# Patient Record
Sex: MALE | Race: WHITE | Employment: OTHER | URBAN - METROPOLITAN AREA
[De-identification: names, ages, dates, MRNs, and addresses within clinical notes are randomized per-mention and may not be internally consistent; named-entity substitution may affect disease eponyms.]

---

## 2017-08-05 ENCOUNTER — APPOINTMENT (OUTPATIENT)
Dept: CT IMAGING | Age: 72
DRG: 123 | End: 2017-08-05
Attending: HOSPITALIST
Payer: MEDICARE

## 2017-08-05 ENCOUNTER — APPOINTMENT (OUTPATIENT)
Dept: MRI IMAGING | Age: 72
DRG: 123 | End: 2017-08-05
Attending: HOSPITALIST
Payer: MEDICARE

## 2017-08-05 ENCOUNTER — APPOINTMENT (OUTPATIENT)
Dept: GENERAL RADIOLOGY | Age: 72
DRG: 123 | End: 2017-08-05
Attending: HOSPITALIST
Payer: MEDICARE

## 2017-08-05 ENCOUNTER — HOSPITAL ENCOUNTER (INPATIENT)
Age: 72
LOS: 2 days | Discharge: HOME | DRG: 123 | End: 2017-08-07
Attending: EMERGENCY MEDICINE | Admitting: HOSPITALIST
Payer: MEDICARE

## 2017-08-05 ENCOUNTER — APPOINTMENT (OUTPATIENT)
Dept: CT IMAGING | Age: 72
DRG: 123 | End: 2017-08-05
Attending: EMERGENCY MEDICINE
Payer: MEDICARE

## 2017-08-05 DIAGNOSIS — I63.9 CEREBROVASCULAR ACCIDENT (CVA), UNSPECIFIED MECHANISM (HCC): Primary | ICD-10-CM

## 2017-08-05 PROBLEM — H51.0: Status: ACTIVE | Noted: 2017-08-05

## 2017-08-05 PROBLEM — H53.8 BLURRY VISION: Status: ACTIVE | Noted: 2017-08-05

## 2017-08-05 PROBLEM — R51.9 HEADACHE: Status: ACTIVE | Noted: 2017-08-05

## 2017-08-05 LAB
ALBUMIN SERPL BCP-MCNC: 4 G/DL (ref 3.2–4.6)
ALBUMIN/GLOB SERPL: 1.1 {RATIO} (ref 1.2–3.5)
ALP SERPL-CCNC: 84 U/L (ref 50–136)
ALT SERPL-CCNC: 35 U/L (ref 12–65)
ANION GAP BLD CALC-SCNC: 11 MMOL/L (ref 7–16)
AST SERPL W P-5'-P-CCNC: 30 U/L (ref 15–37)
BASOPHILS # BLD AUTO: 0 K/UL (ref 0–0.2)
BASOPHILS # BLD: 1 % (ref 0–2)
BILIRUB SERPL-MCNC: 0.8 MG/DL (ref 0.2–1.1)
BUN SERPL-MCNC: 16 MG/DL (ref 8–23)
CALCIUM SERPL-MCNC: 9.8 MG/DL (ref 8.3–10.4)
CHLORIDE SERPL-SCNC: 102 MMOL/L (ref 98–107)
CO2 SERPL-SCNC: 28 MMOL/L (ref 21–32)
CREAT SERPL-MCNC: 1.07 MG/DL (ref 0.8–1.5)
CRP SERPL-MCNC: <0.3 MG/DL (ref 0–0.9)
DIFFERENTIAL METHOD BLD: ABNORMAL
EOSINOPHIL # BLD: 0.2 K/UL (ref 0–0.8)
EOSINOPHIL NFR BLD: 3 % (ref 0.5–7.8)
ERYTHROCYTE [DISTWIDTH] IN BLOOD BY AUTOMATED COUNT: 12.5 % (ref 11.9–14.6)
ERYTHROCYTE [SEDIMENTATION RATE] IN BLOOD: 10 MM/HR (ref 0–20)
EST. AVERAGE GLUCOSE BLD GHB EST-MCNC: 171 MG/DL
GLOBULIN SER CALC-MCNC: 3.5 G/DL (ref 2.3–3.5)
GLUCOSE BLD STRIP.AUTO-MCNC: 186 MG/DL (ref 65–100)
GLUCOSE SERPL-MCNC: 200 MG/DL (ref 65–100)
HBA1C MFR BLD: 7.6 % (ref 4.8–6)
HCT VFR BLD AUTO: 41.8 % (ref 41.1–50.3)
HGB BLD-MCNC: 15.2 G/DL (ref 13.6–17.2)
IMM GRANULOCYTES # BLD: 0 K/UL (ref 0–0.5)
IMM GRANULOCYTES NFR BLD AUTO: 0.2 % (ref 0–5)
INR PPP: 1 (ref 0.9–1.2)
LYMPHOCYTES # BLD AUTO: 34 % (ref 13–44)
LYMPHOCYTES # BLD: 2.1 K/UL (ref 0.5–4.6)
MCH RBC QN AUTO: 31.5 PG (ref 26.1–32.9)
MCHC RBC AUTO-ENTMCNC: 36.4 G/DL (ref 31.4–35)
MCV RBC AUTO: 86.7 FL (ref 79.6–97.8)
MONOCYTES # BLD: 0.5 K/UL (ref 0.1–1.3)
MONOCYTES NFR BLD AUTO: 8 % (ref 4–12)
NEUTS SEG # BLD: 3.4 K/UL (ref 1.7–8.2)
NEUTS SEG NFR BLD AUTO: 54 % (ref 43–78)
PLATELET # BLD AUTO: 150 K/UL (ref 150–450)
PMV BLD AUTO: 10.7 FL (ref 10.8–14.1)
POTASSIUM SERPL-SCNC: 3.6 MMOL/L (ref 3.5–5.1)
PROT SERPL-MCNC: 7.5 G/DL (ref 6.3–8.2)
PROTHROMBIN TIME: 10.5 SEC (ref 9.6–12)
RBC # BLD AUTO: 4.82 M/UL (ref 4.23–5.67)
RPR SER QL: NONREACTIVE
SODIUM SERPL-SCNC: 141 MMOL/L (ref 136–145)
WBC # BLD AUTO: 6.3 K/UL (ref 4.3–11.1)

## 2017-08-05 PROCEDURE — 85652 RBC SED RATE AUTOMATED: CPT | Performed by: HOSPITALIST

## 2017-08-05 PROCEDURE — 86592 SYPHILIS TEST NON-TREP QUAL: CPT | Performed by: HOSPITALIST

## 2017-08-05 PROCEDURE — 96374 THER/PROPH/DIAG INJ IV PUSH: CPT | Performed by: EMERGENCY MEDICINE

## 2017-08-05 PROCEDURE — 96375 TX/PRO/DX INJ NEW DRUG ADDON: CPT | Performed by: EMERGENCY MEDICINE

## 2017-08-05 PROCEDURE — 71020 XR CHEST PA LAT: CPT

## 2017-08-05 PROCEDURE — 74011636637 HC RX REV CODE- 636/637: Performed by: HOSPITALIST

## 2017-08-05 PROCEDURE — 82962 GLUCOSE BLOOD TEST: CPT

## 2017-08-05 PROCEDURE — 93005 ELECTROCARDIOGRAM TRACING: CPT | Performed by: EMERGENCY MEDICINE

## 2017-08-05 PROCEDURE — 74011250637 HC RX REV CODE- 250/637: Performed by: HOSPITALIST

## 2017-08-05 PROCEDURE — 80053 COMPREHEN METABOLIC PANEL: CPT | Performed by: EMERGENCY MEDICINE

## 2017-08-05 PROCEDURE — 74011250636 HC RX REV CODE- 250/636: Performed by: HOSPITALIST

## 2017-08-05 PROCEDURE — 65660000000 HC RM CCU STEPDOWN

## 2017-08-05 PROCEDURE — 86140 C-REACTIVE PROTEIN: CPT | Performed by: HOSPITALIST

## 2017-08-05 PROCEDURE — 70450 CT HEAD/BRAIN W/O DYE: CPT

## 2017-08-05 PROCEDURE — 85025 COMPLETE CBC W/AUTO DIFF WBC: CPT | Performed by: EMERGENCY MEDICINE

## 2017-08-05 PROCEDURE — 85610 PROTHROMBIN TIME: CPT | Performed by: EMERGENCY MEDICINE

## 2017-08-05 PROCEDURE — 83036 HEMOGLOBIN GLYCOSYLATED A1C: CPT | Performed by: HOSPITALIST

## 2017-08-05 PROCEDURE — 99285 EMERGENCY DEPT VISIT HI MDM: CPT | Performed by: EMERGENCY MEDICINE

## 2017-08-05 PROCEDURE — 74011250636 HC RX REV CODE- 250/636: Performed by: EMERGENCY MEDICINE

## 2017-08-05 RX ORDER — LORAZEPAM 2 MG/ML
1 INJECTION INTRAMUSCULAR
Status: COMPLETED | OUTPATIENT
Start: 2017-08-05 | End: 2017-08-05

## 2017-08-05 RX ORDER — VALSARTAN 160 MG/1
160 TABLET ORAL DAILY
Status: DISCONTINUED | OUTPATIENT
Start: 2017-08-06 | End: 2017-08-07 | Stop reason: HOSPADM

## 2017-08-05 RX ORDER — SODIUM CHLORIDE 0.9 % (FLUSH) 0.9 %
5-10 SYRINGE (ML) INJECTION AS NEEDED
Status: DISCONTINUED | OUTPATIENT
Start: 2017-08-05 | End: 2017-08-07 | Stop reason: HOSPADM

## 2017-08-05 RX ORDER — SIMVASTATIN 40 MG/1
40 TABLET, FILM COATED ORAL
Status: DISCONTINUED | OUTPATIENT
Start: 2017-08-05 | End: 2017-08-06

## 2017-08-05 RX ORDER — LEVOTHYROXINE SODIUM 150 UG/1
150 TABLET ORAL
Status: DISCONTINUED | OUTPATIENT
Start: 2017-08-06 | End: 2017-08-07 | Stop reason: HOSPADM

## 2017-08-05 RX ORDER — SODIUM CHLORIDE 0.9 % (FLUSH) 0.9 %
5-10 SYRINGE (ML) INJECTION EVERY 8 HOURS
Status: DISCONTINUED | OUTPATIENT
Start: 2017-08-05 | End: 2017-08-07 | Stop reason: HOSPADM

## 2017-08-05 RX ORDER — HYDROCODONE BITARTRATE AND ACETAMINOPHEN 5; 325 MG/1; MG/1
1 TABLET ORAL
Status: DISCONTINUED | OUTPATIENT
Start: 2017-08-05 | End: 2017-08-07 | Stop reason: HOSPADM

## 2017-08-05 RX ORDER — TOPIRAMATE 50 MG/1
50 TABLET, FILM COATED ORAL 2 TIMES DAILY WITH MEALS
Status: DISCONTINUED | OUTPATIENT
Start: 2017-08-05 | End: 2017-08-07 | Stop reason: HOSPADM

## 2017-08-05 RX ORDER — ONDANSETRON 2 MG/ML
4 INJECTION INTRAMUSCULAR; INTRAVENOUS
Status: DISCONTINUED | OUTPATIENT
Start: 2017-08-05 | End: 2017-08-07 | Stop reason: HOSPADM

## 2017-08-05 RX ORDER — NALOXONE HYDROCHLORIDE 0.4 MG/ML
0.4 INJECTION, SOLUTION INTRAMUSCULAR; INTRAVENOUS; SUBCUTANEOUS AS NEEDED
Status: DISCONTINUED | OUTPATIENT
Start: 2017-08-05 | End: 2017-08-07 | Stop reason: HOSPADM

## 2017-08-05 RX ORDER — INSULIN LISPRO 100 [IU]/ML
INJECTION, SOLUTION INTRAVENOUS; SUBCUTANEOUS
Status: DISCONTINUED | OUTPATIENT
Start: 2017-08-05 | End: 2017-08-07 | Stop reason: HOSPADM

## 2017-08-05 RX ORDER — PREDNISONE 20 MG/1
40 TABLET ORAL
Status: DISCONTINUED | OUTPATIENT
Start: 2017-08-06 | End: 2017-08-07 | Stop reason: HOSPADM

## 2017-08-05 RX ORDER — GUAIFENESIN 100 MG/5ML
81 LIQUID (ML) ORAL DAILY
Status: DISCONTINUED | OUTPATIENT
Start: 2017-08-06 | End: 2017-08-07 | Stop reason: HOSPADM

## 2017-08-05 RX ORDER — FUROSEMIDE 40 MG/1
40 TABLET ORAL DAILY
Status: DISCONTINUED | OUTPATIENT
Start: 2017-08-06 | End: 2017-08-07 | Stop reason: HOSPADM

## 2017-08-05 RX ORDER — ENOXAPARIN SODIUM 100 MG/ML
40 INJECTION SUBCUTANEOUS EVERY 24 HOURS
Status: DISCONTINUED | OUTPATIENT
Start: 2017-08-05 | End: 2017-08-07 | Stop reason: HOSPADM

## 2017-08-05 RX ORDER — NITROGLYCERIN 20 MG/1
1 PATCH TRANSDERMAL DAILY
Status: DISCONTINUED | OUTPATIENT
Start: 2017-08-06 | End: 2017-08-07

## 2017-08-05 RX ORDER — AMLODIPINE BESYLATE 5 MG/1
2.5 TABLET ORAL DAILY
Status: CANCELLED | OUTPATIENT
Start: 2017-08-06

## 2017-08-05 RX ORDER — METOCLOPRAMIDE HYDROCHLORIDE 5 MG/ML
10 INJECTION INTRAMUSCULAR; INTRAVENOUS
Status: COMPLETED | OUTPATIENT
Start: 2017-08-05 | End: 2017-08-05

## 2017-08-05 RX ORDER — ACETAMINOPHEN 325 MG/1
650 TABLET ORAL
Status: DISCONTINUED | OUTPATIENT
Start: 2017-08-05 | End: 2017-08-07 | Stop reason: HOSPADM

## 2017-08-05 RX ORDER — DEXAMETHASONE SODIUM PHOSPHATE 100 MG/10ML
10 INJECTION INTRAMUSCULAR; INTRAVENOUS
Status: COMPLETED | OUTPATIENT
Start: 2017-08-05 | End: 2017-08-05

## 2017-08-05 RX ORDER — METOPROLOL TARTRATE 25 MG/1
25 TABLET, FILM COATED ORAL 2 TIMES DAILY
Status: DISCONTINUED | OUTPATIENT
Start: 2017-08-05 | End: 2017-08-07 | Stop reason: HOSPADM

## 2017-08-05 RX ORDER — PREDNISONE 20 MG/1
60 TABLET ORAL ONCE
Status: COMPLETED | OUTPATIENT
Start: 2017-08-05 | End: 2017-08-05

## 2017-08-05 RX ADMIN — PREDNISONE 60 MG: 20 TABLET ORAL at 22:32

## 2017-08-05 RX ADMIN — Medication 10 ML: at 22:33

## 2017-08-05 RX ADMIN — METOPROLOL TARTRATE 25 MG: 25 TABLET ORAL at 22:32

## 2017-08-05 RX ADMIN — SIMVASTATIN 40 MG: 40 TABLET, FILM COATED ORAL at 22:32

## 2017-08-05 RX ADMIN — DEXAMETHASONE SODIUM PHOSPHATE 10 MG: 10 INJECTION INTRAMUSCULAR; INTRAVENOUS at 18:31

## 2017-08-05 RX ADMIN — INSULIN LISPRO 2 UNITS: 100 INJECTION, SOLUTION INTRAVENOUS; SUBCUTANEOUS at 22:31

## 2017-08-05 RX ADMIN — LORAZEPAM 1 MG: 2 INJECTION INTRAMUSCULAR; INTRAVENOUS at 18:30

## 2017-08-05 RX ADMIN — METOCLOPRAMIDE 10 MG: 5 INJECTION, SOLUTION INTRAMUSCULAR; INTRAVENOUS at 18:31

## 2017-08-05 RX ADMIN — ENOXAPARIN SODIUM 40 MG: 40 INJECTION SUBCUTANEOUS at 22:32

## 2017-08-05 NOTE — IP AVS SNAPSHOT
Kev Burton 
 
 
 [address] [facility] [address] [facility] 
[phone] Patient: Alexis Vieyra MRN: [id number] FXO:[id number] You are allergic to the following No active allergies Recent Documentation Height Weight BMI  
  
  
 1.854 m 96.2 kg 27.97 kg/m2 Emergency Contacts Name Discharge Info Relation Home Work Mobile Danya Xie  Spouse [3] [phone] About your hospitalization You were admitted on:  August 5, 2017 You last received care in the:  [facility] 6 MED SURG You were discharged on:  August 7, 2017 Unit phone number:  [phone] Why you were hospitalized Your primary diagnosis was:  Not on File Your diagnoses also included:  Blurry Vision, Paralysis Of Conjugate Gaze, Headache Providers Seen During Your Hospitalizations Provider Role Specialty Primary office phone Estelita Cabral MD Attending Provider Emergency Medicine [phone] Christelle Jordan MD Attending Provider Internal Medicine [phone] Your Primary Care Physician (PCP) Primary Care Physician Office Phone Office Fax UNKNOWN, PROVIDER ** None ** ** None ** Follow-up Information Follow up With Details Comments Contact Info [address] [facility]  patient will need to call and schedule. [facility] 
[phone] Ella Hickman MD  we will notify you of your appointment date and time y [address] [facility] for [address] [place] [address] [address] [place] [address] 
[phone] Current Discharge Medication List  
  
START taking these medications Dose & Instructions Dispensing Information Comments Morning Noon Evening Bedtime  
 atorvastatin 40 mg tablet Commonly known as:  LIPITOR Your next dose is: This evening Dose:  40 mg Take 1 Tab by mouth nightly. Quantity:  30 Tab Refills:  0 butalbital-acetaminophen-caffeine -40 mg per tablet Commonly known as:  Georgiann Pallas Your next dose is: Take on as needed schedule Dose:  1 Tab Take 1 Tab by mouth every six (6) hours as needed for Headache. Max Daily Amount: 4 Tabs. Quantity:  30 Tab Refills:  0  
     
   
   
   
  
 predniSONE 20 mg tablet Commonly known as:  Savage Aver Your next dose is:  Tomorrow Morning Take 40 mg x 3 days, then 20 mg x 3 days, then10 mg x 3 days then stop Quantity:  11 Tab Refills:  0  
     
  
   
   
   
  
 topiramate 50 mg tablet Commonly known as:  TOPAMAX Your next dose is: This evening Dose:  50 mg Take 1 Tab by mouth two (2) times daily (with meals). Quantity:  60 Tab Refills:  0 CONTINUE these medications which have NOT CHANGED Dose & Instructions Dispensing Information Comments Morning Noon Evening Bedtime  
 aspirin 81 mg chewable tablet Your next dose is:  Tomorrow Morning Dose:  81 mg Take 81 mg by mouth daily. Refills:  0  
     
  
   
   
   
  
 cholecalciferol 400 unit Tab tablet Commonly known as:  VITAMIN D3 Your next dose is:  Tomorrow Morning Dose:  400 Units Take 400 Units by mouth daily. Refills:  0  
     
  
   
   
   
  
 cyanocobalamin 1,000 mcg tablet Your next dose is:  Tomorrow Morning Dose:  1000 mcg Take 1,000 mcg by mouth daily. Refills:  0  
     
  
   
   
   
  
 glipiZIDE 5 mg tablet Commonly known as:  Ora Kitchen Your next dose is: This evening Dose:  5 mg Take 5 mg by mouth two (2) times a day. Refills:  0  
     
  
   
   
  
   
  
 LASIX 40 mg tablet Generic drug:  furosemide Your next dose is:  Tomorrow Morning Take  by mouth daily. Refills:  0  
     
  
   
   
   
  
 metFORMIN 1,000 mg tablet Commonly known as:  GLUCOPHAGE Your next dose is: This evening Dose:  1000 mg Take 1,000 mg by mouth two (2) times daily (with meals). Refills:  0  
     
  
   
   
  
   
  
 metoprolol tartrate 50 mg tablet Commonly known as:  LOPRESSOR Your next dose is: This evening Dose:  25 mg Take 0.5 Tabs by mouth two (2) times a day. Quantity:  60 Tab Refills:  2 NITRO-DUR 0.1 mg/hr Generic drug:  nitroglycerin Dose:  1 Patch 1 Patch by TransDERmal route daily. Refills:  0 NORVASC 2.5 mg tablet Generic drug:  amLODIPine Your next dose is:  Tomorrow Morning Take  by mouth daily. Refills:  0  
     
  
   
   
   
  
 SYNTHROID 125 mcg tablet Generic drug:  levothyroxine Your next dose is:  Tomorrow Morning Dose:  150 mcg Take 150 mcg by mouth Daily (before breakfast). Refills:  0 STOP taking these medications ZOCOR 10 mg tablet Generic drug:  simvastatin Where to Get Your Medications Information on where to get these meds will be given to you by the nurse or doctor. ! Ask your nurse or doctor about these medications  
  atorvastatin 40 mg tablet  
 butalbital-acetaminophen-caffeine -40 mg per tablet  
 predniSONE 20 mg tablet  
 topiramate 50 mg tablet Discharge Instructions DISCHARGE SUMMARY from Nurse The following personal items are in your possession at time of discharge: 
 
Dental Appliances: Uppers, Lowers, With patient Visual Aid: Glasses Home Medications: None Jewelry: None Clothing: Shirt, Pants, Socks, Footwear, At bedside Other Valuables: Cell Phone PATIENT INSTRUCTIONS: 
 
After general anesthesia or intravenous sedation, for 24 hours or while taking prescription Narcotics: · Limit your activities · Do not drive and operate hazardous machinery · Do not make important personal or business decisions · Do  not drink alcoholic beverages · If you have not urinated within 8 hours after discharge, please contact your surgeon on call. Report the following to your surgeon: 
· Excessive pain, swelling, redness or odor of or around the surgical area · Temperature over 100.5 · Nausea and vomiting lasting longer than 4 hours or if unable to take medications · Any signs of decreased circulation or nerve impairment to extremity: change in color, persistent  numbness, tingling, coldness or increase pain · Any questions What to do at Home: 
Recommended activity: Activity as tolerated, If you experience any of the following symptoms fever greater than 101, new or unrelieved pain,dizziness, chest pain or shortness of breath , please follow up with MD. 
 
 
 
Recognize signs and symptoms of STROKE: 
 
 F-face looks uneven A-arms unable to move or move unevenly S-speech slurred or non-existent T-time-call 911 as soon as signs and symptoms begin-DO NOT go Back to bed or wait to see if you get better-TIME IS BRAIN. Warning Signs of HEART ATTACK Call 911 if you have these symptoms: 
? Chest discomfort. Most heart attacks involve discomfort in the center of the chest that lasts more than a few minutes, or that goes away and comes back. It can feel like uncomfortable pressure, squeezing, fullness, or pain. ? Discomfort in other areas of the upper body. Symptoms can include pain or discomfort in one or both arms, the back, neck, jaw, or stomach. ? Shortness of breath with or without chest discomfort. ? Other signs may include breaking out in a cold sweat, nausea, or lightheadedness. Don't wait more than five minutes to call [address]! Fast action can save your life. Calling 911 is almost always the fastest way to get lifesaving treatment. Emergency Medical Services staff can begin treatment when they arrive  up to an hour sooner than if someone gets to the hospital by car. The discharge information has been reviewed with the patient. The patient verbalized understanding. Discharge medications reviewed with the patient and appropriate educational materials and side effects teaching were provided. Discharge Orders None [place] Announcement We are excited to announce that we are making your provider's discharge notes available to you in [employer]. You will see these notes when they are completed and signed by the physician that discharged you from your recent hospital stay. If you have any questions or concerns about any information you see in [employer], please call the Health Information Department where you were seen or reach out to your Primary Care Provider for more information about your plan of care. Introducing [facility]! Una Flores introduces [employer] patient portal. Now you can access parts of your medical record, email your doctor's office, and request medication refills online. 1. In your internet browser, go to [email or web address] 2. Click on the First Time User? Click Here link in the Sign In box. You will see the New Member Sign Up page. 3. Enter your [employer] Access Code exactly as it appears below. You will not need to use this code after youve completed the sign-up process. If you do not sign up before the expiration date, you must request a new code. · [employer] Access Code: [email or web address] Expires: 11/3/2017  4:35 PM 
 
4. Enter the last four digits of your Social Security Number (xxxx) and Date of Birth (mm/dd/yyyy) as indicated and click Submit. You will be taken to the next sign-up page. 5. Create a [employer] ID. This will be your [employer] login ID and cannot be changed, so think of one that is secure and easy to remember. 6. Create a [employer] password. You can change your password at any time. 7. Enter your Password Reset Question and Answer. This can be used at a later time if you forget your password. 8. Enter your e-mail address. You will receive e-mail notification when new information is available in [address] 9. Click Sign Up. You can now view and download portions of your medical record. 10. Click the Download Summary menu link to download a portable copy of your medical information. If you have questions, please visit the Frequently Asked Questions section of the [employer] website. Remember, [employer] is NOT to be used for urgent needs. For medical emergencies, dial 911. Now available from your iPhone and Android! General Information Please provide this summary of care documentation to your next provider. Patient Signature:  ____________________________________________________________ Date:  ____________________________________________________________  
  
Eav Ciaran Provider Signature:  ____________________________________________________________ Date:  ____________________________________________________________

## 2017-08-05 NOTE — ED PROVIDER NOTES
HPI Comments: 58-year-old male presents with a seven-day history of blurred vision and double vision. This is associated with a right sided headache which radiates into his neck. Headache does seem to be worse with bright lights. Patient states he went to the [place] for evaluation last Saturday; they performed a CAT scan which he has not heard the results of and plain x-rays of his neck which only revealed some degenerative change. Patient denies history of headaches up until this event. Patient has had no fever vomiting or diarrhea  Patient denies trauma    Patient presents today due to worsening right-sided headache and persistent blurriness and double vision with his binocular vision. Patient clearly states that when he only uses one eye, his vision is clear and normal.  This is the same for either of his eyes. Patient and caregiver report that he was seen by ophthalmology  In the middle of the week. They felt that he did not have a direct ophthalmologic problem but were concerned that he may have had a \"stroke\"  The patient is unaware of any other specific instructions or treatment that the ophthalmologist advised. Patient is a 74 y.o. male presenting with visual disturbance. The history is provided by the patient and a friend. Vision Change    This is a new problem. Episode onset: abrupt onset of symptoms 7 days ago. The problem occurs constantly. The problem has been gradually worsening. Affected eye: patient reports blurry and double vision with binocular vision states that his vision is clear when he closes either eye. The injury mechanism was none. The pain is moderate. There is no history of trauma to the eye. There is no known exposure to pink eye. He does not wear contacts. Associated symptoms include blurred vision, double vision and photophobia.  Pertinent negatives include no numbness, no discharge, no foreign body sensation, no eye redness, no nausea, no vomiting, no tingling, no weakness, no itching, no fever, no pain, no blindness, no head injury and no dizziness. He has tried nothing for the symptoms. Past Medical History:   Diagnosis Date    CAD (Coronary Artery Disease)     Endocrine Disease     hypothyriod    Heart Failure ([address]     Hypertension        Past Surgical History:   Procedure Laterality Date    CARDIAC SURG PROCEDURE UNLIST      3 vessel bypass    HX CHOLECYSTECTOMY           No family history on file. Social History     Social History    Marital status:      Spouse name: N/A    Number of children: N/A    Years of education: N/A     Occupational History    Not on file. Social History Main Topics    Smoking status: Never Smoker    Smokeless tobacco: Not on file    Alcohol use No    Drug use: No    Sexual activity: Not on file     Other Topics Concern    Not on file     Social History Narrative    No narrative on file         ALLERGIES: Review of patient's allergies indicates no known allergies. Review of Systems   Constitutional: Negative for activity change, chills, diaphoresis and fever. HENT: Negative for dental problem, hearing loss, nosebleeds, rhinorrhea and sore throat. Eyes: Positive for blurred vision, double vision, photophobia and visual disturbance. Negative for blindness, pain, discharge and redness. Respiratory: Negative for cough, chest tightness and shortness of breath. Cardiovascular: Negative for chest pain, palpitations and leg swelling. Gastrointestinal: Negative for abdominal pain, constipation, diarrhea, nausea and vomiting. Endocrine: Negative for cold intolerance, heat intolerance, polydipsia and polyuria. Genitourinary: Negative for dysuria and flank pain. Musculoskeletal: Negative for arthralgias, back pain, joint swelling, myalgias and neck pain. Skin: Negative for itching, pallor and rash. Allergic/Immunologic: Negative for environmental allergies and food allergies.    Neurological: Positive for headaches. Negative for dizziness, tingling, tremors, seizures, syncope, weakness, light-headedness and numbness. Hematological: Negative for adenopathy. Does not bruise/bleed easily. Psychiatric/Behavioral: Negative for confusion and dysphoric mood. The patient is not nervous/anxious and is not hyperactive. All other systems reviewed and are negative. Vitals:    08/05/17 1651   BP: 149/83   Pulse: 79   Resp: 16   Temp: 97.8 °F (36.6 °C)   SpO2: 95%   Weight: 96.2 kg (212 lb)   Height: 6' 1\" (1.854 m)            Physical Exam   Constitutional: He is oriented to person, place, and time. He appears well-developed and well-nourished. He appears distressed. HENT:   Head: Normocephalic and atraumatic. Mouth/Throat: Oropharynx is clear and moist.   Eyes: Conjunctivae and EOM are normal. Pupils are equal, round, and reactive to light. Right eye exhibits no discharge. Left eye exhibits no discharge. No scleral icterus. Right eye gaze palsy. Patient is unable to deviate his right eye laterally  Pupils are equal round and reactive to light     Neck: Normal range of motion. Neck supple. No JVD present. Cardiovascular: Normal rate, regular rhythm and intact distal pulses. Exam reveals no gallop and no friction rub. Murmur heard. Pulmonary/Chest: Effort normal and breath sounds normal. No respiratory distress. He has no wheezes. Abdominal: Soft. Bowel sounds are normal. He exhibits no distension. There is no hepatosplenomegaly. There is no tenderness. There is no rebound and no guarding. Musculoskeletal: Normal range of motion. He exhibits no edema or tenderness. Lymphadenopathy:     He has no cervical adenopathy. Neurological: He is alert and oriented to person, place, and time. He has normal strength. No cranial nerve deficit or sensory deficit. He exhibits normal muscle tone. GCS eye subscore is 4. GCS verbal subscore is 5. GCS motor subscore is 6. Skin: Skin is warm and dry. No rash noted. He is not diaphoretic. No erythema. Psychiatric: He has a normal mood and affect. His speech is normal and behavior is normal. Judgment and thought content normal. Cognition and memory are normal.   Nursing note and vitals reviewed. MDM  Number of Diagnoses or Management Options  Cerebrovascular accident (CVA), unspecified mechanism [facility]): new and requires workup  Diagnosis management comments: Differential diagnosis includes stroke aneurysm electrolyte abnormality atypical migraine per patient's history the ophthalmologic consultants have ruled out a pure ophthalmologic issue. 6:07 PM  CT is negative for an acute stroke bleed or mass effect. CBC and see med are within normal limits  Currently we will attempt to treat the patient's headache to help exclude a migraine etiology    7:25 PM  Patient headache relieved with medications. Unfortunately, the vision changes are unchanged. I will consult with the hospitalist for admission for full stroke workup. Amount and/or Complexity of Data Reviewed  Clinical lab tests: ordered and reviewed  Tests in the radiology section of CPT®: ordered and reviewed  Tests in the medicine section of CPT®: ordered and reviewed  Obtain history from someone other than the patient: yes  Review and summarize past medical records: yes  Discuss the patient with other providers: yes    Risk of Complications, Morbidity, and/or Mortality  Presenting problems: high  Diagnostic procedures: high  Management options: high  General comments: Elements of this note have been dictated via voice recognition software. Text and phrases may be limited by the accuracy of the software. The chart has been reviewed, but errors may still be present.       Critical Care  Total time providing critical care: 30-74 minutes    Patient Progress  Patient progress: improved    ED Course       Procedures

## 2017-08-05 NOTE — H&P
HOSPITALIST H&P/CONSULT  NAME:  Jeri Jacobs   Age:  74 y.o.  :   1945   MRN:   [id number]  PCP: PROVIDER Everardo Claros  Consulting MD:  Treatment Team: Attending Provider: Izabella Gallegos MD; Primary Nurse: Leena Reid RN  HPI:   Patient is 74years old male with pmhx of HTN, congestive HF unknown type hypothyroidism, dyslipidemia, CAD s/p CABG x4 and PCI who presented to ER with chief complaints of 1 week history of blurry vision and headaches. Pt was in his usual health until about a week ago when he had sudden onset of binocular blurry vision for which he saw ophthalmologist who mentioned that pt probably had a stroke with no other interventions offered. Pt is also having worsening headaches, mostly right sided and mostly located behind the eye. Pt denies scalp tenderness or jaw claudication. He complains of diplopia with no fever, nausea, vomiting, chest pain, SOB, weakness or numbness/tingling in extremities. He denies any recent travel, change in medications, sick contacts. Complete ROS done and is as stated in HPI or otherwise negative  Past Medical History:   Diagnosis Date    CAD (coronary artery disease)     Endocrine disease     hypothyriod    Headache 2017    Heart failure ([address]     Hypertension       Past Surgical History:   Procedure Laterality Date    CARDIAC SURG PROCEDURE UNLIST      3 vessel bypass    HX CHOLECYSTECTOMY        Prior to Admission Medications   Prescriptions Last Dose Informant Patient Reported? Taking?   amlodipine (NORVASC) 2.5 mg tablet   Yes No   Sig: Take  by mouth daily. levothyroxine (SYNTHROID) 125 mcg tablet   Yes No   Sig: Take  by mouth daily (before breakfast). metoprolol (LOPRESSOR) 50 mg tablet   No No   Sig: Take 0.5 Tabs by mouth two (2) times a day. nitroglycerin (NITRO-DUR) 0.1 mg/hr   Yes No   Si Patch by TransDERmal route daily. simvastatin (ZOCOR) 10 mg tablet   Yes No   Sig: Take  by mouth nightly. Facility-Administered Medications: None     No Known Allergies   Social History   Substance Use Topics    Smoking status: Never Smoker    Smokeless tobacco: Not on file    Alcohol use No      No family history on file. Objective:     Visit Vitals    /83 (BP 1 Location: Right arm, BP Patient Position: At rest)    Pulse 79    Temp 97.8 °F (36.6 °C)    Resp 16    Ht 6' 1\" (1.854 m)    Wt 96.2 kg (212 lb)    SpO2 95%    BMI 27.97 kg/m2      Temp (24hrs), Av.8 °F (36.6 °C), Min:97.8 °F (36.6 °C), Max:97.8 °F (36.6 °C)    Oxygen Therapy  O2 Sat (%): 95 % (17)  O2 Device: Room air (17)  Physical Exam:  General:    NAD  HEENT:           Head AT/NC, PERRLA+, no pallor or ict, MMM, neck supple. Lungs:   Clear to auscultation bilaterally. No Wheezing or Rhonchi. No rales. Heart:   Regular rate and rhythm,  no murmur, rub or gallop. Abdomen:   Soft, non-tender. Not distended. Bowel sounds normal.   Extremities: No cyanosis. No edema. No clubbing  Skin:     Texture, turgor normal. No rashes or lesions. Not Jaundiced  Neurologic: GCS 15, right lateral gaze paralysis, with right temporal hemianopsia. No motor or sensory deficits, cerebellar functions intact. Psych:           AO x 3, mood and affect anxious       Data Review:   Recent Results (from the past 24 hour(s))   CBC WITH AUTOMATED DIFF    Collection Time: 17  4:54 PM   Result Value Ref Range    WBC 6.3 4.3 - 11.1 K/uL    RBC 4.82 4.23 - 5.67 M/uL    HGB 15.2 13.6 - 17.2 g/dL    HCT 41.8 41.1 - 50.3 %    MCV 86.7 79.6 - 97.8 FL    MCH 31.5 26.1 - 32.9 PG    MCHC 36.4 (H) 31.4 - 35.0 g/dL    RDW 12.5 11.9 - 14.6 %    PLATELET 352 199 - 066 K/uL    MPV 10.7 (L) 10.8 - 14.1 FL    DF AUTOMATED      NEUTROPHILS 54 43 - 78 %    LYMPHOCYTES 34 13 - 44 %    MONOCYTES 8 4.0 - 12.0 %    EOSINOPHILS 3 0.5 - 7.8 %    BASOPHILS 1 0.0 - 2.0 %    IMMATURE GRANULOCYTES 0.2 0.0 - 5.0 %    ABS.  NEUTROPHILS 3.4 1.7 - 8.2 K/UL ABS. LYMPHOCYTES 2.1 0.5 - 4.6 K/UL    ABS. MONOCYTES 0.5 0.1 - 1.3 K/UL    ABS. EOSINOPHILS 0.2 0.0 - 0.8 K/UL    ABS. BASOPHILS 0.0 0.0 - 0.2 K/UL    ABS. IMM. GRANS. 0.0 0.0 - 0.5 K/UL   METABOLIC PANEL, COMPREHENSIVE    Collection Time: 08/05/17  4:54 PM   Result Value Ref Range    Sodium 141 136 - 145 mmol/L    Potassium 3.6 3.5 - 5.1 mmol/L    Chloride 102 98 - 107 mmol/L    CO2 28 21 - 32 mmol/L    Anion gap 11 7 - 16 mmol/L    Glucose 200 (H) 65 - 100 mg/dL    BUN 16 8 - 23 MG/DL    Creatinine 1.07 0.8 - 1.5 MG/DL    GFR est AA >60 >60 ml/min/1.73m2    GFR est non-AA >60 >60 ml/min/1.73m2    Calcium 9.8 8.3 - 10.4 MG/DL    Bilirubin, total 0.8 0.2 - 1.1 MG/DL    ALT (SGPT) 35 12 - 65 U/L    AST (SGOT) 30 15 - 37 U/L    Alk. phosphatase 84 50 - 136 U/L    Protein, total 7.5 6.3 - 8.2 g/dL    Albumin 4.0 3.2 - 4.6 g/dL    Globulin 3.5 2.3 - 3.5 g/dL    A-G Ratio 1.1 (L) 1.2 - 3.5     PROTHROMBIN TIME + INR    Collection Time: 08/05/17  4:54 PM   Result Value Ref Range    Prothrombin time 10.5 9.6 - 12.0 sec    INR 1.0 0.9 - 1.2       Imaging /Procedures /Studies   CT head w/o contrast:  Impression:   1. No evidence of hemorrhage. 2. 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 Will follow up with ESR, CRP. Will start on Prednisone. Temporal artery biopsy can be needed. ·  to r/o stroke, MRI/MRA can't be done because of pacemaker. Will get CTA head and carotid dopplers. Will also get 2d-echo. · For headache, tylenol and topiramate.   · Tele neurology consult in AM.  · Continue home meds as reconciled in [facility] for HTN, Dyslipidemia, hypothyroidism, CHF. · DVT prophylaxis with lovenox.   · SSI, and HbA1c  · PRN tylenol for fever    Code Status: full    Anticipated discharge: > 2MN    Signed By: Magali Merrill MD     August 5, 2017

## 2017-08-05 NOTE — ED TRIAGE NOTES
Pt. Reports blurred vision/difficulty seeing out of his right eye. Stated that this started a week ago. States he went to the [place] and had a CT but has not been notified of the results. States he has now started to have headaches that come and go.

## 2017-08-06 ENCOUNTER — APPOINTMENT (OUTPATIENT)
Dept: ULTRASOUND IMAGING | Age: 72
DRG: 123 | End: 2017-08-06
Attending: HOSPITALIST
Payer: MEDICARE

## 2017-08-06 ENCOUNTER — APPOINTMENT (OUTPATIENT)
Dept: CT IMAGING | Age: 72
DRG: 123 | End: 2017-08-06
Attending: HOSPITALIST
Payer: MEDICARE

## 2017-08-06 LAB
ALBUMIN SERPL BCP-MCNC: 4 G/DL (ref 3.2–4.6)
ALBUMIN/GLOB SERPL: 1 {RATIO} (ref 1.2–3.5)
ALP SERPL-CCNC: 68 U/L (ref 50–136)
ALT SERPL-CCNC: 35 U/L (ref 12–65)
ANION GAP BLD CALC-SCNC: 13 MMOL/L (ref 7–16)
AST SERPL W P-5'-P-CCNC: 22 U/L (ref 15–37)
ATRIAL RATE: 71 BPM
ATRIAL RATE: 85 BPM
BILIRUB SERPL-MCNC: 0.9 MG/DL (ref 0.2–1.1)
BUN SERPL-MCNC: 18 MG/DL (ref 8–23)
CALCIUM SERPL-MCNC: 9.7 MG/DL (ref 8.3–10.4)
CALCULATED P AXIS, ECG09: 41 DEGREES
CALCULATED P AXIS, ECG09: 42 DEGREES
CALCULATED R AXIS, ECG10: -82 DEGREES
CALCULATED R AXIS, ECG10: -85 DEGREES
CALCULATED T AXIS, ECG11: 90 DEGREES
CALCULATED T AXIS, ECG11: 96 DEGREES
CHLORIDE SERPL-SCNC: 102 MMOL/L (ref 98–107)
CO2 SERPL-SCNC: 23 MMOL/L (ref 21–32)
CREAT SERPL-MCNC: 1.12 MG/DL (ref 0.8–1.5)
DIAGNOSIS, 93000: NORMAL
DIAGNOSIS, 93000: NORMAL
ERYTHROCYTE [DISTWIDTH] IN BLOOD BY AUTOMATED COUNT: 12.5 % (ref 11.9–14.6)
EST. AVERAGE GLUCOSE BLD GHB EST-MCNC: 166 MG/DL
GLOBULIN SER CALC-MCNC: 4 G/DL (ref 2.3–3.5)
GLUCOSE BLD STRIP.AUTO-MCNC: 271 MG/DL (ref 65–100)
GLUCOSE BLD STRIP.AUTO-MCNC: 337 MG/DL (ref 65–100)
GLUCOSE BLD STRIP.AUTO-MCNC: 343 MG/DL (ref 65–100)
GLUCOSE BLD STRIP.AUTO-MCNC: 359 MG/DL (ref 65–100)
GLUCOSE BLD STRIP.AUTO-MCNC: 361 MG/DL (ref 65–100)
GLUCOSE SERPL-MCNC: 293 MG/DL (ref 65–100)
HBA1C MFR BLD: 7.4 % (ref 4.8–6)
HCT VFR BLD AUTO: 44.6 % (ref 41.1–50.3)
HGB BLD-MCNC: 16.4 G/DL (ref 13.6–17.2)
MCH RBC QN AUTO: 31.4 PG (ref 26.1–32.9)
MCHC RBC AUTO-ENTMCNC: 36.8 G/DL (ref 31.4–35)
MCV RBC AUTO: 85.3 FL (ref 79.6–97.8)
P-R INTERVAL, ECG05: 124 MS
P-R INTERVAL, ECG05: 184 MS
PLATELET # BLD AUTO: 167 K/UL (ref 150–450)
PMV BLD AUTO: 10.9 FL (ref 10.8–14.1)
POTASSIUM SERPL-SCNC: 3.8 MMOL/L (ref 3.5–5.1)
PROT SERPL-MCNC: 8 G/DL (ref 6.3–8.2)
Q-T INTERVAL, ECG07: 466 MS
Q-T INTERVAL, ECG07: 468 MS
QRS DURATION, ECG06: 186 MS
QRS DURATION, ECG06: 188 MS
QTC CALCULATION (BEZET), ECG08: 506 MS
QTC CALCULATION (BEZET), ECG08: 556 MS
RBC # BLD AUTO: 5.23 M/UL (ref 4.23–5.67)
SODIUM SERPL-SCNC: 138 MMOL/L (ref 136–145)
VENTRICULAR RATE, ECG03: 71 BPM
VENTRICULAR RATE, ECG03: 85 BPM
WBC # BLD AUTO: 10 K/UL (ref 4.3–11.1)

## 2017-08-06 PROCEDURE — 80053 COMPREHEN METABOLIC PANEL: CPT | Performed by: HOSPITALIST

## 2017-08-06 PROCEDURE — 74011636637 HC RX REV CODE- 636/637: Performed by: HOSPITALIST

## 2017-08-06 PROCEDURE — 97161 PT EVAL LOW COMPLEX 20 MIN: CPT

## 2017-08-06 PROCEDURE — 85027 COMPLETE CBC AUTOMATED: CPT | Performed by: HOSPITALIST

## 2017-08-06 PROCEDURE — 83036 HEMOGLOBIN GLYCOSYLATED A1C: CPT | Performed by: HOSPITALIST

## 2017-08-06 PROCEDURE — 74011250636 HC RX REV CODE- 250/636: Performed by: HOSPITALIST

## 2017-08-06 PROCEDURE — 82962 GLUCOSE BLOOD TEST: CPT

## 2017-08-06 PROCEDURE — 93005 ELECTROCARDIOGRAM TRACING: CPT | Performed by: HOSPITALIST

## 2017-08-06 PROCEDURE — 77030012893

## 2017-08-06 PROCEDURE — 93880 EXTRACRANIAL BILAT STUDY: CPT

## 2017-08-06 PROCEDURE — 74011250637 HC RX REV CODE- 250/637: Performed by: HOSPITALIST

## 2017-08-06 PROCEDURE — 36415 COLL VENOUS BLD VENIPUNCTURE: CPT | Performed by: HOSPITALIST

## 2017-08-06 PROCEDURE — 74011250637 HC RX REV CODE- 250/637: Performed by: INTERNAL MEDICINE

## 2017-08-06 PROCEDURE — 70496 CT ANGIOGRAPHY HEAD: CPT

## 2017-08-06 PROCEDURE — 74011636320 HC RX REV CODE- 636/320: Performed by: HOSPITALIST

## 2017-08-06 PROCEDURE — 65660000000 HC RM CCU STEPDOWN

## 2017-08-06 PROCEDURE — 74011000258 HC RX REV CODE- 258: Performed by: HOSPITALIST

## 2017-08-06 RX ORDER — CYANOCOBALAMIN (VITAMIN B-12) 500 MCG
400 TABLET ORAL DAILY
COMMUNITY
End: 2018-11-13

## 2017-08-06 RX ORDER — SODIUM CHLORIDE 0.9 % (FLUSH) 0.9 %
10 SYRINGE (ML) INJECTION
Status: COMPLETED | OUTPATIENT
Start: 2017-08-06 | End: 2017-08-06

## 2017-08-06 RX ORDER — GLIPIZIDE 5 MG/1
5 TABLET ORAL 2 TIMES DAILY
COMMUNITY
End: 2018-01-09

## 2017-08-06 RX ORDER — FUROSEMIDE 40 MG/1
TABLET ORAL DAILY
COMMUNITY
End: 2018-02-06 | Stop reason: SDUPTHER

## 2017-08-06 RX ORDER — GUAIFENESIN 100 MG/5ML
81 LIQUID (ML) ORAL
COMMUNITY
End: 2020-08-21 | Stop reason: SDUPTHER

## 2017-08-06 RX ORDER — ATORVASTATIN CALCIUM 40 MG/1
40 TABLET, FILM COATED ORAL
Status: DISCONTINUED | OUTPATIENT
Start: 2017-08-06 | End: 2017-08-07 | Stop reason: HOSPADM

## 2017-08-06 RX ORDER — METFORMIN HYDROCHLORIDE 1000 MG/1
1000 TABLET ORAL 2 TIMES DAILY WITH MEALS
COMMUNITY
End: 2018-02-28 | Stop reason: SDUPTHER

## 2017-08-06 RX ORDER — BUTALBITAL, ACETAMINOPHEN AND CAFFEINE 50; 325; 40 MG/1; MG/1; MG/1
1 TABLET ORAL
Status: DISCONTINUED | OUTPATIENT
Start: 2017-08-06 | End: 2017-08-07 | Stop reason: HOSPADM

## 2017-08-06 RX ORDER — GLIPIZIDE 5 MG/1
5 TABLET ORAL 2 TIMES DAILY WITH MEALS
Status: DISCONTINUED | OUTPATIENT
Start: 2017-08-06 | End: 2017-08-07 | Stop reason: HOSPADM

## 2017-08-06 RX ORDER — METFORMIN HYDROCHLORIDE 500 MG/1
1000 TABLET ORAL 2 TIMES DAILY WITH MEALS
Status: DISCONTINUED | OUTPATIENT
Start: 2017-08-06 | End: 2017-08-07 | Stop reason: HOSPADM

## 2017-08-06 RX ORDER — LANOLIN ALCOHOL/MO/W.PET/CERES
1000 CREAM (GRAM) TOPICAL DAILY
COMMUNITY
End: 2018-11-13

## 2017-08-06 RX ADMIN — INSULIN LISPRO 10 UNITS: 100 INJECTION, SOLUTION INTRAVENOUS; SUBCUTANEOUS at 17:16

## 2017-08-06 RX ADMIN — TOPIRAMATE 50 MG: 50 TABLET, FILM COATED ORAL at 08:59

## 2017-08-06 RX ADMIN — METOPROLOL TARTRATE 25 MG: 25 TABLET ORAL at 17:15

## 2017-08-06 RX ADMIN — INSULIN LISPRO 12 UNITS: 100 INJECTION, SOLUTION INTRAVENOUS; SUBCUTANEOUS at 12:07

## 2017-08-06 RX ADMIN — Medication 10 ML: at 23:46

## 2017-08-06 RX ADMIN — TOPIRAMATE 50 MG: 50 TABLET, FILM COATED ORAL at 17:15

## 2017-08-06 RX ADMIN — SODIUM CHLORIDE 100 ML: 900 INJECTION, SOLUTION INTRAVENOUS at 06:48

## 2017-08-06 RX ADMIN — GLIPIZIDE 5 MG: 5 TABLET ORAL at 17:15

## 2017-08-06 RX ADMIN — ENOXAPARIN SODIUM 40 MG: 40 INJECTION SUBCUTANEOUS at 21:35

## 2017-08-06 RX ADMIN — LEVOTHYROXINE SODIUM 150 MCG: 150 TABLET ORAL at 06:02

## 2017-08-06 RX ADMIN — PREDNISONE 40 MG: 20 TABLET ORAL at 08:58

## 2017-08-06 RX ADMIN — VALSARTAN 160 MG: 160 TABLET, FILM COATED ORAL at 08:59

## 2017-08-06 RX ADMIN — IOPAMIDOL 80 ML: 755 INJECTION, SOLUTION INTRAVENOUS at 06:48

## 2017-08-06 RX ADMIN — Medication 10 ML: at 14:23

## 2017-08-06 RX ADMIN — Medication 10 ML: at 06:48

## 2017-08-06 RX ADMIN — INSULIN LISPRO 6 UNITS: 100 INJECTION, SOLUTION INTRAVENOUS; SUBCUTANEOUS at 09:01

## 2017-08-06 RX ADMIN — FUROSEMIDE 40 MG: 40 TABLET ORAL at 08:59

## 2017-08-06 RX ADMIN — METOPROLOL TARTRATE 25 MG: 25 TABLET ORAL at 08:59

## 2017-08-06 RX ADMIN — INSULIN LISPRO 10 UNITS: 100 INJECTION, SOLUTION INTRAVENOUS; SUBCUTANEOUS at 20:47

## 2017-08-06 RX ADMIN — ASPIRIN 81 MG 81 MG: 81 TABLET ORAL at 08:59

## 2017-08-06 RX ADMIN — ATORVASTATIN CALCIUM 40 MG: 40 TABLET, FILM COATED ORAL at 21:34

## 2017-08-06 NOTE — PROGRESS NOTES
Problem: Mobility Impaired (Adult and Pediatric)  Goal: *Acute Goals and Plan of Care (Insert Text)  LTG:  (1.)Mr. Schwartz will move from supine to sit and sit to supine, scoot up and down and roll side to side INDEPENDENT within 7 day(s). (2.)Mr. Schwartz will transfer from bed to chair and chair to bed INDEPENDENTLY within 7 day(s). (3.)Mr. Schwartz will ambulate with INDEPENDENT for 500+ feet with good balance and safety awareness using the least restrictive or no device within 7 day(s). (4.)Mr. Schwartz will ascend and descend 10 steps with MODIFIED INDEPENDENCE using handrail within 7 days. ________________________________________________________________________________________________      PHYSICAL THERAPY: INITIAL ASSESSMENT, AM 8/6/2017  INPATIENT: Hospital Day: 2  [place] / [place] / Product Type: Medicare /      NAME/AGE/GENDER: Corinne Cowing is a 74 y.o. male      PRIMARY DIAGNOSIS: Headache  Blurry vision  Paralysis of conjugate gaze <principal problem not specified> <principal problem not specified>        ICD-10: Treatment Diagnosis:       · Difficulty in walking, Not elsewhere classified (R26.2)  · Other abnormalities of gait and mobility (R26.89)   Precaution/Allergies:  Review of patient's allergies indicates no known allergies. ASSESSMENT:      Mr. Soraida Thomas is a pleasant 74year old male admitted form home for 1 week onset of blurry/altered vision and more recently a right headache. Has seen the [place] [facility] and an eye doctor per his report with no diagnosis or resolution. He denies falls during this time. At baseline pt lives with wife and is completely independent/ambulatory without use of any DME. Agreeable to therapy assessment this morning and denies pain. Performs bed mobility and transfers independently. Ambulates 250 ft in room and hallway without use of any DME.  He does have some path deviations and trunk sway noted at times however no major loss of balance. Reports feeling that when he loses balance he goes posteriorly. Navigates 10 steps with right handrail and CGA. Appears that he is functioning close to baseline however does have some deficits with balance which is likely caused by his blurry/double vision. Encouraged pt to continuing moving around room and sitting up during the day but to move slowly and carefully to avoid loss of balance/falls. Likely that if/when vision improves pt will be back to baseline physically with his balance/safety. May benefit from PT 2x/wk during hospital stay to maximize safety and independence. Does have supportive who at home who is able to assist.       This section established at most recent assessment   PROBLEM LIST (Impairments causing functional limitations):  1. Decreased Ambulation Ability/Technique  2. Decreased Balance  3. Decreased Activity Tolerance    INTERVENTIONS PLANNED: (Benefits and precautions of physical therapy have been discussed with the patient.)  1. Balance Exercise  2. Bed Mobility  3. Gait Training  4. Therapeutic Activites  5. Therapeutic Exercise/Strengthening  6. Transfer Training      TREATMENT PLAN: Frequency/Duration: 2 times a week for duration of hospital stay  Rehabilitation Potential For Stated Goals: GOOD      RECOMMENDED REHABILITATION/EQUIPMENT: (at time of discharge pending progress): Continue Skilled Therapy and likely none pending progress. HISTORY:   History of Present Injury/Illness (Reason for Referral):  Per H&P, \"Patient is 74years old male with pmhx of HTN, congestive HF unknown type hypothyroidism, dyslipidemia, CAD s/p CABG x4 and PCI who presented to ER with chief complaints of 1 week history of blurry vision and headaches. Pt was in his usual health until about a week ago when he had sudden onset of binocular blurry vision for which he saw ophthalmologist who mentioned that pt probably had a stroke with no other interventions offered.  Pt is also having worsening headaches, mostly right sided and mostly located behind the eye. Pt denies scalp tenderness or jaw claudication. He complains of diplopia with no fever, nausea, vomiting, chest pain, SOB, weakness or numbness/tingling in extremities. He denies any recent travel, change in medications, sick contacts\"     Past Medical History/Comorbidities:   Mr. Maria Gaona  has a past medical history of CAD (coronary artery disease); Endocrine disease; Headache (8/5/2017); Heart failure ([facility] and Hypertension. He also has no past medical history of Arrhythmia; Arthritis; Asthma; Autoimmune disease ([address] Cancer ([facility] [address] Chronic kidney disease; Chronic pain; Coagulation defects; COPD; DEMENTIA; Diabetes ([facility] [address] Gastrointestinal disorder; GERD (gastroesophageal reflux disease); Infectious disease; Liver disease; Other ill-defined conditions; Psychiatric disorder; PUD (peptic ulcer disease); Seizures ([facility] Sleep disorder; Stroke [facility]); Thromboembolus ([facility] [address] or Thyroid disease. Mr. Maria Gaona  has a past surgical history that includes cardiac surg procedure unlist and cholecystectomy. Social History/Living Environment:   Home Environment: Private residence  # Steps to Enter: 7  Rails to Enter: Yes  Hand Rails : Right  Wheelchair Ramp: No  One/Two Story Residence: One story  Living Alone: No  Support Systems: Spouse/Significant Other/Partner  Patient Expects to be Discharged to[de-identified] Private residence  Current DME Used/Available at Home: Cane, straight  Prior Level of Function/Work/Activity:  Lives with wife in single story home with 7 steps to enter. Pt is typically active and independent. Drives daily. Denies falls or regular use of any DME.        Number of Personal Factors/Comorbidities that affect the Plan of Care: 1-2: MODERATE COMPLEXITY   EXAMINATION:   Most Recent Physical Functioning:   Gross Assessment:  AROM: Within functional limits  Strength: Generally decreased, functional  Coordination: Within functional limits  Sensation: Intact               Posture:  Posture (WDL): Exceptions to WDL  Posture Assessment: Forward head, Rounded shoulders  Balance:  Sitting: Intact  Standing: Impaired  Standing - Static: Good  Standing - Dynamic : Fair (+) Bed Mobility:  Supine to Sit: Independent  Scooting: Independent  Wheelchair Mobility:     Transfers:  Sit to Stand: Independent  Stand to Sit: Independent  Bed to Chair: Independent  Gait:     Base of Support: Narrowed; Center of gravity altered  Speed/Liz: Accelerated; Fluctuations  Step Length: Left shortened;Right shortened  Gait Abnormalities: Trunk sway increased; Path deviations  Distance (ft): 250 Feet (ft)  Assistive Device:  (none)  Ambulation - Level of Assistance: Contact guard assistance;Stand-by asssistance  Number of Stairs Trained: 10  Stairs - Level of Assistance: Contact guard assistance  Rail Use: Right   Interventions: Verbal cues; Safety awareness training;Visual/Demos       Body Structures Involved:  1. Eyes and Ears Body Functions Affected:  1. Movement Related Activities and Participation Affected:  1. General Tasks and Demands  2. Mobility  3. Domestic Life  4. Community, Social and [place]   Number of elements that affect the Plan of Care: 4+: HIGH COMPLEXITY   CLINICAL PRESENTATION:   Presentation: Stable and uncomplicated: LOW COMPLEXITY   CLINICAL DECISION MAKIN [facility] Inpatient Short Form  How much difficulty does the patient currently have. .. Unable A Lot A Little None   1. Turning over in bed (including adjusting bedclothes, sheets and blankets)? [ ] 1   [ ] 2   [ ] 3   [X] 4   2. Sitting down on and standing up from a chair with arms ( e.g., wheelchair, bedside commode, etc.)   [ ] 1   [ ] 2   [ ] 3   [X] 4   3. Moving from lying on back to sitting on the side of the bed? [ ] 1   [ ] 2   [ ] 3   [X] 4   How much help from another person does the patient currently need. .. Total A Lot A Little None   4. Moving to and from a bed to a chair (including a wheelchair)? [ ] 1   [ ] 2   [ ] 3   [X] 4   5. Need to walk in hospital room? [ ] 1   [ ] 2   [X] 3   [ ] 4   6. Climbing 3-5 steps with a railing? [ ] 1   [ ] 2   [X] 3   [ ] 4   © 2007, Trustees of [address] [place] [address], under license to [employer]. All rights reserved    Score:  Initial: 22 Most Recent: X (Date: -- )     Interpretation of Tool:  Represents activities that are increasingly more difficult (i.e. Bed mobility, Transfers, Gait). Score 24 23 22-20 19-15 14-10 9-7 6       Modifier CH CI CJ CK CL CM CN         · Mobility - Walking and Moving Around:               - CURRENT STATUS:    CJ - 20%-39% impaired, limited or restricted               - GOAL STATUS:           CH - 0% impaired, limited or restricted               - D/C STATUS:                       ---------------To be determined---------------  [place] / [place] / Product Type: Medicare /       Medical Necessity:     · Patient demonstrates good rehab potential due to higher previous functional level. Reason for Services/Other Comments:  · Patient continues to demonstrate capacity to improve strength, mobility, balance, transfers, activity tolerance which will increase independence and increase safety. Use of outcome tool(s) and clinical judgement create a POC that gives a: Clear prediction of patient's progress: LOW COMPLEXITY                 TREATMENT:   (In addition to Assessment/Re-Assessment sessions the following treatments were rendered)   Pre-treatment Symptoms/Complaints:  \"I feel like if I lose my balance it's always to the back\"  Pain: Initial:   Pain Intensity 1: 0  Post Session:  0/10      Assessment/Reassessment only, no treatment provided today     Braces/Orthotics/Lines/Etc:   · O2 Device: Room air  Treatment/Session Assessment:    · Response to Treatment:  Pt performs mobility with CGA-independence.  Some balance deficits related to visual changes  · Interdisciplinary Collaboration:  · Physical Therapist  · Registered Nurse  · After treatment position/precautions:  · Bed/Chair-wheels locked  · Bed in low position  · Call light within reach  · RN notified  · Family at bedside  · sitting at edge of bed  · Compliance with Program/Exercises: Will assess as treatment progresses. · Recommendations/Intent for next treatment session: \"Next visit will focus on advancements to more challenging activities and reduction in assistance provided\".   Total Treatment Duration:  PT Patient Time In/Time Out  Time In: 1134  Time Out: 1356 Perla Ramirez DPT

## 2017-08-06 NOTE — PROGRESS NOTES
PTA med list updated. Restarted pts blood sugar meds per hospitalist. Will allow MD to restart the rest of the home meds as he sees fit.

## 2017-08-06 NOTE — PROGRESS NOTES
Hospitalist Progress Note    2017  Admit Date: 2017  4:52 PM   NAME: Laretta Rinne   :  1945   MRN:  [id number]   Attending: Zelda Cuellar MD  PCP:  PROVIDER UNKNOWN    SUBJECTIVE:   Patient is 74years old male with pmhx of HTN, congestive HF unknown type hypothyroidism, dyslipidemia, CAD s/p CABG x4 and PCI who presented to ER with chief complaints of 1 week history of blurry vision and headaches. : Still complaining of headaches and blurry vision. Bp is up. Carotid ultrasound is pending. CRP and VDRL - neg. Review of Systems negative with exception of pertinent positives noted above  PHYSICAL EXAM     Visit Vitals    /80    Pulse 86    Temp 97.6 °F (36.4 °C)    Resp 18    Ht 6' 1\" (1.854 m)    Wt 96.2 kg (212 lb)    SpO2 92%    BMI 27.97 kg/m2      Temp (24hrs), Av.8 °F (36.6 °C), Min:97.4 °F (36.3 °C), Max:98.4 °F (36.9 °C)    Oxygen Therapy  O2 Sat (%): 92 % (17 0738)  O2 Device: Room air (17 2030)    Intake/Output Summary (Last 24 hours) at 17 1056  Last data filed at 17 0745   Gross per 24 hour   Intake              240 ml   Output                0 ml   Net              240 ml      General: No acute distress    Lungs:  CTA Bilaterally. Heart:  Regular rate and rhythm,  No murmur, rub, or gallop  Abdomen: Soft, Non distended, Non tender, Positive bowel sounds  Extremities: No cyanosis, clubbing or edema  Neurologic:  No focal deficits    CTA Brain: Mild atherosclerosis in the carotid siphon bilaterally. No acute vascular  findings. If symptoms persist, recommend consideration of a MRI of the brain.     ASSESSMENT      Active Hospital Problems    Diagnosis Date Noted    Blurry vision 2017    Paralysis of conjugate gaze 2017    Headache 2017     Plan:  · Blurry vision / Headache:  Consider MRI / MRA if all work up neg  · HTN: Stay on present meds for now     DVT Prophylaxis:     Signed By: Vira Shafer MD August 6, 2017

## 2017-08-06 NOTE — ED NOTES
TRANSFER - OUT REPORT:    Verbal report given to Andre Madsen RN on Fredis Cormier  being transferred to 6th Floor for routine progression of care       Report consisted of patients Situation, Background, Assessment and   Recommendations(SBAR). Information from the following report(s) SBAR was reviewed with the receiving nurse. Lines:       Opportunity for questions and clarification was provided.     MD aware of MRI cancellation     Patient transported with:

## 2017-08-06 NOTE — PROGRESS NOTES
TRANSFER - IN REPORT:    Verbal report received from Alan allen RN on [phone]  being received from ED for routine progression of care      Report consisted of patients Situation, Background, Assessment and   Recommendations(SBAR). Information from the following report(s) SBAR was reviewed with the receiving nurse. Opportunity for questions and clarification was provided. Assessment completed upon patients arrival to unit and care assumed.

## 2017-08-06 NOTE — PROGRESS NOTES
Primary Nurse Poppy Toscano and Archie Moran RN performed a dual skin assessment on this patient. One scar in the middle of chest and one on the upper abdomen from previous surgeries noted. No other breakdown noted. Skin clean, dry, and intact. Pt oriented to room and call light and instructed to call with any further needs. Pt resting quietly with family at bedside.

## 2017-08-06 NOTE — PROGRESS NOTES
Hospitalist Progress Note    2017  Admit Date: 2017  4:52 PM   NAME: Fly Sweet   :  1945   MRN:  [id number]   Attending: Edgar Arora MD  PCP:  PROVIDER UNKNOWN    SUBJECTIVE:   Patient is 74years old male with pmhx of HTN, congestive HF unknown type hypothyroidism, dyslipidemia, CAD s/p CABG x4 and PCI who presented to ER with chief complaints of 1 week history of blurry vision and headaches. : Still complaining of headaches and blurry vision. Bp is up. Carotid ultrasound is pending. CRP and VDRL - neg. Review of Systems negative with exception of pertinent positives noted above  PHYSICAL EXAM     Visit Vitals    /78    Pulse 88    Temp 97.6 °F (36.4 °C)    Resp 18    Ht 6' 1\" (1.854 m)    Wt 96.2 kg (212 lb)    SpO2 92%    BMI 27.97 kg/m2      Temp (24hrs), Av.7 °F (36.5 °C), Min:97.4 °F (36.3 °C), Max:98.4 °F (36.9 °C)    Oxygen Therapy  O2 Sat (%): 92 % (17 1507)  O2 Device: Room air (17 1147)    Intake/Output Summary (Last 24 hours) at 17 1805  Last data filed at 17 0745   Gross per 24 hour   Intake              240 ml   Output                0 ml   Net              240 ml      General: No acute distress    Lungs:  CTA Bilaterally. Heart:  Regular rate and rhythm,  No murmur, rub, or gallop  Abdomen: Soft, Non distended, Non tender, Positive bowel sounds  Extremities: No cyanosis, clubbing or edema  Neurologic:  No focal deficits    CTA Brain: Mild atherosclerosis in the carotid siphon bilaterally. No acute vascular  findings. If symptoms persist, recommend consideration of a MRI of the brain. ASSESSMENT      Active Hospital Problems    Diagnosis Date Noted    Blurry vision 2017    Paralysis of conjugate gaze 2017    Headache 2017     Plan:  · Blurry vision / Headache:  Consider MRI / MRA if all work up neg (pt has a pace maker in place, not sure if MRI compatible.   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

## 2017-08-07 VITALS
TEMPERATURE: 97.3 F | WEIGHT: 212 LBS | HEIGHT: 73 IN | DIASTOLIC BLOOD PRESSURE: 73 MMHG | HEART RATE: 70 BPM | RESPIRATION RATE: 15 BRPM | BODY MASS INDEX: 28.1 KG/M2 | OXYGEN SATURATION: 96 % | SYSTOLIC BLOOD PRESSURE: 154 MMHG

## 2017-08-07 LAB
ALBUMIN SERPL BCP-MCNC: 4.2 G/DL (ref 3.2–4.6)
ALBUMIN/GLOB SERPL: 1.1 {RATIO} (ref 1.2–3.5)
ALP SERPL-CCNC: 61 U/L (ref 50–136)
ALT SERPL-CCNC: 35 U/L (ref 12–65)
ANION GAP BLD CALC-SCNC: 12 MMOL/L (ref 7–16)
AST SERPL W P-5'-P-CCNC: 33 U/L (ref 15–37)
ATRIAL RATE: 78 BPM
BILIRUB SERPL-MCNC: 0.8 MG/DL (ref 0.2–1.1)
BUN SERPL-MCNC: 16 MG/DL (ref 8–23)
CALCIUM SERPL-MCNC: 10.1 MG/DL (ref 8.3–10.4)
CALCULATED P AXIS, ECG09: 64 DEGREES
CALCULATED R AXIS, ECG10: -90 DEGREES
CALCULATED T AXIS, ECG11: 89 DEGREES
CHLORIDE SERPL-SCNC: 101 MMOL/L (ref 98–107)
CO2 SERPL-SCNC: 27 MMOL/L (ref 21–32)
CREAT SERPL-MCNC: 1.08 MG/DL (ref 0.8–1.5)
DIAGNOSIS, 93000: NORMAL
ERYTHROCYTE [DISTWIDTH] IN BLOOD BY AUTOMATED COUNT: 12.7 % (ref 11.9–14.6)
GLOBULIN SER CALC-MCNC: 3.7 G/DL (ref 2.3–3.5)
GLUCOSE BLD STRIP.AUTO-MCNC: 238 MG/DL (ref 65–100)
GLUCOSE BLD STRIP.AUTO-MCNC: 244 MG/DL (ref 65–100)
GLUCOSE SERPL-MCNC: 235 MG/DL (ref 65–100)
HCT VFR BLD AUTO: 45.9 % (ref 41.1–50.3)
HGB BLD-MCNC: 16.1 G/DL (ref 13.6–17.2)
MCH RBC QN AUTO: 31.1 PG (ref 26.1–32.9)
MCHC RBC AUTO-ENTMCNC: 35.1 G/DL (ref 31.4–35)
MCV RBC AUTO: 88.6 FL (ref 79.6–97.8)
P-R INTERVAL, ECG05: 188 MS
PLATELET # BLD AUTO: 181 K/UL (ref 150–450)
PMV BLD AUTO: 10.8 FL (ref 10.8–14.1)
POTASSIUM SERPL-SCNC: 3.6 MMOL/L (ref 3.5–5.1)
PROT SERPL-MCNC: 7.9 G/DL (ref 6.3–8.2)
Q-T INTERVAL, ECG07: 470 MS
QRS DURATION, ECG06: 186 MS
QTC CALCULATION (BEZET), ECG08: 535 MS
RBC # BLD AUTO: 5.18 M/UL (ref 4.23–5.67)
SODIUM SERPL-SCNC: 140 MMOL/L (ref 136–145)
VENTRICULAR RATE, ECG03: 78 BPM
WBC # BLD AUTO: 17.8 K/UL (ref 4.3–11.1)

## 2017-08-07 PROCEDURE — 74011250637 HC RX REV CODE- 250/637: Performed by: INTERNAL MEDICINE

## 2017-08-07 PROCEDURE — 74011250637 HC RX REV CODE- 250/637: Performed by: HOSPITALIST

## 2017-08-07 PROCEDURE — 74011636637 HC RX REV CODE- 636/637: Performed by: HOSPITALIST

## 2017-08-07 PROCEDURE — 82962 GLUCOSE BLOOD TEST: CPT

## 2017-08-07 PROCEDURE — 93005 ELECTROCARDIOGRAM TRACING: CPT | Performed by: HOSPITALIST

## 2017-08-07 PROCEDURE — 93306 TTE W/DOPPLER COMPLETE: CPT

## 2017-08-07 PROCEDURE — 85027 COMPLETE CBC AUTOMATED: CPT | Performed by: HOSPITALIST

## 2017-08-07 PROCEDURE — 97165 OT EVAL LOW COMPLEX 30 MIN: CPT

## 2017-08-07 PROCEDURE — 80053 COMPREHEN METABOLIC PANEL: CPT | Performed by: HOSPITALIST

## 2017-08-07 PROCEDURE — 36415 COLL VENOUS BLD VENIPUNCTURE: CPT | Performed by: HOSPITALIST

## 2017-08-07 RX ORDER — ATORVASTATIN CALCIUM 40 MG/1
40 TABLET, FILM COATED ORAL
Qty: 30 TAB | Refills: 0 | Status: SHIPPED | OUTPATIENT
Start: 2017-08-07 | End: 2017-12-19

## 2017-08-07 RX ORDER — TOPIRAMATE 50 MG/1
50 TABLET, FILM COATED ORAL 2 TIMES DAILY WITH MEALS
Qty: 60 TAB | Refills: 0 | Status: SHIPPED | OUTPATIENT
Start: 2017-08-07 | End: 2017-12-19

## 2017-08-07 RX ORDER — BUTALBITAL, ACETAMINOPHEN AND CAFFEINE 50; 325; 40 MG/1; MG/1; MG/1
1 TABLET ORAL
Qty: 30 TAB | Refills: 0 | Status: SHIPPED | OUTPATIENT
Start: 2017-08-07 | End: 2017-12-19

## 2017-08-07 RX ORDER — PREDNISONE 20 MG/1
TABLET ORAL
Qty: 11 TAB | Refills: 0 | Status: SHIPPED | OUTPATIENT
Start: 2017-08-07 | End: 2017-12-19

## 2017-08-07 RX ADMIN — PREDNISONE 40 MG: 20 TABLET ORAL at 08:28

## 2017-08-07 RX ADMIN — ASPIRIN 81 MG 81 MG: 81 TABLET ORAL at 08:28

## 2017-08-07 RX ADMIN — Medication 10 ML: at 06:10

## 2017-08-07 RX ADMIN — LEVOTHYROXINE SODIUM 150 MCG: 150 TABLET ORAL at 06:09

## 2017-08-07 RX ADMIN — TOPIRAMATE 50 MG: 50 TABLET, FILM COATED ORAL at 08:28

## 2017-08-07 RX ADMIN — GLIPIZIDE 5 MG: 5 TABLET ORAL at 08:28

## 2017-08-07 RX ADMIN — INSULIN LISPRO 4 UNITS: 100 INJECTION, SOLUTION INTRAVENOUS; SUBCUTANEOUS at 08:28

## 2017-08-07 RX ADMIN — METOPROLOL TARTRATE 25 MG: 25 TABLET ORAL at 08:28

## 2017-08-07 RX ADMIN — FUROSEMIDE 40 MG: 40 TABLET ORAL at 08:28

## 2017-08-07 RX ADMIN — VALSARTAN 160 MG: 160 TABLET, FILM COATED ORAL at 08:28

## 2017-08-07 RX ADMIN — Medication 10 ML: at 14:29

## 2017-08-07 RX ADMIN — INSULIN LISPRO 4 UNITS: 100 INJECTION, SOLUTION INTRAVENOUS; SUBCUTANEOUS at 11:53

## 2017-08-07 NOTE — DISCHARGE SUMMARY
Hospitalist Discharge Summary     Patient ID:  Gigi Mackenzie  [id number]  3 y.o.  1945  Admit date: 8/5/2017  4:52 PM  Discharge date and time: 8/7/2017  Attending: Trevor Gutiérrez MD  PCP:  PROVIDER UNKNOWN  Treatment Team: Attending Provider: Trevor Gutiérrez MD; Consulting Provider: Vi Broderick MD    Principal Diagnosis <principal problem not specified>   Active Problems:    Blurry vision (8/5/2017)      Paralysis of conjugate gaze (8/5/2017)      Headache (8/5/2017)             Hospital Course:  Please refer to the admission H&P for details of presentation. In summary, the patient is a 73 yo male who presented to ER with new onset headache and blurry vision. Pt was initially seen by opthalmology for dilated eye exam and was urged to come to ER for stroke work up. Unfortunately pt unable to have MRI due to pacer. His CT and CTA are without signficiant findings. He has been evaluated by tele neuro who feels differentials include CN 6 palsy vs CVA. Pt was started on prednisone and topamax. Today he reports improvement in headaches. He continues to have some vision issues, not worsening but also not improving. He is stable for d/c home and will have follow up with neuro opthamologist for first available appt (may not be until Sept). He will cont short prednisone taper and topamax. Significant Diagnostic Studies:     CT Head IMPRESSION: No acute abnormality    CTA HeadIMPRESSION:     Mild atherosclerosis in the carotid siphon bilaterally. No acute vascular  findings. If symptoms persist, recommend consideration of a MRI of the brain. Carotid Duplex IMPRESSION:     Bilateral carotid artery atherosclerosis. However, no hemodynamically  significant internal carotid artery stenosis.     Labs: Results:       Chemistry Recent Labs      08/07/17   0809  08/06/17   0600  08/05/17   1654   GLU  235*  293*  200*   NA  140  138  141   K  3.6  3.8  3.6   CL  101  102  102   CO2  27  23  28   BUN  16 18  16   CREA  1.08  1.12  1.07   CA  10.1  9.7  9.8   AGAP  12  13  11   AP  61  68  84   TP  7.9  8.0  7.5   ALB  4.2  4.0  4.0   GLOB  3.7*  4.0*  3.5   AGRAT  1.1*  1.0*  1.1*      CBC w/Diff Recent Labs      08/07/17   0809  08/06/17   0600  08/05/17   1654   WBC  17.8*  10.0  6.3   RBC  5.18  5.23  4.82   HGB  16.1  16.4  15.2   HCT  45.9  44.6  41.8   PLT  181  167  150   GRANS   --    --   54   LYMPH   --    --   34   EOS   --    --   3      Cardiac Enzymes No results for input(s): CPK, CKND1, LÓPZE in the last 72 hours. No lab exists for component: CKRMB, TROIP   Coagulation Recent Labs      08/05/17   1654   PTP  10.5   INR  1.0       Lipid Panel No results found for: CHOL, CHOLPOCT, CHOLX, CHLST, CHOLV, 964889, HDL, LDL, LDLC, DLDLP, 338414, VLDLC, VLDL, TGLX, TRIGL, TRIGP, TGLPOCT, CHHD, CHHDX   BNP No results for input(s): BNPP in the last 72 hours. Liver Enzymes Recent Labs      08/07/17   0809   TP  7.9   ALB  4.2   AP  61   SGOT  33      Thyroid Studies No results found for: T4, T3U, TSH, TSHEXT         Discharge Exam:  Visit Vitals    /75 (BP 1 Location: Right arm, BP Patient Position: Sitting)    Pulse 77    Temp 97.3 °F (36.3 °C)    Resp 15    Ht 6' 1\" (1.854 m)    Wt 96.2 kg (212 lb)    SpO2 93%    BMI 27.97 kg/m2     General appearance: alert, cooperative, no distress, appears stated age  Lungs: clear to auscultation bilaterally  Heart: regular rate and rhythm, S1, S2 normal, no murmur, click, rub or gallop  Abdomen: soft, non-tender. Bowel sounds normal. No masses,  no organomegaly  Extremities: no cyanosis or edema  Neurologic: Grossly normal, blurred vision worse with both eyes but still able to read signs across the room    Disposition: Home   Discharge Condition: stable  Patient Instructions:   Current Discharge Medication List      START taking these medications    Details   atorvastatin (LIPITOR) 40 mg tablet Take 1 Tab by mouth nightly.   Qty: 30 Tab, Refills: 0 butalbital-acetaminophen-caffeine (FIORICET, ESGIC) -40 mg per tablet Take 1 Tab by mouth every six (6) hours as needed for Headache. Max Daily Amount: 4 Tabs. Qty: 30 Tab, Refills: 0      predniSONE (DELTASONE) 20 mg tablet Take 40 mg x 3 days, then 20 mg x 3 days, then10 mg x 3 days then stop  Qty: 11 Tab, Refills: 0      topiramate (TOPAMAX) 50 mg tablet Take 1 Tab by mouth two (2) times daily (with meals). Qty: 60 Tab, Refills: 0         CONTINUE these medications which have NOT CHANGED    Details   aspirin 81 mg chewable tablet Take 81 mg by mouth daily. furosemide (LASIX) 40 mg tablet Take  by mouth daily. glipiZIDE (GLUCOTROL) 5 mg tablet Take 5 mg by mouth two (2) times a day. metFORMIN (GLUCOPHAGE) 1,000 mg tablet Take 1,000 mg by mouth two (2) times daily (with meals). cyanocobalamin 1,000 mcg tablet Take 1,000 mcg by mouth daily. cholecalciferol (VITAMIN D3) 400 unit tab tablet Take 400 Units by mouth daily. metoprolol (LOPRESSOR) 50 mg tablet Take 0.5 Tabs by mouth two (2) times a day. Qty: 60 Tab, Refills: 2      levothyroxine (SYNTHROID) 125 mcg tablet Take 150 mcg by mouth Daily (before breakfast). nitroglycerin (NITRO-DUR) 0.1 mg/hr 1 Patch by TransDERmal route daily. amlodipine (NORVASC) 2.5 mg tablet Take  by mouth daily.          STOP taking these medications       simvastatin (ZOCOR) 10 mg tablet Comments:   Reason for Stopping:               Activity: Regular, as tolerated, caution with blurred vision  Diet: DIET DIABETIC CONSISTENT CARB Regular    Follow-up  Neuro opthalmologist for first available appt   PCP in 2 weeks       Follow-up Information     Follow up With Details Comments Contact Info    Provider Unknown   Patient not available to ask              Time spent to discharge patient greater than 30 minutes  Signed:  Ulises Gonzalez NP  8/7/2017  11:53 AM

## 2017-08-07 NOTE — PROGRESS NOTES
Spoke with Hyacinth Stevens NP regarding Nitro patch. Pt states he does not use the patch anymore. Orders to discontinue nitro patch.

## 2017-08-07 NOTE — PROGRESS NOTES
Still awaiting fax about appointment date time. Unit secretary and this RN will follow up tomorrow and call patient and notify him of his appointment date time. Pt given neuro ophthalmologist's office information. ECHO tech in room at this time. Will administer d/c instructions once ECHO is completed.

## 2017-08-07 NOTE — PROGRESS NOTES
Problem: Self Care Deficits Care Plan (Adult)  Goal: *Acute Goals and Plan of Care (Insert Text)      OCCUPATIONAL THERAPY: Initial Assessment and Discharge 8/7/2017  INPATIENT: Hospital Day: 3  Payor: CARE IMPROVEMENT PLUS / [place] / Product Type: Managed Care Medicare /      NAME/AGE/GENDER: Preet Shirley is a 74 y.o. male      PRIMARY DIAGNOSIS:  Headache  Blurry vision  Paralysis of conjugate gaze <principal problem not specified> <principal problem not specified>        ICD-10: Treatment Diagnosis:        · Generalized Muscle Weakness (M62.81)   Precautions/Allergies:         Review of patient's allergies indicates no known allergies. ASSESSMENT:      Mr. Deep Hernandez presents sitting in chair, agreeable to OT evaluation. Pt is oriented x 4, demonstrates donning/doffing socks with modified independence, appears to be at baseline for ADLs and functional mobility. Pt's vision continues with mild deficits, may benefit from visual OP therapy should difficulty continue. Further acute OT intervention is not indicated. OT will discharge. Pt left sitting in chair with RN at side and all needs in reach. This section established at most recent assessment                RECOMMENDED REHABILITATION/EQUIPMENT: (at time of discharge pending progress): consider vision OT should deficits continue. [employer] OCCUPATIONAL PROFILE AND HISTORY:   History of Present Injury/Illness (Reason for Referral):  See H&P  Past Medical History/Comorbidities:   Mr. Deep Hernandez  has a past medical history of CAD (coronary artery disease); Endocrine disease; Headache (8/5/2017); Heart failure ([address] and Hypertension. He also has no past medical history of Arrhythmia; Arthritis; Asthma; Autoimmune disease ([address] Cancer ([address] Chronic kidney disease; Chronic pain; Coagulation defects; COPD; DEMENTIA; Diabetes ([address] Gastrointestinal disorder; GERD (gastroesophageal reflux disease);  Infectious disease; Liver disease; Other ill-defined conditions; Psychiatric disorder; PUD (peptic ulcer disease); Seizures ([facility] Sleep disorder; Stroke [facility]); Thromboembolus ([facility] or Thyroid disease. Mr. Melissa Jennings  has a past surgical history that includes cardiac surg procedure unlist and cholecystectomy. Social History/Living Environment:   Home Environment: Private residence  # Steps to Enter: 7  Rails to Enter: Yes  Hand Rails : Right  Wheelchair Ramp: No  One/Two Story Residence: One story  Living Alone: No  Support Systems: Spouse/Significant Other/Partner  Patient Expects to be Discharged to[de-identified] Private residence  Current DME Used/Available at Home: Grab bars, Shower chair  Tub or Shower Type: Shower  Prior Level of Function/Work/Activity:  ind      Number of Personal Factors/Comorbidities that affect the Plan of Care: Brief history (0):  LOW COMPLEXITY   ASSESSMENT OF OCCUPATIONAL PERFORMANCE[de-identified]   Activities of Daily Living:           Basic ADLs (From Assessment) Complex ADLs (From Assessment)   Basic ADL  Feeding: Independent  Oral Facial Hygiene/Grooming: Independent  Bathing: Supervision  Upper Body Dressing: Independent  Lower Body Dressing: Modified independent  Toileting: Modified independent     Grooming/Bathing/Dressing Activities of Daily Living     Cognitive Retraining  Safety/Judgement: Fall prevention                 Functional Transfers  Toilet Transfer : Independent  Shower Transfer: Supervision     Bed/Mat Mobility  Sit to Stand: Independent  Bed to Chair: Independent          Most Recent Physical Functioning:   Gross Assessment:  AROM: Within functional limits  Strength: Within functional limits               Posture:  Posture (WDL): Exceptions to WDL  Posture Assessment:  Forward head, Rounded shoulders  Balance:  Sitting: Intact  Standing: Impaired  Standing - Static: Good  Standing - Dynamic :  (Fair+) Bed Mobility:     Wheelchair Mobility:     Transfers:  Sit to Stand: Independent  Stand to Sit: Independent  Bed to Chair: Independent                    Patient Vitals for the past 6 hrs:       BP BP Patient Position SpO2 Pulse   08/07/17 1228 154/73 Sitting 96 % 70        Mental Status  Neurologic State: Alert  Orientation Level: Oriented X4  Cognition: Appropriate for age attention/concentration  Perception: Appears intact  Perseveration: No perseveration noted  Safety/Judgement: Fall prevention                               Physical Skills Involved:  1. Activity Tolerance  2. Vision Cognitive Skills Affected (resulting in the inability to perform in a timely and safe manner):  1. WFLs Psychosocial Skills Affected:  1. Habits/Routines  2. Environmental Adaptation  3. Self-Awareness   Number of elements that affect the Plan of Care: 3-5:  MODERATE COMPLEXITY   CLINICAL DECISION MAKING:   [facility] AM-PAC 6 Clicks   Daily Activity Inpatient Short Form  How much help from another person does the patient currently need. .. Total A Lot A Little None   1. Putting on and taking off regular lower body clothing?   [ ] 1   [ ] 2   [ ] 3   [X] 4   2. Bathing (including washing, rinsing, drying)? [ ] 1   [ ] 2   [ ] 3   [X] 4   3. Toileting, which includes using toilet, bedpan or urinal?   [ ] 1   [ ] 2   [ ] 3   [X] 4   4. Putting on and taking off regular upper body clothing?   [ ] 1   [ ] 2   [ ] 3   [X] 4   5. Taking care of personal grooming such as brushing teeth? [ ] 1   [ ] 2   [ ] 3   [X] 4   6. Eating meals? [ ] 1   [ ] 2   [ ] 3   [X] 4   © 2007, Trustees of [facility], under license to [employer]. All rights reserved    Score:  Initial: 24 Most Recent: X (Date: -- )     Interpretation of Tool:  Represents activities that are increasingly more difficult (i.e. Bed mobility, Transfers, Gait).        Score 24 23 22-20 19-15 14-10 9-7 6       Modifier CH CI CJ CK CL CM CN         · Self Care:               - CURRENT STATUS:     - 0% impaired, limited or restricted  - GOAL STATUS:            - 0% impaired, limited or restricted               - D/C STATUS:                       [address] [place] [address] - 0% impaired, limited or restricted  Payor: Eric Lim / [place] / Product Type: [employer] Care Medicare /           Use of outcome tool(s) and clinical judgement create a POC that gives a: LOW COMPLEXITY             TREATMENT:   (In addition to Assessment/Re-Assessment sessions the following treatments were rendered)      Pre-treatment Symptoms/Complaints:    Pain: Initial:   Pain Intensity 1: 0  Post Session:  0      Assessment/Reassessment only, no treatment provided today     Braces/Orthotics/Lines/Etc:   · O2 Device: Room air  Treatment/Session Assessment:    · Response to Treatment:  D/C  · Interdisciplinary Collaboration:  · Occupational Therapist  · Registered Nurse  · After treatment position/precautions:  · Up in chair  · Bed/Chair-wheels locked  · Call light within reach  · Nurse at bedside     Total Treatment Duration:  OT Patient Time In/Time Out  Time In: 1108  Time Out: [address], OTR/L

## 2017-08-07 NOTE — DISCHARGE INSTRUCTIONS
DISCHARGE SUMMARY from Nurse    The following personal items are in your possession at time of discharge:    Dental Appliances: Uppers, Lowers, With patient  Visual Aid: Glasses     Home Medications: None  Jewelry: None  Clothing: Shirt, Pants, Socks, Footwear, At bedside  Other Valuables: Cell Phone             PATIENT INSTRUCTIONS:    After general anesthesia or intravenous sedation, for 24 hours or while taking prescription Narcotics:  · Limit your activities  · Do not drive and operate hazardous machinery  · Do not make important personal or business decisions  · Do  not drink alcoholic beverages  · If you have not urinated within 8 hours after discharge, please contact your surgeon on call. Report the following to your surgeon:  · Excessive pain, swelling, redness or odor of or around the surgical area  · Temperature over 100.5  · Nausea and vomiting lasting longer than 4 hours or if unable to take medications  · Any signs of decreased circulation or nerve impairment to extremity: change in color, persistent  numbness, tingling, coldness or increase pain  · Any questions        What to do at Home:  Recommended activity: Activity as tolerated,     If you experience any of the following symptoms fever greater than 101, new or unrelieved pain,dizziness, chest pain or shortness of breath , please follow up with MD.      *  Please give a list of your current medications to your Primary Care Provider. *  Please update this list whenever your medications are discontinued, doses are      changed, or new medications (including over-the-counter products) are added. *  Please carry medication information at all times in case of emergency situations. These are general instructions for a healthy lifestyle:    No smoking/ No tobacco products/ Avoid exposure to second hand smoke    Surgeon General's Warning:  Quitting smoking now greatly reduces serious risk to your health.     Obesity, smoking, and sedentary lifestyle greatly increases your risk for illness    A healthy diet, regular physical exercise & weight monitoring are important for maintaining a healthy lifestyle    You may be retaining fluid if you have a history of heart failure or if you experience any of the following symptoms:  Weight gain of 3 pounds or more overnight or 5 pounds in a week, increased swelling in our hands or feet or shortness of breath while lying flat in bed. Please call your doctor as soon as you notice any of these symptoms; do not wait until your next office visit. Recognize signs and symptoms of STROKE:    F-face looks uneven    A-arms unable to move or move unevenly    S-speech slurred or non-existent    T-time-call 911 as soon as signs and symptoms begin-DO NOT go       Back to bed or wait to see if you get better-TIME IS BRAIN. Warning Signs of HEART ATTACK     Call 911 if you have these symptoms:   Chest discomfort. Most heart attacks involve discomfort in the center of the chest that lasts more than a few minutes, or that goes away and comes back. It can feel like uncomfortable pressure, squeezing, fullness, or pain.  Discomfort in other areas of the upper body. Symptoms can include pain or discomfort in one or both arms, the back, neck, jaw, or stomach.  Shortness of breath with or without chest discomfort.  Other signs may include breaking out in a cold sweat, nausea, or lightheadedness. Don't wait more than five minutes to call 911 - MINUTES MATTER! Fast action can save your life. Calling 911 is almost always the fastest way to get lifesaving treatment. Emergency Medical Services staff can begin treatment when they arrive -- up to an hour sooner than if someone gets to the hospital by car. The discharge information has been reviewed with the patient. The patient verbalized understanding.     Discharge medications reviewed with the patient and appropriate educational materials and side effects teaching were provided.

## 2017-08-07 NOTE — PROGRESS NOTES
Patient had a blood sugar of 359 in the evening. MD was paged. Orders were given for 10 units of Humalog and diet ot be changed from cardiac to diabetic. At 10 pm, blood sugar was 343. MD was paged. No new orders were given. Patient is not symptomatic and is sleeping at this time. Will continue to monitor.

## 2017-08-07 NOTE — PROGRESS NOTES
Awaiting fax back from Neuro ophthalmologist with follow up appointment. Will resume with discharge once appointment date/time has been received.

## 2017-12-19 PROBLEM — E11.9 TYPE 2 DIABETES MELLITUS WITHOUT COMPLICATION, WITHOUT LONG-TERM CURRENT USE OF INSULIN (HCC): Status: ACTIVE | Noted: 2017-12-19

## 2018-01-09 PROBLEM — I25.5 ISCHEMIC CARDIOMYOPATHY: Status: ACTIVE | Noted: 2018-01-09

## 2018-02-06 PROBLEM — Z95.0 PACEMAKER: Status: ACTIVE | Noted: 2018-02-06

## 2018-03-19 PROBLEM — E03.9 ACQUIRED HYPOTHYROIDISM: Status: ACTIVE | Noted: 2018-03-19

## 2018-06-23 ENCOUNTER — HOSPITAL ENCOUNTER (EMERGENCY)
Age: 73
Discharge: HOME | End: 2018-06-23
Attending: EMERGENCY MEDICINE
Payer: MEDICARE

## 2018-06-23 VITALS
HEART RATE: 80 BPM | OXYGEN SATURATION: 98 % | TEMPERATURE: 98.1 F | DIASTOLIC BLOOD PRESSURE: 88 MMHG | WEIGHT: 221 LBS | RESPIRATION RATE: 16 BRPM | SYSTOLIC BLOOD PRESSURE: 164 MMHG | BODY MASS INDEX: 29.16 KG/M2

## 2018-06-23 DIAGNOSIS — R07.89 ATYPICAL CHEST PAIN: ICD-10-CM

## 2018-06-23 DIAGNOSIS — R10.13 DYSPEPSIA: Primary | ICD-10-CM

## 2018-06-23 LAB
GLUCOSE BLD STRIP.AUTO-MCNC: 233 MG/DL (ref 65–100)
TROPONIN I BLD-MCNC: 0.01 NG/ML (ref 0.02–0.05)

## 2018-06-23 PROCEDURE — 84484 ASSAY OF TROPONIN QUANT: CPT

## 2018-06-23 PROCEDURE — 93005 ELECTROCARDIOGRAM TRACING: CPT | Performed by: EMERGENCY MEDICINE

## 2018-06-23 PROCEDURE — 82962 GLUCOSE BLOOD TEST: CPT

## 2018-06-23 PROCEDURE — 99284 EMERGENCY DEPT VISIT MOD MDM: CPT | Performed by: EMERGENCY MEDICINE

## 2018-06-23 NOTE — ED TRIAGE NOTES
Patient complains of excessive burping for 3 days with burning sensation to throat with burping. Patient denies nausea, vomiting, chest pain, increased shortness of breath. Patient states they added jardiance to his diabetes regimen to try and better control his diabetes on 6/19 and symptoms started on 6/20 after he started medication.

## 2018-06-23 NOTE — ED NOTES
I have reviewed discharge instructions with the patient. The patient verbalized understanding. Patient left ED via Discharge Method: ambulatory to Home with self. Opportunity for questions and clarification provided. Patient given 0 scripts. To continue your aftercare when you leave the hospital, you may receive an automated call from our care team to check in on how you are doing. This is a free service and part of our promise to provide the best care and service to meet your aftercare needs.  If you have questions, or wish to unsubscribe from this service please call [phone]. Thank you for Choosing our [facility] Emergency Department.

## 2018-06-23 NOTE — DISCHARGE INSTRUCTIONS

## 2018-06-23 NOTE — ED PROVIDER NOTES
HPI Comments: Patient started on jardiance several days ago and since has had excessive burping and burning in his upper chest neck and throat area. Patient is a 74 y.o. male presenting with medication problem. The history is provided by the patient. Medication Problem    This is a new problem. The current episode started more than 2 days ago. The problem has not changed since onset. Pertinent negatives include no nausea, no vomiting and no shortness of breath. Past Medical History:   Diagnosis Date    Acquired hypothyroidism 3/19/2018    AV junctional bradycardia     CAD (coronary artery disease)     Congestive heart failure (HCC)     Diabetes ([facility]     Endocrine disease     hypothyriod    Headache 8/5/2017    Heart failure ([facility]     Hypertension     Stroke [facility])        Past Surgical History:   Procedure Laterality Date    CARDIAC SURG PROCEDURE UNLIST      3 vessel bypass    HX CHOLECYSTECTOMY           Family History:   Problem Relation Age of Onset    Heart Disease Mother     Diabetes Sister     Cancer Sister     Diabetes Sister        Social History     Social History    Marital status:      Spouse name: N/A    Number of children: N/A    Years of education: N/A     Occupational History     Retired     Social History Main Topics    Smoking status: Former Smoker     Quit date: 1/9/1972    Smokeless tobacco: Never Used    Alcohol use No    Drug use: No    Sexual activity: Yes     Other Topics Concern    Not on file     Social History Narrative         ALLERGIES: Review of patient's allergies indicates no known allergies. Review of Systems   Constitutional: Negative for fever. Respiratory: Negative for cough and shortness of breath. Cardiovascular: Negative for chest pain. Gastrointestinal: Negative for abdominal pain, nausea and vomiting. Musculoskeletal: Negative for back pain and neck pain.        Vitals:    06/23/18 1315   BP: (!) 175/91   Pulse: 85   Resp: 18   Temp: 98.2 °F (36.8 °C)   SpO2: 96%   Weight: 100.2 kg (221 lb)            Physical Exam   Constitutional: He appears well-developed and well-nourished. HENT:   Mouth/Throat: Oropharynx is clear and moist.   Eyes: Conjunctivae are normal.   Neck: Neck supple. No JVD present. Cardiovascular: Normal rate, regular rhythm, normal heart sounds and intact distal pulses. Pulmonary/Chest: Effort normal and breath sounds normal.   Abdominal: Soft. He exhibits no distension. There is no tenderness. There is no rebound and no guarding. Musculoskeletal: Normal range of motion. He exhibits no edema. Lymphadenopathy:     He has no cervical adenopathy. Nursing note and vitals reviewed. MDM  Number of Diagnoses or Management Options  Diagnosis management comments: Burping and reflux not listed as one of the more common side effects of Giardia and however it is not excluded. Patient has a cardiac history and some of the burning pain is described in the upper chest earrings we will obtain an EKG and a point-of-care troponin. Symptoms have been persistent for several days so I do not believe a second troponin will be needed. We will begin over-the-counter Pepcid and have the patient continue to use Tums or either extra strength Gaviscon for breakthrough symptoms. Over-the-counter simethicone/Gas-X will be recommended as well.        Amount and/or Complexity of Data Reviewed  Clinical lab tests: ordered and reviewed (Results for orders placed or performed during the hospital encounter of 06/23/18  -GLUCOSE, POC       Result                                            Value                         Ref Range                       Glucose (POC)                                     233 (H)                       65 - 100 mg/dL             -POC TROPONIN-I       Result                                            Value                         Ref Range                       Troponin-I (POC) 0.01 (L)                      0.02 - 0.05 ng/ml          )  Independent visualization of images, tracings, or specimens: yes (EKG was reviewed and reveals a ventricular paced rhythm. Wide complex.   Negative Scarbossa criteria for MI.  )          ED Course       Procedures

## 2018-06-24 LAB
ATRIAL RATE: 78 BPM
CALCULATED P AXIS, ECG09: 47 DEGREES
CALCULATED R AXIS, ECG10: -89 DEGREES
CALCULATED T AXIS, ECG11: 91 DEGREES
DIAGNOSIS, 93000: NORMAL
P-R INTERVAL, ECG05: 182 MS
Q-T INTERVAL, ECG07: 470 MS
QRS DURATION, ECG06: 180 MS
QTC CALCULATION (BEZET), ECG08: 535 MS
VENTRICULAR RATE, ECG03: 78 BPM

## 2018-10-12 PROBLEM — E78.5 HYPERLIPIDEMIA: Status: ACTIVE | Noted: 2018-10-12

## 2018-11-04 ENCOUNTER — APPOINTMENT (OUTPATIENT)
Dept: GENERAL RADIOLOGY | Age: 73
DRG: 246 | End: 2018-11-04
Payer: MEDICARE

## 2018-11-04 ENCOUNTER — HOSPITAL ENCOUNTER (INPATIENT)
Age: 73
LOS: 2 days | Discharge: HOME | DRG: 246 | End: 2018-11-06
Attending: EMERGENCY MEDICINE | Admitting: INTERNAL MEDICINE
Payer: MEDICARE

## 2018-11-04 DIAGNOSIS — R06.03 ACUTE RESPIRATORY DISTRESS: Primary | ICD-10-CM

## 2018-11-04 DIAGNOSIS — I50.9 ACUTE ON CHRONIC CONGESTIVE HEART FAILURE, UNSPECIFIED HEART FAILURE TYPE (HCC): ICD-10-CM

## 2018-11-04 DIAGNOSIS — R77.8 ELEVATED TROPONIN: ICD-10-CM

## 2018-11-04 PROBLEM — I50.43 ACUTE ON CHRONIC COMBINED SYSTOLIC AND DIASTOLIC ACC/AHA STAGE C CONGESTIVE HEART FAILURE (HCC): Status: ACTIVE | Noted: 2018-11-04

## 2018-11-04 PROBLEM — I11.0 HYPERTENSIVE CARDIOMYOPATHY, WITH HEART FAILURE (HCC): Status: ACTIVE | Noted: 2018-11-04

## 2018-11-04 PROBLEM — I50.23 ACUTE ON CHRONIC SYSTOLIC CHF (CONGESTIVE HEART FAILURE) (HCC): Status: ACTIVE | Noted: 2018-11-04

## 2018-11-04 PROBLEM — I43 HYPERTENSIVE CARDIOMYOPATHY, WITH HEART FAILURE (HCC): Status: ACTIVE | Noted: 2018-11-04

## 2018-11-04 PROBLEM — I21.4 NSTEMI (NON-ST ELEVATED MYOCARDIAL INFARCTION) (HCC): Status: ACTIVE | Noted: 2018-11-04

## 2018-11-04 LAB
ALBUMIN SERPL-MCNC: 4.2 G/DL (ref 3.2–4.6)
ALBUMIN/GLOB SERPL: 1.2 {RATIO} (ref 1.2–3.5)
ALP SERPL-CCNC: 71 U/L (ref 50–136)
ALT SERPL-CCNC: 31 U/L (ref 12–65)
ANION GAP SERPL CALC-SCNC: 7 MMOL/L (ref 7–16)
APTT PPP: 27.1 SEC (ref 23.2–35.3)
APTT PPP: 98.8 SEC (ref 23.2–35.3)
ARTERIAL PATENCY WRIST A: YES
AST SERPL-CCNC: 26 U/L (ref 15–37)
ATRIAL RATE: 88 BPM
BASE EXCESS BLD CALC-SCNC: 0 MMOL/L
BASOPHILS # BLD: 0 K/UL (ref 0–0.2)
BASOPHILS NFR BLD: 1 % (ref 0–2)
BDY SITE: ABNORMAL
BILIRUB SERPL-MCNC: 1.2 MG/DL (ref 0.2–1.1)
BNP SERPL-MCNC: 287 PG/ML
BODY TEMPERATURE: 98.6
BUN SERPL-MCNC: 9 MG/DL (ref 8–23)
CALCIUM SERPL-MCNC: 8.9 MG/DL (ref 8.3–10.4)
CALCULATED P AXIS, ECG09: 77 DEGREES
CALCULATED R AXIS, ECG10: -76 DEGREES
CALCULATED T AXIS, ECG11: 101 DEGREES
CHLORIDE SERPL-SCNC: 103 MMOL/L (ref 98–107)
CK SERPL-CCNC: 89 U/L (ref 21–215)
CO2 BLD-SCNC: 26 MMOL/L
CO2 SERPL-SCNC: 29 MMOL/L (ref 21–32)
CREAT SERPL-MCNC: 1.05 MG/DL (ref 0.8–1.5)
D DIMER PPP FEU-MCNC: 0.32 UG/ML(FEU)
DIAGNOSIS, 93000: NORMAL
DIFFERENTIAL METHOD BLD: NORMAL
EOSINOPHIL # BLD: 0.3 K/UL (ref 0–0.8)
EOSINOPHIL NFR BLD: 3 % (ref 0.5–7.8)
ERYTHROCYTE [DISTWIDTH] IN BLOOD BY AUTOMATED COUNT: 12.7 %
FLOW RATE ISTAT,IFRATE: 2 L/MIN
GAS FLOW.O2 O2 DELIVERY SYS: ABNORMAL L/MIN
GLOBULIN SER CALC-MCNC: 3.4 G/DL (ref 2.3–3.5)
GLUCOSE BLD STRIP.AUTO-MCNC: 191 MG/DL (ref 65–100)
GLUCOSE BLD STRIP.AUTO-MCNC: 215 MG/DL (ref 65–100)
GLUCOSE SERPL-MCNC: 219 MG/DL (ref 65–100)
HCO3 BLD-SCNC: 24.9 MMOL/L (ref 22–26)
HCT VFR BLD AUTO: 44.6 % (ref 41.1–50.3)
HGB BLD-MCNC: 15.3 G/DL (ref 13.6–17.2)
IMM GRANULOCYTES # BLD: 0 K/UL (ref 0–0.5)
IMM GRANULOCYTES NFR BLD AUTO: 0 % (ref 0–5)
LYMPHOCYTES # BLD: 1.7 K/UL (ref 0.5–4.6)
LYMPHOCYTES NFR BLD: 24 % (ref 13–44)
MCH RBC QN AUTO: 31.5 PG (ref 26.1–32.9)
MCHC RBC AUTO-ENTMCNC: 34.3 G/DL (ref 31.4–35)
MCV RBC AUTO: 91.8 FL (ref 79.6–97.8)
MONOCYTES # BLD: 0.6 K/UL (ref 0.1–1.3)
MONOCYTES NFR BLD: 8 % (ref 4–12)
NEUTS SEG # BLD: 4.7 K/UL (ref 1.7–8.2)
NEUTS SEG NFR BLD: 64 % (ref 43–78)
NRBC # BLD: 0 K/UL (ref 0–0.2)
PCO2 BLD: 40.2 MMHG (ref 35–45)
PH BLD: 7.4 [PH] (ref 7.35–7.45)
PLATELET # BLD AUTO: 156 K/UL (ref 150–450)
PMV BLD AUTO: 11.1 FL (ref 9.4–12.3)
PO2 BLD: 61 MMHG (ref 75–100)
POTASSIUM SERPL-SCNC: 3.6 MMOL/L (ref 3.5–5.1)
PROT SERPL-MCNC: 7.6 G/DL (ref 6.3–8.2)
Q-T INTERVAL, ECG07: 430 MS
QRS DURATION, ECG06: 180 MS
QTC CALCULATION (BEZET), ECG08: 517 MS
RBC # BLD AUTO: 4.86 M/UL (ref 4.23–5.6)
SAO2 % BLD: 91 % (ref 95–98)
SERVICE CMNT-IMP: ABNORMAL
SERVICE CMNT-IMP: ABNORMAL
SODIUM SERPL-SCNC: 139 MMOL/L (ref 136–145)
SPECIMEN TYPE: ABNORMAL
TROPONIN I SERPL-MCNC: 0.26 NG/ML (ref 0.02–0.05)
TROPONIN I SERPL-MCNC: 0.27 NG/ML (ref 0.02–0.05)
TROPONIN I SERPL-MCNC: 0.29 NG/ML (ref 0.02–0.05)
VENTRICULAR RATE, ECG03: 87 BPM
WBC # BLD AUTO: 7.3 K/UL (ref 4.3–11.1)

## 2018-11-04 PROCEDURE — 93005 ELECTROCARDIOGRAM TRACING: CPT

## 2018-11-04 PROCEDURE — 74011250637 HC RX REV CODE- 250/637: Performed by: PHYSICIAN ASSISTANT

## 2018-11-04 PROCEDURE — 85025 COMPLETE CBC W/AUTO DIFF WBC: CPT

## 2018-11-04 PROCEDURE — 74011250636 HC RX REV CODE- 250/636: Performed by: EMERGENCY MEDICINE

## 2018-11-04 PROCEDURE — 82803 BLOOD GASES ANY COMBINATION: CPT

## 2018-11-04 PROCEDURE — 96374 THER/PROPH/DIAG INJ IV PUSH: CPT | Performed by: EMERGENCY MEDICINE

## 2018-11-04 PROCEDURE — 36600 WITHDRAWAL OF ARTERIAL BLOOD: CPT

## 2018-11-04 PROCEDURE — 82550 ASSAY OF CK (CPK): CPT

## 2018-11-04 PROCEDURE — 71045 X-RAY EXAM CHEST 1 VIEW: CPT

## 2018-11-04 PROCEDURE — 85730 THROMBOPLASTIN TIME PARTIAL: CPT

## 2018-11-04 PROCEDURE — 77010033678 HC OXYGEN DAILY

## 2018-11-04 PROCEDURE — 84484 ASSAY OF TROPONIN QUANT: CPT

## 2018-11-04 PROCEDURE — 74011250636 HC RX REV CODE- 250/636: Performed by: INTERNAL MEDICINE

## 2018-11-04 PROCEDURE — 85379 FIBRIN DEGRADATION QUANT: CPT

## 2018-11-04 PROCEDURE — 65660000000 HC RM CCU STEPDOWN

## 2018-11-04 PROCEDURE — 80053 COMPREHEN METABOLIC PANEL: CPT

## 2018-11-04 PROCEDURE — 99285 EMERGENCY DEPT VISIT HI MDM: CPT | Performed by: EMERGENCY MEDICINE

## 2018-11-04 PROCEDURE — 74011250637 HC RX REV CODE- 250/637: Performed by: INTERNAL MEDICINE

## 2018-11-04 PROCEDURE — 36415 COLL VENOUS BLD VENIPUNCTURE: CPT

## 2018-11-04 PROCEDURE — 82962 GLUCOSE BLOOD TEST: CPT

## 2018-11-04 PROCEDURE — 83880 ASSAY OF NATRIURETIC PEPTIDE: CPT

## 2018-11-04 PROCEDURE — 74011250637 HC RX REV CODE- 250/637: Performed by: EMERGENCY MEDICINE

## 2018-11-04 PROCEDURE — 74011636637 HC RX REV CODE- 636/637: Performed by: PHYSICIAN ASSISTANT

## 2018-11-04 RX ORDER — NITROGLYCERIN 0.4 MG/1
0.4 TABLET SUBLINGUAL
Status: DISCONTINUED | OUTPATIENT
Start: 2018-11-04 | End: 2018-11-06 | Stop reason: HOSPADM

## 2018-11-04 RX ORDER — SODIUM CHLORIDE 0.9 % (FLUSH) 0.9 %
5-10 SYRINGE (ML) INJECTION AS NEEDED
Status: DISCONTINUED | OUTPATIENT
Start: 2018-11-04 | End: 2018-11-05 | Stop reason: SDUPTHER

## 2018-11-04 RX ORDER — SODIUM CHLORIDE 0.9 % (FLUSH) 0.9 %
5-10 SYRINGE (ML) INJECTION EVERY 8 HOURS
Status: DISCONTINUED | OUTPATIENT
Start: 2018-11-04 | End: 2018-11-05 | Stop reason: SDUPTHER

## 2018-11-04 RX ORDER — HEPARIN SODIUM 5000 [USP'U]/ML
4000 INJECTION, SOLUTION INTRAVENOUS; SUBCUTANEOUS ONCE
Status: COMPLETED | OUTPATIENT
Start: 2018-11-04 | End: 2018-11-04

## 2018-11-04 RX ORDER — INSULIN LISPRO 100 [IU]/ML
INJECTION, SOLUTION INTRAVENOUS; SUBCUTANEOUS
Status: DISCONTINUED | OUTPATIENT
Start: 2018-11-04 | End: 2018-11-06 | Stop reason: HOSPADM

## 2018-11-04 RX ORDER — GUAIFENESIN 100 MG/5ML
81 LIQUID (ML) ORAL DAILY
Status: DISCONTINUED | OUTPATIENT
Start: 2018-11-04 | End: 2018-11-06 | Stop reason: HOSPADM

## 2018-11-04 RX ORDER — LEVOTHYROXINE SODIUM 75 UG/1
150 TABLET ORAL
Status: DISCONTINUED | OUTPATIENT
Start: 2018-11-05 | End: 2018-11-06 | Stop reason: HOSPADM

## 2018-11-04 RX ORDER — GUAIFENESIN 100 MG/5ML
81 LIQUID (ML) ORAL DAILY
Status: DISCONTINUED | OUTPATIENT
Start: 2018-11-04 | End: 2018-11-04 | Stop reason: SDUPTHER

## 2018-11-04 RX ORDER — IRBESARTAN 150 MG/1
150 TABLET ORAL DAILY
Status: DISCONTINUED | OUTPATIENT
Start: 2018-11-04 | End: 2018-11-06 | Stop reason: HOSPADM

## 2018-11-04 RX ORDER — SIMVASTATIN 40 MG/1
40 TABLET, FILM COATED ORAL
Status: DISCONTINUED | OUTPATIENT
Start: 2018-11-04 | End: 2018-11-06 | Stop reason: HOSPADM

## 2018-11-04 RX ORDER — HEPARIN SODIUM 5000 [USP'U]/100ML
12-25 INJECTION, SOLUTION INTRAVENOUS
Status: DISCONTINUED | OUTPATIENT
Start: 2018-11-04 | End: 2018-11-06 | Stop reason: HOSPADM

## 2018-11-04 RX ORDER — MORPHINE SULFATE 2 MG/ML
2 INJECTION, SOLUTION INTRAMUSCULAR; INTRAVENOUS
Status: DISCONTINUED | OUTPATIENT
Start: 2018-11-04 | End: 2018-11-06 | Stop reason: HOSPADM

## 2018-11-04 RX ORDER — NITROGLYCERIN 20 MG/100ML
0-20 INJECTION INTRAVENOUS
Status: DISCONTINUED | OUTPATIENT
Start: 2018-11-04 | End: 2018-11-06 | Stop reason: HOSPADM

## 2018-11-04 RX ORDER — FUROSEMIDE 10 MG/ML
80 INJECTION INTRAMUSCULAR; INTRAVENOUS
Status: COMPLETED | OUTPATIENT
Start: 2018-11-04 | End: 2018-11-04

## 2018-11-04 RX ORDER — ACETAMINOPHEN 500 MG
500 TABLET ORAL
Status: DISCONTINUED | OUTPATIENT
Start: 2018-11-04 | End: 2018-11-06 | Stop reason: HOSPADM

## 2018-11-04 RX ORDER — CARVEDILOL 12.5 MG/1
12.5 TABLET ORAL 2 TIMES DAILY WITH MEALS
Status: DISCONTINUED | OUTPATIENT
Start: 2018-11-04 | End: 2018-11-06 | Stop reason: HOSPADM

## 2018-11-04 RX ADMIN — SIMVASTATIN 40 MG: 40 TABLET, FILM COATED ORAL at 21:51

## 2018-11-04 RX ADMIN — CARVEDILOL 12.5 MG: 3.12 TABLET, FILM COATED ORAL at 10:48

## 2018-11-04 RX ADMIN — INSULIN LISPRO 2 UNITS: 100 INJECTION, SOLUTION INTRAVENOUS; SUBCUTANEOUS at 21:51

## 2018-11-04 RX ADMIN — HEPARIN SODIUM 4000 UNITS: 5000 INJECTION INTRAVENOUS; SUBCUTANEOUS at 10:48

## 2018-11-04 RX ADMIN — FUROSEMIDE 80 MG: 10 INJECTION, SOLUTION INTRAMUSCULAR; INTRAVENOUS at 06:58

## 2018-11-04 RX ADMIN — HEPARIN SODIUM 12 UNITS/KG/HR: 5000 INJECTION, SOLUTION INTRAVENOUS at 11:12

## 2018-11-04 RX ADMIN — NITROGLYCERIN 1 INCH: 20 OINTMENT TOPICAL at 06:52

## 2018-11-04 RX ADMIN — Medication 10 ML: at 21:52

## 2018-11-04 RX ADMIN — ASPIRIN 81 MG: 81 TABLET, CHEWABLE ORAL at 10:48

## 2018-11-04 RX ADMIN — CARVEDILOL 12.5 MG: 3.12 TABLET, FILM COATED ORAL at 17:29

## 2018-11-04 NOTE — ROUTINE PROCESS
TRANSFER - OUT REPORT: 
 
[facility] Victor  being transferred to 3rd floor for routine progression of care Report consisted of patients Situation, Background, Assessment and  
Recommendations(SBAR). Information from the following report(s) ED Summary was reviewed with the receiving nurse. Lines:  
Peripheral IV 11/04/18 Right Antecubital (Active) Site Assessment Clean, dry, & intact 11/4/2018  6:46 AM  
Phlebitis Assessment 0 11/4/2018  6:46 AM  
Infiltration Assessment 0 11/4/2018  6:46 AM  
Dressing Status Clean, dry, & intact 11/4/2018  6:46 AM  
  
 
Opportunity for questions and clarification was provided. Patient transported with: 
 Monitor Registered Nurse O2 3LNC

## 2018-11-04 NOTE — PROGRESS NOTES
Admission skin assessment completed with second RN and reveals the following: sacrum intact and bilateral lower extremities dry with 1+ pitting edema.

## 2018-11-04 NOTE — ED PROVIDER NOTES
Patient awakened this morning at approximately 5 AM with severe shortness of breath. Denies chest pain, diaphoresis, nausea or vomiting. Denies history of CHF. The history is provided by the patient. Shortness of Breath This is a new problem. The average episode lasts 2 hours. The problem occurs continuously. The current episode started 1 to 2 hours ago. The problem has been gradually improving. Associated symptoms include wheezing. Pertinent negatives include no fever, no headaches, no coryza, no rhinorrhea, no sore throat, no swollen glands, no ear pain, no neck pain, no cough, no sputum production, no hemoptysis, no PND, no orthopnea, no chest pain, no syncope, no vomiting, no abdominal pain, no rash, no leg pain, no leg swelling and no claudication. It is unknown what precipitated the problem. Treatments tried: nebulizer treatment per EMS. The treatment provided mild relief. He has had no prior hospitalizations. He has had no prior ED visits. Associated medical issues include CAD and past MI. Associated medical issues do not include asthma, COPD, pneumonia, chronic lung disease, PE, heart failure, DVT or recent surgery. Past Medical History:  
Diagnosis Date  Acquired hypothyroidism 3/19/2018  AV junctional bradycardia  CAD (coronary artery disease)  Congestive heart failure ([address]  Diabetes [facility]  Endocrine disease   
 hypothyriod  Headache 8/5/2017  Heart failure ([facility]  Hypertension  Stroke ([facility] Past Surgical History:  
Procedure Laterality Date  CARDIAC SURG PROCEDURE UNLIST    
 3 vessel bypass  HX CHOLECYSTECTOMY Family History:  
Problem Relation Age of Onset  Heart Disease Mother  Diabetes Sister  Cancer Sister  Diabetes Sister Social History Socioeconomic History  Marital status:  Spouse name: Not on file  Number of children: Not on file  Years of education: Not on file  Highest education level: Not on file Social Needs  Financial resource strain: Not on file  Food insecurity - worry: Not on file  Food insecurity - inability: Not on file  Transportation needs - medical: Not on file  Transportation needs - non-medical: Not on file Occupational History Employer: RETIRED Tobacco Use  Smoking status: Former Smoker Last attempt to quit: 1972 Years since quittin.8  Smokeless tobacco: Never Used Substance and Sexual Activity  Alcohol use: No  
 Drug use: No  
 Sexual activity: Yes Other Topics Concern  Not on file Social History Narrative  Not on file ALLERGIES: Jardiance [empagliflozin] Review of Systems Constitutional: Negative for activity change, appetite change, chills and fever. HENT: Negative for ear pain, rhinorrhea and sore throat. Respiratory: Positive for shortness of breath and wheezing. Negative for cough, hemoptysis and sputum production. Cardiovascular: Negative for chest pain, orthopnea, claudication, leg swelling, syncope and PND. Gastrointestinal: Negative for abdominal pain and vomiting. Musculoskeletal: Negative for neck pain. Skin: Negative for rash. Neurological: Negative for headaches. All other systems reviewed and are negative. Vitals:  
 18 1231 18 3949 18 5206 18 8355 BP: (!) 194/107  (!) 205/93 190/86 Pulse: 85  87 81 Resp: 23  25 24 Temp:      
SpO2: 93% 92% 92% 99% Weight:      
Height:      
      
 
Physical Exam  
Constitutional: He is oriented to person, place, and time. He appears well-developed and well-nourished. No distress. HENT:  
Head: Normocephalic and atraumatic. Right Ear: External ear normal.  
Left Ear: External ear normal.  
Mouth/Throat: Oropharynx is clear and moist.  
Eyes: Conjunctivae and EOM are normal. Pupils are equal, round, and reactive to light. Neck: Normal range of motion. Neck supple. Cardiovascular: Normal rate, regular rhythm, normal heart sounds and intact distal pulses. No murmur heard. Pulmonary/Chest: Accessory muscle usage present. Tachypnea noted. He is in respiratory distress. He has wheezes (mild all fields). He has no rhonchi. He has rales in the right lower field and the left lower field. Abdominal: Soft. Bowel sounds are normal. There is no tenderness. Musculoskeletal: Normal range of motion. He exhibits no edema. Neurological: He is alert and oriented to person, place, and time. Skin: Skin is warm and dry. Capillary refill takes less than 2 seconds. Psychiatric: He has a normal mood and affect. Nursing note and vitals reviewed. MDM Number of Diagnoses or Management Options Acute on chronic congestive heart failure, unspecified heart failure type [facility]): new and requires workup Acute respiratory distress: new and requires workup Elevated troponin: new and requires workup Amount and/or Complexity of Data Reviewed Clinical lab tests: ordered and reviewed Tests in the radiology section of CPT®: ordered and reviewed Decide to obtain previous medical records or to obtain history from someone other than the patient: yes Review and summarize past medical records: yes (Dedra Torres Physician 
Cardiology Discharge Summary Signed Date of Service:  02/15/10 1055 
 
 
 
 
  
 
                         [facility] [place]  [facility]. [address] 
                              [phone] 
 
                            DISCHARGE SUMMARY 
 
NAME: Alli Morales                            MR:  [id number] LOC: 03  [id number]              SEX:  M               ACCT: [de-identified] : 1945             AGE:  64              PT:  I 
ADMIT: 2010           DSCH:  02/10/2010     MSV:  MED ADMITTING CARDIOLOGIST: Keanu Nova M.D. 
 
 DISCHARGE CARDIOLOGIST: Blanca Mera M.D. FINAL DIAGNOSES EXPLAINING ADMISSION: 1.    Acute diastolic congestive heart failure likely secondary to 
bradycardia. 2.    AV junctional bradycardia, status post dual chamber pacemaker. 3.    Atherosclerotic coronary artery disease, status post coronary 
      artery bypass grafting. 4.    Hypertension. 5.    Dyslipidemia. 6.    Hypothyroidism. OPERATIONS AND PROCEDURES:  2/8/2010 - By Dr. Marianna Boone, he 
underwent a [employer] dual chamber pacemaker implantation secondary to a 
2:1 AV block.  He has an atrial lead [address], model #2008OA, serial #[id number], 46 cm in length, ventricular lead [employer] TNDRIL DX model E3033706, serial [id number], 52 cm in length, 
generator [address] [facility], model M410809, serial [id number]. COURSE IN THE HOSPITAL:  Mr. Asif Denis is a 78-year-old  gentleman 
admitted to the [facility] on February 5, 2010 for 
progressive shortness of breath, orthopnea, and dyspnea on exertion.  He 
was also found to be bradycardiac with heart rates in the 30s upon 
admission. [place] was diuresed with IV Lasix and his Cozaar was resumed. His beta blocker dosage was reduced and subsequently placed on hold with 
a 2:1 AV block.  We assumed care over the course of the weekend secondary 
to the urgent nature of Dr. Woo Smoke underwent a dual chamber 
pacemaker implantation without event.   Post pacer chest x-ray was within 
normal limits with no evidence of pneumothorax.  He does have sutures 
still intact to his right chest wall.  He has some mild erythema to the 
borders of his edges.  Otherwise, no fevers, chills, or drainage 
appreciated.  He was converted to oral Lasix on February 9 with 
resolution of his diastolic heart failure.  On February 10, 2010, he was 
seen and evaluated by Dr. Blanca Mera and felt stable and 
appropriate for discharge home today. DISPOSITION:  Mr. Vanessa Strickland will be discharged home today in stable 
condition. ) Discuss the patient with other providers: yes Independent visualization of images, tracings, or specimens: yes Risk of Complications, Morbidity, and/or Mortality Presenting problems: high Diagnostic procedures: moderate Management options: high Critical Care Total time providing critical care: (=================================================================== This patient is critically ill and there is a high probability of of imminent or life threatening deterioration in the patient's condition without immediate management. The nature of the patient's clinical problem is: aacute and chronic congestive heart failure, acute respiratory distress, elevated troponin, hypertensive urgency I have spent 45 minutes in direct patient care, documentation, review of labs/xrays/old records, discussion with Family, Staff, Colleague, Nursing . The time involved in the performance of separately reportable procedures was not counted toward critical care time. Jose Francisco Key MD; 11/4/2018 @9:06 AM 
=================================================================== 
 
) Patient Progress Patient progress: improved Procedures The patient was observed in the ED. Results Reviewed: 
 
 
Recent Results (from the past 24 hour(s)) POC G3 Collection Time: 11/04/18  6:44 AM  
Result Value Ref Range Device: NASAL CANNULA pH (POC) 7.399 7.35 - 7.45    
 pCO2 (POC) 40.2 35 - 45 MMHG  
 pO2 (POC) 61 (L) 75 - 100 MMHG  
 HCO3 (POC) 24.9 22 - 26 MMOL/L  
 sO2 (POC) 91 (L) 95 - 98 % Base excess (POC) 0 mmol/L Allens test (POC) YES Site RIGHT RADIAL Patient temp. 98.6 Specimen type (POC) ARTERIAL Performed by Kory   
 CO2, POC 26 MMOL/L Flow rate (POC) 2.000 L/min Respiratory comment: PhysicianNotified CBC WITH AUTOMATED DIFF  Collection Time: 11/04/18  6:48 AM  
 Result Value Ref Range WBC 7.3 4.3 - 11.1 K/uL  
 RBC 4.86 4.23 - 5.6 M/uL  
 HGB 15.3 13.6 - 17.2 g/dL HCT 44.6 41.1 - 50.3 % MCV 91.8 79.6 - 97.8 FL  
 MCH 31.5 26.1 - 32.9 PG  
 MCHC 34.3 31.4 - 35.0 g/dL  
 RDW 12.7 % PLATELET 203 518 - 454 K/uL MPV 11.1 9.4 - 12.3 FL ABSOLUTE NRBC 0.00 0.0 - 0.2 K/uL  
 DF AUTOMATED NEUTROPHILS 64 43 - 78 % LYMPHOCYTES 24 13 - 44 % MONOCYTES 8 4.0 - 12.0 % EOSINOPHILS 3 0.5 - 7.8 % BASOPHILS 1 0.0 - 2.0 % IMMATURE GRANULOCYTES 0 0.0 - 5.0 %  
 ABS. NEUTROPHILS 4.7 1.7 - 8.2 K/UL  
 ABS. LYMPHOCYTES 1.7 0.5 - 4.6 K/UL  
 ABS. MONOCYTES 0.6 0.1 - 1.3 K/UL  
 ABS. EOSINOPHILS 0.3 0.0 - 0.8 K/UL  
 ABS. BASOPHILS 0.0 0.0 - 0.2 K/UL  
 ABS. IMM. GRANS. 0.0 0.0 - 0.5 K/UL METABOLIC PANEL, COMPREHENSIVE Collection Time: 11/04/18  6:48 AM  
Result Value Ref Range Sodium 139 136 - 145 mmol/L Potassium 3.6 3.5 - 5.1 mmol/L Chloride 103 98 - 107 mmol/L  
 CO2 29 21 - 32 mmol/L Anion gap 7 7 - 16 mmol/L Glucose 219 (H) 65 - 100 mg/dL BUN 9 8 - 23 MG/DL Creatinine 1.05 0.8 - 1.5 MG/DL  
 GFR est AA >60 >60 ml/min/1.73m2 GFR est non-AA >60 >60 ml/min/1.73m2 Calcium 8.9 8.3 - 10.4 MG/DL Bilirubin, total 1.2 (H) 0.2 - 1.1 MG/DL  
 ALT (SGPT) 31 12 - 65 U/L  
 AST (SGOT) 26 15 - 37 U/L Alk. phosphatase 71 50 - 136 U/L Protein, total 7.6 6.3 - 8.2 g/dL Albumin 4.2 3.2 - 4.6 g/dL Globulin 3.4 2.3 - 3.5 g/dL A-G Ratio 1.2 1.2 - 3.5    
TROPONIN I Collection Time: 11/04/18  6:48 AM  
Result Value Ref Range Troponin-I, Qt. 0.29 (HH) 0.02 - 0.05 NG/ML  
BNP Collection Time: 11/04/18  6:48 AM  
Result Value Ref Range  pg/mL EKG, 12 LEAD, INITIAL Collection Time: 11/04/18  7:43 AM  
Result Value Ref Range Ventricular Rate 87 BPM  
 Atrial Rate 88 BPM  
 QRS Duration 180 ms  
 Q-T Interval 430 ms QTC Calculation (Bezet) 517 ms Calculated P Axis 77 degrees Calculated R Axis -76 degrees Calculated T Axis 101 degrees Diagnosis    
  !! AGE AND GENDER SPECIFIC ECG ANALYSIS !! Sinus rhythm with A-V dissociation and Wide QRS rhythm Left axis deviation Non-specific intra-ventricular conduction block Lateral infarct , age undetermined Inferior infarct , age undetermined Abnormal ECG When compared with ECG of 23-JUN-2018 14:17, 
Previous ECG has undetermined rhythm, needs review Confirmed by Ramila Pena MD (), Evonnie Libman ([id number]) on 11/4/2018 8:38:14 AM 
  
 
 
XR CHEST PORT Final Result IMPRESSION: Postop chest findings and pacemaker stable. Increased mild perihilar  
interstitial markings today. Correlate for viral pneumonitis versus interstitial  
pulmonary edema.

## 2018-11-04 NOTE — ED TRIAGE NOTES
Per EMS: arriving via ambulance from home in [address] Called for SOB. Went to bed feeling okay but woke up SOB. Work of breathing and sounds improved but still wheezing, predominately on right side. 92% room air and 98% on 2l NC. Admin 5 of albuterol.  
20g IV in right AC. bgl 257 with hx diabetes.

## 2018-11-04 NOTE — ED NOTES
Patient refused to start nitro drip at this time. Patient states he was on nitro drip x1 year ago and he states he thinks it caused him to have a stroke. Provided patient education on benefits of nitro drip. Patient verbalizes understanding and states he will agree to nitro drip if blood pressure does not improve with PO medication- see MAR.

## 2018-11-04 NOTE — PROGRESS NOTES
Placed pt on BIPAP per 's order. Pt was SOB and tachypneic on 2L. Pt is doing well on BIPAP now. Will continue to monitor.

## 2018-11-04 NOTE — H&P
11/4/2018 9:27 AM 
 
Admit Date: 11/4/2018 Admit Diagnosis: No admission diagnoses are documented for this encounter. Subjective:  
 Patient awoke early this am with sob vela pul edema. All acute onset. Came to er and found to be in pulmonary edema with labs consistent with nstemi. Prior to this AM had been doing well. Remote cabg hx with known depressed LV function. HFrEF with PPM in place. Received BiPAP and lasix now breathing easier. Objective:  
  
Visit Vitals /83 Pulse 82 Temp 96.7 °F (35.9 °C) Resp 20 Ht 6' 1\" (1.854 m) Wt 95.3 kg (210 lb) SpO2 94% BMI 27.71 kg/m² ROS:  General ROS: negative for - chills Hematological and Lymphatic ROS: negative for - blood clots or jaundice Respiratory ROS: positive for - shortness of breath Cardiovascular ROS: positive for - dyspnea on exertion and shortness of breath Gastrointestinal ROS: no abdominal pain, change in bowel habits, or black or bloody stools Neurological ROS: no TIA or stroke symptoms Physical Exam: 
 
Physical Examination: General appearance - alert, well appearing, and in no distress Mental status - alert, oriented to person, place, and time Eyes - pupils equal and reactive, extraocular eye movements intact Neck/lymph - supple, no significant adenopathy Chest/CV - clear to auscultation, no wheezes, rales or rhonchi, symmetric air entry Heart - normal rate, regular rhythm, normal S1, S2, no murmurs, rubs, clicks or gallops Abdomen/GI - soft, nontender, nondistended, no masses or organomegaly Musculoskeletal - no joint tenderness, deformity or swelling Extremities - peripheral pulses normal, no pedal edema, no clubbing or cyanosis Skin - normal coloration and turgor, no rashes, no suspicious skin lesions noted No current facility-administered medications for this encounter. Current Outpatient Medications Medication Sig  
  irbesartan (AVAPRO) 150 mg tablet 1 po qd for blood pressure instead of the valsartan  levothyroxine (SYNTHROID) 125 mcg tablet Take 1 Tab by mouth Daily (before breakfast).  glimepiride (AMARYL) 4 mg tablet 1 po bid with a meal for diabetes instead of the glipizide)  repaglinide (PRANDIN) 2 mg tablet  Blood-Glucose Meter monitoring kit Test blood sugar daily, either fasting or 2 hours after eating for diabetes and record in log. (#1)  Dx: E11.9  
 lancets 30 gauge misc Test blood sugar daily, either fasting or 2 hours after eating for diabetes and record in log. (#100)  Dx: E11.9  
 glucose blood VI test strips (BLOOD GLUCOSE TEST) strip Test blood sugar daily, either fasting or 2 hours after eating for diabetes and record in log. (#100)  Dx: E11.9  varicella-zoster recombinant, PF, (SHINGRIX, PF,) 50 mcg/0.5 mL susr injection 0.5ml IM now followed by second dose 2 months later.  carvedilol (COREG) 12.5 mg tablet Take 1 Tab by mouth two (2) times daily (with meals).  linagliptin (TRADJENTA) 5 mg tablet Take 1 Tab by mouth daily.  simethicone (GAS-X) 125 mg capsule 1 po qid before meals and bedtime for gas  raNITIdine (ZANTAC) 150 mg tablet Take 1 Tab by mouth two (2) times a day.  metFORMIN (GLUCOPHAGE) 1,000 mg tablet Take 1 Tab by mouth two (2) times daily (with meals).  simvastatin (ZOCOR) 40 mg tablet Take 1 Tab by mouth nightly.  repaglinide (PRANDIN) 1 mg tablet Take 1 Tab by mouth Before breakfast, lunch, and dinner.  furosemide (LASIX) 40 mg tablet Take 1 Tab by mouth daily.  nitroglycerin (NITROSTAT) 0.4 mg SL tablet by SubLINGual route every five (5) minutes as needed for Chest Pain.  aspirin 81 mg chewable tablet Take 81 mg by mouth daily.  cyanocobalamin 1,000 mcg tablet Take 1,000 mcg by mouth daily.  cholecalciferol (VITAMIN D3) 400 unit tab tablet Take 400 Units by mouth daily. Data Review: @LABRCNT(Na,K,BUN,CREA,WBC,HGB,HCT,PLT,INR,TRP,TCHOL*,Triglyceride*,LDL*,LDLCPOC HDL*,HDL])@ TELEMETRY: Paced Assessment/Plan: Active Problems: 
  Chronic coronary artery disease (2/6/2010) prior cabg now appears to have nstemi. Admit med treatment consider cath Acute on chronic combined systolic and diastolic ACC/AHA stage C congestive heart failure ([facility] (11/4/2018)adjust meds Hypertensive cardiomyopathy, with [facility] (11/4/2018)The current medical regimen is effective;  continue present plan and medications. NSTEMI (non-ST elevated myocardial infarction) ([facility] (11/4/2018) PPM SJM dual chamber PPM 
 
CABG lima to lad and SV to rca Brittany Garcia MD

## 2018-11-04 NOTE — PROGRESS NOTES
TRANSFER - IN REPORT: 
 
Verbal report received from SHANE Robertson on [id number] being received from ER for routine progression of care Report consisted of patients Situation, Background, Assessment and Recommendations(SBAR). Information from the following report(s) SBAR, Kardex, ED Summary, Intake/Output, MAR, Accordion, Recent Results, Med Rec Status and Cardiac Rhythm SR was reviewed with the receiving nurse. Opportunity for questions and clarification was provided. Assessment completed upon patients arrival to unit and care assumed.

## 2018-11-05 LAB
ANION GAP SERPL CALC-SCNC: 8 MMOL/L (ref 7–16)
APTT PPP: 98.2 SEC (ref 23.2–35.3)
ATRIAL RATE: 64 BPM
BUN SERPL-MCNC: 9 MG/DL (ref 8–23)
CALCIUM SERPL-MCNC: 8.4 MG/DL (ref 8.3–10.4)
CALCULATED P AXIS, ECG09: 41 DEGREES
CALCULATED R AXIS, ECG10: -84 DEGREES
CALCULATED T AXIS, ECG11: 97 DEGREES
CHLORIDE SERPL-SCNC: 101 MMOL/L (ref 98–107)
CHOLEST SERPL-MCNC: 94 MG/DL
CO2 SERPL-SCNC: 30 MMOL/L (ref 21–32)
CREAT SERPL-MCNC: 0.98 MG/DL (ref 0.8–1.5)
DIAGNOSIS, 93000: NORMAL
GLUCOSE BLD STRIP.AUTO-MCNC: 162 MG/DL (ref 65–100)
GLUCOSE BLD STRIP.AUTO-MCNC: 215 MG/DL (ref 65–100)
GLUCOSE BLD STRIP.AUTO-MCNC: 228 MG/DL (ref 65–100)
GLUCOSE BLD STRIP.AUTO-MCNC: 232 MG/DL (ref 65–100)
GLUCOSE SERPL-MCNC: 185 MG/DL (ref 65–100)
HCT VFR BLD AUTO: 42.5 % (ref 41.1–50.3)
HDLC SERPL-MCNC: 33 MG/DL (ref 40–60)
HDLC SERPL: 2.8 {RATIO}
HGB BLD-MCNC: 14.9 G/DL (ref 13.6–17.2)
LDLC SERPL CALC-MCNC: 25.4 MG/DL
LIPID PROFILE,FLP: ABNORMAL
MAGNESIUM SERPL-MCNC: 2 MG/DL (ref 1.8–2.4)
P-R INTERVAL, ECG05: 162 MS
POTASSIUM SERPL-SCNC: 3.2 MMOL/L (ref 3.5–5.1)
Q-T INTERVAL, ECG07: 518 MS
QRS DURATION, ECG06: 188 MS
QTC CALCULATION (BEZET), ECG08: 534 MS
SODIUM SERPL-SCNC: 139 MMOL/L (ref 136–145)
TRIGL SERPL-MCNC: 178 MG/DL (ref 35–150)
TROPONIN I SERPL-MCNC: 0.22 NG/ML (ref 0.02–0.05)
VENTRICULAR RATE, ECG03: 64 BPM
VLDLC SERPL CALC-MCNC: 35.6 MG/DL (ref 6–23)

## 2018-11-05 PROCEDURE — 74011636320 HC RX REV CODE- 636/320: Performed by: INTERNAL MEDICINE

## 2018-11-05 PROCEDURE — 82962 GLUCOSE BLOOD TEST: CPT

## 2018-11-05 PROCEDURE — 83735 ASSAY OF MAGNESIUM: CPT

## 2018-11-05 PROCEDURE — 36415 COLL VENOUS BLD VENIPUNCTURE: CPT

## 2018-11-05 PROCEDURE — 84484 ASSAY OF TROPONIN QUANT: CPT

## 2018-11-05 PROCEDURE — 74011250637 HC RX REV CODE- 250/637: Performed by: NURSE PRACTITIONER

## 2018-11-05 PROCEDURE — C1887 CATHETER, GUIDING: HCPCS

## 2018-11-05 PROCEDURE — C1725 CATH, TRANSLUMIN NON-LASER: HCPCS

## 2018-11-05 PROCEDURE — 027034Z DILATION OF CORONARY ARTERY, ONE ARTERY WITH DRUG-ELUTING INTRALUMINAL DEVICE, PERCUTANEOUS APPROACH: ICD-10-PCS | Performed by: INTERNAL MEDICINE

## 2018-11-05 PROCEDURE — 74011000258 HC RX REV CODE- 258: Performed by: INTERNAL MEDICINE

## 2018-11-05 PROCEDURE — C1769 GUIDE WIRE: HCPCS

## 2018-11-05 PROCEDURE — C8929 TTE W OR WO FOL WCON,DOPPLER: HCPCS

## 2018-11-05 PROCEDURE — 74011250637 HC RX REV CODE- 250/637: Performed by: INTERNAL MEDICINE

## 2018-11-05 PROCEDURE — 92928 PRQ TCAT PLMT NTRAC ST 1 LES: CPT

## 2018-11-05 PROCEDURE — 85018 HEMOGLOBIN: CPT

## 2018-11-05 PROCEDURE — 74011250636 HC RX REV CODE- 250/636

## 2018-11-05 PROCEDURE — C1760 CLOSURE DEV, VASC: HCPCS

## 2018-11-05 PROCEDURE — 77030012468 HC VLV BLEEDBK CNTRL ABBT -B

## 2018-11-05 PROCEDURE — 99152 MOD SED SAME PHYS/QHP 5/>YRS: CPT

## 2018-11-05 PROCEDURE — 93459 L HRT ART/GRFT ANGIO: CPT

## 2018-11-05 PROCEDURE — 74011636637 HC RX REV CODE- 636/637: Performed by: PHYSICIAN ASSISTANT

## 2018-11-05 PROCEDURE — 77030004534 HC CATH ANGI DX INFN CARD -A

## 2018-11-05 PROCEDURE — 99153 MOD SED SAME PHYS/QHP EA: CPT

## 2018-11-05 PROCEDURE — 80061 LIPID PANEL: CPT

## 2018-11-05 PROCEDURE — 65660000000 HC RM CCU STEPDOWN

## 2018-11-05 PROCEDURE — 74011250636 HC RX REV CODE- 250/636: Performed by: NURSE PRACTITIONER

## 2018-11-05 PROCEDURE — B2151ZZ FLUOROSCOPY OF LEFT HEART USING LOW OSMOLAR CONTRAST: ICD-10-PCS | Performed by: INTERNAL MEDICINE

## 2018-11-05 PROCEDURE — 85730 THROMBOPLASTIN TIME PARTIAL: CPT

## 2018-11-05 PROCEDURE — C1874 STENT, COATED/COV W/DEL SYS: HCPCS

## 2018-11-05 PROCEDURE — 4A023N7 MEASUREMENT OF CARDIAC SAMPLING AND PRESSURE, LEFT HEART, PERCUTANEOUS APPROACH: ICD-10-PCS | Performed by: INTERNAL MEDICINE

## 2018-11-05 PROCEDURE — B2111ZZ FLUOROSCOPY OF MULTIPLE CORONARY ARTERIES USING LOW OSMOLAR CONTRAST: ICD-10-PCS | Performed by: INTERNAL MEDICINE

## 2018-11-05 PROCEDURE — 74011000250 HC RX REV CODE- 250: Performed by: INTERNAL MEDICINE

## 2018-11-05 PROCEDURE — 74011250637 HC RX REV CODE- 250/637: Performed by: PHYSICIAN ASSISTANT

## 2018-11-05 PROCEDURE — C1894 INTRO/SHEATH, NON-LASER: HCPCS

## 2018-11-05 PROCEDURE — 80048 BASIC METABOLIC PNL TOTAL CA: CPT

## 2018-11-05 PROCEDURE — 74011250636 HC RX REV CODE- 250/636: Performed by: INTERNAL MEDICINE

## 2018-11-05 PROCEDURE — 93005 ELECTROCARDIOGRAM TRACING: CPT | Performed by: NURSE PRACTITIONER

## 2018-11-05 RX ORDER — ONDANSETRON 2 MG/ML
INJECTION INTRAMUSCULAR; INTRAVENOUS
Status: COMPLETED
Start: 2018-11-05 | End: 2018-11-05

## 2018-11-05 RX ORDER — MIDAZOLAM HYDROCHLORIDE 1 MG/ML
.5-2 INJECTION, SOLUTION INTRAMUSCULAR; INTRAVENOUS
Status: DISCONTINUED | OUTPATIENT
Start: 2018-11-05 | End: 2018-11-06 | Stop reason: HOSPADM

## 2018-11-05 RX ORDER — CLOPIDOGREL BISULFATE 75 MG/1
75 TABLET ORAL DAILY
Status: DISCONTINUED | OUTPATIENT
Start: 2018-11-05 | End: 2018-11-06 | Stop reason: HOSPADM

## 2018-11-05 RX ORDER — SODIUM CHLORIDE 0.9 % (FLUSH) 0.9 %
5-10 SYRINGE (ML) INJECTION AS NEEDED
Status: DISCONTINUED | OUTPATIENT
Start: 2018-11-05 | End: 2018-11-06 | Stop reason: HOSPADM

## 2018-11-05 RX ORDER — FENTANYL CITRATE 50 UG/ML
25-50 INJECTION, SOLUTION INTRAMUSCULAR; INTRAVENOUS
Status: DISCONTINUED | OUTPATIENT
Start: 2018-11-05 | End: 2018-11-06 | Stop reason: HOSPADM

## 2018-11-05 RX ORDER — HYDRALAZINE HYDROCHLORIDE 20 MG/ML
10 INJECTION INTRAMUSCULAR; INTRAVENOUS
Status: DISCONTINUED | OUTPATIENT
Start: 2018-11-05 | End: 2018-11-06 | Stop reason: HOSPADM

## 2018-11-05 RX ORDER — POTASSIUM CHLORIDE 20 MEQ/1
40 TABLET, EXTENDED RELEASE ORAL 2 TIMES DAILY
Status: COMPLETED | OUTPATIENT
Start: 2018-11-05 | End: 2018-11-05

## 2018-11-05 RX ORDER — LIDOCAINE HYDROCHLORIDE 20 MG/ML
1-20 INJECTION, SOLUTION INFILTRATION; PERINEURAL
Status: DISCONTINUED | OUTPATIENT
Start: 2018-11-05 | End: 2018-11-06 | Stop reason: HOSPADM

## 2018-11-05 RX ORDER — HEPARIN SODIUM 200 [USP'U]/100ML
3 INJECTION, SOLUTION INTRAVENOUS CONTINUOUS
Status: DISCONTINUED | OUTPATIENT
Start: 2018-11-05 | End: 2018-11-06 | Stop reason: HOSPADM

## 2018-11-05 RX ORDER — SODIUM CHLORIDE 0.9 % (FLUSH) 0.9 %
5-10 SYRINGE (ML) INJECTION EVERY 8 HOURS
Status: DISCONTINUED | OUTPATIENT
Start: 2018-11-05 | End: 2018-11-06 | Stop reason: HOSPADM

## 2018-11-05 RX ORDER — SODIUM CHLORIDE 9 MG/ML
75 INJECTION, SOLUTION INTRAVENOUS CONTINUOUS
Status: DISCONTINUED | OUTPATIENT
Start: 2018-11-05 | End: 2018-11-06 | Stop reason: HOSPADM

## 2018-11-05 RX ADMIN — HEPARIN SODIUM 3 ML/HR: 5000 INJECTION, SOLUTION INTRAVENOUS; SUBCUTANEOUS at 10:51

## 2018-11-05 RX ADMIN — Medication 5 ML: at 05:51

## 2018-11-05 RX ADMIN — HYDRALAZINE HYDROCHLORIDE 10 MG: 20 INJECTION INTRAMUSCULAR; INTRAVENOUS at 15:10

## 2018-11-05 RX ADMIN — BIVALIRUDIN 1.75 MG/KG/HR: 250 INJECTION, POWDER, LYOPHILIZED, FOR SOLUTION INTRAVENOUS at 11:13

## 2018-11-05 RX ADMIN — LIDOCAINE HYDROCHLORIDE 140 MG: 20 INJECTION, SOLUTION INFILTRATION; PERINEURAL at 11:01

## 2018-11-05 RX ADMIN — MIDAZOLAM HYDROCHLORIDE 2 MG: 1 INJECTION, SOLUTION INTRAMUSCULAR; INTRAVENOUS at 10:58

## 2018-11-05 RX ADMIN — Medication 5 ML: at 21:50

## 2018-11-05 RX ADMIN — TICAGRELOR 180 MG: 90 TABLET ORAL at 11:26

## 2018-11-05 RX ADMIN — ASPIRIN 81 MG: 81 TABLET, CHEWABLE ORAL at 08:00

## 2018-11-05 RX ADMIN — CARVEDILOL 12.5 MG: 3.12 TABLET, FILM COATED ORAL at 08:00

## 2018-11-05 RX ADMIN — SIMVASTATIN 40 MG: 40 TABLET, FILM COATED ORAL at 21:49

## 2018-11-05 RX ADMIN — CARVEDILOL 12.5 MG: 3.12 TABLET, FILM COATED ORAL at 17:00

## 2018-11-05 RX ADMIN — LEVOTHYROXINE SODIUM 150 MCG: 75 TABLET ORAL at 05:48

## 2018-11-05 RX ADMIN — INSULIN LISPRO 4 UNITS: 100 INJECTION, SOLUTION INTRAVENOUS; SUBCUTANEOUS at 08:00

## 2018-11-05 RX ADMIN — POTASSIUM CHLORIDE 40 MEQ: 20 TABLET, EXTENDED RELEASE ORAL at 08:00

## 2018-11-05 RX ADMIN — ONDANSETRON 4 MG: 2 INJECTION INTRAMUSCULAR; INTRAVENOUS at 16:14

## 2018-11-05 RX ADMIN — POTASSIUM CHLORIDE 40 MEQ: 20 TABLET, EXTENDED RELEASE ORAL at 17:00

## 2018-11-05 RX ADMIN — IRBESARTAN 150 MG: 150 TABLET ORAL at 08:01

## 2018-11-05 RX ADMIN — INSULIN LISPRO 4 UNITS: 100 INJECTION, SOLUTION INTRAVENOUS; SUBCUTANEOUS at 21:48

## 2018-11-05 RX ADMIN — PERFLUTREN 1 ML: 6.52 INJECTION, SUSPENSION INTRAVENOUS at 11:00

## 2018-11-05 RX ADMIN — IOPAMIDOL 150 ML: 755 INJECTION, SOLUTION INTRAVENOUS at 11:30

## 2018-11-05 RX ADMIN — CLOPIDOGREL BISULFATE 75 MG: 75 TABLET ORAL at 21:49

## 2018-11-05 NOTE — H&P
11/5/2018 9:27 AM 
 
Admit Date: 11/4/2018 Admit Diagnosis: Acute on chronic systolic CHF (congestive heart failure) ([facility] Elevated troponin Subjective:  
 Patient awoke early this am with sob vela pul edema. All acute onset. Came to er and found to be in pulmonary edema with labs consistent with nstemi. Prior to this AM had been doing well. Remote cabg hx with known depressed LV function. HFrEF with PPM in place. Received BiPAP and lasix now breathing easier. Trop elevated but stable. Cath today Objective:  
  
Visit Vitals /79 (BP 1 Location: Left arm, BP Patient Position: At rest) Pulse 77 Temp 97.3 °F (36.3 °C) Resp 18 Ht 6' 1\" (1.854 m) Wt 95.8 kg (211 lb 3.2 oz) SpO2 93% BMI 27.86 kg/m² ROS:  General ROS: negative for - chills Hematological and Lymphatic ROS: negative for - blood clots or jaundice Respiratory ROS: positive for - shortness of breath Cardiovascular ROS: positive for - dyspnea on exertion and shortness of breath Gastrointestinal ROS: no abdominal pain, change in bowel habits, or black or bloody stools Neurological ROS: no TIA or stroke symptoms Physical Exam: 
 
Physical Examination: General appearance - alert, well appearing, and in no distress Mental status - alert, oriented to person, place, and time Eyes - pupils equal and reactive, extraocular eye movements intact Neck/lymph - supple, no significant adenopathy Chest/CV - clear to auscultation, no wheezes, rales or rhonchi, symmetric air entry Heart - normal rate, regular rhythm, normal S1, S2, no murmurs, rubs, clicks or gallops Abdomen/GI - soft, nontender, nondistended, no masses or organomegaly Musculoskeletal - no joint tenderness, deformity or swelling Extremities - peripheral pulses normal, no pedal edema, no clubbing or cyanosis Skin - normal coloration and turgor, no rashes, no suspicious skin lesions noted Current Facility-Administered Medications Medication Dose Route Frequency  carvedilol (COREG) tablet 12.5 mg  12.5 mg Oral BID WITH MEALS  irbesartan (AVAPRO) tablet 150 mg (Patient Supplied)  150 mg Oral DAILY  levothyroxine (SYNTHROID) tablet 150 mcg  150 mcg Oral ACB  linagliptin (TRADJENTA) tablet 5 mg (Patient Supplied)  5 mg Oral DAILY  sodium chloride (NS) flush 5-10 mL  5-10 mL IntraVENous Q8H  
 sodium chloride (NS) flush 5-10 mL  5-10 mL IntraVENous PRN  
 aspirin chewable tablet 81 mg  81 mg Oral DAILY  nitroglycerin (NITROSTAT) tablet 0.4 mg  0.4 mg SubLINGual Q5MIN PRN  
 morphine injection 2 mg  2 mg IntraVENous Q4H PRN  
 heparin 25,000 units in dextrose 500 mL infusion  12-25 Units/kg/hr IntraVENous TITRATE  nitroglycerin (Tridil) 200 mcg/ml infusion  0-20 mcg/min IntraVENous TITRATE  insulin lispro (HUMALOG) injection   SubCUTAneous AC&HS  promethazine (PHENERGAN) with saline injection 12.5 mg  12.5 mg IntraVENous Q6H PRN  
 acetaminophen (TYLENOL) tablet 500 mg  500 mg Oral Q4H PRN  
 simvastatin (ZOCOR) tablet 40 mg  40 mg Oral QHS Data Review:  
@LABRCNT(Na,K,BUN,CREA,WBC,HGB,HCT,PLT,INR,TRP,TCHOL*,Triglyceride*,LDL*,LDLCPOC HDL*,HDL])@ TELEMETRY: Paced Assessment/Plan: Active Problems: 
  Chronic coronary artery disease (2/6/2010) prior cabg now appears to have nstemi. Admit med treatment consider cath Acute on chronic combined systolic and diastolic ACC/AHA stage C congestive heart failure ([facility] (11/4/2018)adjust meds Hypertensive cardiomyopathy, with heart failure ([facility] (11/4/2018)The current medical regimen is effective;  continue present plan and medications. NSTEMI (non-ST elevated myocardial infarction) ([facility] (11/4/2018) PPM SJM dual chamber PPM 
 
CABG lima to lad and SV to rca Desirae Santos MD

## 2018-11-05 NOTE — PROGRESS NOTES
RR called overhead around 1545. 
 
75 y/o male admitted 11/5 with NSTEMI with PCI x1. Rec'd Brillinta load. Pt got up to use bathroom and became dizzy and said he didn't feel well. Sounds like he had some near syncope but no actual LOC. Was walked back to bed, SBP 180s, hydralazine got it down to 140s. Sats 92% on 4L NC. HR 70s. 12 lead EKG pending. Order stat H/H (although Hbs have been normal previously). Low threshold for stat head CT wo contrast if dizziness persists. D/w cardiology at bedside.  
 
Renay Goodell, MD

## 2018-11-05 NOTE — PROGRESS NOTES
TRANSFER - OUT REPORT: 
 
Verbal report given to Malik Westfall RN(name) on Aurea Strickland  being transferred to telemetry(unit) for routine progression of care Report consisted of patients Situation, Background, Assessment and  
Recommendations(SBAR). Information from the following report(s) Kardex and Procedure Summary was reviewed with the receiving nurse. Lines:  
Peripheral IV 11/04/18 Right Antecubital (Active) Site Assessment Clean, dry, & intact 11/5/2018 12:15 PM  
Phlebitis Assessment 0 11/5/2018 12:15 PM  
Infiltration Assessment 0 11/5/2018 12:15 PM  
Dressing Status Clean, dry, & intact 11/5/2018 12:15 PM  
Dressing Type Transparent;Tape 11/5/2018 12:15 PM  
Hub Color/Line Status Patent 11/5/2018  4:26 AM  
   
Peripheral IV 11/04/18 Left Arm (Active) Site Assessment Clean, dry, & intact 11/5/2018 12:15 PM  
Phlebitis Assessment 0 11/5/2018 12:15 PM  
Infiltration Assessment 0 11/5/2018 12:15 PM  
Dressing Status Clean, dry, & intact 11/5/2018 12:15 PM  
Dressing Type Transparent;Tape 11/5/2018 12:15 PM  
Hub Color/Line Status Patent 11/5/2018  4:26 AM  
  
 
Opportunity for questions and clarification was provided. Patient transported with: 
 Monitor Registered Nurse

## 2018-11-05 NOTE — PROGRESS NOTES
Rapid response called on pt. I arrived to floor with hospitalist at bedside and pt in trendelenburg position. His . He apparently received single dose of IV hydralazine 10 mg for SBP > 180. Shortly there after pt complained of mid abdominal discomfort and nausea. His nausea persisted for several minutes without nausea. + c/o dizziness. Neurologic exam normal. ECG notes AV sequencial paced rhythm. VS remain stable but nausea persists--will give zofran. Complains of SOB since initiation of brilinta. A/p likely related to brilinta, precipitous drop in bp. Pt was apparently sitting in chair when he received hydralazine. Will discuss with DR. Carrillo--likely stop brilinta. Monitor closely.

## 2018-11-05 NOTE — PROGRESS NOTES
IP consult to Cardiac Rehab. Chart review completed. Echo and LHC pending at this time. I will follow and see pt if appropriate.

## 2018-11-05 NOTE — PROGRESS NOTES
TRANSFER - OUT REPORT: 
 
Verbal report given to Frieda Bailey RN on [address]  being transferred to [facility] for routine progression of care Report consisted of patients Situation, Background, Assessment and Recommendations(SBAR). Information from the following report(s) SBAR, Kardex, Procedure Summary and MAR was reviewed with the receiving nurse. Opportunity for questions and clarification was provided. [facility] with Dr Audelia Rodriguez STEVE Diag 
2 versed 
angiomax completed at 1140 
180 brilinta 6 Kazakh RFA- angioseal

## 2018-11-05 NOTE — PROGRESS NOTES
TRANSFER - IN REPORT: 
 
Verbal report received from SHANE Epps on [address] being received from [facility] for routine progression of care Report consisted of patients Situation, Background, Assessment and Recommendations(SBAR). Information from the following report(s) SBAR, Kardex, Procedure Summary, Intake/Output, Recent Results, Med Rec Status and Cardiac Rhythm paced was reviewed with the receiving nurse. Opportunity for questions and clarification was provided. Assessment completed upon patients arrival to unit and care assumed.

## 2018-11-05 NOTE — PROGRESS NOTES
Bedside and Verbal shift change report given to Juan Ramon Bonds RN (oncoming nurse) by self (offgoing nurse).  Report included the following information SBAR, Kardex, Intake/Output, Recent Results, Med Rec Status and Cardiac Rhythm SR.

## 2018-11-05 NOTE — PROGRESS NOTES
Patient having difficulties breathing after Brilinta. Caffeine was given and patient was assisted to chair. BP continued to be elevated and orders received for Hydralazine. Patient was given Hydralazine and he started having some diaphoresis and just not feeling well. Complaints of nausea and abdominal pain. Patient on 3L nasal cannula and lungs very diminished. Blood sugar checked and resulted at 215. Patient continued to decline. Patient was assisted to bed were he almost went unconscious. [place] was called and patient remained conscious. See rapids charting.

## 2018-11-05 NOTE — PROGRESS NOTES
Rapid Response; pt requested prayer. Initial visit to assess pt's spiritual needs. Ministry of presence & prayer to demonstrate caring & concern, convey emotional & spiritual support.  
 
Chaplain Rickey Arrington MDiv,M,PhD

## 2018-11-05 NOTE — PROCEDURES
Cardiac Catheterization Procedure Note    Patient ID:     Name: Sophy Samuels   Medical Record Number: [id number]   YOB: 1945    Date of Procedure: 11/5/2018     Pre-procedure Diagnosis:  Unstable Angina    Post-procedure Diagnosis: Coronary Artery Disease    Reason for Procedure: Worsening Angina    Blood loss less than 5 ml    Sedation. Pt received 2 mg versed and 0 mcg fentanyl for monitored conscious sedation from 11:00 to 11:35. Nurse woods    Specimen: None    No complications    No assistants    Time out, Mallampati, and ASA performed    Procedure:  After informed consent, patient was prepped and draped in the usual sterile fashion. The right groin was infiltrated with lidocaine. The right femoral artery was accessed via the modified Seldinger technique with a 6 Kazakh sheath. 150cc Visipaque contrast were utilized for the entire procedure. angioseal closure device used    Catheters/wires/stents. Sukhjinder Gee PIGTAIL Q4 GUIDE PROWATER 2.25X15 AMMON STENT    FINDINGS    Left Ventricle: 60  LVEDP: 24    Left Main:patent    Left Anterior descending coronary artery: occluded after diagonal. Lad fills from patent lima    diagonals prox diag with 90% stenosis    Left Circumflex coronary artery: occluded mid vessel. Appears to be chronic. OMs retrograde collateral filling of small om1   Ramus patent with mod30% disease mid ramus .  The ramus is small    Right coronary artery: occluded   RV branch occluded   AKOSUA/PDA fills from patent svbg to the pda      Graft anatomy: LIMA to lad patent with good runoff in small LAD  SV to pda patent with good runoff in pda and akosua    Intervention if done: attempt at  of circ unsuccessful  Stent to mid diag  2.25x15 ammon laura    Conclusions: one vessel stenting    Recommentations: dapt    No complications      Signed By: Tamiko Varela MD

## 2018-11-06 VITALS
TEMPERATURE: 98.4 F | OXYGEN SATURATION: 91 % | DIASTOLIC BLOOD PRESSURE: 77 MMHG | SYSTOLIC BLOOD PRESSURE: 154 MMHG | BODY MASS INDEX: 27.96 KG/M2 | HEIGHT: 73 IN | WEIGHT: 211 LBS | HEART RATE: 89 BPM | RESPIRATION RATE: 18 BRPM

## 2018-11-06 LAB
ANION GAP SERPL CALC-SCNC: 7 MMOL/L (ref 7–16)
BUN SERPL-MCNC: 12 MG/DL (ref 8–23)
CALCIUM SERPL-MCNC: 8.5 MG/DL (ref 8.3–10.4)
CHLORIDE SERPL-SCNC: 103 MMOL/L (ref 98–107)
CO2 SERPL-SCNC: 28 MMOL/L (ref 21–32)
CREAT SERPL-MCNC: 1.2 MG/DL (ref 0.8–1.5)
GLUCOSE BLD STRIP.AUTO-MCNC: 149 MG/DL (ref 65–100)
GLUCOSE SERPL-MCNC: 281 MG/DL (ref 65–100)
POTASSIUM SERPL-SCNC: 3.7 MMOL/L (ref 3.5–5.1)
SODIUM SERPL-SCNC: 138 MMOL/L (ref 136–145)

## 2018-11-06 PROCEDURE — 80048 BASIC METABOLIC PNL TOTAL CA: CPT

## 2018-11-06 PROCEDURE — 74011250637 HC RX REV CODE- 250/637: Performed by: INTERNAL MEDICINE

## 2018-11-06 PROCEDURE — 82962 GLUCOSE BLOOD TEST: CPT

## 2018-11-06 PROCEDURE — 36415 COLL VENOUS BLD VENIPUNCTURE: CPT

## 2018-11-06 RX ORDER — CLOPIDOGREL BISULFATE 75 MG/1
75 TABLET ORAL DAILY
Qty: 30 TAB | Refills: 11 | Status: SHIPPED | OUTPATIENT
Start: 2018-11-07 | End: 2019-10-28

## 2018-11-06 RX ADMIN — ASPIRIN 81 MG: 81 TABLET, CHEWABLE ORAL at 08:14

## 2018-11-06 RX ADMIN — IRBESARTAN 150 MG: 150 TABLET ORAL at 08:14

## 2018-11-06 RX ADMIN — CARVEDILOL 12.5 MG: 3.12 TABLET, FILM COATED ORAL at 08:14

## 2018-11-06 RX ADMIN — LEVOTHYROXINE SODIUM 150 MCG: 75 TABLET ORAL at 05:43

## 2018-11-06 RX ADMIN — Medication 5 ML: at 05:43

## 2018-11-06 NOTE — PROGRESS NOTES
CM met with patient to complete intervention. Patient Zaheer Calzada / cousin at bedside. Patient alert and oriented x3. States that he lives in a one level home with his spouse with five steps to enter into the home. Patient states he independent with his ADL's and have no DME's in the home. Patient drives and care for his spouse. States he plan on returning home with no needs identified. Patient states he will be agreeable to [place] if needed. CM will continue to follow and make necessary changes that may occur. Care Management Interventions PCP Verified by CM: Yes(Medina Stevens) Mode of Transport at Discharge: Other (see comment)(Family) Transition of Care Consult (CM Consult): Discharge Planning Discharge Durable Medical Equipment: No 
Physical Therapy Consult: No 
Occupational Therapy Consult: No 
Speech Therapy Consult: No 
Current Support Network: Own Home, Lives with Spouse Confirm Follow Up Transport: Family Plan discussed with Pt/Family/Caregiver: Yes Freedom of Choice Offered: Yes [employer] Information Provided?: No 
Discharge Location Discharge Placement: Home

## 2018-11-06 NOTE — PROGRESS NOTES
Cardiac Rehab: Spoke with patient regarding referral to cardiac rehab. Patient meets admission criteria based on PCI (11/5/19). Written information about Cardiac Rehab given and reviewed with patient. Discussed lifestyle modifications to promote cardiac wellness. Patient indicated that he does not want to participate in the cardiac rehab program at this time. He states he did not participate in Cardiac Rehab after his heart surgery. The patient is aware that his referral is valid for six months from his procedure date. His Cardiologist is Dr. Nuvia Lamar. Thank you, Raechel Schaumann, BSN, RN Cardiopulmonary Rehabilitation Nurse Liaison [facility]

## 2018-11-06 NOTE — PROGRESS NOTES
Bedside and Verbal shift change report given to self (oncoming nurse) by Sae Garcia RN (offgoing nurse). Report included the following information SBAR, Kardex, Intake/Output, MAR and Recent Results.

## 2018-11-06 NOTE — PROGRESS NOTES
11/6/2018 7:39 AM 
 
Admit Date: 11/4/2018 Admit Diagnosis: Acute on chronic systolic CHF (congestive heart failure) ([address] Elevated troponin Subjective:  
 Patient sp pci. Seems to have had a rxn to brilinta. Doing much better home today Objective:  
  
Visit Vitals /73 Pulse 82 Temp 97.9 °F (36.6 °C) Resp 20 Ht 6' 1\" (1.854 m) Wt 95.7 kg (211 lb) SpO2 90% BMI 27.84 kg/m² ROS:  General ROS: negative for - chills Hematological and Lymphatic ROS: negative for - blood clots or jaundice Respiratory ROS: no cough, shortness of breath, or wheezing Cardiovascular ROS: no chest pain or dyspnea on exertion Gastrointestinal ROS: no abdominal pain, change in bowel habits, or black or bloody stools Neurological ROS: no TIA or stroke symptoms Physical Exam: 
 
Physical Examination: General appearance - alert, well appearing, and in no distress Mental status - alert, oriented to person, place, and time Eyes - pupils equal and reactive, extraocular eye movements intact Neck/lymph - supple, no significant adenopathy Chest/CV - clear to auscultation, no wheezes, rales or rhonchi, symmetric air entry Heart - normal rate, regular rhythm, normal S1, S2, no murmurs, rubs, clicks or gallops Abdomen/GI - soft, nontender, nondistended, no masses or organomegaly Musculoskeletal - no joint tenderness, deformity or swelling Extremities - peripheral pulses normal, no pedal edema, no clubbing or cyanosis Skin - normal coloration and turgor, no rashes, no suspicious skin lesions noted Current Facility-Administered Medications Medication Dose Route Frequency  heparin (PF) 2 units/ml in NS infusion  3 mL/hr IntraVENous CONTINUOUS  
 lidocaine (XYLOCAINE) 20 mg/mL (2 %) injection  mg  1-20 mL IntraDERMal Multiple  midazolam (VERSED) injection 0.5-2 mg  0.5-2 mg IntraVENous Multiple  fentaNYL citrate (PF) injection 25-50 mcg  25-50 mcg IntraVENous Multiple  bivalirudin (ANGIOMAX) 250 mg in 0.9% sodium chloride (MBP/ADV) 50 mL infusion  1.75 mg/kg/hr IntraVENous CONTINUOUS  
 0.9% sodium chloride infusion  75 mL/hr IntraVENous CONTINUOUS  
 sodium chloride (NS) flush 5-10 mL  5-10 mL IntraVENous Q8H  
 sodium chloride (NS) flush 5-10 mL  5-10 mL IntraVENous PRN  
 hydrALAZINE (APRESOLINE) 20 mg/mL injection 10 mg  10 mg IntraVENous Q6H PRN  
 clopidogrel (PLAVIX) tablet 75 mg  75 mg Oral DAILY  carvedilol (COREG) tablet 12.5 mg  12.5 mg Oral BID WITH MEALS  irbesartan (AVAPRO) tablet 150 mg (Patient Supplied)  150 mg Oral DAILY  levothyroxine (SYNTHROID) tablet 150 mcg  150 mcg Oral ACB  aspirin chewable tablet 81 mg  81 mg Oral DAILY  nitroglycerin (NITROSTAT) tablet 0.4 mg  0.4 mg SubLINGual Q5MIN PRN  
 morphine injection 2 mg  2 mg IntraVENous Q4H PRN  
 heparin 25,000 units in dextrose 500 mL infusion  12-25 Units/kg/hr IntraVENous TITRATE  nitroglycerin (Tridil) 200 mcg/ml infusion  0-20 mcg/min IntraVENous TITRATE  insulin lispro (HUMALOG) injection   SubCUTAneous AC&HS  promethazine (PHENERGAN) with saline injection 12.5 mg  12.5 mg IntraVENous Q6H PRN  
 acetaminophen (TYLENOL) tablet 500 mg  500 mg Oral Q4H PRN  
 simvastatin (ZOCOR) tablet 40 mg  40 mg Oral QHS Data Review:  
@LABRCNT(Na,K,BUN,CREA,WBC,HGB,HCT,PLT,INR,TRP,TCHOL*,Triglyceride*,LDL*,LDLCPOC HDL*,HDL])@ TELEMETRY: nsr Assessment/Plan: Active Problems: 
  Chronic coronary artery disease (2/6/2010) The current medical regimen is effective;  continue present plan and medications. Acute on chronic combined systolic and diastolic ACC/AHA stage C congestive heart failure ([facility] (11/4/2018)The current medical regimen is effective;  continue present plan and medications. Hypertensive cardiomyopathy, with heart failure ([facility] (11/4/2018)   NSTEMI (non-ST elevated myocardial infarction) ([facility] (11/4/2018)The current medical regimen is effective;  continue present plan and medications. Elevated troponin (11/4/2018) Acute on chronic systolic CHF (congestive heart failure) ([facility] (11/4/2018) Lisset Reid MD

## 2018-11-06 NOTE — DISCHARGE SUMMARY
[facility] Cardiology Discharge Summary     Patient ID:  Claude Barefoot  [id number]  24 y.o.  1945    Admit date: 11/4/2018    Discharge date:  10/6/2018    Admitting Physician: Dena Stevenson MD     Discharge Physician: Maura Mcknight NP/Dr. Carrillo    Admission Diagnoses: Acute on chronic systolic CHF (congestive heart failure) ([facility]  Elevated troponin    Discharge Diagnoses:    Diagnosis    Acute on chronic combined systolic and diastolic ACC/AHA stage C congestive heart failure ([address]    Hypertensive cardiomyopathy, with heart failure ([address]    NSTEMI (non-ST elevated myocardial infarction) ([facility]    Elevated troponin    Acute on chronic systolic CHF (congestive heart failure) ([facility]    Hyperlipidemia    Pacemaker    Ischemic cardiomyopathy    Type 2 diabetes mellitus without complication, without long-term current use of insulin ([facility])    Stroke ([facility]    Hypertension    Heart failure ([facility]    Diabetes ([facility]    Congestive heart failure ([facility]    CAD (coronary artery disease)     Cardiology Procedures this admission:  Left heart catheterization with PCI  Consults: None    Hospital Course: Patient presented to the emergency department of [facility] with complaints of progressive shortness of breath and FELIX. Troponin level peaked at 0.29. CXR was consistent with pulmonary edema. He received IV lasix with improved dyspnea. Patient was evaluated and subsequently admitted for further cardiac evaluation and treatment. Patient underwent cardiac catheterization by Dr. Iliana Jamison. Patient was found to have 100% occlusion of the cirx with unsuccessful attempt to open . Patient was also found to have 90% stenosis of the mid Diag that was stented with a [place] STEVE 2.25x15 with 0% residual stenosis. Patient tolerated the procedure well and was taken to the telemetry floor for recovery. Echocardiogram was done and showed:      Left ventricle: There apex was severely hypokinetic to akinetic. There was  also severe hypokinesis of the mid to apical inferoseptal/anteroseptal wall. Systolic function was moderately reduced. Ejection fraction was estimated in  the range of 35 % to 40 %. There was mild concentric hypertrophy. Avg. E/e'=  16.2. Features were consistent with grade II diastolic dysfunction. -  Left atrium: The atrium was moderately dilated. -  Aortic valve: There was mild to moderate regurgitation. -  Mitral valve: There was moderate to severe regurgitation. At time of discharge, patient was up feeling well without any complaints shortness of breath. Patient's labs were stable. Patient was seen and examined by Dr. Ye Tran and determined stable and ready for discharge. Patient was instructed on the importance of medication compliance, low sodium diet, 2 liter per day fluid restriction and daily weights. For maximized medical therapy of congestive heart failure, patient will continue use of BB and ARB. For maximized therapy of CAD, patient will continue BB, ARB, and statin. Patient educated on the importance of taking ASA and Plavix everyday without missing a dose. After receiving drug eluting stents patient will remain on  DAPT for at least one year. The patient has been scheduled for 7 day transitional care follow up with [facility] Cardiology -- Dr. Ally Matson in 2-4 weeks. Resume Metformin on Thursday, 11-8-18. DISPOSITION: The patient is being discharged home in stable condition on a low saturated fat, low cholesterol and low salt diet. The patient is instructed to advance activities as tolerated to the limit of fatigue or shortness of breath. The patient is informed to monitor daily weights and maintain a 2 liter per day fluid restriction. The patient is instructed to call the office for any shortness of breath, weight gain, or increased peripheral edema.         Discharge Exam:   Visit Vitals  /73   Pulse 82   Temp 97.9 °F (36.6 °C)   Resp 20   Ht 6' 1\" (1.854 m)   Wt 95.7 kg (211 lb)   SpO2 90%   BMI 27.84 kg/m²        Recent Results (from the past 24 hour(s))   GLUCOSE, POC    Collection Time: 11/05/18 12:31 PM   Result Value Ref Range    Glucose (POC) 162 (H) 65 - 100 mg/dL   GLUCOSE, POC    Collection Time: 11/05/18  3:27 PM   Result Value Ref Range    Glucose (POC) 215 (H) 65 - 100 mg/dL   EKG, 12 LEAD, INITIAL    Collection Time: 11/05/18  3:56 PM   Result Value Ref Range    Ventricular Rate 64 BPM    Atrial Rate 64 BPM    P-R Interval 162 ms    QRS Duration 188 ms    Q-T Interval 518 ms    QTC Calculation (Bezet) 534 ms    Calculated P Axis 41 degrees    Calculated R Axis -84 degrees    Calculated T Axis 97 degrees    Diagnosis       Atrial-sensed ventricular-paced rhythm  Abnormal ECG  When compared with ECG of 04-NOV-2018 07:43,  No significant change was found    Confirmed by [facility]  MD ()ALEXANDRA ([id number]) on 11/5/2018 4:21:21 PM     HGB & HCT    Collection Time: 11/05/18  4:11 PM   Result Value Ref Range    HGB 14.9 13.6 - 17.2 g/dL    HCT 42.5 41.1 - 50.3 %   TROPONIN I    Collection Time: 11/05/18  4:11 PM   Result Value Ref Range    Troponin-I, Qt. 0.22 (HH) 0.02 - 0.05 NG/ML   GLUCOSE, POC    Collection Time: 11/05/18  8:55 PM   Result Value Ref Range    Glucose (POC) 228 (H) 65 - 100 mg/dL   GLUCOSE, POC    Collection Time: 11/06/18  6:05 AM   Result Value Ref Range    Glucose (POC) 149 (H) 65 - 100 mg/dL         Patient Instructions:     Current Discharge Medication List      START taking these medications    Details   clopidogrel (PLAVIX) 75 mg tab Take 1 Tab by mouth daily. Qty: 30 Tab, Refills: 11         CONTINUE these medications which have NOT CHANGED    Details   irbesartan (AVAPRO) 150 mg tablet 1 po qd for blood pressure instead of the valsartan  Qty: 90 Tab, Refills: 1      levothyroxine (SYNTHROID) 125 mcg tablet Take 1 Tab by mouth Daily (before breakfast).   Qty: 90 Tab, Refills: 1    Associated Diagnoses: Acquired hypothyroidism glimepiride (AMARYL) 4 mg tablet 1 po bid with a meal for diabetes instead of the glipizide)  Qty: 180 Tab, Refills: 1    Associated Diagnoses: Type 2 diabetes mellitus without complication, without long-term current use of insulin ([address]      ! ! repaglinide (PRANDIN) 2 mg tablet       carvedilol (COREG) 12.5 mg tablet Take 1 Tab by mouth two (2) times daily (with meals). Qty: 180 Tab, Refills: 3      metFORMIN (GLUCOPHAGE) 1,000 mg tablet Take 1 Tab by mouth two (2) times daily (with meals). Qty: 180 Tab, Refills: 1    Associated Diagnoses: Type 2 diabetes mellitus without complication, without long-term current use of insulin (HCC)      simvastatin (ZOCOR) 40 mg tablet Take 1 Tab by mouth nightly. Qty: 90 Tab, Refills: 3    Associated Diagnoses: Hyperlipidemia, unspecified hyperlipidemia type      !! repaglinide (PRANDIN) 1 mg tablet Take 1 Tab by mouth Before breakfast, lunch, and dinner. Qty: 90 Tab, Refills: 5    Associated Diagnoses: Type 2 diabetes mellitus without complication, without long-term current use of insulin (HCC)      furosemide (LASIX) 40 mg tablet Take 1 Tab by mouth daily. Qty: 90 Tab, Refills: 3      aspirin 81 mg chewable tablet Take 81 mg by mouth nightly. cholecalciferol (VITAMIN D3) 400 unit tab tablet Take 400 Units by mouth daily. Blood-Glucose Meter monitoring kit Test blood sugar daily, either fasting or 2 hours after eating for diabetes and record in log. (#1)  Dx: E11.9  Qty: 1 Kit, Refills: 0      lancets 30 gauge misc Test blood sugar daily, either fasting or 2 hours after eating for diabetes and record in log. (#100)  Dx: E11.9  Qty: 100 Lancet, Refills: 11      glucose blood VI test strips (BLOOD GLUCOSE TEST) strip Test blood sugar daily, either fasting or 2 hours after eating for diabetes and record in log.  (#100)  Dx: E11.9  Qty: 100 Strip, Refills: 11      varicella-zoster recombinant, PF, (SHINGRIX, PF,) 50 mcg/0.5 mL susr injection 0.5ml IM now followed by second dose 2 months later. Qty: 0.5 mL, Refills: 1    Associated Diagnoses: Need for shingles vaccine      linagliptin (TRADJENTA) 5 mg tablet Take 1 Tab by mouth daily. Qty: 30 Tab, Refills: 5    Associated Diagnoses: Type 2 diabetes mellitus without complication, without long-term current use of insulin (HCC)      simethicone (GAS-X) 125 mg capsule 1 po qid before meals and bedtime for gas  Qty: 120 Cap, Refills: 5    Associated Diagnoses: Abdominal bloating      raNITIdine (ZANTAC) 150 mg tablet Take 1 Tab by mouth two (2) times a day. Qty: 60 Tab, Refills: 5    Associated Diagnoses: Dyspepsia; Gastroesophageal reflux disease without esophagitis      nitroglycerin (NITROSTAT) 0.4 mg SL tablet by SubLINGual route every five (5) minutes as needed for Chest Pain. cyanocobalamin 1,000 mcg tablet Take 1,000 mcg by mouth daily. !! - Potential duplicate medications found. Please discuss with provider.               Signed:  Mason Caller, NP  11/6/2018 8:09 AM

## 2018-11-06 NOTE — PROGRESS NOTES
Pt refused evening Brillinta. Deniz Alarcon NP notified. Brillinta d/c and Plavix ordered to start tonight.

## 2018-11-06 NOTE — DISCHARGE INSTRUCTIONS
Heart Failure: Care Instructions  Your Care Instructions    Heart failure occurs when your heart does not pump as much blood as the body needs. Failure does not mean that the heart has stopped pumping but rather that it is not pumping as well as it should. Over time, this causes fluid buildup in your lungs and other parts of your body. Fluid buildup can cause shortness of breath, fatigue, swollen ankles, and other problems. By taking medicines regularly, reducing sodium (salt) in your diet, checking your weight every day, and making lifestyle changes, you can feel better and live longer. Follow-up care is a key part of your treatment and safety. Be sure to make and go to all appointments, and call your doctor if you are having problems. It's also a good idea to know your test results and keep a list of the medicines you take. How can you care for yourself at home? Medicines    · Be safe with medicines. Take your medicines exactly as prescribed. Call your doctor if you think you are having a problem with your medicine.     · Do not take any vitamins, over-the-counter medicine, or herbal products without talking to your doctor first. [place] not take ibuprofen (Advil or Motrin) and naproxen (Aleve) without talking to your doctor first. They could make your heart failure worse.     · You may be taking some of the following medicine. ? Beta-blockers can slow heart rate, decrease blood pressure, and improve your condition. Taking a beta-blocker may lower your chance of needing to be hospitalized. ? Angiotensin-converting enzyme inhibitors (ACEIs) reduce the heart's workload, lower blood pressure, and reduce swelling. Taking an ACEI may lower your chance of needing to be hospitalized again. ? Angiotensin II receptor blockers (ARBs) work like ACEIs. Your doctor may prescribe them instead of ACEIs. ? Diuretics, also called water pills, reduce swelling. ?  Potassium supplements replace this important mineral, which is sometimes lost with diuretics. ? Aspirin and other blood thinners prevent blood clots, which can cause a stroke or heart attack.    You will get more details on the specific medicines your doctor prescribes. Diet    · Your doctor may suggest that you limit sodium to 2,000 milligrams (mg) a day or less. That is less than 1 teaspoon of salt a day, including all the salt you eat in cooking or in packaged foods. People get most of their sodium from processed foods. Fast food and restaurant meals also tend to be very high in sodium.     · Ask your doctor how much liquid you can drink each day. You may have to limit liquids.    Weight    · Weigh yourself without clothing at the same time each day. Record your weight. Call your doctor if you have a sudden weight gain, such as more than 2 to 3 pounds in a day or 5 pounds in a week. (Your doctor may suggest a different range of weight gain.) A sudden weight gain may mean that your heart failure is getting worse.    Activity level    · Start light exercise (if your doctor says it is okay). Even if you can only do a small amount, exercise will help you get stronger, have more energy, and manage your weight and your stress. Walking is an easy way to get exercise. Start out by walking a little more than you did before. Bit by bit, increase the amount you walk.     · When you exercise, watch for signs that your heart is working too hard. You are pushing yourself too hard if you cannot talk while you are exercising. If you become short of breath or dizzy or have chest pain, stop, sit down, and rest.     · If you feel \"wiped out\" the day after you exercise, walk slower or for a shorter distance until you can work up to a better pace.     · Get enough rest at night. Sleeping with 1 or 2 pillows under your upper body and head may help you breathe easier.    Lifestyle changes    · Do not smoke. Smoking can make a heart condition worse.  If you need help quitting, talk to your doctor about stop-smoking programs and medicines. These can increase your chances of quitting for good. Quitting smoking may be the most important step you can take to protect your heart.     · Limit alcohol to 2 drinks a day for men and 1 drink a day for women. Too much alcohol can cause health problems.     · Avoid getting sick from colds and the flu. Get a pneumococcal vaccine shot. If you have had one before, ask your doctor whether you need another dose. Get a flu shot each year. If you must be around people with colds or the flu, wash your hands often. When should you call for help? Call 911 if you have symptoms of sudden heart failure such as:    · You have severe trouble breathing.     · You cough up pink, foamy mucus.     · You have a new irregular or rapid heartbeat.    Call your doctor now or seek immediate medical care if:    · You have new or increased shortness of breath.     · You are dizzy or lightheaded, or you feel like you may faint.     · You have sudden weight gain, such as more than 2 to 3 pounds in a day or 5 pounds in a week. (Your doctor may suggest a different range of weight gain.)     · You have increased swelling in your legs, ankles, or feet.     · You are suddenly so tired or weak that you cannot do your usual activities.    Watch closely for changes in your health, and be sure to contact your doctor if you develop new symptoms. Where can you learn more? Go to http://abdulaziz-evelia.info/. Enter V139 in the search box to learn more about \"Heart Failure: Care Instructions. \"  Current as of: December 6, 2017  Content Version: 11.8  © 9224-8610 [employer]. Care instructions adapted under license by [employer] (which disclaims liability or warranty for this information).  If you have questions about a medical condition or this instruction, always ask your healthcare professional. [address] any warranty or liability for your use of this information. DASH Diet: Care Instructions  Your Care Instructions    The DASH diet is an eating plan that can help lower your blood pressure. DASH stands for Dietary Approaches to Stop Hypertension. Hypertension is high blood pressure. The DASH diet focuses on eating foods that are high in calcium, potassium, and magnesium. These nutrients can lower blood pressure. The foods that are highest in these nutrients are fruits, vegetables, low-fat dairy products, nuts, seeds, and legumes. But taking calcium, potassium, and magnesium supplements instead of eating foods that are high in those nutrients does not have the same effect. The DASH diet also includes whole grains, fish, and poultry. The DASH diet is one of several lifestyle changes your doctor may recommend to lower your high blood pressure. Your doctor may also want you to decrease the amount of sodium in your diet. Lowering sodium while following the DASH diet can lower blood pressure even further than just the DASH diet alone. Follow-up care is a key part of your treatment and safety. Be sure to make and go to all appointments, and call your doctor if you are having problems. It's also a good idea to know your test results and keep a list of the medicines you take. How can you care for yourself at home? Following the DASH diet  · Eat 4 to 5 servings of fruit each day. A serving is 1 medium-sized piece of fruit, ½ cup chopped or canned fruit, 1/4 cup dried fruit, or 4 ounces (½ cup) of fruit juice. Choose fruit more often than fruit juice. · Eat 4 to 5 servings of vegetables each day. A serving is 1 cup of lettuce or raw leafy vegetables, ½ cup of chopped or cooked vegetables, or 4 ounces (½ cup) of vegetable juice. Choose vegetables more often than vegetable juice. · Get 2 to 3 servings of low-fat and fat-free dairy each day. A serving is 8 ounces of milk, 1 cup of yogurt, or 1 ½ ounces of cheese.   · Eat 6 to 8 servings of grains each day. A serving is 1 slice of bread, 1 ounce of dry cereal, or ½ cup of cooked rice, pasta, or cooked cereal. Try to choose whole-grain products as much as possible. · Limit lean meat, poultry, and fish to 2 servings each day. A serving is 3 ounces, about the size of a deck of cards. · Eat 4 to 5 servings of nuts, seeds, and legumes (cooked dried beans, lentils, and split peas) each week. A serving is 1/3 cup of nuts, 2 tablespoons of seeds, or ½ cup of cooked beans or peas. · Limit fats and oils to 2 to 3 servings each day. A serving is 1 teaspoon of vegetable oil or 2 tablespoons of salad dressing. · Limit sweets and added sugars to 5 servings or less a week. A serving is 1 tablespoon jelly or jam, ½ cup sorbet, or 1 cup of lemonade. · Eat less than 2,300 milligrams (mg) of sodium a day. If you limit your sodium to 1,500 mg a day, you can lower your blood pressure even more. Tips for success  · Start small. Do not try to make dramatic changes to your diet all at once. You might feel that you are missing out on your favorite foods and then be more likely to not follow the plan. Make small changes, and stick with them. Once those changes become habit, add a few more changes. · Try some of the following:  ? Make it a goal to eat a fruit or vegetable at every meal and at snacks. This will make it easy to get the recommended amount of fruits and vegetables each day. ? Try yogurt topped with fruit and nuts for a snack or healthy dessert. ? Add lettuce, tomato, cucumber, and onion to sandwiches. ? Combine a ready-made pizza crust with low-fat mozzarella cheese and lots of vegetable toppings. Try using tomatoes, squash, spinach, broccoli, carrots, cauliflower, and onions. ? Have a variety of cut-up vegetables with a low-fat dip as an appetizer instead of chips and dip. ? Sprinkle sunflower seeds or chopped almonds over salads. Or try adding chopped walnuts or almonds to cooked vegetables.   ? Try some vegetarian meals using beans and peas. Add garbanzo or kidney beans to salads. Make burritos and tacos with mashed chew beans or black beans. Where can you learn more? Go to http://abdulaziz-evelia.info/. Enter U836 in the search box to learn more about \"DASH Diet: Care Instructions. \"  Current as of: December 6, 2017  Content Version: 11.8  © 3403-3524 [employer]. Care instructions adapted under license by [employer] (which disclaims liability or warranty for this information). If you have questions about a medical condition or this instruction, always ask your healthcare professional. [place] [address] any warranty or liability for your use of this information. Fluid Restriction: Care Instructions  Your Care Instructions    A buildup of fluid in the body can cause swelling and pain. Your doctor may suggest that you limit liquids, including foods that contain a lot of liquid. Limiting liquids is called fluid restriction. It will help your body get rid of the extra fluid. Your doctor may also recommend that you cut back on sodium in your diet. Keeping track of the amount of fluids you take in may help you feel better. It may also lower your risk of having to go to the hospital. Your doctor will tell you how much fluid you can have in a day. Follow-up care is a key part of your treatment and safety. Be sure to make and go to all appointments, and call your doctor if you are having problems. It's also a good idea to know your test results and keep a list of the medicines you take. How can you care for yourself at home? · Find a way of tracking the fluids you take in that works for you. Here are two methods you can try:  ? Write down how much you drink throughout the day. ? Keep a container filled with the amount of liquid allowed for the day. As you drink liquids during the day, such as a 6-ounce cup of coffee, pour that same amount out of the container. When the container is empty, you've had your liquid for the day. · Count any foods that will melt (such as ice cream, gelatin, or flavored ice treats) or liquid foods (such as soup) as part of your fluids for the day. Also count the liquid in canned fruits and vegetables as part of your daily intake, or drain them well before serving. · Space your liquids throughout the day. Then you won't be tempted to drink more than the amount your doctor recommends. · To relieve thirst without taking in extra water, try chewing gum, sucking on hard candy (sugarless if you have diabetes), or rinsing your mouth with water and spitting it out. Where can you learn more? Go to http://abdulaziz-evelia.info/. Enter R948 in the search box to learn more about \"Fluid Restriction: Care Instructions. \"  Current as of: December 6, 2017  Content Version: 11.8  © 3289-0738 [employer]. Care instructions adapted under license by [employer] (which disclaims liability or warranty for this information). If you have questions about a medical condition or this instruction, always ask your healthcare professional. [place] [address] any warranty or liability for your use of this information.

## 2018-11-06 NOTE — PROGRESS NOTES
Verbal and bedside shift [facility] [address] [facility], RN.

## 2018-11-06 NOTE — PROGRESS NOTES
Problem: Cath Lab Procedures: Post-Cath Day 1 Goal: Medications Outcome: Progressing Towards Goal 
Did not tolerate Brillinta, Plavix given instead.

## 2018-11-06 NOTE — PROGRESS NOTES
Verbal and bedside shift report received from [facility], [address].

## 2018-11-13 ENCOUNTER — HOSPITAL ENCOUNTER (OUTPATIENT)
Dept: LAB | Age: 73
Discharge: HOME | End: 2018-11-13
Payer: MEDICARE

## 2018-11-13 DIAGNOSIS — I50.43 ACUTE ON CHRONIC COMBINED SYSTOLIC AND DIASTOLIC ACC/AHA STAGE C CONGESTIVE HEART FAILURE (HCC): ICD-10-CM

## 2018-11-13 PROBLEM — I34.0 NON-RHEUMATIC MITRAL REGURGITATION: Status: ACTIVE | Noted: 2018-11-13

## 2018-11-13 LAB
ANION GAP SERPL CALC-SCNC: 9 MMOL/L
BUN SERPL-MCNC: 12 MG/DL (ref 8–23)
CALCIUM SERPL-MCNC: 9.3 MG/DL (ref 8.3–10.4)
CHLORIDE SERPL-SCNC: 99 MMOL/L (ref 98–107)
CO2 SERPL-SCNC: 31 MMOL/L (ref 21–32)
CREAT SERPL-MCNC: 1.1 MG/DL (ref 0.8–1.5)
GLUCOSE SERPL-MCNC: 223 MG/DL (ref 65–100)
POTASSIUM SERPL-SCNC: 3.4 MMOL/L (ref 3.5–5.1)
SODIUM SERPL-SCNC: 139 MMOL/L (ref 136–145)

## 2018-11-13 PROCEDURE — 36415 COLL VENOUS BLD VENIPUNCTURE: CPT

## 2018-11-13 PROCEDURE — 80048 BASIC METABOLIC PNL TOTAL CA: CPT

## 2019-01-23 PROBLEM — R55 SYNCOPE: Status: ACTIVE | Noted: 2019-01-23

## 2020-05-12 ENCOUNTER — HOSPITAL ENCOUNTER (INPATIENT)
Age: 75
LOS: 3 days | Discharge: HOME | DRG: 281 | End: 2020-05-15
Attending: EMERGENCY MEDICINE | Admitting: INTERNAL MEDICINE
Payer: MEDICARE

## 2020-05-12 ENCOUNTER — APPOINTMENT (OUTPATIENT)
Dept: GENERAL RADIOLOGY | Age: 75
DRG: 281 | End: 2020-05-12
Attending: EMERGENCY MEDICINE
Payer: MEDICARE

## 2020-05-12 DIAGNOSIS — E78.5 HYPERLIPIDEMIA, UNSPECIFIED HYPERLIPIDEMIA TYPE: ICD-10-CM

## 2020-05-12 DIAGNOSIS — R07.9 ACUTE CHEST PAIN: Primary | ICD-10-CM

## 2020-05-12 DIAGNOSIS — R77.8 ELEVATED TROPONIN I LEVEL: ICD-10-CM

## 2020-05-12 LAB
ALBUMIN SERPL-MCNC: 4.2 G/DL (ref 3.2–4.6)
ALBUMIN/GLOB SERPL: 1.1 {RATIO} (ref 1.2–3.5)
ALP SERPL-CCNC: 82 U/L (ref 50–136)
ALT SERPL-CCNC: 26 U/L (ref 12–65)
ANION GAP SERPL CALC-SCNC: 8 MMOL/L (ref 7–16)
AST SERPL-CCNC: 24 U/L (ref 15–37)
BASOPHILS # BLD: 0 K/UL (ref 0–0.2)
BASOPHILS NFR BLD: 1 % (ref 0–2)
BILIRUB SERPL-MCNC: 0.9 MG/DL (ref 0.2–1.1)
BUN SERPL-MCNC: 15 MG/DL (ref 8–23)
CALCIUM SERPL-MCNC: 9.8 MG/DL (ref 8.3–10.4)
CHLORIDE SERPL-SCNC: 100 MMOL/L (ref 98–107)
CO2 SERPL-SCNC: 30 MMOL/L (ref 21–32)
CREAT SERPL-MCNC: 1.26 MG/DL (ref 0.8–1.5)
DIFFERENTIAL METHOD BLD: NORMAL
EOSINOPHIL # BLD: 0.2 K/UL (ref 0–0.8)
EOSINOPHIL NFR BLD: 3 % (ref 0.5–7.8)
ERYTHROCYTE [DISTWIDTH] IN BLOOD BY AUTOMATED COUNT: 12.7 % (ref 11.9–14.6)
GLOBULIN SER CALC-MCNC: 3.9 G/DL (ref 2.3–3.5)
GLUCOSE SERPL-MCNC: 218 MG/DL (ref 65–100)
HCT VFR BLD AUTO: 45.4 % (ref 41.1–50.3)
HGB BLD-MCNC: 15.1 G/DL (ref 13.6–17.2)
IMM GRANULOCYTES # BLD AUTO: 0 K/UL (ref 0–0.5)
IMM GRANULOCYTES NFR BLD AUTO: 0 % (ref 0–5)
LYMPHOCYTES # BLD: 1.6 K/UL (ref 0.5–4.6)
LYMPHOCYTES NFR BLD: 22 % (ref 13–44)
MCH RBC QN AUTO: 30.4 PG (ref 26.1–32.9)
MCHC RBC AUTO-ENTMCNC: 33.3 G/DL (ref 31.4–35)
MCV RBC AUTO: 91.3 FL (ref 79.6–97.8)
MONOCYTES # BLD: 0.6 K/UL (ref 0.1–1.3)
MONOCYTES NFR BLD: 9 % (ref 4–12)
NEUTS SEG # BLD: 4.8 K/UL (ref 1.7–8.2)
NEUTS SEG NFR BLD: 65 % (ref 43–78)
NRBC # BLD: 0 K/UL (ref 0–0.2)
PLATELET # BLD AUTO: 187 K/UL (ref 150–450)
PMV BLD AUTO: 10.5 FL (ref 9.4–12.3)
POTASSIUM SERPL-SCNC: 3.7 MMOL/L (ref 3.5–5.1)
PROT SERPL-MCNC: 8.1 G/DL (ref 6.3–8.2)
RBC # BLD AUTO: 4.97 M/UL (ref 4.23–5.6)
SODIUM SERPL-SCNC: 138 MMOL/L (ref 136–145)
TROPONIN I SERPL-MCNC: 0.31 NG/ML (ref 0.02–0.05)
WBC # BLD AUTO: 7.3 K/UL (ref 4.3–11.1)

## 2020-05-12 PROCEDURE — 99285 EMERGENCY DEPT VISIT HI MDM: CPT

## 2020-05-12 PROCEDURE — 71046 X-RAY EXAM CHEST 2 VIEWS: CPT

## 2020-05-12 PROCEDURE — 84484 ASSAY OF TROPONIN QUANT: CPT

## 2020-05-12 PROCEDURE — 74011250637 HC RX REV CODE- 250/637: Performed by: INTERNAL MEDICINE

## 2020-05-12 PROCEDURE — 85025 COMPLETE CBC W/AUTO DIFF WBC: CPT

## 2020-05-12 PROCEDURE — 74011250636 HC RX REV CODE- 250/636: Performed by: INTERNAL MEDICINE

## 2020-05-12 PROCEDURE — 80053 COMPREHEN METABOLIC PANEL: CPT

## 2020-05-12 PROCEDURE — 94762 N-INVAS EAR/PLS OXIMTRY CONT: CPT

## 2020-05-12 PROCEDURE — 74011250637 HC RX REV CODE- 250/637: Performed by: EMERGENCY MEDICINE

## 2020-05-12 PROCEDURE — 81003 URINALYSIS AUTO W/O SCOPE: CPT

## 2020-05-12 PROCEDURE — 65660000000 HC RM CCU STEPDOWN

## 2020-05-12 PROCEDURE — 93005 ELECTROCARDIOGRAM TRACING: CPT | Performed by: EMERGENCY MEDICINE

## 2020-05-12 RX ORDER — MORPHINE SULFATE 10 MG/ML
5 INJECTION, SOLUTION INTRAMUSCULAR; INTRAVENOUS
Status: DISCONTINUED | OUTPATIENT
Start: 2020-05-12 | End: 2020-05-15 | Stop reason: HOSPADM

## 2020-05-12 RX ORDER — INSULIN LISPRO 100 [IU]/ML
INJECTION, SOLUTION INTRAVENOUS; SUBCUTANEOUS
Status: DISCONTINUED | OUTPATIENT
Start: 2020-05-13 | End: 2020-05-15 | Stop reason: HOSPADM

## 2020-05-12 RX ORDER — LISINOPRIL 5 MG/1
5 TABLET ORAL DAILY
Status: DISCONTINUED | OUTPATIENT
Start: 2020-05-13 | End: 2020-05-13

## 2020-05-12 RX ORDER — ACETAMINOPHEN 325 MG/1
650 TABLET ORAL
Status: DISCONTINUED | OUTPATIENT
Start: 2020-05-12 | End: 2020-05-15 | Stop reason: HOSPADM

## 2020-05-12 RX ORDER — HEPARIN SODIUM 5000 [USP'U]/ML
4000 INJECTION, SOLUTION INTRAVENOUS; SUBCUTANEOUS ONCE
Status: COMPLETED | OUTPATIENT
Start: 2020-05-12 | End: 2020-05-12

## 2020-05-12 RX ORDER — GUAIFENESIN 100 MG/5ML
81 LIQUID (ML) ORAL
Status: DISCONTINUED | OUTPATIENT
Start: 2020-05-12 | End: 2020-05-15 | Stop reason: HOSPADM

## 2020-05-12 RX ORDER — ATORVASTATIN CALCIUM 80 MG/1
80 TABLET, FILM COATED ORAL
Status: DISCONTINUED | OUTPATIENT
Start: 2020-05-12 | End: 2020-05-15 | Stop reason: HOSPADM

## 2020-05-12 RX ORDER — SODIUM CHLORIDE 9 MG/ML
50 INJECTION, SOLUTION INTRAVENOUS CONTINUOUS
Status: DISCONTINUED | OUTPATIENT
Start: 2020-05-12 | End: 2020-05-14

## 2020-05-12 RX ORDER — NITROGLYCERIN 0.4 MG/1
0.4 TABLET SUBLINGUAL
Status: DISCONTINUED | OUTPATIENT
Start: 2020-05-12 | End: 2020-05-15 | Stop reason: HOSPADM

## 2020-05-12 RX ORDER — NITROGLYCERIN 0.4 MG/1
0.4 TABLET SUBLINGUAL
Status: DISCONTINUED | OUTPATIENT
Start: 2020-05-12 | End: 2020-05-13 | Stop reason: SDUPTHER

## 2020-05-12 RX ORDER — FAMOTIDINE 20 MG/1
20 TABLET, FILM COATED ORAL 2 TIMES DAILY
Status: DISCONTINUED | OUTPATIENT
Start: 2020-05-13 | End: 2020-05-15 | Stop reason: HOSPADM

## 2020-05-12 RX ORDER — CARVEDILOL 12.5 MG/1
12.5 TABLET ORAL 2 TIMES DAILY WITH MEALS
Status: DISCONTINUED | OUTPATIENT
Start: 2020-05-13 | End: 2020-05-15 | Stop reason: HOSPADM

## 2020-05-12 RX ORDER — HEPARIN SODIUM 5000 [USP'U]/100ML
12-25 INJECTION, SOLUTION INTRAVENOUS
Status: DISCONTINUED | OUTPATIENT
Start: 2020-05-12 | End: 2020-05-13

## 2020-05-12 RX ORDER — GUAIFENESIN 100 MG/5ML
324 LIQUID (ML) ORAL
Status: COMPLETED | OUTPATIENT
Start: 2020-05-12 | End: 2020-05-12

## 2020-05-12 RX ADMIN — HEPARIN SODIUM 4000 UNITS: 5000 INJECTION INTRAVENOUS; SUBCUTANEOUS at 22:53

## 2020-05-12 RX ADMIN — ATORVASTATIN CALCIUM 80 MG: 80 TABLET, FILM COATED ORAL at 22:49

## 2020-05-12 RX ADMIN — ASPIRIN 81 MG 81 MG: 81 TABLET ORAL at 22:49

## 2020-05-12 RX ADMIN — SODIUM CHLORIDE 50 ML/HR: 900 INJECTION, SOLUTION INTRAVENOUS at 23:00

## 2020-05-12 RX ADMIN — HEPARIN SODIUM AND DEXTROSE 12 UNITS/KG/HR: 5000; 5 INJECTION INTRAVENOUS at 22:56

## 2020-05-12 RX ADMIN — ASPIRIN 81 MG 324 MG: 81 TABLET ORAL at 19:04

## 2020-05-12 NOTE — ED TRIAGE NOTES
Pt reports onset of right sided dull chest pain with SOB today, states that it radiates to his neck and right shoulder. Denies NV, cough or fever. Pt masked in triage.

## 2020-05-12 NOTE — ED PROVIDER NOTES
68-year-old  male with history of coronary disease status post CABG in 2007 presents to the emergency department complaining of sudden onset of chest pain shortness of breath while working on some yard work this afternoon at about 3 PM.  States the discomfort is much improved at present and his breathing is back to normal, but still complains of a slight pressure in the right side of the chest rating the right neck. No known exacerbating or alleviating factors. Patient states he is chronically short of breath and that is really not changed from his normal at this point. The history is provided by the patient. Chest Pain (Angina)    This is a new problem. The current episode started 3 to 5 hours ago. The problem has been rapidly improving. Duration of episode(s) is 3 hours. The problem occurs rarely. The pain is associated with normal activity. The pain is present in the lateral region and right side. The pain is mild. The quality of the pain is described as tightness and dull. Associated symptoms include shortness of breath. Pertinent negatives include no fever and no palpitations.         Past Medical History:   Diagnosis Date    Acquired hypothyroidism 3/19/2018    AV junctional bradycardia     CAD (coronary artery disease)     Congestive heart failure (HCC)     [facility] ([facility] [address]     Endocrine disease     hypothyriod    Headache 8/5/2017    Heart failure ([facility] [address]     Hypertension     Stroke [facility])        Past Surgical History:   Procedure Laterality Date    CARDIAC SURG PROCEDURE UNLIST      3 vessel bypass    HX CHOLECYSTECTOMY           Family History:   Problem Relation Age of Onset    Heart Disease Mother     Diabetes Sister     Cancer Sister     Diabetes Sister        Social History     Socioeconomic History    Marital status:      Spouse name: Not on file    Number of children: Not on file    Years of education: Not on file    Highest education level: Not on file Occupational History     Employer: RETIRED   Social Needs    Financial resource strain: Not on file    Food insecurity     Worry: Not on file     Inability: Not on file    Transportation needs     Medical: Not on file     Non-medical: Not on file   Tobacco Use    Smoking status: Former Smoker     Last attempt to quit: 1972     Years since quittin.3    Smokeless tobacco: Never Used   Substance and Sexual Activity    Alcohol use: No    Drug use: No    Sexual activity: Yes   Lifestyle    Physical activity     Days per week: Not on file     Minutes per session: Not on file    Stress: Not on file   Relationships    Social connections     Talks on phone: Not on file     Gets together: Not on file     Attends Congregation service: Not on file     Active member of club or organization: Not on file     Attends meetings of clubs or organizations: Not on file     Relationship status: Not on file    Intimate partner violence     Fear of current or ex partner: Not on file     Emotionally abused: Not on file     Physically abused: Not on file     Forced sexual activity: Not on file   Other Topics Concern    Not on file   Social History Narrative    Maegan Hartman is his sister         ALLERGIES: Brilinta [ticagrelor] and Jardiance [empagliflozin]    Review of Systems   Constitutional: Negative for chills and fever. Respiratory: Positive for shortness of breath. Cardiovascular: Positive for chest pain. Negative for palpitations and leg swelling. All other systems reviewed and are negative. Vitals:    20 1730   BP: (!) 176/91   Pulse: (!) 104   Resp: 18   Temp: 98.2 °F (36.8 °C)   SpO2: 95%   Weight: 99.8 kg (220 lb)   Height: 6' 1\" (1.854 m)            Physical Exam  Vitals signs and nursing note reviewed. Constitutional:       General: He is not in acute distress. Appearance: He is well-developed. HENT:      Head: Normocephalic and atraumatic.       Right Ear: External ear normal. Left Ear: External ear normal.      Nose: Nose normal.      Mouth/Throat:      Mouth: Mucous membranes are moist.   Eyes:      Extraocular Movements: Extraocular movements intact. Conjunctiva/sclera: Conjunctivae normal.      Pupils: Pupils are equal, round, and reactive to light. Neck:      Musculoskeletal: Normal range of motion and neck supple. Cardiovascular:      Rate and Rhythm: Normal rate and regular rhythm. Heart sounds: Normal heart sounds. Pulmonary:      Effort: Pulmonary effort is normal.      Breath sounds: Normal breath sounds. Abdominal:      General: Bowel sounds are normal.      Palpations: Abdomen is soft. Tenderness: There is no abdominal tenderness. Hernia: No hernia is present. Musculoskeletal: Normal range of motion. Skin:     General: Skin is warm and dry. Capillary Refill: Capillary refill takes less than 2 seconds. Neurological:      General: No focal deficit present. Mental Status: He is alert and oriented to person, place, and time.    Psychiatric:         Mood and Affect: Mood normal.         Behavior: Behavior normal.          MDM  Number of Diagnoses or Management Options  Acute chest pain: new and requires workup  Elevated troponin I level: new and requires workup     Amount and/or Complexity of Data Reviewed  Clinical lab tests: ordered and reviewed  Tests in the radiology section of CPT®: ordered and reviewed  Review and summarize past medical records: yes  Discuss the patient with other providers: yes  Independent visualization of images, tracings, or specimens: yes    Risk of Complications, Morbidity, and/or Mortality  Presenting problems: high  Diagnostic procedures: moderate  Management options: moderate    Patient Progress  Patient progress: stable         Procedures      Results Reviewed:      Recent Results (from the past 24 hour(s))   CBC WITH AUTOMATED DIFF    Collection Time: 05/12/20  5:37 PM   Result Value Ref Range    WBC 7.3 4.3 - 11.1 K/uL    RBC 4.97 4.23 - 5.6 M/uL    HGB 15.1 13.6 - 17.2 g/dL    HCT 45.4 41.1 - 50.3 %    MCV 91.3 79.6 - 97.8 FL    MCH 30.4 26.1 - 32.9 PG    MCHC 33.3 31.4 - 35.0 g/dL    RDW 12.7 11.9 - 14.6 %    PLATELET 345 680 - 672 K/uL    MPV 10.5 9.4 - 12.3 FL    ABSOLUTE NRBC 0.00 0.0 - 0.2 K/uL    DF AUTOMATED      NEUTROPHILS 65 43 - 78 %    LYMPHOCYTES 22 13 - 44 %    MONOCYTES 9 4.0 - 12.0 %    EOSINOPHILS 3 0.5 - 7.8 %    BASOPHILS 1 0.0 - 2.0 %    IMMATURE GRANULOCYTES 0 0.0 - 5.0 %    ABS. NEUTROPHILS 4.8 1.7 - 8.2 K/UL    ABS. LYMPHOCYTES 1.6 0.5 - 4.6 K/UL    ABS. MONOCYTES 0.6 0.1 - 1.3 K/UL    ABS. EOSINOPHILS 0.2 0.0 - 0.8 K/UL    ABS. BASOPHILS 0.0 0.0 - 0.2 K/UL    ABS. IMM. GRANS. 0.0 0.0 - 0.5 K/UL   METABOLIC PANEL, COMPREHENSIVE    Collection Time: 05/12/20  5:37 PM   Result Value Ref Range    Sodium 138 136 - 145 mmol/L    Potassium 3.7 3.5 - 5.1 mmol/L    Chloride 100 98 - 107 mmol/L    CO2 30 21 - 32 mmol/L    Anion gap 8 7 - 16 mmol/L    Glucose 218 (H) 65 - 100 mg/dL    BUN 15 8 - 23 MG/DL    Creatinine 1.26 0.8 - 1.5 MG/DL    GFR est AA >60 >60 ml/min/1.73m2    GFR est non-AA 59 (L) >60 ml/min/1.73m2    Calcium 9.8 8.3 - 10.4 MG/DL    Bilirubin, total 0.9 0.2 - 1.1 MG/DL    ALT (SGPT) 26 12 - 65 U/L    AST (SGOT) 24 15 - 37 U/L    Alk. phosphatase 82 50 - 136 U/L    Protein, total 8.1 6.3 - 8.2 g/dL    Albumin 4.2 3.2 - 4.6 g/dL    Globulin 3.9 (H) 2.3 - 3.5 g/dL    A-G Ratio 1.1 (L) 1.2 - 3.5     TROPONIN I    Collection Time: 05/12/20  5:37 PM   Result Value Ref Range    Troponin-I, Qt. 0.31 (HH) 0.02 - 0.05 NG/ML       XR CHEST PA LAT   Final Result   IMPRESSION: Negative for acute abnormality.

## 2020-05-13 LAB
ALBUMIN SERPL-MCNC: 3.7 G/DL (ref 3.2–4.6)
ALBUMIN/GLOB SERPL: 1.2 {RATIO} (ref 1.2–3.5)
ALP SERPL-CCNC: 56 U/L (ref 50–136)
ALT SERPL-CCNC: 26 U/L (ref 12–65)
ANION GAP SERPL CALC-SCNC: 6 MMOL/L (ref 7–16)
APTT PPP: 82.1 SEC (ref 24.3–35.4)
APTT PPP: 85.2 SEC (ref 24.3–35.4)
AST SERPL-CCNC: 51 U/L (ref 15–37)
ATRIAL RATE: 100 BPM
BILIRUB DIRECT SERPL-MCNC: 0.3 MG/DL
BILIRUB SERPL-MCNC: 0.8 MG/DL (ref 0.2–1.1)
BUN SERPL-MCNC: 13 MG/DL (ref 8–23)
CALCIUM SERPL-MCNC: 9.2 MG/DL (ref 8.3–10.4)
CALCULATED P AXIS, ECG09: 56 DEGREES
CALCULATED R AXIS, ECG10: -88 DEGREES
CALCULATED T AXIS, ECG11: 78 DEGREES
CHLORIDE SERPL-SCNC: 108 MMOL/L (ref 98–107)
CHOLEST SERPL-MCNC: 113 MG/DL
CO2 SERPL-SCNC: 28 MMOL/L (ref 21–32)
CREAT SERPL-MCNC: 0.96 MG/DL (ref 0.8–1.5)
DIAGNOSIS, 93000: NORMAL
ERYTHROCYTE [DISTWIDTH] IN BLOOD BY AUTOMATED COUNT: 12.6 % (ref 11.9–14.6)
EST. AVERAGE GLUCOSE BLD GHB EST-MCNC: 163 MG/DL
GLOBULIN SER CALC-MCNC: 3.1 G/DL (ref 2.3–3.5)
GLUCOSE BLD STRIP.AUTO-MCNC: 129 MG/DL (ref 65–100)
GLUCOSE BLD STRIP.AUTO-MCNC: 155 MG/DL (ref 65–100)
GLUCOSE BLD STRIP.AUTO-MCNC: 169 MG/DL (ref 65–100)
GLUCOSE BLD STRIP.AUTO-MCNC: 195 MG/DL (ref 65–100)
GLUCOSE SERPL-MCNC: 139 MG/DL (ref 65–100)
HBA1C MFR BLD: 7.3 % (ref 4.8–6)
HCT VFR BLD AUTO: 41.1 % (ref 41.1–50.3)
HDLC SERPL-MCNC: 38 MG/DL (ref 40–60)
HDLC SERPL: 3 {RATIO}
HGB BLD-MCNC: 13.9 G/DL (ref 13.6–17.2)
INR PPP: 1.1
LDLC SERPL CALC-MCNC: 35.8 MG/DL
LIPID PROFILE,FLP: ABNORMAL
MAGNESIUM SERPL-MCNC: 1.9 MG/DL (ref 1.8–2.4)
MCH RBC QN AUTO: 30.7 PG (ref 26.1–32.9)
MCHC RBC AUTO-ENTMCNC: 33.8 G/DL (ref 31.4–35)
MCV RBC AUTO: 90.7 FL (ref 79.6–97.8)
NRBC # BLD: 0 K/UL (ref 0–0.2)
P-R INTERVAL, ECG05: 178 MS
PLATELET # BLD AUTO: 160 K/UL (ref 150–450)
PMV BLD AUTO: 10.8 FL (ref 9.4–12.3)
POTASSIUM SERPL-SCNC: 3.4 MMOL/L (ref 3.5–5.1)
PROT SERPL-MCNC: 6.8 G/DL (ref 6.3–8.2)
PROTHROMBIN TIME: 14 SEC (ref 12–14.7)
Q-T INTERVAL, ECG07: 418 MS
QRS DURATION, ECG06: 176 MS
QTC CALCULATION (BEZET), ECG08: 539 MS
RBC # BLD AUTO: 4.53 M/UL (ref 4.23–5.6)
SODIUM SERPL-SCNC: 142 MMOL/L (ref 136–145)
TRIGL SERPL-MCNC: 196 MG/DL (ref 35–150)
TROPONIN I SERPL-MCNC: 19.1 NG/ML (ref 0.02–0.05)
TSH SERPL DL<=0.005 MIU/L-ACNC: 3.27 UIU/ML (ref 0.36–3.74)
VENTRICULAR RATE, ECG03: 100 BPM
VLDLC SERPL CALC-MCNC: 39.2 MG/DL (ref 6–23)
WBC # BLD AUTO: 5.9 K/UL (ref 4.3–11.1)

## 2020-05-13 PROCEDURE — 74011250637 HC RX REV CODE- 250/637: Performed by: INTERNAL MEDICINE

## 2020-05-13 PROCEDURE — 84443 ASSAY THYROID STIM HORMONE: CPT

## 2020-05-13 PROCEDURE — 85027 COMPLETE CBC AUTOMATED: CPT

## 2020-05-13 PROCEDURE — 74011636637 HC RX REV CODE- 636/637: Performed by: INTERNAL MEDICINE

## 2020-05-13 PROCEDURE — 84484 ASSAY OF TROPONIN QUANT: CPT

## 2020-05-13 PROCEDURE — 80076 HEPATIC FUNCTION PANEL: CPT

## 2020-05-13 PROCEDURE — C1894 INTRO/SHEATH, NON-LASER: HCPCS

## 2020-05-13 PROCEDURE — 65660000000 HC RM CCU STEPDOWN

## 2020-05-13 PROCEDURE — 74011250636 HC RX REV CODE- 250/636: Performed by: INTERNAL MEDICINE

## 2020-05-13 PROCEDURE — B2181ZZ FLUOROSCOPY OF LEFT INTERNAL MAMMARY BYPASS GRAFT USING LOW OSMOLAR CONTRAST: ICD-10-PCS | Performed by: INTERNAL MEDICINE

## 2020-05-13 PROCEDURE — B2131ZZ FLUOROSCOPY OF MULTIPLE CORONARY ARTERY BYPASS GRAFTS USING LOW OSMOLAR CONTRAST: ICD-10-PCS | Performed by: INTERNAL MEDICINE

## 2020-05-13 PROCEDURE — B2111ZZ FLUOROSCOPY OF MULTIPLE CORONARY ARTERIES USING LOW OSMOLAR CONTRAST: ICD-10-PCS | Performed by: INTERNAL MEDICINE

## 2020-05-13 PROCEDURE — 82962 GLUCOSE BLOOD TEST: CPT

## 2020-05-13 PROCEDURE — 83036 HEMOGLOBIN GLYCOSYLATED A1C: CPT

## 2020-05-13 PROCEDURE — 74011636320 HC RX REV CODE- 636/320: Performed by: INTERNAL MEDICINE

## 2020-05-13 PROCEDURE — 77030016699 HC CATH ANGI DX INFN1 CARD -A

## 2020-05-13 PROCEDURE — C1760 CLOSURE DEV, VASC: HCPCS

## 2020-05-13 PROCEDURE — 85730 THROMBOPLASTIN TIME PARTIAL: CPT

## 2020-05-13 PROCEDURE — 83735 ASSAY OF MAGNESIUM: CPT

## 2020-05-13 PROCEDURE — 74011000250 HC RX REV CODE- 250: Performed by: INTERNAL MEDICINE

## 2020-05-13 PROCEDURE — 80048 BASIC METABOLIC PNL TOTAL CA: CPT

## 2020-05-13 PROCEDURE — 36415 COLL VENOUS BLD VENIPUNCTURE: CPT

## 2020-05-13 PROCEDURE — 4A023N7 MEASUREMENT OF CARDIAC SAMPLING AND PRESSURE, LEFT HEART, PERCUTANEOUS APPROACH: ICD-10-PCS | Performed by: INTERNAL MEDICINE

## 2020-05-13 PROCEDURE — 80061 LIPID PANEL: CPT

## 2020-05-13 PROCEDURE — 93459 L HRT ART/GRFT ANGIO: CPT

## 2020-05-13 PROCEDURE — 99152 MOD SED SAME PHYS/QHP 5/>YRS: CPT

## 2020-05-13 PROCEDURE — B2151ZZ FLUOROSCOPY OF LEFT HEART USING LOW OSMOLAR CONTRAST: ICD-10-PCS | Performed by: INTERNAL MEDICINE

## 2020-05-13 PROCEDURE — 85610 PROTHROMBIN TIME: CPT

## 2020-05-13 PROCEDURE — 99153 MOD SED SAME PHYS/QHP EA: CPT

## 2020-05-13 PROCEDURE — C8929 TTE W OR WO FOL WCON,DOPPLER: HCPCS

## 2020-05-13 RX ORDER — HEPARIN SODIUM 200 [USP'U]/100ML
3 INJECTION, SOLUTION INTRAVENOUS CONTINUOUS
Status: DISCONTINUED | OUTPATIENT
Start: 2020-05-13 | End: 2020-05-13

## 2020-05-13 RX ORDER — MIDAZOLAM HYDROCHLORIDE 1 MG/ML
.5-2 INJECTION, SOLUTION INTRAMUSCULAR; INTRAVENOUS
Status: DISCONTINUED | OUTPATIENT
Start: 2020-05-13 | End: 2020-05-15 | Stop reason: HOSPADM

## 2020-05-13 RX ORDER — LIDOCAINE HYDROCHLORIDE 10 MG/ML
1-30 INJECTION, SOLUTION EPIDURAL; INFILTRATION; INTRACAUDAL; PERINEURAL ONCE
Status: COMPLETED | OUTPATIENT
Start: 2020-05-13 | End: 2020-05-13

## 2020-05-13 RX ORDER — SPIRONOLACTONE 25 MG/1
25 TABLET ORAL DAILY
Status: DISCONTINUED | OUTPATIENT
Start: 2020-05-13 | End: 2020-05-15 | Stop reason: HOSPADM

## 2020-05-13 RX ORDER — LISINOPRIL 20 MG/1
20 TABLET ORAL DAILY
Status: DISCONTINUED | OUTPATIENT
Start: 2020-05-13 | End: 2020-05-15

## 2020-05-13 RX ADMIN — IOPAMIDOL 90 ML: 755 INJECTION, SOLUTION INTRAVENOUS at 13:03

## 2020-05-13 RX ADMIN — CARVEDILOL 12.5 MG: 12.5 TABLET, FILM COATED ORAL at 08:24

## 2020-05-13 RX ADMIN — ASPIRIN 81 MG 81 MG: 81 TABLET ORAL at 21:03

## 2020-05-13 RX ADMIN — PERFLUTREN 1 ML: 6.52 INJECTION, SUSPENSION INTRAVENOUS at 09:00

## 2020-05-13 RX ADMIN — SPIRONOLACTONE 25 MG: 25 TABLET ORAL at 08:24

## 2020-05-13 RX ADMIN — NITROGLYCERIN 1 INCH: 20 OINTMENT TOPICAL at 05:30

## 2020-05-13 RX ADMIN — INSULIN LISPRO 2 UNITS: 100 INJECTION, SOLUTION INTRAVENOUS; SUBCUTANEOUS at 21:07

## 2020-05-13 RX ADMIN — HEPARIN SODIUM 3 UNITS/HR: 200 INJECTION, SOLUTION INTRAVENOUS at 12:26

## 2020-05-13 RX ADMIN — MIDAZOLAM 2 MG: 1 INJECTION INTRAMUSCULAR; INTRAVENOUS at 12:43

## 2020-05-13 RX ADMIN — LEVOTHYROXINE SODIUM 137 MCG: 0.11 TABLET ORAL at 06:53

## 2020-05-13 RX ADMIN — FAMOTIDINE 20 MG: 20 TABLET, FILM COATED ORAL at 08:24

## 2020-05-13 RX ADMIN — FAMOTIDINE 20 MG: 20 TABLET, FILM COATED ORAL at 17:42

## 2020-05-13 RX ADMIN — INSULIN LISPRO 2 UNITS: 100 INJECTION, SOLUTION INTRAVENOUS; SUBCUTANEOUS at 11:52

## 2020-05-13 RX ADMIN — INSULIN LISPRO 2 UNITS: 100 INJECTION, SOLUTION INTRAVENOUS; SUBCUTANEOUS at 06:47

## 2020-05-13 RX ADMIN — LIDOCAINE HYDROCHLORIDE 8 ML: 10 INJECTION, SOLUTION EPIDURAL; INFILTRATION; INTRACAUDAL; PERINEURAL at 12:50

## 2020-05-13 RX ADMIN — LISINOPRIL 20 MG: 20 TABLET ORAL at 08:25

## 2020-05-13 RX ADMIN — CARVEDILOL 12.5 MG: 12.5 TABLET, FILM COATED ORAL at 17:43

## 2020-05-13 RX ADMIN — NITROGLYCERIN 1 INCH: 20 OINTMENT TOPICAL at 11:54

## 2020-05-13 RX ADMIN — ATORVASTATIN CALCIUM 80 MG: 80 TABLET, FILM COATED ORAL at 21:03

## 2020-05-13 NOTE — PROGRESS NOTES
TRANSFER - IN REPORT:    Verbal report received from [facility]) on Chidi Duran  being received from cath lab(unit) for routine progression of care      Report consisted of patients Situation, Background, Assessment and   Recommendations(SBAR). Information from the following report(s) Procedure Summary was reviewed with the receiving nurse. Opportunity for questions and clarification was provided. Assessment completed upon patients arrival to unit and care assumed.

## 2020-05-13 NOTE — PROGRESS NOTES
Care Management Interventions  PCP Verified by CM: Yes  Last Visit to PCP: 04/21/20  Mode of Transport at Discharge: Other (see comment)(Abigail Virgen Sister [phone] )  Transition of Care Consult (CM Consult): Discharge Planning  Discharge Durable Medical Equipment: No  Physical Therapy Consult: No  Occupational Therapy Consult: No  Current Support Network: Lives with Caregiver, Own Home(Sister)  Confirm Follow Up Transport: Family  Discharge Location  Discharge Placement: Home    Pt admitted to 3rd floor [facility] for NSTEMI. CM met with pt to discuss CM needs & DCP. Pt is A&Ox4. Pt is indep at home with all ADLS. Pt lives with his sister in a house. Pt has no DME needs; he has a cane. Pt has no difficulty with obtaining medications or transport. DCP home with sister. CM to continue to monitor.

## 2020-05-13 NOTE — PROGRESS NOTES
am  5/13/2020 7:24 AM    Admit Date: 5/12/2020    Admit Diagnosis: NSTEMI (non-ST elevated myocardial infarction) ([facility] [address] [I21.4]      Subjective:    Patient with nstemi. Needs cath. Prior cabg and stenting.  LIMA to LAD and SV to RCA seem to be his grafts    Objective:      Visit Vitals  /72 (BP 1 Location: Right arm, BP Patient Position: At rest)   Pulse 74   Temp 97.4 °F (36.3 °C)   Resp 19   Ht 6' 1\" (1.854 m)   Wt 99.3 kg (219 lb)   SpO2 94%   BMI 28.89 kg/m²       ROS:  General ROS: negative for - chills  Hematological and Lymphatic ROS: negative for - blood clots or jaundice  Respiratory ROS: positive for - shortness of breath  Cardiovascular ROS: positive for - chest pain and dyspnea on exertion  Gastrointestinal ROS: no abdominal pain, change in bowel habits, or black or bloody stools  Neurological ROS: no TIA or stroke symptoms    Physical Exam:    Physical Examination: General appearance - alert, well appearing, and in no distress  Mental status - alert, oriented to person, place, and time  Eyes - pupils equal and reactive, extraocular eye movements intact  Neck/lymph - supple, no significant adenopathy  Chest/CV - clear to auscultation, no wheezes, rales or rhonchi, symmetric air entry  Heart - normal rate, regular rhythm, normal S1, S2, no murmurs, rubs, clicks or gallops  Abdomen/GI - soft, nontender, nondistended, no masses or organomegaly  Musculoskeletal - no joint tenderness, deformity or swelling  Extremities - peripheral pulses normal, no pedal edema, no clubbing or cyanosis  Skin - normal coloration and turgor, no rashes, no suspicious skin lesions noted    Current Facility-Administered Medications   Medication Dose Route Frequency    nitroglycerin (NITROSTAT) tablet 0.4 mg  0.4 mg SubLINGual Q5MIN PRN    aspirin chewable tablet 81 mg  81 mg Oral QHS    carvediloL (COREG) tablet 12.5 mg  12.5 mg Oral BID WITH MEALS    levothyroxine (SYNTHROID) tablet 137 mcg  137 mcg Oral ACB    lisinopriL (PRINIVIL, ZESTRIL) tablet 5 mg  5 mg Oral DAILY    famotidine (PEPCID) tablet 20 mg  20 mg Oral BID    atorvastatin (LIPITOR) tablet 80 mg  80 mg Oral QHS    0.9% sodium chloride infusion  50 mL/hr IntraVENous CONTINUOUS    acetaminophen (TYLENOL) tablet 650 mg  650 mg Oral Q6H PRN    morphine 10 mg/ml injection 5 mg  5 mg IntraVENous Q4H PRN    nitroglycerin (NITROBID) 2 % ointment 1 Inch  1 Inch Topical Q6H    nitroglycerin (NITROSTAT) tablet 0.4 mg  0.4 mg SubLINGual Q5MIN PRN    heparin 25,000 units in dextrose 500 mL infusion  12-25 Units/kg/hr IntraVENous TITRATE    insulin lispro (HUMALOG) injection   SubCUTAneous AC&HS       Data Review:   @LABNT(Na,K,BUN,CREA,WBC,HGB,HCT,PLT,INR,TRP,TCHOL*,Triglyceride*,LDL*,LDLCPOC HDL*,HDL])@    TELEMETRY: paced    Assessment/Plan:     Principal Problem:    NSTEMI (non-ST elevated [facility] infarction) [facility] (11/4/2018)    Active Problems:    Essential hypertension, benign (2/6/2010)      Hypertension ()      Heart failure (HCC) ()      Congestive heart failure (HCC) ()      Overview: Followed by Dr. Cruzito Siegel.       CAD (coronary artery disease) ()      Pacemaker (2/6/2018)      Hypertensive cardiomyopathy, with heart failure ([facility] (11/4/2018)      Elevated troponin (11/4/2018)      Non-rheumatic mitral regurgitation (11/13/2018)    adjust nstemi meds  Plan cath  Adjust hfref meds  Echo          Nakita Harris MD

## 2020-05-13 NOTE — PROCEDURES
[facility]  CARDIAC CATH    Name:  Mabel Calloway  MR#:  [id number]  :  1945  ACCOUNT #:  [de-identified]  DATE OF SERVICE:  2020    PROCEDURES PERFORMED:  Left heart cath, selective coronary angiography, left ventriculogram with LIMA angiography and saphenous vein angiography. PREOPERATIVE DIAGNOSES:  Known coronary artery disease. POSTOPERATIVE DIAGNOSES:  Stable coronary artery disease. SURGEON:  Ru Navas. Francisco Amaya MD    ASSISTANT:  None. ESTIMATED BLOOD LOSS:  4 mL. SPECIMENS REMOVED:  None. COMPLICATIONS:  None. IMPLANTS:  None. ANESTHESIA:  Sedation, 2 mg of Versed given between 12:50 and 01:10 by Lina Liu RN. INDICATION:  Elevated troponin. ACCESS:  Right femoral.    CATHETERS:  Celio left 4, Celio right 4 and straight pigtail. HEMODYNAMICS:  Aortic pressure 108/60. LVEDP of 13. Total contrast 90 mL. Angio-Seal closure was performed. FINDINGS:  Left ventriculogram done in FRANKEL projection shows overall EF around 40% with global hypokinesis and slightly more hypokinetic apex. There is no gradient on pullback. Left main arises normally off the left cusp, courses for a short distance, divides into LAD and circumflex. There is no significant disease in the left main. LAD courses for a short distance and then after two to three very small proximal diagonals, the LAD is occluded. This is chronic. The diagonals are small caliber, less than 2 mm in size and have areas of diffuse moderate-to-severe 70-90% disease, but this is medical management. Circumflex artery in the AV groove is totally occluded in its midportion with faint collateral filling of an OM. Right coronary artery arises off the right cusp in a normal fashion, courses in the AV groove for a short distance and then is occluded in its midportion.     GRAFT ANATOMY:    LIMA to the LAD is patent with good distal anastomosis and acceptable runoff into a mild-to-moderately diseased LAD with 30-40% plaque. Saphenous vein bypass to an OM is known to be occluded. Saphenous vein bypass graft to the PDA is patent with good proximal and distal anastomosis, mild disease in the body of the vein graft and moderate 30-40% disease in the distal right posterior descending and posterior lateral.    Angio-Seal is performed without difficulty. CONCLUSIONS:  Stable CAD with two of three patent grafts. There is a patent LIMA to an LAD. There is a patent vein graft to a PDA. There is an occluded vein graft to an OM. The patient's ejection fraction is moderately reduced at 40%. Continue medical therapy.         MD FLORI [id number]  D:  05/13/2020 13:32  T:  05/13/2020 15:38  JOB #:  [id number]

## 2020-05-13 NOTE — ROUTINE PROCESS
Bedside and Verbal shift change report given to Santiago RN  (oncoming nurse) by Ruddy Lind RN and Mikey Chávez RN  (offgoing nurse). Report included the following information SBAR, Kardex, Intake/Output, MAR and Recent Results.

## 2020-05-13 NOTE — ROUTINE PROCESS
Bedside and Verbal shift change report given to Henry العلي (oncoming nurse) by Ofelia Monson RN. Report included the following information SBAR, Kardex, Intake/Output, and MAR. Heparin verified at 12.

## 2020-05-13 NOTE — ROUTINE PROCESS
Bedside and Verbal shift change report given to [facility], [facility], RN and Nory Trinidad RN  (oncoming nurse) by SHANE Henderson (offgoing nurse). Report included the following information SBAR, Kardex and Recent Results.

## 2020-05-13 NOTE — PROCEDURES
Cardiac Catheterization Procedure Note    Patient ID:     Name: Olga Chapa   Medical Record Number: [id number]   YOB: 1945    Date of Procedure: 5/13/2020     Pre-procedure Diagnosis:  Coronary Artery Disease    Post-procedure Diagnosis: Coronary Artery Disease    Reason for Procedure: Worsening Angina    Blood loss less than 5 ml    Sedation. Pt received 2 mg versed and 0 mcg fentanyl for monitored conscious sedation from 1250to 110. Nurse torrez    Specimen: None    No complications    No assistants    Time out, Mallampati, and ASA performed    Procedure:  After informed consent, patient was prepped and draped in the usual sterile fashion. Right femoral approach was used. 90cc Visipaque contrast were utilized for the entire procedure. angioseal closure device used        FINDINGS    Left Ventricle: 40  LVEDP: 13    Left Main:ok    Left Anterior descending coronary artery: occluded p prox diag       Left Circumflex coronary artery: occluded        Right coronary artery: occluded            Graft anatomy: brady to lad patent  Sv to pda patent  Sv to om occluded    Intervention if done: na    Conclusions: stable anatomy    Recommentations: med tx    No complications    Family and or significant other were sought out and discussion of the procedure and findings took place. Procedure and findings including pertinent sequele were discussed with the patient immediately post procedure. Opportunity to ask questions was offered. If no one was available in the post procedure waiting area, I can be reached thru paging system or at [phone].           Signed By: Violeta Ptit MD

## 2020-05-13 NOTE — H&P
[address], [id number] [facility], [address] [place] [address]  PHONE: [phone]      20      NAME:  Efrain Philip  : 1945  MRN: [id number]     Primary Cardiologist: Marcos Mac MD      HPI:  This is a 74yo WM with CABG in , last PCI at [facility] 2018 (see report in Epic), CSHF (2019 EF 40%) who developed sudden onset of exertional CP into neck and bilateral shoulders. Better after presented to ER. CP free now. Symptoms were consistent with his prior angina. He has chronic FELIX. Living home with sister, not as active now. No new edema. No fever, illness. I have reviewed the prior cardiac testing and results with the patient today. Patient denies recent history of orthopnea, PND, excessive dizziness and/or syncope. He is on ASA. Past Medical History, Past Surgical History, Family history, Social History, and Medications were all reviewed with the patient today and updated as necessary.        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    Headache R51    Stroke ([facility] [address] I63.9    Hypertension I10    Heart failure ([facility] [address] I50.9    Diabetes ([facility] E11.9    Congestive heart failure ([facility]) I50.9    CAD (coronary artery disease) I25.10    Type 2 diabetes mellitus without complication, without long-term current use of insulin ([facility]) E11.9    Ischemic cardiomyopathy I25.5    Pacemaker Z95.0    Acquired hypothyroidism E03.9    Hyperlipidemia E78.5    Acute on chronic combined systolic and diastolic ACC/AHA stage C congestive heart failure (HCC) I50.43    Hypertensive cardiomyopathy, with heart failure ([facility]) I11.0, I43    NSTEMI (non-ST elevated myocardial infarction) ([facility]) I21.4    Elevated troponin R79.89    Acute on chronic systolic CHF (congestive heart failure) ([facility]) I50.23    Non-rheumatic mitral regurgitation I34.0    Syncope R55         Past Medical History:   Diagnosis Date    Acquired hypothyroidism 3/19/2018    AV junctional bradycardia     CAD (coronary artery disease)     Congestive heart failure (HCC)     Diabetes [facility] [address]     Endocrine disease     hypothyriod    Headache 2017    Heart failure ([facility] [address]     Hypertension     Stroke [facility])      Past Surgical History:   Procedure Laterality Date    CARDIAC SURG PROCEDURE UNLIST      3 vessel bypass    HX CHOLECYSTECTOMY       Family History   Problem Relation Age of Onset    Heart Disease Mother     Diabetes Sister     Cancer Sister     Diabetes Sister      Social History     Tobacco Use    Smoking status: Former Smoker     Last attempt to quit: 1972     Years since quittin.3    Smokeless tobacco: Never Used   Substance Use Topics    Alcohol use: No         Allergies   Allergen Reactions    Brilinta [Ticagrelor] Anaphylaxis    Jardiance [Empagliflozin] Other (comments)     Dyspepsia and atypical chest pain       Current Facility-Administered Medications   Medication Dose Route Frequency    nitroglycerin (NITROSTAT) tablet 0.4 mg  0.4 mg SubLINGual Q5MIN PRN     Current Outpatient Medications   Medication Sig    levothyroxine (SYNTHROID) 137 mcg tablet Take 137 mcg by mouth Daily (before breakfast).  lisinopriL (PRINIVIL, ZESTRIL) 5 mg tablet TAKE 1 TABLET BY MOUTH ONCE DAILY    metFORMIN (GLUCOPHAGE) 1,000 mg tablet Take 1 Tab by mouth two (2) times daily (with meals).  simvastatin (ZOCOR) 40 mg tablet Take 1 Tab by mouth nightly.  glimepiride (AMARYL) 4 mg tablet 1 po bid with a meal for diabetes instead of the glipizide)    furosemide (LASIX) 40 mg tablet Take 1 Tab by mouth daily.  pioglitazone (ACTOS) 15 mg tablet 1 po qd for diabetes (instead of the jardiance)    raNITIdine (ZANTAC) 150 mg tablet Take 150 mg by mouth two (2) times a day.     diclofenac potassium (CATAFLAM) 50 mg tablet Take 50 mg by mouth two (2) times a day.  carvedilol (COREG) 12.5 mg tablet TAKE 1 TABLET BY MOUTH TWICE DAILY WITH MEALS    multivitamin (ONE A DAY) tablet Take 1 Tab by mouth daily.  Blood-Glucose Meter monitoring kit Test blood sugar daily, either fasting or 2 hours after eating for diabetes and record in log. (#1)  Dx: E11.9    glucose blood VI test strips (BLOOD GLUCOSE TEST) strip Test blood sugar daily, either fasting or 2 hours after eating for diabetes and record in log. (#100)  Dx: E11.9    nitroglycerin (NITROSTAT) 0.4 mg SL tablet by SubLINGual route every five (5) minutes as needed for Chest Pain.  aspirin 81 mg chewable tablet Take 81 mg by mouth nightly. ROS:    Constitutional:   Negative for fevers and unexplained weight loss. Eyes:   Negative for visual disturbance. ENT:   Negative for significant hearing loss and tinnitus. Respiratory:   Negative for hemoptysis. Cardiovascular:   Negative except as noted in HPI. Gastrointestinal:   Negative for melena and abdominal pain. Genitourinary:   Negative for hematuria, renal stones. Integumentary:   Negative for rash or non-healing wounds  Hematologic/Lymphatic:   Negative for excessive bleeding hx or clotting disorder. Musculoskeletal:  Negative for active, unexplained/severe joint pain. Neurological:   Negative for stroke. Behavioral/Psych:   Negative for suicidal ideations. Endocrine:   Negative for uncontrolled diabetic symptoms including polyuria, polydipsia and poor wound healing.      PHYSICAL EXAM:       Visit Vitals  BP (!) 176/91   Pulse (!) 104   Temp 98.2 °F (36.8 °C)   Resp 18   Ht 6' 1\" (1.854 m)   Wt 99.8 kg (220 lb)   SpO2 95%   BMI 29.03 kg/m²      General/Constitutional:   Alert and oriented x 3, no acute distress  HEENT:   normocephalic, atraumatic, moist mucous membranes  Neck:   No JVD or carotid bruits bilaterally  Cardiovascular:   regular rate and rhythm, no murmur/rub/gallop appreciated  Pulmonary:   clear to auscultation bilaterally, no respiratory distress  Abdomen:   soft, non-tender, non-distended  Ext:   No sig LE edema bilaterally  Skin:  warm and dry, no obvious rashes seen  Neuro:   no obvious sensory or motor deficits  Psychiatric:   normal mood and affect      Medical problems, medical history and test results were reviewed with the patient today. Labs:   Recent Labs     05/12/20  1737      K 3.7   BUN 15   CREA 1.26   *   WBC 7.3   HGB 15.1   HCT 45.4          Echo Results  (Last 48 hours)    None        CXR Results  (Last 48 hours)               05/12/20 1740  XR CHEST PA LAT Final result    Impression:  IMPRESSION: Negative for acute abnormality. Narrative:  CHEST X-RAY, 2 views. HISTORY:  Chest pain shortness breath. TECHNIQUE: PA and lateral views. COMPARISON: November 2018. FINDINGS:    -Lungs: are clear.    -Heart size: is normal.    -Costophrenic angles: are sharp.    -Pulmonary vasculature: is unremarkable. -Included portion of the upper abdomen: is unremarkable. -Bones: Sternal wires.   -Other: Right-sided cardiac pacemaker. ASSESSMENT:    1. NSTEMI    2. cSHF    3. PPM    4. CKD    5. DM      PLAN:     1. NSTEMI: CP free now, symptoms consistent with prior angina on admission. Will admit for ACS, IV heparin. Continue BB, ASA. Check AM labs. Gentle IVF overnight with known EF 40%. Check echo in AM.    Discussed risk of cardiac catheterization and potential PCI with the patient in detail. The patient voiced complete understanding about these risks. The patient agrees to proceed with the aforementioned associated risks. 2. CSHF: remain on coreg and Ace. Does not appear volume overloaded. Check echo in AM, follow. 3. PPM:  Paced on EKG. Last check >99% RV pacing. After admission, consider EP referral for Bi-V upgrade +/- ICD with ICM. 4. CKD: gentle IVF for LHC in the AM, check AM labs. 5. DM: SSI, check HbA1c.              Indio Jane,   5/12/2020   9:00pm

## 2020-05-13 NOTE — PROGRESS NOTES
Telemetry monitor applied, bed low and locked, side rails x2. Patient has been oriented to room and instructed to use call light for assistance. Dual skin assessment completed with secondary RN which reveals the following: Sacrum and heels intact with no breakdown or redness noted. Skin is dry and Flaky. Patient has 1+ pitting in BLE. Patient has scattered small abrasions. IV in [address] [place] [address] noted.

## 2020-05-13 NOTE — ROUTINE PROCESS
Bedside and Verbal shift change report given to self (oncoming nurse) by Abbi Jaquez. Report included the following information SBAR, Kardex, Intake/Output and MAR. Right groin site assessed with off going RN.

## 2020-05-13 NOTE — PROGRESS NOTES
TRANSFER - IN REPORT:    Verbal report received from ER on Rayann Lot  being received from ER(unit) for routine progression of care      Report consisted of patients Situation, Background, Assessment and   Recommendations(SBAR). Information from the following report(s) SBAR, Kardex, ED Summary and MAR was reviewed with the receiving nurse. Opportunity for questions and clarification was provided. Assessment completed upon patients arrival to unit and care assumed.

## 2020-05-13 NOTE — ED NOTES
TRANSFER - OUT REPORT:    Verbal report given to Jeff Tucker RN(name) on Anish Donovan  being transferred to 307(unit) for routine progression of care       Report consisted of patients Situation, Background, Assessment and   Recommendations(SBAR). Information from the following report(s) ED Summary was reviewed with the receiving nurse. Lines:   Peripheral IV 05/12/20 Left Antecubital (Active)        Opportunity for questions and clarification was provided.       Patient transported with:   Registered Nurse

## 2020-05-13 NOTE — PROGRESS NOTES
TRANSFER - OUT REPORT:    Verbal report given to yoseph LYNN(name) on Sadaf Pollack  being transferred to [facility](unit) for routine progression of care       Report consisted of patients Situation, Background, Assessment and   Recommendations(SBAR). Information from the following report(s) Procedure Summary was reviewed with the receiving nurse. Lines:   Peripheral IV 05/12/20 Left Antecubital (Active)   Site Assessment Clean, dry, & intact 5/13/2020 11:54 AM   Phlebitis Assessment 0 5/13/2020 11:54 AM   Infiltration Assessment 0 5/13/2020 11:54 AM   Dressing Status Clean, dry, & intact 5/13/2020 11:54 AM   Dressing Type Transparent;Tape 5/13/2020 11:54 AM   Hub Color/Line Status Flushed 5/13/2020 11:54 AM   Alcohol Cap Used No 5/13/2020 11:54 AM       Peripheral IV 05/12/20 Posterior;Right Forearm (Active)   Site Assessment Clean, dry, & intact 5/13/2020 11:54 AM   Phlebitis Assessment 0 5/13/2020 11:54 AM   Infiltration Assessment 0 5/13/2020 11:54 AM   Dressing Status Clean, dry, & intact 5/13/2020 11:54 AM   Dressing Type Transparent;Tape 5/13/2020 11:54 AM   Hub Color/Line Status Patent 5/13/2020 11:54 AM   Alcohol Cap Used No 5/13/2020 11:54 AM        Opportunity for questions and clarification was provided.       Patient transported with:   [employer]

## 2020-05-14 LAB
ANION GAP SERPL CALC-SCNC: 7 MMOL/L (ref 7–16)
BUN SERPL-MCNC: 11 MG/DL (ref 8–23)
CALCIUM SERPL-MCNC: 9.5 MG/DL (ref 8.3–10.4)
CHLORIDE SERPL-SCNC: 107 MMOL/L (ref 98–107)
CO2 SERPL-SCNC: 26 MMOL/L (ref 21–32)
CREAT SERPL-MCNC: 1.05 MG/DL (ref 0.8–1.5)
ERYTHROCYTE [DISTWIDTH] IN BLOOD BY AUTOMATED COUNT: 12.7 % (ref 11.9–14.6)
GLUCOSE BLD STRIP.AUTO-MCNC: 159 MG/DL (ref 65–100)
GLUCOSE BLD STRIP.AUTO-MCNC: 186 MG/DL (ref 65–100)
GLUCOSE BLD STRIP.AUTO-MCNC: 210 MG/DL (ref 65–100)
GLUCOSE BLD STRIP.AUTO-MCNC: 212 MG/DL (ref 65–100)
GLUCOSE SERPL-MCNC: 170 MG/DL (ref 65–100)
HCT VFR BLD AUTO: 42.3 % (ref 41.1–50.3)
HGB BLD-MCNC: 14.2 G/DL (ref 13.6–17.2)
MCH RBC QN AUTO: 30.7 PG (ref 26.1–32.9)
MCHC RBC AUTO-ENTMCNC: 33.6 G/DL (ref 31.4–35)
MCV RBC AUTO: 91.4 FL (ref 79.6–97.8)
NRBC # BLD: 0 K/UL (ref 0–0.2)
PLATELET # BLD AUTO: 155 K/UL (ref 150–450)
PMV BLD AUTO: 10.3 FL (ref 9.4–12.3)
POTASSIUM SERPL-SCNC: 3.8 MMOL/L (ref 3.5–5.1)
RBC # BLD AUTO: 4.63 M/UL (ref 4.23–5.6)
SODIUM SERPL-SCNC: 140 MMOL/L (ref 136–145)
WBC # BLD AUTO: 6.8 K/UL (ref 4.3–11.1)

## 2020-05-14 PROCEDURE — 36415 COLL VENOUS BLD VENIPUNCTURE: CPT

## 2020-05-14 PROCEDURE — 74011636637 HC RX REV CODE- 636/637: Performed by: INTERNAL MEDICINE

## 2020-05-14 PROCEDURE — 85027 COMPLETE CBC AUTOMATED: CPT

## 2020-05-14 PROCEDURE — 80048 BASIC METABOLIC PNL TOTAL CA: CPT

## 2020-05-14 PROCEDURE — 65660000000 HC RM CCU STEPDOWN

## 2020-05-14 PROCEDURE — 74011250637 HC RX REV CODE- 250/637: Performed by: INTERNAL MEDICINE

## 2020-05-14 PROCEDURE — 82962 GLUCOSE BLOOD TEST: CPT

## 2020-05-14 RX ADMIN — SPIRONOLACTONE 25 MG: 25 TABLET ORAL at 08:10

## 2020-05-14 RX ADMIN — ASPIRIN 81 MG 81 MG: 81 TABLET ORAL at 22:33

## 2020-05-14 RX ADMIN — INSULIN LISPRO 2 UNITS: 100 INJECTION, SOLUTION INTRAVENOUS; SUBCUTANEOUS at 06:39

## 2020-05-14 RX ADMIN — CARVEDILOL 12.5 MG: 12.5 TABLET, FILM COATED ORAL at 17:10

## 2020-05-14 RX ADMIN — LEVOTHYROXINE SODIUM 137 MCG: 0.11 TABLET ORAL at 06:35

## 2020-05-14 RX ADMIN — FAMOTIDINE 20 MG: 20 TABLET, FILM COATED ORAL at 08:10

## 2020-05-14 RX ADMIN — INSULIN LISPRO 4 UNITS: 100 INJECTION, SOLUTION INTRAVENOUS; SUBCUTANEOUS at 11:48

## 2020-05-14 RX ADMIN — LISINOPRIL 20 MG: 20 TABLET ORAL at 08:10

## 2020-05-14 RX ADMIN — INSULIN LISPRO 4 UNITS: 100 INJECTION, SOLUTION INTRAVENOUS; SUBCUTANEOUS at 22:33

## 2020-05-14 RX ADMIN — CARVEDILOL 12.5 MG: 12.5 TABLET, FILM COATED ORAL at 08:10

## 2020-05-14 RX ADMIN — INSULIN LISPRO 2 UNITS: 100 INJECTION, SOLUTION INTRAVENOUS; SUBCUTANEOUS at 17:11

## 2020-05-14 RX ADMIN — FAMOTIDINE 20 MG: 20 TABLET, FILM COATED ORAL at 17:10

## 2020-05-14 RX ADMIN — ATORVASTATIN CALCIUM 80 MG: 80 TABLET, FILM COATED ORAL at 22:33

## 2020-05-14 NOTE — ROUTINE PROCESS
Bedside and Verbal shift change report given to [facility], [facility], RN and Yaneth Curry RN  (oncoming nurse) by SHANE Henderson  (offgoing nurse). Report included the following information SBAR, Kardex, MAR and Recent Results.

## 2020-05-14 NOTE — ROUTINE PROCESS
Bedside and Verbal shift change report given to Mendel Harman (oncoming nurse) by SHANE Alves. Report included the following information SBAR, Kardex, Intake/Output, and MAR. Right groin site assessed with oncoming RN.

## 2020-05-14 NOTE — PROGRESS NOTES
[facility] CARDIOLOGY PROGRESS NOTE           5/14/2020 7:11 AM    Admit Date: 5/12/2020      Subjective:   No cp    ROS:  Cardiovascular:  As noted above    Objective:      Vitals:    05/13/20 2053 05/13/20 2123 05/14/20 0038 05/14/20 0450   BP: (!) 150/96 153/63 145/71 128/49   Pulse: 81  78 74   Resp: 18  20 17   Temp: 97.6 °F (36.4 °C)  97.7 °F (36.5 °C) 98.1 °F (36.7 °C)   SpO2: 94%  96% 95%   Weight:    97.4 kg (214 lb 12.8 oz)   Height:           Physical Exam:  General-No Acute Distress  Neck- supple, no JVD  CV- regular rate and rhythm no MRG  Lung- clear bilaterally  Abd- soft, nontender, nondistended  Ext- no edema bilaterally. Skin- warm and dry    Data Review:   Recent Labs     05/13/20  0442 05/12/20  1737    138   K 3.4* 3.7   MG 1.9  --    BUN 13 15   CREA 0.96 1.26   * 218*   WBC 5.9 7.3   HGB 13.9 15.1   HCT 41.1 45.4    187   INR 1.1  --    TROIQ 19.10* 0.31*   CHOL 113  --    LDLC 35.8  --    HDL 38*  --        Assessment/Plan:     Principal Problem:    NSTEMI (non-ST elevated myocardial infarction) ([facility] (11/4/2018)        Active Problems:    Essential hypertension, benign (2/6/2010)          Hypertension ()          Heart failure (HCC) ()          Congestive heart failure (HCC) ()          CAD (coronary artery disease) ()          Pacemaker (2/6/2018)          Hypertensive cardiomyopathy, with heart failure ([facility] (11/4/2018)          Elevated troponin (11/4/2018)          Non-rheumatic mitral regurgitation (11/13/2018)      ///    CV stable  Cines reviewed  Order for ivf stopped.   Stop [address], MD  5/14/2020 7:11 AM

## 2020-05-14 NOTE — ROUTINE PROCESS
Bedside and Verbal shift change report given to Alea Kearney RN  (oncoming nurse) by [facility], [facility], RN and Randell May RN(offgoing nurse). Report included the following information SBAR, Kardex, MAR and Recent Results.

## 2020-05-15 VITALS
TEMPERATURE: 98.5 F | HEIGHT: 73 IN | WEIGHT: 214.5 LBS | DIASTOLIC BLOOD PRESSURE: 71 MMHG | SYSTOLIC BLOOD PRESSURE: 146 MMHG | HEART RATE: 58 BPM | OXYGEN SATURATION: 97 % | RESPIRATION RATE: 16 BRPM | BODY MASS INDEX: 28.43 KG/M2

## 2020-05-15 LAB — GLUCOSE BLD STRIP.AUTO-MCNC: 187 MG/DL (ref 65–100)

## 2020-05-15 PROCEDURE — 74011250637 HC RX REV CODE- 250/637: Performed by: INTERNAL MEDICINE

## 2020-05-15 PROCEDURE — 82962 GLUCOSE BLOOD TEST: CPT

## 2020-05-15 PROCEDURE — 74011636637 HC RX REV CODE- 636/637: Performed by: INTERNAL MEDICINE

## 2020-05-15 RX ORDER — SPIRONOLACTONE 25 MG/1
25 TABLET ORAL DAILY
Qty: 30 TAB | Refills: 5 | Status: SHIPPED | OUTPATIENT
Start: 2020-05-15 | End: 2020-08-21 | Stop reason: SDUPTHER

## 2020-05-15 RX ORDER — LISINOPRIL 20 MG/1
20 TABLET ORAL 2 TIMES DAILY
Qty: 60 TAB | Refills: 5 | Status: SHIPPED | OUTPATIENT
Start: 2020-05-15 | End: 2020-05-15 | Stop reason: SDUPTHER

## 2020-05-15 RX ORDER — LISINOPRIL 20 MG/1
20 TABLET ORAL 2 TIMES DAILY
Qty: 60 TAB | Refills: 5 | Status: ON HOLD
Start: 2020-05-15 | End: 2020-07-02 | Stop reason: SDUPTHER

## 2020-05-15 RX ORDER — LISINOPRIL 20 MG/1
20 TABLET ORAL 2 TIMES DAILY
Status: DISCONTINUED | OUTPATIENT
Start: 2020-05-15 | End: 2020-05-15 | Stop reason: HOSPADM

## 2020-05-15 RX ADMIN — LISINOPRIL 20 MG: 20 TABLET ORAL at 08:18

## 2020-05-15 RX ADMIN — CARVEDILOL 12.5 MG: 12.5 TABLET, FILM COATED ORAL at 08:18

## 2020-05-15 RX ADMIN — LEVOTHYROXINE SODIUM 137 MCG: 0.11 TABLET ORAL at 08:18

## 2020-05-15 RX ADMIN — SPIRONOLACTONE 25 MG: 25 TABLET ORAL at 08:18

## 2020-05-15 RX ADMIN — INSULIN LISPRO 2 UNITS: 100 INJECTION, SOLUTION INTRAVENOUS; SUBCUTANEOUS at 08:19

## 2020-05-15 RX ADMIN — FAMOTIDINE 20 MG: 20 TABLET, FILM COATED ORAL at 08:18

## 2020-05-15 NOTE — DISCHARGE INSTRUCTIONS
Resume metformin on 05/16/2020  Patient Education                    Cardiac Catheterization/Angiography Discharge Instructions    *Check the puncture site frequently for swelling or bleeding. If you see any bleeding, lie down and apply pressure over the area with a clean town or washcloth. Notify your doctor for any redness, swelling, drainage or oozing from the puncture site. Notify your doctor for any fever or chills. *If the leg or arm with the puncture becomes cold, numb or painful, call Dr Susanne Hartley at  [facility]    *Activity should be limited for the next 48 hours. Climb stairs as little as possible and avoid any stooping, bending or strenuous activity for 48 hours. No heavy lifting (anything over 10 pounds) for three days. *Do not drive for 48 hours. *You may resume your usual diet. Drink more fluids than usual.    *Have a responsible person drive you home and stay with you for at least 24 hours after your heart catheterization/angiography. *You may remove the bandage from your {ARM/GROIN:42818} in 24 hours. You may shower in 24 hours. No tub baths, hot tubs or swimming for one week. Do not place any lotions, creams, powders, ointments over the puncture site for one week. You may place a clean band-aid over the puncture site each day for 5 days. Change this daily. Left Heart Catheterization: About This Test  What is it? Cardiac catheterization is a test to check the left side of your heart. Your doctor might look at the shape of your heart, the motion of your heart, or the blood pressure inside the chambers. Why is this test done? This test gives information about how your heart is working. It can:  · Check blood flow and blood pressure in the chambers of the heart. · Check the pumping action of the heart. · Find out if a heart defect is present and how severe it is. · Find out how well the heart valves work.   What happens during the test?  · You will get medicine to help you relax.  · A thin tube called a catheter is put into a blood vessel in the groin or the arm. The doctor moves the catheter through the blood vessel into your heart. · You will get a shot to numb the skin where the catheter goes in. You may feel pressure when the doctor moves the catheter through your blood vessel into your heart. · Dye may be injected into your heart. Your doctor can watch on special monitors as the dye moves in your heart. The dye helps your doctor see blood flow in your heart. · You may feel hot or flushed for several seconds when the dye is put in.  · If a heart defect is found, cardiac catheterization sometimes is used to correct it during the test.  How long does it take? · The test will take about 30 minutes. If a problem is found and the doctor treats it, it can take a few hours longer. What happens after the test?  · You will stay in a room for at least a few hours to make sure the catheter site starts to heal. You may have a bandage or a compression device on your groin or arm to prevent bleeding. · If the catheter was placed in your groin, you may lie in bed for a few hours. If the catheter was put in your arm, you will need to keep your arm still for at least 1 hour. · You may or may not need to stay in the hospital overnight. You will get more instructions for what to do at home. · Drink plenty of fluids for several hours after the test.  Follow-up care is a key part of your treatment and safety. Be sure to make and go to all appointments, and call your doctor if you are having problems. It's also a good idea to know your test results and keep a list of the medicines you take. Where can you learn more? Go to http://abdulaziz-evelia.info/  Enter W306 in the search box to learn more about \"Left Heart Catheterization: About This Test.\"  Current as of: December 15, 2019Content Version: 12.4  © 2796-6209 [employer], [employer].   Care instructions adapted under license by Jarvis Blackman (which disclaims liability or warranty for this information). If you have questions about a medical condition or this instruction, always ask your healthcare professional. [address] any warranty or liability for your use of this information. DISPOSITION: The patient is being discharged home in stable condition on a low saturated fat, low cholesterol and low salt diet. The patient is instructed to advance activities as tolerated to the limit of fatigue or shortness of breath. The patient is instructed to avoid all heavy lifting, straining, stooping or squatting for 3-5 days. The patient is instructed to watch the cath site for bleeding/oozing; if seen, the patient is instructed to apply firm pressure with a clean cloth and call [facility] at [phone]. The patient is instructed to watch for signs of infection which include: increasing area of redness, fever/hot to touch or purulent drainage at the catheterization site. The patient is instructed not to soak in a bathtub for 7-10 days, but is cleared to shower. The patient is instructed to call the office or return to the ER for immediate evaluation for any shortness of breath or chest pain not relieved by NTG.

## 2020-05-15 NOTE — PROGRESS NOTES
Care Management Interventions  PCP Verified by CM: Yes  Last Visit to PCP: 04/21/20  Mode of Transport at Discharge: Other (see comment)(Abigail Virgen Sister [phone] )  Transition of Care Consult (CM Consult): Discharge Planning  Discharge Durable Medical Equipment: No  Physical Therapy Consult: No  Occupational Therapy Consult: No  Current Support Network: Lives with Caregiver, Own Home(Sister)  Confirm Follow Up Transport: Family  Discharge Location  Discharge Placement: Home    CM spoke with pt yesterday to encourage him to participate in Cardiac Rehab. Pt is agreeable.  No further CM needs

## 2020-05-15 NOTE — ROUTINE PROCESS
Discharge instructions reviewed with pt. Prescriptions given for meds and med info sheets provided for all new medications. Opportunity for questions provided. pt voiced understanding of all discharge instructions.

## 2020-05-15 NOTE — PROGRESS NOTES
Patient alert and oriented x 4. Respirations even and unlabored. Lung sounds diminished in lower. Right groin incision clean, dry, and intact. Remote telemetry on and functioning properly. Pulses present. Skin warm, dry, and intact. Denies any pain. No distress observed. Call light in reach. Bed in low position. Side rails up x 2. Instructed to call with any need. Patient verbalized understanding.

## 2020-05-15 NOTE — PROGRESS NOTES
[facility] CARDIOLOGY PROGRESS NOTE           5/15/2020 6:53 AM    Admit Date: 5/12/2020      Subjective:   No cp or sob        Objective:      Vitals:    05/14/20 1838 05/14/20 2132 05/15/20 0111 05/15/20 0512   BP: 125/65 163/87 146/75 155/72   Pulse: 75 71 69 75   Resp: 20 19 18 18   Temp: 97.9 °F (36.6 °C) 98 °F (36.7 °C) 97.8 °F (36.6 °C) 98.1 °F (36.7 °C)   SpO2: 97% 94% 93% 90%   Weight:    97.3 kg (214 lb 8 oz)   Height:           Physical Exam:  General-No Acute Distress  Neck- supple, no JVD  CV- regular rate and rhythm no MRG  Lung- clear bilaterally  Abd- soft, nontender, nondistended  Ext- no edema bilaterally. Skin- warm and dry    Data Review:   Recent Labs     05/14/20  0751 05/13/20  0442 05/12/20  1737    142 138   K 3.8 3.4* 3.7   MG  --  1.9  --    BUN 11 13 15   CREA 1.05 0.96 1.26   * 139* 218*   WBC 6.8 5.9 7.3   HGB 14.2 13.9 15.1   HCT 42.3 41.1 45.4    160 187   INR  --  1.1  --    TROIQ  --  19.10* 0.31*   CHOL  --  113  --    LDLC  --  35.8  --    HDL  --  38*  --        Assessment/Plan:     Principal Problem:    NSTEMI (non-ST elevated myocardial infarction) ([facility] (11/4/2018)        Active Problems:    Essential hypertension, benign (2/6/2010)          Hypertension ()          Heart failure (HCC) ()        Congestive heart failure (HCC) ()          CAD (coronary artery disease) ()          Pacemaker (2/6/2018)          Hypertensive cardiomyopathy, with heart failure ([facility] (11/4/2018)          Elevated troponin (11/4/2018)          Non-rheumatic mitral regurgitation (11/13/2018)      ////    Looks good. Lisinopril inc to bid. Home today.           Bridger Torres MD  5/15/2020 6:53 AM

## 2020-05-15 NOTE — PROGRESS NOTES
Problem: Falls - Risk of  Goal: *Absence of Falls  Description: Document Adriana Oneal Fall Risk and appropriate interventions in the flowsheet. Outcome: Progressing Towards Goal  Note: Fall Risk Interventions:            Medication Interventions: Patient to call before getting OOB, Teach patient to arise slowly         History of Falls Interventions: Investigate reason for fall         Problem: Patient Education: Go to Patient Education Activity  Goal: Patient/Family Education  Outcome: Progressing Towards Goal     Problem: Diabetes Self-Management  Goal: *Disease process and treatment process  Description: Define diabetes and identify own type of diabetes; list 3 options for treating diabetes. Outcome: Progressing Towards Goal  Goal: *Incorporating nutritional management into lifestyle  Description: Describe effect of type, amount and timing of food on blood glucose; list 3 methods for planning meals. Outcome: Progressing Towards Goal  Goal: *Incorporating physical activity into lifestyle  Description: State effect of exercise on blood glucose levels. Outcome: Progressing Towards Goal  Goal: *Developing strategies to promote health/change behavior  Description: Define the ABC's of diabetes; identify appropriate screenings, schedule and personal plan for screenings. Outcome: Progressing Towards Goal  Goal: *Using medications safely  Description: State effect of diabetes medications on diabetes; name diabetes medication taking, action and side effects. Outcome: Progressing Towards Goal  Goal: *Monitoring blood glucose, interpreting and using results  Description: Identify recommended blood glucose targets  and personal targets. Outcome: Progressing Towards Goal  Goal: *Prevention, detection, treatment of acute complications  Description: List symptoms of hyper- and hypoglycemia; describe how to treat low blood sugar and actions for lowering  high blood glucose level.   Outcome: Progressing Towards Goal  Goal: *Prevention, detection and treatment of chronic complications  Description: Define the natural course of diabetes and describe the relationship of blood glucose levels to long term complications of diabetes.   Outcome: Progressing Towards Goal  Goal: *Developing strategies to address psychosocial issues  Description: Describe feelings about living with diabetes; identify support needed and support network  Outcome: Progressing Towards Goal  Goal: *Insulin pump training  Outcome: Progressing Towards Goal  Goal: *Sick day guidelines  Outcome: Progressing Towards Goal  Goal: *Patient Specific Goal (EDIT GOAL, INSERT TEXT)  Outcome: Progressing Towards Goal     Problem: Patient Education: Go to Patient Education Activity  Goal: Patient/Family Education  Outcome: Progressing Towards Goal

## 2020-05-15 NOTE — DISCHARGE SUMMARY
[id number] Cardiology Discharge Summary     Patient ID:  Solomon Virgen  [id number]  89 y.o.  1945    Admit date: 5/12/2020    Discharge date:  05/15/2020    Admitting Physician: Phoebe Mayen DO     Discharge Physician: Adal Mansfield NP/ [facility]    Admission Diagnoses: NSTEMI (non-ST elevated myocardial infarction) [facility]) [I21.4]    Discharge Diagnoses:    Diagnosis    Essential hypertension, benign        Syncope    Non-rheumatic mitral regurgitation    NSTEMI (non-ST elevated myocardial infarction) ([facility]    Hyperlipidemia    Pacemaker    Type 2 diabetes mellitus without complication, without long-term current use of insulin ([facility]    Stroke ([facility]    Hypertension    CAD (coronary artery disease)       Cardiology Procedures this admission:  Left heart catheterization with PCI  EchoCardiogram  Consults: None    Hospital Course: Patient presented to the ER with c/o chest pain with radiation to neck and bilateral shoulders. Patient underwent cardiac catheterization by Dr. Hanh Miles. Patient was found to have stable CAD with two of three patent grafts. There is a patent LIMA to an LAD. There is a patent vein graft to a PDA. There is an occluded vein graft to an OM. The patient's ejection fraction is moderately reduced at 40%. Continue medical therapy  Patient tolerated the procedure well and was taken to the telemetry floor for recovery. Echo results:  Left ventricle: Systolic function was moderately reduced. Ejection   Fraction was estimated in the range of 35 %. There was mild concentric hypertrophy. The E/e' ratio was 15.6. There was Grade II Diastolic Dysfunction.     -  Left atrium: The atrium was mildly to moderately dilated.     -  Aortic valve: There was mild to moderate regurgitation.     -  Mitral valve: There was mild annular calcification. There was moderate  regurgitation.       The morning of discharge, patient was up feeling well without any complaints of chest pain or shortness of breath. Patient's right femoral cath site was clean, dry and intact without hematoma or bruit. Patient's labs were WNL. Patient was seen and examined by Dr. Misty Arteaga and determined stable and ready for discharge. For maximized medical therapy for CAD, patient will continue ACE, Beta Blocker and Statin  The patient will follow up with [facility] -- Dr. Dennise Barker on 6/4/2020. Patient has been referred to cardiac rehab. DISPOSITION: The patient is being discharged home in stable condition on a low saturated fat, low cholesterol and low salt diet. The patient is instructed to advance activities as tolerated to the limit of fatigue or shortness of breath. The patient is instructed to avoid all heavy lifting, straining, stooping or squatting for 3-5 days. The patient is instructed to watch the cath site for bleeding/oozing; if seen, the patient is instructed to apply firm pressure with a clean cloth and call [facility] at [phone]. The patient is instructed to watch for signs of infection which include: increasing area of redness, fever/hot to touch or purulent drainage at the catheterization site. The patient is instructed not to soak in a bathtub for 7-10 days, but is cleared to shower. The patient is instructed to call the office or return to the ER for immediate evaluation for any shortness of breath or chest pain not relieved by NTG.         Discharge Exam:   Visit Vitals  /72 (BP 1 Location: Right arm, BP Patient Position: At rest)   Pulse 75   Temp 98.1 °F (36.7 °C)   Resp 18   Ht 6' 1\" (1.854 m)   Wt 97.3 kg (214 lb 8 oz)   SpO2 90%   BMI 28.30 kg/m²         Recent Results (from the past 24 hour(s))   GLUCOSE, POC    Collection Time: 05/14/20 11:07 AM   Result Value Ref Range    Glucose (POC) 212 (H) 65 - 100 mg/dL   GLUCOSE, POC    Collection Time: 05/14/20  3:57 PM   Result Value Ref Range    Glucose (POC) 186 (H) 65 - 100 mg/dL   GLUCOSE, POC    Collection Time: 05/14/20 9:34 PM   Result Value Ref Range    Glucose (POC) 210 (H) 65 - 100 mg/dL   GLUCOSE, POC    Collection Time: 05/15/20  6:29 AM   Result Value Ref Range    Glucose (POC) 187 (H) 65 - 100 mg/dL         Patient Instructions:        Current Discharge Medication List      START taking these medications    Details   spironolactone (ALDACTONE) 25 mg tablet Take 1 Tab by mouth daily. Qty: 30 Tab, Refills: 5         CONTINUE these medications which have CHANGED    Details   lisinopriL (PRINIVIL, ZESTRIL) 20 mg tablet Take 1 Tab by mouth two (2) times a day. TAKE 1 TABLET BY MOUTH ONCE DAILY  Qty: 60 Tab, Refills: 5         CONTINUE these medications which have NOT CHANGED    Details   levothyroxine (SYNTHROID) 137 mcg tablet Take 137 mcg by mouth Daily (before breakfast). Qty: 90 Tab, Refills: 1    Comments: Cancel all refills on the lower doses  Associated Diagnoses: Acquired hypothyroidism      metFORMIN (GLUCOPHAGE) 1,000 mg tablet Take 1 Tab by mouth two (2) times daily (with meals). Qty: 180 Tab, Refills: 1    Associated Diagnoses: Type 2 diabetes mellitus without complication, without long-term current use of insulin (HCC)      simvastatin (ZOCOR) 40 mg tablet Take 1 Tab by mouth nightly. Qty: 90 Tab, Refills: 3    Associated Diagnoses: Hyperlipidemia, unspecified hyperlipidemia type      glimepiride (AMARYL) 4 mg tablet 1 po bid with a meal for diabetes instead of the glipizide)  Qty: 180 Tab, Refills: 1    Associated Diagnoses: Type 2 diabetes mellitus without complication, without long-term current use of insulin (HCC)      furosemide (LASIX) 40 mg tablet Take 1 Tab by mouth daily.   Qty: 90 Tab, Refills: 3    Associated Diagnoses: Essential hypertension, benign      pioglitazone (ACTOS) 15 mg tablet 1 po qd for diabetes (instead of the jardiance)  Qty: 90 Tab, Refills: 1    Associated Diagnoses: Type 2 diabetes mellitus without complication, without long-term current use of insulin (HCC)      carvedilol (COREG) 12.5 mg tablet TAKE 1 TABLET BY MOUTH TWICE DAILY WITH MEALS  Qty: 180 Tab, Refills: 3      multivitamin (ONE A DAY) tablet Take 1 Tab by mouth daily. Blood-Glucose Meter monitoring kit Test blood sugar daily, either fasting or 2 hours after eating for diabetes and record in log. (#1)  Dx: E11.9  Qty: 1 Kit, Refills: 0    Associated Diagnoses: Type 2 diabetes mellitus without complication, without long-term current use of insulin ([facility])      glucose blood VI test strips (BLOOD GLUCOSE TEST) strip Test blood sugar daily, either fasting or 2 hours after eating for diabetes and record in log. (#100)  Dx: E11.9  Qty: 100 Strip, Refills: 11    Associated Diagnoses: Type 2 diabetes mellitus without complication, without long-term current use of insulin ([facility])      aspirin 81 mg chewable tablet Take 81 mg by mouth nightly. raNITIdine (ZANTAC) 150 mg tablet Take 150 mg by mouth two (2) times a day. diclofenac potassium (CATAFLAM) 50 mg tablet Take 50 mg by mouth two (2) times a day. nitroglycerin (NITROSTAT) 0.4 mg SL tablet by SubLINGual route every five (5) minutes as needed for Chest Pain.                Signed:  Jerrod Bridges NP  5/15/2020  8:04 AM

## 2020-05-15 NOTE — PROGRESS NOTES
Spiritual Care Visit, initial visit. Conversed and prayed with patient as he was waiting to be discharged and picked up by family. Prayed for patient's healing and health. Visit by Hilda Chua, Staff .  Az., Pratibha.B., B.A.

## 2020-05-18 ENCOUNTER — PATIENT OUTREACH (OUTPATIENT)
Dept: CASE MANAGEMENT | Age: 75
End: 2020-05-18

## 2020-05-18 NOTE — PROGRESS NOTES
Initial [facility] outreach attempt to patient's home/mobile number was unsuccessful. Left message to return call. Will attempt second outreach within 24 hours.

## 2020-05-19 ENCOUNTER — PATIENT OUTREACH (OUTPATIENT)
Dept: CASE MANAGEMENT | Age: 75
End: 2020-05-19

## 2020-05-19 NOTE — PROGRESS NOTES
Second [facility] outreach attempt made to patient's home/mobile number was unsuccessful. Left message to return call. Will attempt third outreach within 5 business days.

## 2020-05-20 ENCOUNTER — PATIENT OUTREACH (OUTPATIENT)
Dept: CASE MANAGEMENT | Age: 75
End: 2020-05-20

## 2020-05-20 NOTE — PROGRESS NOTES
Transition of Care Hospital Discharge Follow-Up      Date/Time:  2020 10:39 AM    Patient was admitted to [facility] on 2020 and discharged on 5/15/2020 for NSTEMI The physician discharge summary was not available at the time of outreach. Patient was contacted within 7 business days of discharge. Inpatient RUR score: 30%  Was this a readmission? no   Patient stated reason for the readmission: None    LPN Care Coordinator contacted the patient by telephone to perform post hospital discharge assessment. Verified name and  with patient as identifiers. Provided introduction to self, and explanation of the Care Coordinator role. Patient received hospital discharge instructions. LPN reviewed discharge instructions and red flags with patient who verbalized understanding. Patient given an opportunity to ask questions and does not have any further questions or concerns at this time. The patient agrees to contact the PCP office for questions related to their healthcare. LPN provided contact information for future reference. Patients top risk factors for readmission:  NSTEMI    Home Health orders at discharge: [address]: N/A  Date of initial or scheduled visit: N/A  (Assist with coordination of services if necessary.)    Durable Medical Equipment ordered at discharge: None, patient has a cane  [address]: N/A  Durable Medical Equipment received: N/A  (Assist patient in obtaining DME orders &/or equipment if necessary.)    Medication(s):   Medication review was performed with patient, who verbalizes understanding of administration of home medications. There were no barriers to obtaining medications identified at this time. Current Outpatient Medications   Medication Sig    spironolactone (ALDACTONE) 25 mg tablet Take 1 Tab by mouth daily.  lisinopriL (PRINIVIL, ZESTRIL) 20 mg tablet Take 1 Tab by mouth two (2) times a day.     levothyroxine (SYNTHROID) 137 mcg tablet Take 137 mcg by mouth Daily (before breakfast).  metFORMIN (GLUCOPHAGE) 1,000 mg tablet Take 1 Tab by mouth two (2) times daily (with meals).  simvastatin (ZOCOR) 40 mg tablet Take 1 Tab by mouth nightly.  glimepiride (AMARYL) 4 mg tablet 1 po bid with a meal for diabetes instead of the glipizide)    furosemide (LASIX) 40 mg tablet Take 1 Tab by mouth daily.  pioglitazone (ACTOS) 15 mg tablet 1 po qd for diabetes (instead of the jardiance)    raNITIdine (ZANTAC) 150 mg tablet Take 150 mg by mouth two (2) times a day.  diclofenac potassium (CATAFLAM) 50 mg tablet Take 50 mg by mouth two (2) times a day.  carvedilol (COREG) 12.5 mg tablet TAKE 1 TABLET BY MOUTH TWICE DAILY WITH MEALS    multivitamin (ONE A DAY) tablet Take 1 Tab by mouth daily.  Blood-Glucose Meter monitoring kit Test blood sugar daily, either fasting or 2 hours after eating for diabetes and record in log. (#1)  Dx: E11.9    glucose blood VI test strips (BLOOD GLUCOSE TEST) strip Test blood sugar daily, either fasting or 2 hours after eating for diabetes and record in log. (#100)  Dx: E11.9    nitroglycerin (NITROSTAT) 0.4 mg SL tablet by SubLINGual route every five (5) minutes as needed for Chest Pain.  aspirin 81 mg chewable tablet Take 81 mg by mouth nightly. No current facility-administered medications for this visit. There are no discontinued medications. ADL assessment:   (If patient is unable to perform ADLs  what is the limiting factor(s)? Do they have a support system that can assist? If no support system is present, discuss possible assistance that they may be able to obtain.  Escalate for SW if ongoing issues are verbalized by pt or anticipated)    [facility] follow up appointment(s):   Future Appointments   Date Time Provider Jose Carlos   6/4/2020 10:00 AM Adam Shin MD [facility]   6/22/2020  8:45 AM MD KELLEN Pastor [address]   6/29/2020  8:30 AM Elicia Daniel MD [facility]PA FPA   7/20/2020  8:00 AM Ailyn Barraza MD [facility] FPA FPA   9/9/2020  8:40 AM GISSELL[facility]TANJA [facility] [address] [facility] UCDE UCD      Non-[facility] follow up appointment(s):   7 Day follow up with PCP or Specialist: Dr. Vic Abraham 6/29/2020, Dr. To Valente 6/4/2020  Transportation arranged: None    Any other questions or concerns expressed by patient? Patient states he is calling his PCP due to foot pain. Schedule next appointment with [facility] or referral to CTN/ ACM:  Next follow up call: Within 14 days  . From CDC: Are you at higher risk for severe illness?  Wash your hands often.  Avoid close contact (6 feet, which is about two arm lengths) with people who are sick.  Put distance between yourself and other people if COVID-19 is spreading in your community.  Clean and disinfect frequently touched surfaces.  Avoid all cruise travel and non-essential air travel.  Call your healthcare professional if you have concerns about COVID-19 and your underlying condition or if you are sick.     For more information on steps you can take to protect yourself, see CDC's How to Protect Yourself            Goals    None

## 2020-06-03 ENCOUNTER — PATIENT OUTREACH (OUTPATIENT)
Dept: CASE MANAGEMENT | Age: 75
End: 2020-06-03

## 2020-06-03 NOTE — PROGRESS NOTES
Attempted to call patient to f/u on [facility] call. Unable to reach the patient on the contact information provided. This Care Coordinator will graduate this patient from this program, patient will be added back to [facility] DODD if phone call is returned.

## 2020-06-30 ENCOUNTER — APPOINTMENT (OUTPATIENT)
Dept: GENERAL RADIOLOGY | Age: 75
DRG: 247 | End: 2020-06-30
Attending: EMERGENCY MEDICINE
Payer: MEDICARE

## 2020-06-30 ENCOUNTER — HOSPITAL ENCOUNTER (INPATIENT)
Age: 75
LOS: 1 days | Discharge: HOME | DRG: 247 | End: 2020-07-02
Admitting: INTERNAL MEDICINE
Payer: MEDICARE

## 2020-06-30 DIAGNOSIS — R07.9 CHEST PAIN, UNSPECIFIED TYPE: Primary | ICD-10-CM

## 2020-06-30 DIAGNOSIS — R77.8 ELEVATED TROPONIN: ICD-10-CM

## 2020-06-30 LAB
ALBUMIN SERPL-MCNC: 3.8 G/DL (ref 3.2–4.6)
ALBUMIN/GLOB SERPL: 1.1 {RATIO} (ref 1.2–3.5)
ALP SERPL-CCNC: 72 U/L (ref 50–136)
ALT SERPL-CCNC: 26 U/L (ref 12–65)
ANION GAP SERPL CALC-SCNC: 7 MMOL/L (ref 7–16)
AST SERPL-CCNC: 20 U/L (ref 15–37)
BASOPHILS # BLD: 0 K/UL (ref 0–0.2)
BASOPHILS NFR BLD: 1 % (ref 0–2)
BILIRUB SERPL-MCNC: 0.9 MG/DL (ref 0.2–1.1)
BNP SERPL-MCNC: 764 PG/ML
BUN SERPL-MCNC: 16 MG/DL (ref 8–23)
CALCIUM SERPL-MCNC: 9.6 MG/DL (ref 8.3–10.4)
CHLORIDE SERPL-SCNC: 105 MMOL/L (ref 98–107)
CO2 SERPL-SCNC: 26 MMOL/L (ref 21–32)
CREAT SERPL-MCNC: 1.16 MG/DL (ref 0.8–1.5)
DIFFERENTIAL METHOD BLD: NORMAL
EOSINOPHIL # BLD: 0.3 K/UL (ref 0–0.8)
EOSINOPHIL NFR BLD: 4 % (ref 0.5–7.8)
ERYTHROCYTE [DISTWIDTH] IN BLOOD BY AUTOMATED COUNT: 13.2 % (ref 11.9–14.6)
GLOBULIN SER CALC-MCNC: 3.6 G/DL (ref 2.3–3.5)
GLUCOSE SERPL-MCNC: 165 MG/DL (ref 65–100)
HCT VFR BLD AUTO: 41.4 % (ref 41.1–50.3)
HGB BLD-MCNC: 14.2 G/DL (ref 13.6–17.2)
IMM GRANULOCYTES # BLD AUTO: 0 K/UL (ref 0–0.5)
IMM GRANULOCYTES NFR BLD AUTO: 0 % (ref 0–5)
LYMPHOCYTES # BLD: 1.4 K/UL (ref 0.5–4.6)
LYMPHOCYTES NFR BLD: 22 % (ref 13–44)
MAGNESIUM SERPL-MCNC: 1.9 MG/DL (ref 1.8–2.4)
MCH RBC QN AUTO: 31.1 PG (ref 26.1–32.9)
MCHC RBC AUTO-ENTMCNC: 34.3 G/DL (ref 31.4–35)
MCV RBC AUTO: 90.8 FL (ref 79.6–97.8)
MONOCYTES # BLD: 0.6 K/UL (ref 0.1–1.3)
MONOCYTES NFR BLD: 9 % (ref 4–12)
NEUTS SEG # BLD: 4.2 K/UL (ref 1.7–8.2)
NEUTS SEG NFR BLD: 65 % (ref 43–78)
NRBC # BLD: 0 K/UL (ref 0–0.2)
PLATELET # BLD AUTO: 162 K/UL (ref 150–450)
PMV BLD AUTO: 10.8 FL (ref 9.4–12.3)
POTASSIUM SERPL-SCNC: 4 MMOL/L (ref 3.5–5.1)
PROT SERPL-MCNC: 7.4 G/DL (ref 6.3–8.2)
RBC # BLD AUTO: 4.56 M/UL (ref 4.23–5.6)
SODIUM SERPL-SCNC: 138 MMOL/L (ref 136–145)
TROPONIN-HIGH SENSITIVITY: 444.2 PG/ML (ref 0–14)
WBC # BLD AUTO: 6.5 K/UL (ref 4.3–11.1)

## 2020-06-30 PROCEDURE — 80053 COMPREHEN METABOLIC PANEL: CPT

## 2020-06-30 PROCEDURE — 83880 ASSAY OF NATRIURETIC PEPTIDE: CPT

## 2020-06-30 PROCEDURE — 85025 COMPLETE CBC W/AUTO DIFF WBC: CPT

## 2020-06-30 PROCEDURE — 99285 EMERGENCY DEPT VISIT HI MDM: CPT

## 2020-06-30 PROCEDURE — 84484 ASSAY OF TROPONIN QUANT: CPT

## 2020-06-30 PROCEDURE — 93005 ELECTROCARDIOGRAM TRACING: CPT

## 2020-06-30 PROCEDURE — 71046 X-RAY EXAM CHEST 2 VIEWS: CPT

## 2020-06-30 PROCEDURE — 93005 ELECTROCARDIOGRAM TRACING: CPT | Performed by: EMERGENCY MEDICINE

## 2020-06-30 PROCEDURE — 83735 ASSAY OF MAGNESIUM: CPT

## 2020-07-01 PROBLEM — I50.22 CHRONIC SYSTOLIC HEART FAILURE (HCC): Status: ACTIVE | Noted: 2018-11-04

## 2020-07-01 LAB
ACT BLD: 362 SECS (ref 70–128)
APTT PPP: 106.1 SEC (ref 24.3–35.4)
ATRIAL RATE: 66 BPM
ATRIAL RATE: 93 BPM
CALCULATED P AXIS, ECG09: 43 DEGREES
CALCULATED P AXIS, ECG09: 72 DEGREES
CALCULATED R AXIS, ECG10: -77 DEGREES
CALCULATED R AXIS, ECG10: -89 DEGREES
CALCULATED T AXIS, ECG11: 104 DEGREES
CALCULATED T AXIS, ECG11: 147 DEGREES
DIAGNOSIS, 93000: NORMAL
DIAGNOSIS, 93000: NORMAL
GLUCOSE BLD STRIP.AUTO-MCNC: 114 MG/DL (ref 65–100)
GLUCOSE BLD STRIP.AUTO-MCNC: 117 MG/DL (ref 65–100)
GLUCOSE BLD STRIP.AUTO-MCNC: 121 MG/DL (ref 65–100)
GLUCOSE BLD STRIP.AUTO-MCNC: 170 MG/DL (ref 65–100)
P-R INTERVAL, ECG05: 190 MS
P-R INTERVAL, ECG05: 192 MS
Q-T INTERVAL, ECG07: 430 MS
Q-T INTERVAL, ECG07: 530 MS
QRS DURATION, ECG06: 176 MS
QRS DURATION, ECG06: 178 MS
QTC CALCULATION (BEZET), ECG08: 534 MS
QTC CALCULATION (BEZET), ECG08: 555 MS
TROPONIN-HIGH SENSITIVITY: 1033.3 PG/ML (ref 0–14)
TROPONIN-HIGH SENSITIVITY: 3582.2 PG/ML (ref 0–14)
VENTRICULAR RATE, ECG03: 66 BPM
VENTRICULAR RATE, ECG03: 93 BPM

## 2020-07-01 PROCEDURE — 74011636320 HC RX REV CODE- 636/320: Performed by: INTERNAL MEDICINE

## 2020-07-01 PROCEDURE — 84484 ASSAY OF TROPONIN QUANT: CPT

## 2020-07-01 PROCEDURE — 74011250637 HC RX REV CODE- 250/637: Performed by: NURSE PRACTITIONER

## 2020-07-01 PROCEDURE — 74011000250 HC RX REV CODE- 250: Performed by: INTERNAL MEDICINE

## 2020-07-01 PROCEDURE — 96374 THER/PROPH/DIAG INJ IV PUSH: CPT

## 2020-07-01 PROCEDURE — B2111ZZ FLUOROSCOPY OF MULTIPLE CORONARY ARTERIES USING LOW OSMOLAR CONTRAST: ICD-10-PCS | Performed by: INTERNAL MEDICINE

## 2020-07-01 PROCEDURE — 65660000000 HC RM CCU STEPDOWN

## 2020-07-01 PROCEDURE — 85347 COAGULATION TIME ACTIVATED: CPT

## 2020-07-01 PROCEDURE — 4A023N7 MEASUREMENT OF CARDIAC SAMPLING AND PRESSURE, LEFT HEART, PERCUTANEOUS APPROACH: ICD-10-PCS | Performed by: INTERNAL MEDICINE

## 2020-07-01 PROCEDURE — 85730 THROMBOPLASTIN TIME PARTIAL: CPT

## 2020-07-01 PROCEDURE — 93458 L HRT ARTERY/VENTRICLE ANGIO: CPT

## 2020-07-01 PROCEDURE — 92920 PRQ TRLUML C ANGIOP 1ART&/BR: CPT

## 2020-07-01 PROCEDURE — 99152 MOD SED SAME PHYS/QHP 5/>YRS: CPT

## 2020-07-01 PROCEDURE — 74011250637 HC RX REV CODE- 250/637: Performed by: INTERNAL MEDICINE

## 2020-07-01 PROCEDURE — 74011250637 HC RX REV CODE- 250/637

## 2020-07-01 PROCEDURE — B2131ZZ FLUOROSCOPY OF MULTIPLE CORONARY ARTERY BYPASS GRAFTS USING LOW OSMOLAR CONTRAST: ICD-10-PCS | Performed by: INTERNAL MEDICINE

## 2020-07-01 PROCEDURE — B2181ZZ FLUOROSCOPY OF LEFT INTERNAL MAMMARY BYPASS GRAFT USING LOW OSMOLAR CONTRAST: ICD-10-PCS | Performed by: INTERNAL MEDICINE

## 2020-07-01 PROCEDURE — 96375 TX/PRO/DX INJ NEW DRUG ADDON: CPT

## 2020-07-01 PROCEDURE — 93005 ELECTROCARDIOGRAM TRACING: CPT | Performed by: INTERNAL MEDICINE

## 2020-07-01 PROCEDURE — 74011250636 HC RX REV CODE- 250/636: Performed by: INTERNAL MEDICINE

## 2020-07-01 PROCEDURE — 027035Z DILATION OF CORONARY ARTERY, ONE ARTERY WITH TWO DRUG-ELUTING INTRALUMINAL DEVICES, PERCUTANEOUS APPROACH: ICD-10-PCS | Performed by: INTERNAL MEDICINE

## 2020-07-01 PROCEDURE — 74011250636 HC RX REV CODE- 250/636

## 2020-07-01 PROCEDURE — 36415 COLL VENOUS BLD VENIPUNCTURE: CPT

## 2020-07-01 PROCEDURE — 92928 PRQ TCAT PLMT NTRAC ST 1 LES: CPT

## 2020-07-01 PROCEDURE — 74011636637 HC RX REV CODE- 636/637: Performed by: NURSE PRACTITIONER

## 2020-07-01 PROCEDURE — 82962 GLUCOSE BLOOD TEST: CPT

## 2020-07-01 PROCEDURE — 99153 MOD SED SAME PHYS/QHP EA: CPT

## 2020-07-01 PROCEDURE — 74011250636 HC RX REV CODE- 250/636: Performed by: NURSE PRACTITIONER

## 2020-07-01 PROCEDURE — 92921 HC PTCA SNGL MAJOR COR VESL/BRNCH EA ADD LD: CPT

## 2020-07-01 RX ORDER — SODIUM CHLORIDE 9 MG/ML
50 INJECTION, SOLUTION INTRAVENOUS CONTINUOUS
Status: DISCONTINUED | OUTPATIENT
Start: 2020-07-01 | End: 2020-07-01

## 2020-07-01 RX ORDER — SODIUM CHLORIDE 0.9 % (FLUSH) 0.9 %
5-40 SYRINGE (ML) INJECTION EVERY 8 HOURS
Status: DISCONTINUED | OUTPATIENT
Start: 2020-07-01 | End: 2020-07-02 | Stop reason: HOSPADM

## 2020-07-01 RX ORDER — LIDOCAINE HYDROCHLORIDE 10 MG/ML
10-30 INJECTION, SOLUTION EPIDURAL; INFILTRATION; INTRACAUDAL; PERINEURAL ONCE
Status: COMPLETED | OUTPATIENT
Start: 2020-07-01 | End: 2020-07-01

## 2020-07-01 RX ORDER — MORPHINE SULFATE 4 MG/ML
4 INJECTION INTRAVENOUS
Status: COMPLETED | OUTPATIENT
Start: 2020-07-01 | End: 2020-07-01

## 2020-07-01 RX ORDER — HYDROCODONE BITARTRATE AND ACETAMINOPHEN 5; 325 MG/1; MG/1
1 TABLET ORAL
Status: DISCONTINUED | OUTPATIENT
Start: 2020-07-01 | End: 2020-07-02 | Stop reason: HOSPADM

## 2020-07-01 RX ORDER — LORAZEPAM 1 MG/1
1 TABLET ORAL
Status: DISCONTINUED | OUTPATIENT
Start: 2020-07-01 | End: 2020-07-02 | Stop reason: HOSPADM

## 2020-07-01 RX ORDER — NITROGLYCERIN 0.4 MG/1
0.4 TABLET SUBLINGUAL
Status: DISCONTINUED | OUTPATIENT
Start: 2020-07-01 | End: 2020-07-02 | Stop reason: HOSPADM

## 2020-07-01 RX ORDER — MIDAZOLAM HYDROCHLORIDE 1 MG/ML
.5-2 INJECTION, SOLUTION INTRAMUSCULAR; INTRAVENOUS
Status: DISCONTINUED | OUTPATIENT
Start: 2020-07-01 | End: 2020-07-02 | Stop reason: HOSPADM

## 2020-07-01 RX ORDER — HEPARIN SODIUM 5000 [USP'U]/ML
4000 INJECTION, SOLUTION INTRAVENOUS; SUBCUTANEOUS
Status: COMPLETED | OUTPATIENT
Start: 2020-07-01 | End: 2020-07-01

## 2020-07-01 RX ORDER — MORPHINE SULFATE 2 MG/ML
2 INJECTION, SOLUTION INTRAMUSCULAR; INTRAVENOUS
Status: DISCONTINUED | OUTPATIENT
Start: 2020-07-01 | End: 2020-07-02 | Stop reason: HOSPADM

## 2020-07-01 RX ORDER — CLOPIDOGREL BISULFATE 75 MG/1
75 TABLET ORAL DAILY
Status: DISCONTINUED | OUTPATIENT
Start: 2020-07-01 | End: 2020-07-02 | Stop reason: HOSPADM

## 2020-07-01 RX ORDER — ACETAMINOPHEN 325 MG/1
650 TABLET ORAL
Status: DISCONTINUED | OUTPATIENT
Start: 2020-07-01 | End: 2020-07-02 | Stop reason: HOSPADM

## 2020-07-01 RX ORDER — FENTANYL CITRATE 50 UG/ML
25-50 INJECTION, SOLUTION INTRAMUSCULAR; INTRAVENOUS
Status: DISCONTINUED | OUTPATIENT
Start: 2020-07-01 | End: 2020-07-02 | Stop reason: HOSPADM

## 2020-07-01 RX ORDER — MORPHINE SULFATE 2 MG/ML
1 INJECTION, SOLUTION INTRAMUSCULAR; INTRAVENOUS
Status: DISCONTINUED | OUTPATIENT
Start: 2020-07-01 | End: 2020-07-02 | Stop reason: HOSPADM

## 2020-07-01 RX ORDER — LISINOPRIL 5 MG/1
10 TABLET ORAL 2 TIMES DAILY
Status: DISCONTINUED | OUTPATIENT
Start: 2020-07-01 | End: 2020-07-02 | Stop reason: HOSPADM

## 2020-07-01 RX ORDER — SODIUM CHLORIDE 0.9 % (FLUSH) 0.9 %
5-40 SYRINGE (ML) INJECTION AS NEEDED
Status: DISCONTINUED | OUTPATIENT
Start: 2020-07-01 | End: 2020-07-02 | Stop reason: HOSPADM

## 2020-07-01 RX ORDER — NITROGLYCERIN 0.4 MG/1
0.4 TABLET SUBLINGUAL
Status: DISCONTINUED | OUTPATIENT
Start: 2020-07-01 | End: 2020-07-01 | Stop reason: SDUPTHER

## 2020-07-01 RX ORDER — ATORVASTATIN CALCIUM 20 MG/1
20 TABLET, FILM COATED ORAL
Status: DISCONTINUED | OUTPATIENT
Start: 2020-07-01 | End: 2020-07-02 | Stop reason: HOSPADM

## 2020-07-01 RX ORDER — GUAIFENESIN 100 MG/5ML
81 LIQUID (ML) ORAL DAILY
Status: DISCONTINUED | OUTPATIENT
Start: 2020-07-02 | End: 2020-07-02 | Stop reason: HOSPADM

## 2020-07-01 RX ORDER — SODIUM CHLORIDE 9 MG/ML
75 INJECTION, SOLUTION INTRAVENOUS CONTINUOUS
Status: DISCONTINUED | OUTPATIENT
Start: 2020-07-01 | End: 2020-07-02 | Stop reason: HOSPADM

## 2020-07-01 RX ORDER — HEPARIN SODIUM 5000 [USP'U]/100ML
12-25 INJECTION, SOLUTION INTRAVENOUS
Status: DISCONTINUED | OUTPATIENT
Start: 2020-07-01 | End: 2020-07-01

## 2020-07-01 RX ORDER — CARVEDILOL 6.25 MG/1
6.25 TABLET ORAL 2 TIMES DAILY WITH MEALS
Status: DISCONTINUED | OUTPATIENT
Start: 2020-07-01 | End: 2020-07-02 | Stop reason: HOSPADM

## 2020-07-01 RX ORDER — INSULIN LISPRO 100 [IU]/ML
INJECTION, SOLUTION INTRAVENOUS; SUBCUTANEOUS
Status: DISCONTINUED | OUTPATIENT
Start: 2020-07-01 | End: 2020-07-02 | Stop reason: HOSPADM

## 2020-07-01 RX ORDER — HEPARIN SODIUM 200 [USP'U]/100ML
3 INJECTION, SOLUTION INTRAVENOUS CONTINUOUS
Status: DISCONTINUED | OUTPATIENT
Start: 2020-07-01 | End: 2020-07-01

## 2020-07-01 RX ORDER — HEPARIN SODIUM 10000 [USP'U]/ML
1000-10000 INJECTION, SOLUTION INTRAVENOUS; SUBCUTANEOUS
Status: DISCONTINUED | OUTPATIENT
Start: 2020-07-01 | End: 2020-07-02 | Stop reason: HOSPADM

## 2020-07-01 RX ORDER — GUAIFENESIN 100 MG/5ML
81 LIQUID (ML) ORAL
Status: DISCONTINUED | OUTPATIENT
Start: 2020-07-01 | End: 2020-07-02 | Stop reason: HOSPADM

## 2020-07-01 RX ORDER — CLOPIDOGREL BISULFATE 75 MG/1
300 TABLET ORAL ONCE
Status: COMPLETED | OUTPATIENT
Start: 2020-07-01 | End: 2020-07-01

## 2020-07-01 RX ORDER — ONDANSETRON 2 MG/ML
4 INJECTION INTRAMUSCULAR; INTRAVENOUS
Status: COMPLETED | OUTPATIENT
Start: 2020-07-01 | End: 2020-07-01

## 2020-07-01 RX ADMIN — HEPARIN SODIUM 3 UNITS/HR: 200 INJECTION, SOLUTION INTRAVENOUS at 13:44

## 2020-07-01 RX ADMIN — ASPIRIN 81 MG: 81 TABLET, CHEWABLE ORAL at 05:35

## 2020-07-01 RX ADMIN — LEVOTHYROXINE SODIUM 137 MCG: 0.11 TABLET ORAL at 08:01

## 2020-07-01 RX ADMIN — Medication 10 ML: at 05:40

## 2020-07-01 RX ADMIN — MORPHINE SULFATE 4 MG: 4 INJECTION INTRAVENOUS at 01:34

## 2020-07-01 RX ADMIN — ATORVASTATIN CALCIUM 20 MG: 20 TABLET, FILM COATED ORAL at 22:15

## 2020-07-01 RX ADMIN — CARVEDILOL 6.25 MG: 6.25 TABLET, FILM COATED ORAL at 18:05

## 2020-07-01 RX ADMIN — LISINOPRIL 10 MG: 5 TABLET ORAL at 18:05

## 2020-07-01 RX ADMIN — NITROGLYCERIN 1 INCH: 20 OINTMENT TOPICAL at 05:35

## 2020-07-01 RX ADMIN — ONDANSETRON 4 MG: 2 INJECTION INTRAMUSCULAR; INTRAVENOUS at 01:34

## 2020-07-01 RX ADMIN — INSULIN LISPRO 2 UNITS: 100 INJECTION, SOLUTION INTRAVENOUS; SUBCUTANEOUS at 22:15

## 2020-07-01 RX ADMIN — LISINOPRIL 10 MG: 5 TABLET ORAL at 08:01

## 2020-07-01 RX ADMIN — LIDOCAINE HYDROCHLORIDE 10 ML: 10 INJECTION, SOLUTION EPIDURAL; INFILTRATION; INTRACAUDAL; PERINEURAL at 13:45

## 2020-07-01 RX ADMIN — SODIUM CHLORIDE 50 ML/HR: 9 INJECTION, SOLUTION INTRAVENOUS at 06:16

## 2020-07-01 RX ADMIN — CLOPIDOGREL BISULFATE 300 MG: 75 TABLET ORAL at 15:33

## 2020-07-01 RX ADMIN — NITROGLYCERIN 1 INCH: 20 OINTMENT TOPICAL at 01:34

## 2020-07-01 RX ADMIN — ATORVASTATIN CALCIUM 20 MG: 20 TABLET, FILM COATED ORAL at 05:35

## 2020-07-01 RX ADMIN — HEPARIN SODIUM 12 UNITS/KG/HR: 5000 INJECTION, SOLUTION INTRAVENOUS at 02:33

## 2020-07-01 RX ADMIN — HEPARIN SODIUM 4000 UNITS: 5000 INJECTION INTRAVENOUS; SUBCUTANEOUS at 02:32

## 2020-07-01 RX ADMIN — Medication 5 ML: at 16:05

## 2020-07-01 RX ADMIN — CLOPIDOGREL BISULFATE 75 MG: 75 TABLET ORAL at 08:02

## 2020-07-01 RX ADMIN — NITROGLYCERIN 1 INCH: 20 OINTMENT TOPICAL at 12:03

## 2020-07-01 RX ADMIN — FENTANYL CITRATE 100 MCG: 50 INJECTION, SOLUTION INTRAMUSCULAR; INTRAVENOUS at 13:58

## 2020-07-01 RX ADMIN — CARVEDILOL 6.25 MG: 6.25 TABLET, FILM COATED ORAL at 08:01

## 2020-07-01 RX ADMIN — IOPAMIDOL 215 ML: 755 INJECTION, SOLUTION INTRAVENOUS at 15:29

## 2020-07-01 RX ADMIN — MIDAZOLAM 2 MG: 1 INJECTION INTRAMUSCULAR; INTRAVENOUS at 13:58

## 2020-07-01 RX ADMIN — HEPARIN SODIUM 5000 UNITS: 10000 INJECTION, SOLUTION INTRAVENOUS; SUBCUTANEOUS at 14:46

## 2020-07-01 RX ADMIN — HEPARIN SODIUM 2 ML: 10000 INJECTION, SOLUTION INTRAVENOUS; SUBCUTANEOUS at 14:00

## 2020-07-01 NOTE — ROUTINE PROCESS
Bedside and Verbal shift change report given to  (oncoming nurse) by Adeola George RN (offgoing nurse). Report included the following information SBAR, Kardex, Intake/Output, MAR and Recent Results. Left radial site visualized with TR band in place

## 2020-07-01 NOTE — ED PROVIDER NOTES
75-year-old male complaining of substernal chest pain onset 3 days ago. 2 PM yesterday. Pain radiating to the jaws and upper arms. Patient was given nitroglycerin by EMS states it did not help however his jaw and arm pain has resolved. There is been no fever chills no nausea vomiting diarrhea. Patient describes the pain is dull or tight. Recent left heart cath results are below:  CONCLUSIONS:  Stable CAD with two of three patent grafts. There is a patent LIMA to an LAD. There is a patent vein graft to a PDA. There is an occluded vein graft to an OM. The patient's ejection fraction is moderately reduced at 40%. Continue medical therapy. Chest Pain (Angina)    This is a recurrent problem. The current episode started 6 to 12 hours ago. The problem has not changed since onset. The problem occurs constantly. The pain is associated with normal activity. The pain is present in the substernal region. The pain is at a severity of 7/10. The pain is moderate. The quality of the pain is described as pressure-like. The pain radiates to the left jaw, right jaw, left arm and right arm. The symptoms are aggravated by movement.         Past Medical History:   Diagnosis Date    Acquired hypothyroidism 3/19/2018    AV junctional bradycardia     CAD (coronary artery disease)     Congestive heart failure (HCC)     Diabetes ([address]     Endocrine disease     hypothyriod    Headache 8/5/2017    Heart failure [facility]     Hypertension     Stroke [facility])        Past Surgical History:   Procedure Laterality Date    CARDIAC SURG PROCEDURE UNLIST      3 vessel bypass    HX CHOLECYSTECTOMY           Family History:   Problem Relation Age of Onset    Heart Disease Mother     Diabetes Sister     Cancer Sister     Diabetes Sister        Social History     Socioeconomic History    Marital status:      Spouse name: Not on file    Number of children: Not on file    Years of education: Not on file   [facility] education level: Not on file   Occupational History     Employer: RETIRED   Social Needs    Financial resource strain: Not on file    Food insecurity     Worry: Not on file     Inability: Not on file    Transportation needs     Medical: Not on file     Non-medical: Not on file   Tobacco Use    Smoking status: Former Smoker     Last attempt to quit: 1972     Years since quittin.5    Smokeless tobacco: Never Used   Substance and Sexual Activity    Alcohol use: No    Drug use: No    Sexual activity: Yes   Lifestyle    Physical activity     Days per week: Not on file     Minutes per session: Not on file    Stress: Not on file   Relationships    Social connections     Talks on phone: Not on file     Gets together: Not on file     Attends Tenriism service: Not on file     Active member of club or organization: Not on file     Attends meetings of clubs or organizations: Not on file     Relationship status: Not on file    Intimate partner violence     Fear of current or ex partner: Not on file     Emotionally abused: Not on file     Physically abused: Not on file     Forced sexual activity: Not on file   Other Topics Concern    Not on file   Social History Narrative    Jen Gonzales is his sister         ALLERGIES: Brilinta [ticagrelor] and Jardiance [empagliflozin]    Review of Systems   Constitutional: Negative. Negative for activity change. HENT: Negative. Eyes: Negative. Respiratory: Negative. Cardiovascular: Positive for chest pain. Gastrointestinal: Negative. Genitourinary: Negative. Musculoskeletal: Negative. Skin: Negative. Neurological: Negative. Psychiatric/Behavioral: Negative. All other systems reviewed and are negative. Vitals:    20 2116   BP: 160/88   Pulse: 95   Resp: 16   Temp: 97.9 °F (36.6 °C)   SpO2: 96%   Weight: 100.2 kg (221 lb)   Height: 6' 1\" (1.854 m)            Physical Exam  Vitals signs and nursing note reviewed.    Constitutional: General: He is not in acute distress. Appearance: He is well-developed. He is not diaphoretic. HENT:      Head: Normocephalic and atraumatic. Right Ear: External ear normal.      Left Ear: External ear normal.      Nose: Nose normal.      Mouth/Throat:      Pharynx: No oropharyngeal exudate. Eyes:      General: No scleral icterus. Right eye: No discharge. Left eye: No discharge. Conjunctiva/sclera: Conjunctivae normal.      Pupils: Pupils are equal, round, and reactive to light. Neck:      Musculoskeletal: Normal range of motion and neck supple. Vascular: No JVD. Trachea: No tracheal deviation. Cardiovascular:      Rate and Rhythm: Normal rate and regular rhythm. Pulmonary:      Effort: Pulmonary effort is normal. No respiratory distress. Breath sounds: Normal breath sounds. No stridor. No wheezing. Chest:      Chest wall: No tenderness. Abdominal:      General: Bowel sounds are normal. There is no distension. Palpations: Abdomen is soft. There is no mass. Tenderness: There is no abdominal tenderness. Musculoskeletal: Normal range of motion. General: No tenderness. Skin:     General: Skin is warm and dry. Coloration: Skin is not pale. Findings: No erythema or rash. Neurological:      Mental Status: He is alert and oriented to person, place, and time. Cranial Nerves: No cranial nerve deficit. Psychiatric:         Behavior: Behavior normal.         Thought Content: Thought content normal.          MDM  Number of Diagnoses or Management Options  Diagnosis management comments: Patient continues to be pain-free in the emergency department elevated troponin noted he has had elevated troponins in the past.  He had a cardiac cath done last month for the same problem. No intervention was necessary at that time. Consulted cardiology they would like heparin and admission will consider additional studies as needed. Amount and/or Complexity of Data Reviewed  Clinical lab tests: ordered and reviewed  Tests in the radiology section of CPT®: ordered and reviewed  Tests in the medicine section of CPT®: ordered and reviewed    Risk of Complications, Morbidity, and/or Mortality  Presenting problems: high  Diagnostic procedures: high  Management options: high    Patient Progress  Patient progress: stable         Procedures

## 2020-07-01 NOTE — H&P
[facility] Cardiology History & Physical      Date of  Admission: 6/30/2020 10:56 PM     Primary Care Physician:  Dr. Glennie Frankel  Primary Cardiologist:  Dr. Joaquina Ornelas  Admitting Physician:  Dr. Tianna Wilkerson    CC:  Chest pain     HPI:  Dayami Manzano is a 66 y.o. male with PMH of CAD s/p CABG ([facility] 2018 -PCI to dia, [facility] 5/2020 LIMA-LAD patent, VG-PDA patent, VG-OM - occluded), SJM DC PPM, HFrEF ([facility] 5/2020 EF 35%), HTN, HLD, hypothyroidism, and DM, who presented to the ED with c/o of CP. He notes exertional chest pain over last 3 days, but today he developed CP @ rest with radiation to jaw and back. The pain is described as a tightness, 7/10. Denies increased SOB, nausea, syncope, or dizziness. EMS was summoned and he was given  mg and nitro paste to chest in route. Labs show , K 4, BUN 16, creatinine 1.16, hs trop 444/1033. EKG showed paced rhythm. CXR normal.  On exam patient is pain free.        Past Medical History:   Diagnosis Date    Acquired hypothyroidism 3/19/2018    AV junctional bradycardia     CAD (coronary artery disease)     Congestive heart failure (HCC)     [facility] [address] [address] [facility]     Endocrine disease     hypothyriod    Headache 8/5/2017    Heart failure ([facility] [address] [facility]     Hypertension     Stroke [facility])       Past Surgical History:   Procedure Laterality Date    CARDIAC SURG PROCEDURE UNLIST      3 vessel bypass    HX CHOLECYSTECTOMY         Allergies   Allergen Reactions    Brilinta [Ticagrelor] Anaphylaxis    Jardiance [Empagliflozin] Other (comments)     Dyspepsia and atypical chest pain      Social History     Socioeconomic History    Marital status:      Spouse name: Not on file    Number of children: Not on file    Years of education: Not on file    Highest education level: Not on file   Occupational History     Employer: RETIRED   Social Needs    Financial resource strain: Not on file    Food insecurity     Worry: Not on file     Inability: Not on file   [employer] needs     Medical: Not on file     Non-medical: Not on file   Tobacco Use    Smoking status: Former Smoker     Last attempt to quit: 1972     Years since quittin.5    Smokeless tobacco: Never Used   Substance and Sexual Activity    Alcohol use: No    Drug use: No    Sexual activity: Yes   Lifestyle    Physical activity     Days per week: Not on file     Minutes per session: Not on file    Stress: Not on file   Relationships    Social connections     Talks on phone: Not on file     Gets together: Not on file     Attends Anabaptism service: Not on file     Active member of club or organization: Not on file     Attends meetings of clubs or organizations: Not on file     Relationship status: Not on file    Intimate partner violence     Fear of current or ex partner: Not on file     Emotionally abused: Not on file     Physically abused: Not on file     Forced sexual activity: Not on file   Other Topics Concern    Not on file   Social History Narrative    Luna Mendiola is his sister     Family History   Problem Relation Age of Onset    Heart Disease Mother     Diabetes Sister     Cancer Sister     Diabetes Sister         Current Facility-Administered Medications   Medication Dose Route Frequency    heparin (porcine) injection 4,000 Units  4,000 Units IntraVENous NOW    heparin 25,000 units in dextrose 500 mL infusion  12-25 Units/kg/hr IntraVENous TITRATE     Current Outpatient Medications   Medication Sig    metFORMIN (GLUCOPHAGE) 1,000 mg tablet Take 1 Tab by mouth two (2) times daily (with meals).  glimepiride (AMARYL) 4 mg tablet 1 po bid with a meal for diabetes instead of the glipizide)    carvediloL (COREG) 12.5 mg tablet TAKE 1 TABLET BY MOUTH TWICE DAILY WITH MEALS    OTHER Disabled placard - This is to certify that this patient has an inability to ordinarily walk 100 feet nonstop without aggravating an existing medical condition, including the increase of pain.     acarbose (PRECOSE) 25 mg tablet Take 1 Tab by mouth two (2) times daily (with meals). Instead of actos    clopidogreL (Plavix) 75 mg tab Take 1 Tab by mouth daily.  spironolactone (ALDACTONE) 25 mg tablet Take 1 Tab by mouth daily.  lisinopriL (PRINIVIL, ZESTRIL) 20 mg tablet Take 1 Tab by mouth two (2) times a day.  levothyroxine (SYNTHROID) 137 mcg tablet Take 137 mcg by mouth Daily (before breakfast).  simvastatin (ZOCOR) 40 mg tablet Take 1 Tab by mouth nightly.  pioglitazone (ACTOS) 15 mg tablet 1 po qd for diabetes (instead of the jardiance)    multivitamin (ONE A DAY) tablet Take 1 Tab by mouth daily.  Blood-Glucose Meter monitoring kit Test blood sugar daily, either fasting or 2 hours after eating for diabetes and record in log. (#1)  Dx: E11.9    glucose blood VI test strips (BLOOD GLUCOSE TEST) strip Test blood sugar daily, either fasting or 2 hours after eating for diabetes and record in log. (#100)  Dx: E11.9    nitroglycerin (NITROSTAT) 0.4 mg SL tablet by SubLINGual route every five (5) minutes as needed for Chest Pain.  aspirin 81 mg chewable tablet Take 81 mg by mouth nightly. Review of Systems    Review of Systems   Constitution: Negative. HENT: Negative. Eyes: Negative. Cardiovascular: Positive for chest pain. Respiratory: Positive for shortness of breath. Endocrine: Negative. Hematologic/Lymphatic: Negative. Skin: Negative. Musculoskeletal: Negative. Gastrointestinal: Negative. Genitourinary: Negative. Neurological: Negative. Psychiatric/Behavioral: Negative. Allergic/Immunologic: Negative. Subjective:     Visit Vitals  /55   Pulse 74   Temp 97.9 °F (36.6 °C)   Resp 20   Ht 6' 1\" (1.854 m)   Wt 100.2 kg (221 lb)   SpO2 91%   BMI 29.16 kg/m²     Physical Exam  Eyes:      Pupils: Pupils are equal, round, and reactive to light. Cardiovascular:      Rate and Rhythm: Normal rate and regular rhythm.    Pulmonary:      Effort: Pulmonary effort is normal.      Breath sounds: Normal breath sounds. Abdominal:      General: Bowel sounds are normal.   Musculoskeletal: Normal range of motion. Skin:     General: Skin is warm and dry. Neurological:      General: No focal deficit present. Mental Status: He is alert and oriented to person, place, and time. Psychiatric:         Mood and Affect: Mood normal.         Behavior: Behavior normal.         Thought Content: Thought content normal.         Judgment: Judgment normal.         Cardiographics  Telemetry: Paced  ECG: Paced  Echocardiogram: 5/2020  Left ventricle: Systolic function was moderately reduced. Ejection   Fraction was estimated in the range of 35 %. There was mild concentric hypertrophy. The E/e' ratio was 15.6. There was Grade II Diastolic Dysfunction.     -  Left atrium: The atrium was mildly to moderately dilated.     -  Aortic valve: There was mild to moderate regurgitation.     -  Mitral valve: There was mild annular calcification.  There was moderate  regurgitation.            Labs:   Recent Results (from the past 24 hour(s))   EKG, 12 LEAD, INITIAL    Collection Time: 06/30/20  9:18 PM   Result Value Ref Range    Ventricular Rate 93 BPM    Atrial Rate 93 BPM    P-R Interval 190 ms    QRS Duration 176 ms    Q-T Interval 430 ms    QTC Calculation (Bezet) 534 ms    Calculated P Axis 72 degrees    Calculated R Axis -77 degrees    Calculated T Axis 104 degrees    Diagnosis       Normal sinus rhythm  Left axis deviation  Non-specific intra-ventricular conduction block  Possible Lateral infarct , age undetermined  Inferior infarct , age undetermined  Abnormal ECG  When compared with ECG of 12-MAY-2020 17:31,  Sinus rhythm has replaced Electronic ventricular pacemaker     CBC WITH AUTOMATED DIFF    Collection Time: 06/30/20  9:20 PM   Result Value Ref Range    WBC 6.5 4.3 - 11.1 K/uL    RBC 4.56 4.23 - 5.6 M/uL    HGB 14.2 13.6 - 17.2 g/dL    HCT 41.4 41.1 - 50.3 %    MCV 90.8 79.6 - 97.8 FL    MCH 31.1 26.1 - 32.9 PG    MCHC 34.3 31.4 - 35.0 g/dL    RDW 13.2 11.9 - 14.6 %    PLATELET 213 244 - 492 K/uL    MPV 10.8 9.4 - 12.3 FL    ABSOLUTE NRBC 0.00 0.0 - 0.2 K/uL    DF AUTOMATED      NEUTROPHILS 65 43 - 78 %    LYMPHOCYTES 22 13 - 44 %    MONOCYTES 9 4.0 - 12.0 %    EOSINOPHILS 4 0.5 - 7.8 %    BASOPHILS 1 0.0 - 2.0 %    IMMATURE GRANULOCYTES 0 0.0 - 5.0 %    ABS. NEUTROPHILS 4.2 1.7 - 8.2 K/UL    ABS. LYMPHOCYTES 1.4 0.5 - 4.6 K/UL    ABS. MONOCYTES 0.6 0.1 - 1.3 K/UL    ABS. EOSINOPHILS 0.3 0.0 - 0.8 K/UL    ABS. BASOPHILS 0.0 0.0 - 0.2 K/UL    ABS. IMM. GRANS. 0.0 0.0 - 0.5 K/UL   METABOLIC PANEL, COMPREHENSIVE    Collection Time: 06/30/20  9:20 PM   Result Value Ref Range    Sodium 138 136 - 145 mmol/L    Potassium 4.0 3.5 - 5.1 mmol/L    Chloride 105 98 - 107 mmol/L    CO2 26 21 - 32 mmol/L    Anion gap 7 7 - 16 mmol/L    Glucose 165 (H) 65 - 100 mg/dL    BUN 16 8 - 23 MG/DL    Creatinine 1.16 0.8 - 1.5 MG/DL    GFR est AA >60 >60 ml/min/1.73m2    GFR est non-AA >60 >60 ml/min/1.73m2    Calcium 9.6 8.3 - 10.4 MG/DL    Bilirubin, total 0.9 0.2 - 1.1 MG/DL    ALT (SGPT) 26 12 - 65 U/L    AST (SGOT) 20 15 - 37 U/L    Alk.  phosphatase 72 50 - 136 U/L    Protein, total 7.4 6.3 - 8.2 g/dL    Albumin 3.8 3.2 - 4.6 g/dL    Globulin 3.6 (H) 2.3 - 3.5 g/dL    A-G Ratio 1.1 (L) 1.2 - 3.5     TROPONIN-HIGH SENSITIVITY    Collection Time: 06/30/20  9:20 PM   Result Value Ref Range    Troponin-High Sensitivity 444.2 (HH) 0 - 14 pg/mL   MAGNESIUM    Collection Time: 06/30/20  9:20 PM   Result Value Ref Range    Magnesium 1.9 1.8 - 2.4 mg/dL   NT-PRO BNP    Collection Time: 06/30/20  9:20 PM   Result Value Ref Range    NT pro- (H) <450 PG/ML   TROPONIN-HIGH SENSITIVITY    Collection Time: 07/01/20 12:30 AM   Result Value Ref Range    Troponin-High Sensitivity 1,033.3 (HH) 0 - 14 pg/mL       Patient has been seen and examined by Dr. Ole Dai and he agrees with the following assessment and plan: Assessment/Plan:       Principal Problem:    NSTEMI (non-ST elevated myocardial infarction) -- admit and plan for [facility]. Heparin bolus and drip. Continue ASA, plavix, BB, ACE and statin. Gently hydrate prior to [address]. Repeat labs. Active Problems:    Essential hypertension, benign-- continue home meds      Diabetes -- hold oral meds, SSI ordered       CAD (coronary artery disease) ()      Pacemaker -- SJM DC PPM      Hyperlipidemia -- on statin      Chronic systolic heart failure -- appear euvolemic, monitor volume status    Closely. May need to consider BIV upgrade of PPM in future, given 99% V pacing.              Paul Carmichael NP  7/1/2020 2:20 AM

## 2020-07-01 NOTE — ROUTINE PROCESS
Bedside and Verbal shift change report given to [employer] (oncoming nurse) by self (offgoing nurse). Report included the following information SBAR, Kardex, ED Summary, Procedure Summary, Intake/Output, MAR and Recent Results.

## 2020-07-01 NOTE — PROGRESS NOTES
TRANSFER - OUT REPORT:    Verbal report given to tele RN(name) on Mark Hines  being transferred to tele(unit) for routine progression of care       Report consisted of patients Situation, Background, Assessment and   Recommendations(SBAR). Information from the following report(s) SBAR, Kardex, Procedure Summary and MAR was reviewed with the receiving nurse. Lines:   Peripheral IV 07/01/20 Left Antecubital (Active)   Site Assessment Clean, dry, & intact 7/1/2020 12:21 PM   Phlebitis Assessment 0 7/1/2020 12:21 PM   Infiltration Assessment 0 7/1/2020 12:21 PM   Dressing Status Clean, dry, & intact 7/1/2020 12:21 PM   Dressing Type Tape;Transparent 7/1/2020  3:51 AM   Hub Color/Line Status Flushed;Patent 7/1/2020  3:51 AM       Peripheral IV 07/01/20 Left; Anterior Wrist (Active)   Site Assessment Clean, dry, & intact 7/1/2020 12:21 PM   Phlebitis Assessment 0 7/1/2020 12:21 PM   Infiltration Assessment 0 7/1/2020 12:21 PM   Dressing Status Clean, dry, & intact 7/1/2020 12:21 PM        Opportunity for questions and clarification was provided.       Patient transported with:   Registered Nurse          [address] [facility] with Dr. Peter Khanna  Left Radial access  Stent of Diagonal  Versed 2mg  Fentanyl 100mcg  Heparin 5000 units IV @ 1445  ACT @ 1455 was 362  TR Band in place @ 15:36 with 12ml in the band

## 2020-07-01 NOTE — PROGRESS NOTES
TRANSFER - IN REPORT:    Verbal report received from Bird RN(name) on Kari Reyes  being received from cath lab(unit) for routine progression of care      Report consisted of patients Situation, Background, Assessment and   Recommendations(SBAR). Information from the following report(s) SBAR, Kardex and MAR was reviewed with the receiving nurse. Opportunity for questions and clarification was provided. Verbal report given to Primary RN Rajni Pitt RN.

## 2020-07-01 NOTE — ROUTINE PROCESS
TRANSFER - IN REPORT: 
 
Verbal report received from [employer] (name) on Chris [place]  being received from ED (unit) for routine progression of care Report consisted of patients Situation, Background, Assessment and  
Recommendations(SBAR). Information from the following report(s) SBAR, Kardex, ED Summary, [facility] and Recent Results was reviewed with the receiving nurse. Opportunity for questions and clarification was provided. Assessment completed upon patients arrival to unit and care assumed.

## 2020-07-01 NOTE — ED NOTES
TRANSFER - OUT REPORT:    Verbal report given to [address] on Hattie Esteves  being transferred to 18 for routine progression of care       Report consisted of patients Situation, Background, Assessment and   Recommendations(SBAR). Information from the following report(s) SBAR, ED Summary, [facility] and Cardiac Rhythm paced was reviewed with the receiving nurse. Lines:   Peripheral IV 07/01/20 Left Antecubital (Active)   Site Assessment Clean, dry, & intact 7/1/2020  2:02 AM   Phlebitis Assessment 0 7/1/2020  2:02 AM   Infiltration Assessment 0 7/1/2020  2:02 AM   Dressing Status Clean, dry, & intact 7/1/2020  2:02 AM        Opportunity for questions and clarification was provided.       Patient transported with:   Monitor  Registered Nurse

## 2020-07-01 NOTE — PROGRESS NOTES
07/01/20 0351   Dual Skin Pressure Injury Assessment   Dual Skin Pressure Injury Assessment WDL   Second Care Provider (Based on [address] [place] [address]) Isaac Judd, RN   Skin Integumentary   Skin Integumentary (WDL) X    Pressure  Injury Documentation No Pressure Injury Noted-Pressure Ulcer Prevention Initiated   Skin Color Appropriate for ethnicity   Skin Condition/Temp Warm   Skin Integrity Incision (comment)  (R hand skin excision )   Turgor Epidermis thin w/ loss of subcut tissue   Hair Growth Sparce   Nails WDL   Varicosities Present     Sacrum free from wounds or redness. Heels dry and intact. Assessment performed with 2nd RN.

## 2020-07-01 NOTE — PROCEDURES
Brief Cardiac Procedure Note    Patient: Erick Vázquez MRN: [id number]  SSN: [id number]    YOB: 1945  Age: 66 y.o. Sex: male      Date of Procedure: 7/1/2020     Pre-procedure Diagnosis: NSTEMI    Post-procedure Diagnosis: Coronary Artery Disease    Reason for Procedure: ACS < or = 24 Hours    Procedure: Left Heart Catheterization with Percutaneous Coronary Intervention    Brief Description of Procedure:  LHC with grafts via L radial artery, selective coronary angiography. PCI to D1      Performed By: Margaux Mac MD     Assistants: None    Anesthesia: Moderate Sedation    Estimated Blood Loss: Less than 10 mL      Specimens: None    Implants: None    Findings: LM normal, LAD occluded patent LIMA to LAD, D1 serial 95% lesions. Circ 99% subtotal occluded prox, % prox, SVG to rPDA patent, SVG to OM known to be occluded. Complications: None    Recommendations: Reloaded with plavix. PCI to D1 2.25 x 15 STEVE and 2.0 x 18 STEVE.     Signed By: Margaux Mac MD     July 1, 2020

## 2020-07-01 NOTE — ED TRIAGE NOTES
Arrives with face mask in place. Arrives via [employer] from home with reports substernal non-radiating chest pain, bilateral jaw pain and numbness to upper extremities. Jaw pain and numbness resolved on arrival. Onset of symptoms approx 1400 today while sitting down. States constant since onset and describes \"dull and tightness\". Reports shortness of breath, non-productive cough. Denies n/v, lightheadedness/dizziness. Asa 324mg, nitropaste 1 inch given by EMS. Reports minimal relief from nitro. Admitted 5/12 for elevated trop, cath done at that time.

## 2020-07-02 VITALS
DIASTOLIC BLOOD PRESSURE: 74 MMHG | RESPIRATION RATE: 18 BRPM | HEART RATE: 80 BPM | SYSTOLIC BLOOD PRESSURE: 152 MMHG | TEMPERATURE: 98.9 F | OXYGEN SATURATION: 92 % | BODY MASS INDEX: 28.02 KG/M2 | HEIGHT: 73 IN | WEIGHT: 211.4 LBS

## 2020-07-02 LAB
ANION GAP SERPL CALC-SCNC: 8 MMOL/L (ref 7–16)
BASOPHILS # BLD: 0 K/UL (ref 0–0.2)
BASOPHILS NFR BLD: 1 % (ref 0–2)
BUN SERPL-MCNC: 11 MG/DL (ref 8–23)
CALCIUM SERPL-MCNC: 9.1 MG/DL (ref 8.3–10.4)
CHLORIDE SERPL-SCNC: 106 MMOL/L (ref 98–107)
CHOLEST SERPL-MCNC: 89 MG/DL
CO2 SERPL-SCNC: 26 MMOL/L (ref 21–32)
CREAT SERPL-MCNC: 0.95 MG/DL (ref 0.8–1.5)
DIFFERENTIAL METHOD BLD: NORMAL
EOSINOPHIL # BLD: 0.2 K/UL (ref 0–0.8)
EOSINOPHIL NFR BLD: 4 % (ref 0.5–7.8)
ERYTHROCYTE [DISTWIDTH] IN BLOOD BY AUTOMATED COUNT: 13 % (ref 11.9–14.6)
GLUCOSE BLD STRIP.AUTO-MCNC: 132 MG/DL (ref 65–100)
GLUCOSE BLD STRIP.AUTO-MCNC: 220 MG/DL (ref 65–100)
GLUCOSE SERPL-MCNC: 109 MG/DL (ref 65–100)
HCT VFR BLD AUTO: 40.7 % (ref 41.1–50.3)
HDLC SERPL-MCNC: 31 MG/DL (ref 40–60)
HDLC SERPL: 2.9 {RATIO}
HGB BLD-MCNC: 13.5 G/DL (ref 13.6–17.2)
IMM GRANULOCYTES # BLD AUTO: 0 K/UL (ref 0–0.5)
IMM GRANULOCYTES NFR BLD AUTO: 0 % (ref 0–5)
LDLC SERPL CALC-MCNC: 22 MG/DL
LIPID PROFILE,FLP: ABNORMAL
LYMPHOCYTES # BLD: 0.9 K/UL (ref 0.5–4.6)
LYMPHOCYTES NFR BLD: 15 % (ref 13–44)
MCH RBC QN AUTO: 30.3 PG (ref 26.1–32.9)
MCHC RBC AUTO-ENTMCNC: 33.2 G/DL (ref 31.4–35)
MCV RBC AUTO: 91.3 FL (ref 79.6–97.8)
MONOCYTES # BLD: 0.6 K/UL (ref 0.1–1.3)
MONOCYTES NFR BLD: 10 % (ref 4–12)
NEUTS SEG # BLD: 4.3 K/UL (ref 1.7–8.2)
NEUTS SEG NFR BLD: 71 % (ref 43–78)
NRBC # BLD: 0 K/UL (ref 0–0.2)
PLATELET # BLD AUTO: 132 K/UL (ref 150–450)
PMV BLD AUTO: 11 FL (ref 9.4–12.3)
POTASSIUM SERPL-SCNC: 3.8 MMOL/L (ref 3.5–5.1)
RBC # BLD AUTO: 4.46 M/UL (ref 4.23–5.6)
SODIUM SERPL-SCNC: 140 MMOL/L (ref 136–145)
TRIGL SERPL-MCNC: 180 MG/DL (ref 35–150)
VLDLC SERPL CALC-MCNC: 36 MG/DL (ref 6–23)
WBC # BLD AUTO: 6 K/UL (ref 4.3–11.1)

## 2020-07-02 PROCEDURE — 74011250637 HC RX REV CODE- 250/637: Performed by: NURSE PRACTITIONER

## 2020-07-02 PROCEDURE — 80061 LIPID PANEL: CPT

## 2020-07-02 PROCEDURE — 85027 COMPLETE CBC AUTOMATED: CPT

## 2020-07-02 PROCEDURE — 80048 BASIC METABOLIC PNL TOTAL CA: CPT

## 2020-07-02 PROCEDURE — 74011250637 HC RX REV CODE- 250/637: Performed by: INTERNAL MEDICINE

## 2020-07-02 PROCEDURE — 82962 GLUCOSE BLOOD TEST: CPT

## 2020-07-02 PROCEDURE — C8929 TTE W OR WO FOL WCON,DOPPLER: HCPCS

## 2020-07-02 PROCEDURE — 36415 COLL VENOUS BLD VENIPUNCTURE: CPT

## 2020-07-02 PROCEDURE — 74011250636 HC RX REV CODE- 250/636: Performed by: INTERNAL MEDICINE

## 2020-07-02 PROCEDURE — 74011000250 HC RX REV CODE- 250: Performed by: INTERNAL MEDICINE

## 2020-07-02 PROCEDURE — 74011636637 HC RX REV CODE- 636/637: Performed by: NURSE PRACTITIONER

## 2020-07-02 RX ORDER — ATORVASTATIN CALCIUM 20 MG/1
20 TABLET, FILM COATED ORAL
Qty: 90 TAB | Refills: 3 | Status: SHIPPED | OUTPATIENT
Start: 2020-07-02 | End: 2020-08-21 | Stop reason: SDUPTHER

## 2020-07-02 RX ORDER — LISINOPRIL 10 MG/1
10 TABLET ORAL 2 TIMES DAILY
Qty: 180 TAB | Refills: 3 | Status: SHIPPED | OUTPATIENT
Start: 2020-07-02 | End: 2020-08-21 | Stop reason: SDUPTHER

## 2020-07-02 RX ADMIN — CARVEDILOL 6.25 MG: 6.25 TABLET, FILM COATED ORAL at 09:09

## 2020-07-02 RX ADMIN — Medication 10 ML: at 05:46

## 2020-07-02 RX ADMIN — LISINOPRIL 10 MG: 5 TABLET ORAL at 09:09

## 2020-07-02 RX ADMIN — PERFLUTREN 1 ML: 6.52 INJECTION, SUSPENSION INTRAVENOUS at 09:45

## 2020-07-02 RX ADMIN — LEVOTHYROXINE SODIUM 137 MCG: 0.11 TABLET ORAL at 09:09

## 2020-07-02 RX ADMIN — CLOPIDOGREL BISULFATE 75 MG: 75 TABLET ORAL at 09:09

## 2020-07-02 RX ADMIN — INSULIN LISPRO 4 UNITS: 100 INJECTION, SOLUTION INTRAVENOUS; SUBCUTANEOUS at 11:45

## 2020-07-02 RX ADMIN — ASPIRIN 81 MG: 81 TABLET, CHEWABLE ORAL at 09:09

## 2020-07-02 NOTE — DISCHARGE SUMMARY
[address] [facility] 121 Cardiology Discharge Summary     Patient ID:  Hannah Parsons  [id number]  39 y.o.  1945    Admit date: 6/30/2020    Discharge date:  07/02/2020    Admitting Physician: Imelda Vincent MD     Discharge Physician: Dr. Rehana Lundberg    Admission Diagnoses: NSTEMI (non-ST elevated myocardial infarction) [facility]) [I21.4]    Discharge Diagnoses:    Diagnosis    Non-rheumatic mitral regurgitation    Hypertensive cardiomyopathy, with heart failure ([address]    NSTEMI (non-ST elevated myocardial infarction) ([address]    Chronic systolic heart failure (HCC)    Hyperlipidemia    Acquired hypothyroidism    Pacemaker    Ischemic cardiomyopathy    Type 2 diabetes mellitus without complication, without long-term current use of insulin [facility]    Stroke ([address]    Hypertension    Diabetes ([facility]    CAD (coronary artery disease)     Transitional care follow up with [address] [place] [address] 121 Cardiology -- Dr Juice Quijano July 16 at [id number] [facility] office (no appts available in [facility])     Cardiology Procedures this admission:  Left heart catheterization with PCI  EchoCardiogram  Consults: None    Hospital Course: Patient presented to the ER with c/o CP. He has a h/o CAD s/p CABG w sHF and EF 35% ([facility] PM). He noted exertional chest pain over last 3 days, but  he developed CP @ rest with radiation to jaw and back  He was evaluated in the Er and admitted for further evaluation. HS troponin was 444 and increased to 3582. He urgently underwent cardiac catheterization by Dr. Rehana Lundberg. Patient was found to have 95% stenosis of the D1 that was stented with an Shaheen STEVE x 2, 2.25x15 and 2.0x18 with 0% residual stenosis. . Patient tolerated the procedure well and was taken to the telemetry floor for recovery. Echo showed    -  Left ventricle: Systolic function was mildly to moderately reduced. Ejection  fraction was estimated to be 40 %. There was severe apical hypokinesis. There  was abnormal septal motion.  There was mild concentric hypertrophy.     -  Left atrium: The atrium was mildly dilated.     -  Mitral valve: There was mild to moderate regurgitation which is eccentric  and anteriorly directed. ACE-I dose decreased bc BP slightly lower. The morning of discharge, patient was up feeling well without any complaints of chest pain or shortness of breath. Patient's left radial cath site was clean, dry and intact without hematoma or bruit. Patient's labs were WNL. Patient was seen and examined by Dr. Ole Dai and determined stable and ready for discharge. Patient was instructed on the importance of medication compliance including taking ASA and Plavix everyday without missing a dose. After receiving drug eluting stents, the patient will remain on dual anti-platelet therapy for 1 year. For maximized medical therapy for CAD, patient will continue ACE, Beta Blocker and Statin  For treatment of CHF will cont ACE and BB. The patient will have close transitional care follow up with [facility] Cardiology -- Dr Doyle Ramirez July 16 at [id number] [facility] office (no appts available in [facility]). Patient has been referred to cardiac rehab. With adjustment of ACE will check BMP prior to f/u appt. Pt to resume glucophage on Saturday July 4. All discharge plans discussed w pt and all questions answered. DISPOSITION: The patient is being discharged home in stable condition on a low saturated fat, low cholesterol and low salt diet. The patient is instructed to advance activities as tolerated to the limit of fatigue or shortness of breath. The patient is instructed to avoid all heavy lifting, straining, stooping or squatting for 3-5 days. The patient is instructed to watch the cath site for bleeding/oozing; if seen, the patient is instructed to apply firm pressure with a clean cloth and call [facility] Cardiology at [phone].  The patient is instructed to watch for signs of infection which include: increasing area of redness, fever/hot to touch or purulent drainage at the catheterization site. The patient is instructed not to soak in a bathtub for 7-10 days, but is cleared to shower. The patient is instructed to call the office or return to the ER for immediate evaluation for any shortness of breath or chest pain not relieved by NTG. Discharge Exam:   Visit Vitals  /74   Pulse 80   Temp 98.9 °F (37.2 °C)   Resp 18   Ht 6' 1\" (1.854 m)   Wt 95.9 kg (211 lb 6.4 oz)   SpO2 92%   BMI 27.89 kg/m²       Patient Vitals for the past 12 hrs:   Temp Pulse Resp BP SpO2   07/02/20 0544 98.1 °F (36.7 °C) 80 16 131/62 91 %   07/02/20 0106 98 °F (36.7 °C) 81 18 118/61 91 %   07/01/20 2127 98.1 °F (36.7 °C) 74 20 150/77 93 %       Patient has been seen by Dr. Wallace Homes: see his progress note for exam details. Recent Results (from the past 24 hour(s))   GLUCOSE, POC    Collection Time: 07/01/20 10:59 AM   Result Value Ref Range    Glucose (POC) 117 (H) 65 - 100 mg/dL   POC ACTIVATED CLOTTING TIME    Collection Time: 07/01/20  2:56 PM   Result Value Ref Range    Activated Clotting Time (POC) 362 (H) 70 - 128 SECS   GLUCOSE, POC    Collection Time: 07/01/20  3:56 PM   Result Value Ref Range    Glucose (POC) 121 (H) 65 - 100 mg/dL   EKG, 12 LEAD, INITIAL    Collection Time: 07/01/20  4:03 PM   Result Value Ref Range    Ventricular Rate 66 BPM    Atrial Rate 66 BPM    P-R Interval 192 ms    QRS Duration 178 ms    Q-T Interval 530 ms    QTC Calculation (Bezet) 555 ms    Calculated P Axis 43 degrees    Calculated R Axis -89 degrees    Calculated T Axis 147 degrees    Diagnosis       Atrial-sensed ventricular-paced rhythm  Abnormal ECG  When compared with ECG of 30-JUN-2020 21:18,  Vent.  rate has decreased BY  27 BPM  Confirmed by ST MARILEE NIELSEN MD (), DEB TANNER ([id number]) on 7/1/2020 10:55:36 PM     GLUCOSE, POC    Collection Time: 07/01/20  9:34 PM   Result Value Ref Range    Glucose (POC) 170 (H) 65 - 068 mg/dL   METABOLIC PANEL, BASIC    Collection Time: 07/02/20 4:41 AM   Result Value Ref Range    Sodium 140 136 - 145 mmol/L    Potassium 3.8 3.5 - 5.1 mmol/L    Chloride 106 98 - 107 mmol/L    CO2 26 21 - 32 mmol/L    Anion gap 8 7 - 16 mmol/L    Glucose 109 (H) 65 - 100 mg/dL    BUN 11 8 - 23 MG/DL    Creatinine 0.95 0.8 - 1.5 MG/DL    GFR est AA >60 >60 ml/min/1.73m2    GFR est non-AA >60 >60 ml/min/1.73m2    Calcium 9.1 8.3 - 10.4 MG/DL   LIPID PANEL    Collection Time: 07/02/20  4:41 AM   Result Value Ref Range    LIPID PROFILE          Cholesterol, total 89 <200 MG/DL    Triglyceride 180 (H) 35 - 150 MG/DL    HDL Cholesterol 31 (L) 40 - 60 MG/DL    LDL, calculated 22 <100 MG/DL    VLDL, calculated 36 (H) 6.0 - 23.0 MG/DL    CHOL/HDL Ratio 2.9     CBC W/O DIFF    Collection Time: 07/02/20  4:41 AM   Result Value Ref Range    WBC 6.0 4.3 - 11.1 K/uL    RBC 4.46 4.23 - 5.6 M/uL    HGB 13.5 (L) 13.6 - 17.2 g/dL    HCT 40.7 (L) 41.1 - 50.3 %    MCV 91.3 79.6 - 97.8 FL    MCH 30.3 26.1 - 32.9 PG    MCHC 33.2 31.4 - 35.0 g/dL    RDW 13.0 11.9 - 14.6 %    PLATELET 077 (L) 866 - 450 K/uL    MPV 11.0 9.4 - 12.3 FL    ABSOLUTE NRBC 0.00 0.0 - 0.2 K/uL   DIFFERENTIAL, AUTO    Collection Time: 07/02/20  4:41 AM   Result Value Ref Range    NEUTROPHILS 71 43 - 78 %    LYMPHOCYTES 15 13 - 44 %    MONOCYTES 10 4.0 - 12.0 %    EOSINOPHILS 4 0.5 - 7.8 %    BASOPHILS 1 0.0 - 2.0 %    ABS. NEUTROPHILS 4.3 1.7 - 8.2 K/UL    ABS. LYMPHOCYTES 0.9 0.5 - 4.6 K/UL    ABS. MONOCYTES 0.6 0.1 - 1.3 K/UL    ABS. EOSINOPHILS 0.2 0.0 - 0.8 K/UL    ABS. BASOPHILS 0.0 0.0 - 0.2 K/UL    DF AUTOMATED      IMMATURE GRANULOCYTES 0 0.0 - 5.0 %    ABS. IMM. GRANS. 0.0 0.0 - 0.5 K/UL   GLUCOSE, POC    Collection Time: 07/02/20  6:15 AM   Result Value Ref Range    Glucose (POC) 132 (H) 65 - 100 mg/dL         Patient Instructions:   Current Discharge Medication List      START taking these medications    Details   atorvastatin (LIPITOR) 20 mg tablet Take 1 Tab by mouth nightly.   Qty: 90 Tab, Refills: 3 CONTINUE these medications which have CHANGED    Details   lisinopriL (PRINIVIL, ZESTRIL) 10 mg tablet Take 1 Tab by mouth two (2) times a day. Qty: 180 Tab, Refills: 3         CONTINUE these medications which have NOT CHANGED    Details   metFORMIN (GLUCOPHAGE) 1,000 mg tablet Take 1 Tab by mouth two (2) times daily (with meals). Qty: 180 Tab, Refills: 1    Associated Diagnoses: Type 2 diabetes mellitus without complication, without long-term current use of insulin ([facility])      glimepiride (AMARYL) 4 mg tablet 1 po bid with a meal for diabetes instead of the glipizide)  Qty: 180 Tab, Refills: 1    Associated Diagnoses: Type 2 diabetes mellitus without complication, without long-term current use of insulin ([facility])      carvediloL (COREG) 12.5 mg tablet TAKE 1 TABLET BY MOUTH TWICE DAILY WITH MEALS  Qty: 180 Tab, Refills: 3    Associated Diagnoses: Congestive heart failure, unspecified HF chronicity, unspecified heart failure type ([facility])      acarbose (PRECOSE) 25 mg tablet Take 1 Tab by mouth two (2) times daily (with meals). Instead of actos  Qty: 180 Tab, Refills: 1    Comments: For diabetes  Associated Diagnoses: Type 2 diabetes mellitus without complication, without long-term current use of insulin ([facility])      clopidogreL (Plavix) 75 mg tab Take 1 Tab by mouth daily. Qty: 90 Tab, Refills: 3      spironolactone (ALDACTONE) 25 mg tablet Take 1 Tab by mouth daily. Qty: 30 Tab, Refills: 5      levothyroxine (SYNTHROID) 137 mcg tablet Take 137 mcg by mouth Daily (before breakfast). Qty: 90 Tab, Refills: 1    Comments: Cancel all refills on the lower doses  Associated Diagnoses: Acquired hypothyroidism      multivitamin (ONE A DAY) tablet Take 1 Tab by mouth daily. aspirin 81 mg chewable tablet Take 81 mg by mouth nightly.       OTHER Disabled placard - This is to certify that this patient has an inability to ordinarily walk 100 feet nonstop without aggravating an existing medical condition, including the increase of pain. Qty: 1 Each, Refills: 11    Associated Diagnoses: Congestive heart failure, unspecified HF chronicity, unspecified heart failure type ([facility]      Blood-Glucose Meter monitoring kit Test blood sugar daily, either fasting or 2 hours after eating for diabetes and record in log. (#1)  Dx: E11.9  Qty: 1 Kit, Refills: 0    Associated Diagnoses: Type 2 diabetes mellitus without complication, without long-term current use of insulin ([facility])      glucose blood VI test strips (BLOOD GLUCOSE TEST) strip Test blood sugar daily, either fasting or 2 hours after eating for diabetes and record in log. (#100)  Dx: E11.9  Qty: 100 Strip, Refills: 11    Associated Diagnoses: Type 2 diabetes mellitus without complication, without long-term current use of insulin ([facility])      nitroglycerin (NITROSTAT) 0.4 mg SL tablet by SubLINGual route every five (5) minutes as needed for Chest Pain.          STOP taking these medications       simvastatin (ZOCOR) 40 mg tablet Comments:   Reason for Stopping:         pioglitazone (ACTOS) 15 mg tablet Comments:   Reason for Stopping:

## 2020-07-02 NOTE — ROUTINE PROCESS
Cardiac Rehab: Spoke with patient regarding referral to cardiac rehab. Patient meets admission criteria based on NSTEMI with PCI (07/01/20). Written information about Cardiac Rehab given and reviewed with patient. Discussed lifestyle modifications to promote cardiac wellness. Pt states he chose not to participate in the Cardiac Rehab program after his CABG surgery. Patient indicated that he does not want to participate in the cardiac rehab program. His Cardiologist is Dr. Cruzito Lynn. Thank you, Ashley Maurer, BHUPENDRAN, RN Cardiopulmonary Rehabilitation Nurse Liaison [facility]

## 2020-07-02 NOTE — PROGRESS NOTES
Care Management Interventions  Transition of Care Consult (CM Consult): Discharge Planning  Discharge Durable Medical Equipment: No  Physical Therapy Consult: No  Occupational Therapy Consult: No  Speech Therapy Consult: No  Discharge Location  Discharge Placement: Home    Pt declined cardiac rehab.

## 2020-07-02 NOTE — ROUTINE PROCESS
Bedside and Verbal shift change report to be given to Maribel Navarro RN (oncoming nurse) by  David levi. Report included the following information SBAR, Kardex, Intake/Output, MAR and Recent Results. RN to assume care of patient.

## 2020-07-02 NOTE — ROUTINE PROCESS
Radial compression band removed at 2300 after slowly reducing air from 14 cc to zero as per hospital protocol. No bleeding or hematoma noted. 2 x 2 gauze with tegaderm placed over puncture site. The affected extremity is warm and dry to the touch. Frequent vital signs printed and placed on bedside chart. Patient instructed to call if any bleeding noted on gauze. Patient verbalized understanding the nursing instructions.

## 2020-07-02 NOTE — PROCEDURES
[address] [facility]  CARDIAC CATH    Name:  Alok Red  MR#:  [id number]  :  1945  ACCOUNT #:  [de-identified]  DATE OF SERVICE:  2020    PROCEDURES PERFORMED:  Left heart catheterization via left radial artery with selective coronary angiography, selective bypass graft angiography, percutaneous intervention to diagonal 1. PREOPERATIVE DIAGNOSIS:  Non-ST elevation myocardial infarction. POSTOPERATIVE DIAGNOSIS:  Coronary artery disease with stable angina. SURGEON:  Lazarus Bolder, MD    ASSISTANT:  None. ESTIMATED BLOOD LOSS:  10 mL. SPECIMENS REMOVED:  None. COMPLICATIONS:  None. IMPLANTS:  Includes two drug-eluting stents as listed below. ANESTHESIA:  The patient received 2 mg of Versed and 100 mcg of fentanyl, was monitored for conscious sedation beginning at 01:59 p.m. and ending at 03:33 p.m. by nurse, Oscar Hassan. PROCEDURE:  After informed consent, the patient was prepped and draped in usual sterile fashion. The left wrist was infiltrated with lidocaine. The left radial artery was accessed via the modified Seldinger technique with a 6-Bermudian sheath. A total of 215 mL of Isovue contrast were used for the entire procedure. Terumo band was used for hemostasis. CATHETERS USED:  Included a JR-4, a JL-3.5, an AL-1 and a XB 3.0 guide. FINDINGS:  Left ventricle:  Left ventriculogram was deferred in order to reduce contrast exposure. LVEDP measured 14 mmHg. Left main was normal.    Left anterior descending artery was occluded proximally after the takeoff of diagonal 1. Diagonal 1 had serial lesions at 95% between two previously placed stents and just beyond a previously placed stent. The circumflex was 99% subtotally occluded. The right coronary artery was 100% proximally occluded. The saphenous vein graft to right PDA was patent with excellent runoff.   The saphenous vein graft to obtuse marginal-1 was known to be occluded and the LIMA to LAD was patent with excellent runoff. INTERVENTION:  It was decided to intervene upon diagonal 1. The patient was given adequate heparin dose to achieve an ACT greater than 300. An XB3 guide was taken down the root of the aorta and used to guide and engage the ostium of the left main. A BMW wire passed down the vessel and then a 2.0 x 20 compliant balloon was used to predilate and restore ANABEL III blood flow to the vessel. Next, a 2.0 x 18 Bern drug-eluting stent was taken down distal to the second of the two previously placed stents. However, we were unable to get it to pass. So, 2.25 NC balloon was used to predilate the proximal portion of the vessel and then the stent passed and was deployed successfully. Next, a 2.25 x 15 Bern drug-eluting stent was placed in an overlapping fashion between the two previously placed stents. Next, a 2.25 x 20 NC balloon was taken back in and used to post-dilate the four stent complex. At the conclusion, there was an excellent angiographic result. The patient was loaded with an additional 300 mg of plavix while on the table. He tolerated the procedure without issue and will be monitored overnight with likely discharge tomorrow.         MD DOMINIC [id number]  D:  07/01/2020 15:48  T:  07/02/2020 4:49  JOB #:  [id number]

## 2020-07-02 NOTE — DISCHARGE INSTRUCTIONS
DISCHARGE SUMMARY from Nurse    PATIENT INSTRUCTIONS:    After general anesthesia or intravenous sedation, for 24 hours or while taking prescription Narcotics:  · Limit your activities  · Do not drive and operate hazardous machinery  · Do not make important personal or business decisions  · Do  not drink alcoholic beverages  · If you have not urinated within 8 hours after discharge, please contact your surgeon on call. Report the following to your surgeon:  · Excessive pain, swelling, redness or odor of or around the surgical area  · Temperature over 100.5  · Nausea and vomiting lasting longer than 4 hours or if unable to take medications  · Any signs of decreased circulation or nerve impairment to extremity: change in color, persistent  numbness, tingling, coldness or increase pain  · Any questions    What to do at Home:  Recommended activity: Activity as tolerated,     *  Please give a list of your current medications to your Primary Care Provider. *  Please update this list whenever your medications are discontinued, doses are      changed, or new medications (including over-the-counter products) are added. *  Please carry medication information at all times in case of emergency situations. These are general instructions for a healthy lifestyle:    No smoking/ No tobacco products/ Avoid exposure to second hand smoke  Surgeon General's Warning:  Quitting smoking now greatly reduces serious risk to your health. Obesity, smoking, and sedentary lifestyle greatly increases your risk for illness    A healthy diet, regular physical exercise & weight monitoring are important for maintaining a healthy lifestyle    You may be retaining fluid if you have a history of heart failure or if you experience any of the following symptoms:  Weight gain of 3 pounds or more overnight or 5 pounds in a week, increased swelling in our hands or feet or shortness of breath while lying flat in bed.   Please call your doctor as soon as you notice any of these symptoms; do not wait until your next office visit. The discharge information has been reviewed with the patient. The patient verbalized understanding. Discharge medications reviewed with the patient and appropriate educational materials and side effects teaching were provided. ___________________________________________________________________________________________________________________________________                     Cardiac Catheterization/Angiography Discharge Instructions    *Check the puncture site frequently for swelling or bleeding. If you see any bleeding, lie down and apply pressure over the area with a clean town or washcloth. Notify your doctor for any redness, swelling, drainage or oozing from the puncture site. Notify your doctor for any fever or chills. *If the leg or arm with the puncture becomes cold, numb or painful, call [facility] Card[facility]    *Activity should be limited for the next 48 hours. Climb stairs as little as possible and avoid any stooping, bending or strenuous activity for 48 hours. No heavy lifting (anything over 10 pounds) for three days. *Do not drive for 48 hours. *You may resume your usual diet. Drink more fluids than usual.    *Have a responsible person drive you home and stay with you for at least 24 hours after your heart catheterization/angiography. *You may remove the bandage from your Left Wrist in 24 hours. You may shower in 24 hours. No tub baths, hot tubs or swimming for one week. Do not place any lotions, creams, powders, ointments over the puncture site for one week. You may place a clean band-aid over the puncture site each day for 5 days. Change this daily.              Resume glucophage on July 4 Saturday     Heart Attack: Care Instructions  Your Care Instructions     A heart attack (myocardial infarction, or MI) occurs when one or more of the coronary arteries, which supply the heart with oxygen-rich blood, is blocked. A blockage usually occurs when plaque inside the artery breaks open and a blood clot forms in the artery. After a heart attack, you may be worried about your future. Over the next several weeks, your heart will start to heal. Though it can be hard to break old habits, you can prevent another heart attack by making some lifestyle changes and by taking medicines. You may use this information for ideas about what to do at home to speed your recovery. Follow-up care is a key part of your treatment and safety. Be sure to make and go to all appointments, and call your doctor if you are having problems. It's also a good idea to know your test results and keep a list of the medicines you take. How can you care for yourself at home? Activity  · Until your doctor says it is okay, do not do strenuous exercise. And do not lift, pull, or push anything heavy. Ask your doctor what types of activities are safe for you. · If your doctor has not set you up with a cardiac rehabilitation (rehab) program, talk to him or her about whether that is right for you. Cardiac rehab includes supervised exercise. It also includes help with diet and lifestyle changes and emotional support. It may reduce your risk of future heart problems. · Increase your activities slowly. Take short rest breaks when you get tired. · If your doctor recommends it, get more exercise. Walking is a good choice. Bit by bit, increase the amount you walk every day. Try for at least 30 minutes on most days of the week. You also may want to swim, bike, or do other activities. Talk with your doctor before you start an exercise program to make sure it is safe for you. · Ask your doctor when you can drive, go back to work, and do other daily activities again. · You can have sex as soon as you feel ready for it. Often this means when you can easily walk around or climb stairs. Talk with your doctor if you have any concerns.  If you are taking nitroglycerin, do not take erection-enhancing medicine such as sildenafil (Viagra), tadalafil (Cialis), or vardenafil (Levitra) . Lifestyle changes  · Do not smoke. Smoking increases your risk of another heart attack. If you need help quitting, talk to your doctor about stop-smoking programs and medicines. These can increase your chances of quitting for good. · Eat a heart-healthy diet that is low in saturated fat and salt, and is full of fruits, vegetables and whole-grains. You may get more details about how to eat healthy. But these tips can help you get started. · Stay at a healthy weight, or lose weight if you need to. Medicines  · Be safe with medicines. Take your medicines exactly as prescribed. Call your doctor if you think you are having a problem with your medicine. You will get more details on the specific medicines your doctor prescribes. Do not stop taking your medicine unless your doctor tells you to. Not taking your medicine might raise your risk of having another heart attack. · You may need several medicines to help lower your risk of another heart attack. These include:  ? Blood pressure medicines such as angiotensin-converting enzyme (ACE) inhibitors, ARBs (angiotensin II receptor blockers), and beta-blockers. ? Cholesterol medicine called statins. ? Aspirin and other blood thinners. These prevent blood clots that can cause a heart attack. · If your doctor has given you nitroglycerin, keep it with you at all times. If you have angina symptoms, such as chest pain or pressure, sit down and rest. Take the first dose of nitroglycerin as directed. If symptoms get worse or are not getting better within 5 minutes, call 911 right away. Stay on the phone. The emergency  will tell you what to do.   · Do not take any over-the-counter medicines, vitamins, or herbal products without talking to your doctor first.  Staying healthy  · Manage other health conditions such as high blood pressure and diabetes. · Avoid colds and flu. Get a pneumococcal vaccine shot. If you have had one before, ask your doctor whether you need another dose. Get the flu vaccine every year. If you must be around people with colds or flu, wash your hands often. · Be sure to tell your doctor about any angina symptoms you have had, even if they went away. Pay attention to your angina symptoms. Know what is typical for you and learn how to control it. Know when to call for help. · Talk to your family, friends, or a counselor about your feelings. It is normal to feel frightened, angry, hopeless, helpless, and even guilty. Talking openly about bad feelings can help you cope. If you have symptoms of depression, talk to your doctor. When should you call for help? [id number] anytime you think you may need emergency care. For example, call if:  · You have symptoms of a heart attack. These may include:  ? Chest pain or pressure, or a strange feeling in the chest.  ? Sweating. ? Shortness of breath. ? Nausea or vomiting. ? Pain, pressure, or a strange feeling in the back, neck, jaw, or upper belly or in one or both shoulders or arms. ? Lightheadedness or sudden weakness. ? A fast or irregular heartbeat. After you call 911, the  may tell you to chew 1 adult-strength or 2 to 4 low-dose aspirin. Wait for an ambulance. Do not try to drive yourself. · You have angina symptoms (such as chest pain or pressure) that do not go away with rest or are not getting better within 5 minutes after you take a dose of nitroglycerin. · You passed out (lost consciousness). · You feel like you are having another heart attack. Call your doctor now or seek immediate medical care if:  · You are having angina symptoms, such as chest pain or pressure, more often than usual, or the symptoms are different or worse than usual.  · You have new or increased shortness of breath. · You are dizzy or lightheaded, or you feel like you may faint.   Watch closely for changes in your health, and be sure to contact your doctor if you have any problems. Where can you learn more? Go to http://abdulaziz-evelia.info/  Enter H564 in the search box to learn more about \"Heart Attack: Care Instructions. \"  Current as of: December 16, 2019               Content Version: 12.5  © 3234-1173 "[employer]. Care instructions adapted under license by [employer] (which disclaims liability or warranty for this information). If you have questions about a medical condition or this instruction, always ask your healthcare professional. [address] any warranty or liability for your use of this information. Learning About Percutaneous Coronary Intervention  What is percutaneous coronary intervention? Percutaneous coronary intervention (PCI) is the name for procedures to open a blocked coronary artery. The two most common are coronary angioplasty and coronary stent placement. A PCI is a way to open a blocked coronary artery before, during, or after a heart attack. It gets blood flowing to your heart. And it can help prevent heart problems by widening an artery that has been narrowed by fatty buildup (plaque). This also may be called balloon angioplasty. Before a PCI, a doctor does a test to find blocked arteries. The test is called cardiac catheterization. The doctor puts a tiny tube called a catheter into an artery in your groin or arm. The doctor moves the catheter through the artery to your heart. Then he or she puts a dye into the catheter. This makes your heart's arteries show up on a screen so the doctor can see any blockages. The test also can measure the pressure inside your heart's chambers. If you have a blocked artery, the doctor may do an angioplasty or a coronary stent procedure. In an angioplasty, the doctor uses a catheter with a tiny balloon at the tip.  He or she puts it into the blocked area and inflates it. The balloon presses the plaque against the walls of the artery. This makes more room for blood to flow. In most cases, the doctor then puts a stent in the artery. A stent is a small, expandable tube that presses against the walls of the artery. The stent is left in the artery to keep the artery open. This helps blood flow. It also may keep small pieces of plaque from breaking off and causing a heart attack. How is PCI done? PCI is done in a cardiac catheterization laboratory (\"cath lab\"). It is done by a heart specialist called a cardiologist. The whole procedure may take 1½ to 3 hours. You lie on a table under a large X-ray machine. You will get medicine through an IV in one of your veins. It helps you relax and not feel pain. You will be awake during the procedure. But you may not be able to remember much about it. The doctor will inject some medicine into your arm or groin to numb the skin. You will feel a small needle stick. It's like having a blood test. You may feel some pressure when the doctor puts in the catheter. But you will not feel pain. The doctor will look at X-ray pictures on a monitor (like a TV set) to move the catheter to your heart. You may feel warm or flushed for a short time when the doctor injects dye into your artery. The doctor then will inflate a tiny balloon at the end of the catheter. The balloon is inflated for a brief time. Then it is deflated and removed. The doctor also may use the catheter to put a stent in the artery. What can you expect after PCI? The catheter will be removed. A nurse or doctor may press on a bandage on the opening. Then a bandage or a compression device may be placed on your groin or wrist at the catheter insertion site. This prevents bleeding. After the test, you will be taken to a room where the catheter site and your heart rate, blood pressure, and temperature will be checked several times.  If the catheter was put in your groin, you will need to lie still and keep your leg straight for several hours. If the catheter was put in your arm, you may be able to sit up and get out of bed right away. But you will need to keep your arm still for at least one hour. You may stay 1 night in the hospital. When you go home, you will get instructions from your doctor to help you recover well and prevent problems. Make sure to drink plenty of fluids (unless your doctor tells you not to) for several hours after the test. This will help flush the dye out of your body. Your doctor will prescribe blood-thinning medicines. You will likely take aspirin plus another blood thinner. It is very important that you take these medicines exactly as directed. They help keep the coronary artery open and reduce your risk of a heart attack. If you have this procedure, you will still need to make lifestyle changes like eating healthy, being active, and not smoking. This will give you the best chance for a longer, healthier life. Follow-up care is a key part of your treatment and safety. Be sure to make and go to all appointments, and call your doctor if you are having problems. It's also a good idea to know your test results and keep a list of the medicines you take. Where can you learn more? Go to http://abdulaziz-evelia.info/  Enter M058 in the search box to learn more about \"Learning About Percutaneous Coronary Intervention. \"  Current as of: December 16, 2019               Content Version: 12.5  © 0244-0769 [employer]. Care instructions adapted under license by [employer] (which disclaims liability or warranty for this information). If you have questions about a medical condition or this instruction, always ask your healthcare professional. [address] any warranty or liability for your use of this information.        Atorvastatin (Lipitor) - (By mouth)   Why this medicine is used:   Treats high cholesterol and triglyceride levels. Reduces the risk of angina, stroke, heart attack, or certain heart and blood vessel problems. Contact a nurse or doctor right away if you have:  · Severe headache, confusion, trouble speaking  · Dark urine or pale stools  · Yellow skin or eyes  · Nausea, vomiting, loss of appetite, stomach pain  · Muscle pain, tenderness, or weakness; unusual tiredness     Common side effects:  · Diarrhea  · Joint pain  © 2017 [facility] Information is for End User's use only and may not be sold, redistributed or otherwise used for commercial purposes.

## 2020-07-02 NOTE — ROUTINE PROCESS
Discharge instructions were reviewed with patient. An opportunity was given for questions. All medications were reviewed, and information was given on the new medications. Patient verbalized understanding, and has no questions at this time. Iv and monitor removed

## 2020-11-05 ENCOUNTER — ANESTHESIA EVENT (OUTPATIENT)
Dept: SURGERY | Age: 75
End: 2020-11-05
Payer: MEDICARE

## 2020-11-05 PROBLEM — I44.2 COMPLETE HEART BLOCK (HCC): Status: ACTIVE | Noted: 2020-11-05

## 2020-11-05 RX ORDER — SODIUM CHLORIDE, SODIUM LACTATE, POTASSIUM CHLORIDE, CALCIUM CHLORIDE 600; 310; 30; 20 MG/100ML; MG/100ML; MG/100ML; MG/100ML
100 INJECTION, SOLUTION INTRAVENOUS CONTINUOUS
Status: CANCELLED | OUTPATIENT
Start: 2020-11-05

## 2020-11-06 ENCOUNTER — ANESTHESIA (OUTPATIENT)
Dept: SURGERY | Age: 75
End: 2020-11-06
Payer: MEDICARE

## 2020-11-06 ENCOUNTER — HOSPITAL ENCOUNTER (OUTPATIENT)
Age: 75
Setting detail: OUTPATIENT SURGERY
Discharge: HOME | End: 2020-11-06
Attending: INTERNAL MEDICINE | Admitting: INTERNAL MEDICINE
Payer: MEDICARE

## 2020-11-06 VITALS
OXYGEN SATURATION: 98 % | DIASTOLIC BLOOD PRESSURE: 51 MMHG | HEIGHT: 73 IN | BODY MASS INDEX: 28.1 KG/M2 | RESPIRATION RATE: 12 BRPM | WEIGHT: 212 LBS | SYSTOLIC BLOOD PRESSURE: 129 MMHG | HEART RATE: 66 BPM | TEMPERATURE: 98 F

## 2020-11-06 DIAGNOSIS — I10 ESSENTIAL HYPERTENSION, BENIGN: Chronic | ICD-10-CM

## 2020-11-06 DIAGNOSIS — I25.10 CORONARY ARTERY DISEASE INVOLVING NATIVE CORONARY ARTERY OF NATIVE HEART WITHOUT ANGINA PECTORIS: ICD-10-CM

## 2020-11-06 DIAGNOSIS — I25.10 CHRONIC CORONARY ARTERY DISEASE: Chronic | ICD-10-CM

## 2020-11-06 DIAGNOSIS — Z45.010 PACEMAKER AT END OF BATTERY LIFE: ICD-10-CM

## 2020-11-06 DIAGNOSIS — I50.22 CHRONIC SYSTOLIC HEART FAILURE (HCC): ICD-10-CM

## 2020-11-06 DIAGNOSIS — I44.2 COMPLETE HEART BLOCK (HCC): ICD-10-CM

## 2020-11-06 LAB
ANION GAP SERPL CALC-SCNC: 6 MMOL/L (ref 7–16)
ATRIAL RATE: 68 BPM
BUN SERPL-MCNC: 12 MG/DL (ref 8–23)
CALCIUM SERPL-MCNC: 9.6 MG/DL (ref 8.3–10.4)
CALCULATED P AXIS, ECG09: 67 DEGREES
CALCULATED R AXIS, ECG10: -79 DEGREES
CALCULATED T AXIS, ECG11: 97 DEGREES
CHLORIDE SERPL-SCNC: 108 MMOL/L (ref 98–107)
CO2 SERPL-SCNC: 27 MMOL/L (ref 21–32)
CREAT SERPL-MCNC: 1.06 MG/DL (ref 0.8–1.5)
DIAGNOSIS, 93000: NORMAL
ERYTHROCYTE [DISTWIDTH] IN BLOOD BY AUTOMATED COUNT: 12.8 % (ref 11.9–14.6)
GLUCOSE SERPL-MCNC: 159 MG/DL (ref 65–100)
HCT VFR BLD AUTO: 42.2 % (ref 41.1–50.3)
HGB BLD-MCNC: 14.2 G/DL (ref 13.6–17.2)
INR PPP: 1
MAGNESIUM SERPL-MCNC: 2 MG/DL (ref 1.8–2.4)
MCH RBC QN AUTO: 30.3 PG (ref 26.1–32.9)
MCHC RBC AUTO-ENTMCNC: 33.6 G/DL (ref 31.4–35)
MCV RBC AUTO: 90 FL (ref 79.6–97.8)
NRBC # BLD: 0 K/UL (ref 0–0.2)
P-R INTERVAL, ECG05: 184 MS
PLATELET # BLD AUTO: 157 K/UL (ref 150–450)
PMV BLD AUTO: 10.8 FL (ref 9.4–12.3)
POTASSIUM SERPL-SCNC: 4.1 MMOL/L (ref 3.5–5.1)
PROTHROMBIN TIME: 13.9 SEC (ref 12.5–14.7)
Q-T INTERVAL, ECG07: 472 MS
QRS DURATION, ECG06: 186 MS
QTC CALCULATION (BEZET), ECG08: 501 MS
RBC # BLD AUTO: 4.69 M/UL (ref 4.23–5.6)
SODIUM SERPL-SCNC: 141 MMOL/L (ref 138–145)
VENTRICULAR RATE, ECG03: 68 BPM
WBC # BLD AUTO: 6.2 K/UL (ref 4.3–11.1)

## 2020-11-06 PROCEDURE — 74011250636 HC RX REV CODE- 250/636: Performed by: NURSE ANESTHETIST, CERTIFIED REGISTERED

## 2020-11-06 PROCEDURE — 85610 PROTHROMBIN TIME: CPT

## 2020-11-06 PROCEDURE — 77030031304 HC WAVWGD EIGR DISP INVO -D

## 2020-11-06 PROCEDURE — 74011000272 HC RX REV CODE- 272: Performed by: INTERNAL MEDICINE

## 2020-11-06 PROCEDURE — 77030022704 HC SUT VLOC COVD -B

## 2020-11-06 PROCEDURE — 77030010507 HC ADH SKN DERMBND J&J -B

## 2020-11-06 PROCEDURE — 33228 REMV&REPLC PM GEN DUAL LEAD: CPT | Performed by: INTERNAL MEDICINE

## 2020-11-06 PROCEDURE — 83735 ASSAY OF MAGNESIUM: CPT

## 2020-11-06 PROCEDURE — 76060000032 HC ANESTHESIA 0.5 TO 1 HR: Performed by: INTERNAL MEDICINE

## 2020-11-06 PROCEDURE — 77030041279 HC DRSG PRMSL AG MDII -B

## 2020-11-06 PROCEDURE — 85027 COMPLETE CBC AUTOMATED: CPT

## 2020-11-06 PROCEDURE — 74011250636 HC RX REV CODE- 250/636: Performed by: INTERNAL MEDICINE

## 2020-11-06 PROCEDURE — C1785 PMKR, DUAL, RATE-RESP: HCPCS

## 2020-11-06 PROCEDURE — 33228 REMV&REPLC PM GEN DUAL LEAD: CPT

## 2020-11-06 PROCEDURE — 74011000250 HC RX REV CODE- 250: Performed by: NURSE ANESTHETIST, CERTIFIED REGISTERED

## 2020-11-06 PROCEDURE — 77030033067 HC SUT PDO STRATFX SPIR J&J -B

## 2020-11-06 PROCEDURE — 80048 BASIC METABOLIC PNL TOTAL CA: CPT

## 2020-11-06 PROCEDURE — 74011000250 HC RX REV CODE- 250: Performed by: INTERNAL MEDICINE

## 2020-11-06 RX ORDER — CEFAZOLIN SODIUM/WATER 2 G/20 ML
2 SYRINGE (ML) INTRAVENOUS ONCE
Status: COMPLETED | OUTPATIENT
Start: 2020-11-06 | End: 2020-11-06

## 2020-11-06 RX ORDER — PROPOFOL 10 MG/ML
INJECTION, EMULSION INTRAVENOUS AS NEEDED
Status: DISCONTINUED | OUTPATIENT
Start: 2020-11-06 | End: 2020-11-06 | Stop reason: HOSPADM

## 2020-11-06 RX ORDER — SODIUM CHLORIDE, SODIUM LACTATE, POTASSIUM CHLORIDE, CALCIUM CHLORIDE 600; 310; 30; 20 MG/100ML; MG/100ML; MG/100ML; MG/100ML
INJECTION, SOLUTION INTRAVENOUS
Status: DISCONTINUED | OUTPATIENT
Start: 2020-11-06 | End: 2020-11-06 | Stop reason: HOSPADM

## 2020-11-06 RX ORDER — DOXYCYCLINE 100 MG/1
100 TABLET ORAL 2 TIMES DAILY
Qty: 10 TAB | Refills: 0 | Status: SHIPPED | OUTPATIENT
Start: 2020-11-06 | End: 2020-11-11

## 2020-11-06 RX ORDER — HYDROMORPHONE HYDROCHLORIDE 2 MG/ML
0.5 INJECTION, SOLUTION INTRAMUSCULAR; INTRAVENOUS; SUBCUTANEOUS
Status: DISCONTINUED | OUTPATIENT
Start: 2020-11-06 | End: 2020-11-06 | Stop reason: HOSPADM

## 2020-11-06 RX ORDER — ALBUTEROL SULFATE 0.83 MG/ML
2.5 SOLUTION RESPIRATORY (INHALATION) AS NEEDED
Status: DISCONTINUED | OUTPATIENT
Start: 2020-11-06 | End: 2020-11-06 | Stop reason: HOSPADM

## 2020-11-06 RX ORDER — LIDOCAINE HYDROCHLORIDE 20 MG/ML
INJECTION, SOLUTION EPIDURAL; INFILTRATION; INTRACAUDAL; PERINEURAL AS NEEDED
Status: DISCONTINUED | OUTPATIENT
Start: 2020-11-06 | End: 2020-11-06 | Stop reason: HOSPADM

## 2020-11-06 RX ORDER — PROPOFOL 10 MG/ML
INJECTION, EMULSION INTRAVENOUS
Status: DISCONTINUED | OUTPATIENT
Start: 2020-11-06 | End: 2020-11-06 | Stop reason: HOSPADM

## 2020-11-06 RX ORDER — OXYCODONE HYDROCHLORIDE 5 MG/1
5 TABLET ORAL
Status: DISCONTINUED | OUTPATIENT
Start: 2020-11-06 | End: 2020-11-06 | Stop reason: HOSPADM

## 2020-11-06 RX ORDER — LIDOCAINE HYDROCHLORIDE AND EPINEPHRINE 10; 10 MG/ML; UG/ML
40 INJECTION, SOLUTION INFILTRATION; PERINEURAL ONCE
Status: COMPLETED | OUTPATIENT
Start: 2020-11-06 | End: 2020-11-06

## 2020-11-06 RX ORDER — SODIUM CHLORIDE 0.9 % (FLUSH) 0.9 %
5 SYRINGE (ML) INJECTION AS NEEDED
Status: DISCONTINUED | OUTPATIENT
Start: 2020-11-06 | End: 2020-11-06 | Stop reason: HOSPADM

## 2020-11-06 RX ORDER — SODIUM CHLORIDE, SODIUM LACTATE, POTASSIUM CHLORIDE, CALCIUM CHLORIDE 600; 310; 30; 20 MG/100ML; MG/100ML; MG/100ML; MG/100ML
50 INJECTION, SOLUTION INTRAVENOUS CONTINUOUS
Status: DISCONTINUED | OUTPATIENT
Start: 2020-11-06 | End: 2020-11-06 | Stop reason: HOSPADM

## 2020-11-06 RX ORDER — HEPARIN SODIUM 200 [USP'U]/100ML
2000 INJECTION, SOLUTION INTRAVENOUS AS NEEDED
Status: DISCONTINUED | OUTPATIENT
Start: 2020-11-06 | End: 2020-11-06 | Stop reason: HOSPADM

## 2020-11-06 RX ADMIN — LIDOCAINE HYDROCHLORIDE,EPINEPHRINE BITARTRATE 200 MG: 10; .01 INJECTION, SOLUTION INFILTRATION; PERINEURAL at 13:16

## 2020-11-06 RX ADMIN — Medication 2 G: at 12:45

## 2020-11-06 RX ADMIN — PROPOFOL 140 MCG/KG/MIN: 10 INJECTION, EMULSION INTRAVENOUS at 12:44

## 2020-11-06 RX ADMIN — NEOMYCIN AND POLYMYXIN B SULFATES: 40; 200000 SOLUTION IRRIGATION at 13:16

## 2020-11-06 RX ADMIN — SODIUM CHLORIDE, SODIUM LACTATE, POTASSIUM CHLORIDE, AND CALCIUM CHLORIDE: 600; 310; 30; 20 INJECTION, SOLUTION INTRAVENOUS at 12:39

## 2020-11-06 RX ADMIN — PROPOFOL 40 MG: 10 INJECTION, EMULSION INTRAVENOUS at 12:44

## 2020-11-06 RX ADMIN — LIDOCAINE HYDROCHLORIDE 100 MG: 20 INJECTION, SOLUTION EPIDURAL; INFILTRATION; INTRACAUDAL; PERINEURAL at 12:44

## 2020-11-06 NOTE — ANESTHESIA PREPROCEDURE EVALUATION
Relevant Problems   No relevant active problems       Anesthetic History   No history of anesthetic complications            Review of Systems / Medical History  Patient summary reviewed, nursing notes reviewed and pertinent labs reviewed    Pulmonary  Within defined limits                 Neuro/Psych         TIA     Cardiovascular    Hypertension    Angina    Dysrhythmias   Pacemaker, CAD, cardiac stents (June 2020) and CABG      Comments: TTE 8/'20- EF 40-45%, mod MR   GI/Hepatic/Renal  Within defined limits              Endo/Other    Diabetes: type 2  Hypothyroidism: well controlled       Other Findings              Physical Exam    Airway  Mallampati: II      Mouth opening: Normal     Cardiovascular  Regular rate and rhythm,  S1 and S2 normal,  no murmur, click, rub, or gallop             Dental    Dentition: Full upper dentures and Full lower dentures     Pulmonary  Breath sounds clear to auscultation               Abdominal         Other Findings            Anesthetic Plan    ASA: 3  Anesthesia type: total IV anesthesia          Induction: Intravenous  Anesthetic plan and risks discussed with: Patient

## 2020-11-06 NOTE — DISCHARGE INSTRUCTIONS
Post Op Device Instructions        Please keep Aquacel dressing on wound until your 1-2 week follow up in device clinic. The dressing promotes healing and is meant to stay on the wound. Keep incision site completely dry for one week. During this week you may take a sponge bath, but no shower. After one week you may shower, cleaning the wound with soap and sexton. Do not use any lotions, creams, or ointments on the incision.         For four weeks after your implant    Do not lift anything heavier than a gallon of milk.    Do not raise your elbow above the level of your shoulder on the pacemaker implant side.    Avoid excessive pushing, pulling, or twisting.    Call you doctor for any of the following:    Any signs of infection, including redness, swelling or pain at the incision site, or a temperature of 101° F or greater.    If you have twitching chest muscles, hiccups that won't stop, or a swollen arm on the side of the incision.    Any feelings of light-headedness, chest pain, or shortness of breath.    Please notify your doctors and dentists that you have a pacemaker.        You should not drive until 1 week after your implant or after your first follow-up appointment, whichever comes first. At that time, you can discuss when you may return to your regular driving routine.        About 1 month after implant, you will receive a card by mail from the company who manufactured your pacemaker. Your device was manufactured by [employer] should carry this card with you at all times.        Avoid manipulating the device or leads with your fingers. Do not massage or excessively rub the implant site.        Please call the device clinic or Behzad Alatorre (EP Nurse) [phone] if you have questions or concerns about your device.  Please call the hospital if it is after 5 pm or the weekend please page the on call cardiologist at [facility] at [facility] will need to have your device checked 1- 2 weeks after the procedure and should have an appointment with the device clinic.

## 2020-11-06 NOTE — PROGRESS NOTES
Patient received to [address] [place] [address] room # 9  Ambulatory from [facility]. Patient scheduled for Gen change today with Dr Daphney Zayas. Procedure reviewed & questions answered, voiced good understanding consent obtained & placed on chart. All medications and medical history reviewed. Will prep patient per orders. Patient & family updated on plan of care.       The patient has a fraility score of 3-MANAGING WELL, based on ability to perform ADLs by self

## 2020-11-06 NOTE — PROCEDURES
: Jakob Coleman. Masood Mendiola MD    REFERRING: Kirit Hardin. Hayley Ji MD    Pre-Procedure Diagnosis  1. PPM generator BRAXTON. 2. Documented non-reversible symptomatic bradycardia due to second degree and/or third degree atrioventricular block. Procedure Performed  1. Dual Chamber PPM Gen Change    Anesthesia: MAC     Complications: None     Estimated Blood Loss: Less than 10 mL     Fluoro Exposure: 0 minutes    Specimens: * No specimens in log *     Patient Information and Indications: The procedure, indications, risks, benefits, and alternatives were discussed with the patient and family members, who desired to proceed after questions were answered and informed consent was documented. Procedure: After informed consent was obtained, the patient was brought to the Electrophysiology Laboratory in a fasting state and was prepped and draped in sterile fashion. Prophylactic antibiotic was administered prior to skin incision. Local anesthetic (sensorcaine) was delivered to the right pectoral region and an incision was made directly over the lower prior surgical scar. The subcutaneous pocket was opened using blunt dissection and electrocautery, and adequate hemostasis was established. The device was freed from overlying fibrotic tissue and the leads freed to give enough slack for device exchange. The leads were individually removed from the old generator and tested using the PSA revealing excellent pacing parameters. The lead pins were then cleaned with antibiotic soaked gauze, dried gently, and attached to a new pacemaker generator. The chronic generator was removed from the field. Pins were directly observed to pass the tip electrode, and the ring hex wrench screws were secured, and leads tug tested. The device and leads were gently positioned within the pocket. The pocket was irrigated copiously with a saline antimicrobial solution prior to device positioning.  The device and leads were tested a second time prior to pocket closure. The wound was closed with two layers of absorbable suture (3-0 Stratafix) followed by skin closure with 4-0 V-Loc in a running fashion. The patient tolerated the procedure well and left the lab in good condition. There were no complications. All sharps and sponges were counted x 2. Device and Lead Information  Pulse Generator Model #  Serial # Location Implant   [id number] [facility] [id number] Right Pectoral Implant   [id number] [facility] [id number] Right Pectoral Explant   Lead Model Number  Serial Number Lead position Implant   RA [id number] [facility] [id number] RA Appendage Chronic   RV [id number] [facility] [id number] RV  Chronic     Lead Sensitivity and Threshold  Lead R or P sensitivity (mv) Threshold (V) Threshold PW (msec) Impedance (ohms) Final output Voltage (V) Final PW (msec)   RA 1.1 1.0 0.4 390 2.0 0.4   RV paced 1.0 0.4 490 2.5 0.4     Bradycardia Settings  Eduard Mode LRL URL Pace AVD (ms) Sense AVD (ms) Rate Response Mode Switching Mode SW Rate   DDDR 60 130 200 150 On On 180       Impression: Successful dual chamber PPM generator replacement. Plan: Same day discharge. -Abx at discharge. -Device follow up in 1-2 weeks.  -Routine cardiac care per Dr. Heather Marrero.  Masood Mendiola MD, [address]  Clinical Cardiac Electrophysiology  [address] Cardiology    11/6/2020  1:34 PM

## 2020-11-06 NOTE — ANESTHESIA POSTPROCEDURE EVALUATION
Procedure(s):  DUAL CHAMBER PACEMAKER CHANGE OUT/ [facility].    total IV anesthesia    Anesthesia Post Evaluation      Multimodal analgesia: multimodal analgesia not used between 6 hours prior to anesthesia start to PACU discharge  Patient location during evaluation: bedside  Patient participation: complete - patient participated  Level of consciousness: awake and alert  Pain management: adequate  Airway patency: patent  Anesthetic complications: no  Cardiovascular status: acceptable  Respiratory status: acceptable, spontaneous ventilation and nonlabored ventilation  Hydration status: acceptable  Post anesthesia nausea and vomiting:  none      INITIAL Post-op Vital signs:   Vitals Value Taken Time   /68 11/6/2020  1:43 PM   Temp     Pulse 67 11/6/2020  1:47 PM   Resp     SpO2 96 % 11/6/2020  1:39 PM   Vitals shown include unvalidated device data.

## 2021-08-03 PROBLEM — I25.10 CAD (CORONARY ARTERY DISEASE): Status: RESOLVED | Noted: 2021-08-03 | Resolved: 2021-08-03

## 2022-03-18 PROBLEM — I44.2 COMPLETE HEART BLOCK (HCC): Status: ACTIVE | Noted: 2020-11-05

## 2022-03-18 PROBLEM — I50.22 CHRONIC SYSTOLIC HEART FAILURE (HCC): Status: ACTIVE | Noted: 2018-11-04

## 2022-03-18 PROBLEM — R55 SYNCOPE: Status: ACTIVE | Noted: 2019-01-23

## 2022-03-19 PROBLEM — H53.8 BLURRY VISION: Status: ACTIVE | Noted: 2017-08-05

## 2022-03-19 PROBLEM — H51.0: Status: ACTIVE | Noted: 2017-08-05

## 2022-03-19 PROBLEM — I34.0 NON-RHEUMATIC MITRAL REGURGITATION: Status: ACTIVE | Noted: 2018-11-13

## 2022-03-19 PROBLEM — Z95.0 PACEMAKER: Status: ACTIVE | Noted: 2018-02-06

## 2022-03-19 PROBLEM — I43 HYPERTENSIVE CARDIOMYOPATHY, WITH HEART FAILURE (HCC): Status: ACTIVE | Noted: 2018-11-04

## 2022-03-19 PROBLEM — I11.0 HYPERTENSIVE CARDIOMYOPATHY, WITH HEART FAILURE (HCC): Status: ACTIVE | Noted: 2018-11-04

## 2022-03-19 PROBLEM — I21.4 NSTEMI (NON-ST ELEVATED MYOCARDIAL INFARCTION) (HCC): Status: ACTIVE | Noted: 2018-11-04

## 2022-03-19 PROBLEM — E11.9 TYPE 2 DIABETES MELLITUS WITHOUT COMPLICATION, WITHOUT LONG-TERM CURRENT USE OF INSULIN (HCC): Status: ACTIVE | Noted: 2017-12-19

## 2022-03-19 PROBLEM — E03.9 ACQUIRED HYPOTHYROIDISM: Status: ACTIVE | Noted: 2018-03-19

## 2022-03-20 PROBLEM — I25.5 ISCHEMIC CARDIOMYOPATHY: Status: ACTIVE | Noted: 2018-01-09

## 2022-03-20 PROBLEM — E78.5 HYPERLIPIDEMIA: Status: ACTIVE | Noted: 2018-10-12

## 2022-05-26 ENCOUNTER — TELEPHONE (OUTPATIENT)
Dept: FAMILY MEDICINE CLINIC | Facility: CLINIC | Age: 77
End: 2022-05-26

## 2022-05-26 DIAGNOSIS — D72.829 LEUKOCYTOSIS, UNSPECIFIED TYPE: ICD-10-CM

## 2022-05-26 DIAGNOSIS — I10 PRIMARY HYPERTENSION: ICD-10-CM

## 2022-05-26 DIAGNOSIS — E11.9 TYPE 2 DIABETES MELLITUS WITHOUT COMPLICATION, WITHOUT LONG-TERM CURRENT USE OF INSULIN (HCC): Primary | ICD-10-CM

## 2022-05-28 ENCOUNTER — HOSPITAL ENCOUNTER (INPATIENT)
Age: 77
LOS: 2 days | Discharge: HOME | DRG: 153 | End: 2022-05-30
Attending: INTERNAL MEDICINE | Admitting: INTERNAL MEDICINE
Payer: MEDICARE

## 2022-05-28 DIAGNOSIS — I25.5 ISCHEMIC CARDIOMYOPATHY: Primary | ICD-10-CM

## 2022-05-28 PROBLEM — I44.2 COMPLETE HEART BLOCK (HCC): Status: ACTIVE | Noted: 2020-11-05

## 2022-05-28 PROBLEM — Z95.0 PACEMAKER: Status: ACTIVE | Noted: 2018-02-06

## 2022-05-28 PROBLEM — R53.1 WEAKNESS GENERALIZED: Status: ACTIVE | Noted: 2022-05-28

## 2022-05-28 PROBLEM — E11.9 TYPE 2 DIABETES MELLITUS WITHOUT COMPLICATION, WITHOUT LONG-TERM CURRENT USE OF INSULIN (HCC): Chronic | Status: ACTIVE | Noted: 2017-12-19

## 2022-05-28 PROBLEM — E11.9 TYPE 2 DIABETES MELLITUS WITHOUT COMPLICATION, WITHOUT LONG-TERM CURRENT USE OF INSULIN (HCC): Status: ACTIVE | Noted: 2017-12-19

## 2022-05-28 PROBLEM — I50.22 CHRONIC SYSTOLIC HEART FAILURE (HCC): Chronic | Status: ACTIVE | Noted: 2018-11-04

## 2022-05-28 PROBLEM — E03.9 ACQUIRED HYPOTHYROIDISM: Chronic | Status: ACTIVE | Noted: 2018-03-19

## 2022-05-28 PROBLEM — E03.9 ACQUIRED HYPOTHYROIDISM: Status: ACTIVE | Noted: 2018-03-19

## 2022-05-28 PROBLEM — E78.5 HYPERLIPIDEMIA: Status: ACTIVE | Noted: 2018-10-12

## 2022-05-28 PROBLEM — E78.5 HYPERLIPIDEMIA: Chronic | Status: ACTIVE | Noted: 2018-10-12

## 2022-05-28 PROBLEM — I44.2 COMPLETE HEART BLOCK (HCC): Chronic | Status: ACTIVE | Noted: 2020-11-05

## 2022-05-28 PROBLEM — R05.9 COUGH: Status: ACTIVE | Noted: 2022-05-28

## 2022-05-28 PROBLEM — Z95.0 PACEMAKER: Chronic | Status: ACTIVE | Noted: 2018-02-06

## 2022-05-28 PROBLEM — I50.22 CHRONIC SYSTOLIC HEART FAILURE (HCC): Status: ACTIVE | Noted: 2018-11-04

## 2022-05-28 PROBLEM — D72.829 LEUKOCYTOSIS: Status: ACTIVE | Noted: 2022-05-28

## 2022-05-28 LAB
EKG ATRIAL RATE: 90 BPM
EKG DIAGNOSIS: NORMAL
EKG P AXIS: 66 DEGREES
EKG P-R INTERVAL: 164 MS
EKG Q-T INTERVAL: 434 MS
EKG QRS DURATION: 176 MS
EKG QTC CALCULATION (BAZETT): 530 MS
EKG R AXIS: -74 DEGREES
EKG T AXIS: 109 DEGREES
EKG VENTRICULAR RATE: 90 BPM
GLUCOSE BLD STRIP.AUTO-MCNC: 120 MG/DL (ref 65–100)
LACTATE SERPL-SCNC: 1.5 MMOL/L (ref 0.4–2)
MAGNESIUM SERPL-MCNC: 2 MG/DL (ref 1.8–2.4)
PROCALCITONIN SERPL-MCNC: 0.15 NG/ML (ref 0–0.49)
SERVICE CMNT-IMP: ABNORMAL
TROPONIN I SERPL HS-MCNC: 1410.4 PG/ML (ref 0–14)
UFH PPP CHRO-ACNC: 0.36 IU/ML (ref 0.3–0.7)

## 2022-05-28 PROCEDURE — 84484 ASSAY OF TROPONIN QUANT: CPT

## 2022-05-28 PROCEDURE — 83735 ASSAY OF MAGNESIUM: CPT

## 2022-05-28 PROCEDURE — 83605 ASSAY OF LACTIC ACID: CPT

## 2022-05-28 PROCEDURE — 84145 PROCALCITONIN (PCT): CPT

## 2022-05-28 PROCEDURE — 2580000003 HC RX 258: Performed by: NURSE PRACTITIONER

## 2022-05-28 PROCEDURE — 6370000000 HC RX 637 (ALT 250 FOR IP): Performed by: NURSE PRACTITIONER

## 2022-05-28 PROCEDURE — 36415 COLL VENOUS BLD VENIPUNCTURE: CPT

## 2022-05-28 PROCEDURE — 99223 1ST HOSP IP/OBS HIGH 75: CPT | Performed by: INTERNAL MEDICINE

## 2022-05-28 PROCEDURE — 82962 GLUCOSE BLOOD TEST: CPT

## 2022-05-28 PROCEDURE — 85520 HEPARIN ASSAY: CPT

## 2022-05-28 PROCEDURE — 1100000003 HC PRIVATE W/ TELEMETRY

## 2022-05-28 RX ORDER — ACETAMINOPHEN 325 MG/1
650 TABLET ORAL EVERY 6 HOURS PRN
Status: DISCONTINUED | OUTPATIENT
Start: 2022-05-28 | End: 2022-05-30 | Stop reason: HOSPADM

## 2022-05-28 RX ORDER — ONDANSETRON 8 MG/1
4 TABLET, ORALLY DISINTEGRATING ORAL EVERY 8 HOURS PRN
Status: DISCONTINUED | OUTPATIENT
Start: 2022-05-28 | End: 2022-05-30 | Stop reason: HOSPADM

## 2022-05-28 RX ORDER — ACARBOSE 25 MG/1
25 TABLET ORAL
Status: DISCONTINUED | OUTPATIENT
Start: 2022-05-28 | End: 2022-05-30 | Stop reason: HOSPADM

## 2022-05-28 RX ORDER — HEPARIN SODIUM 10000 [USP'U]/100ML
5-30 INJECTION, SOLUTION INTRAVENOUS CONTINUOUS
Status: DISCONTINUED | OUTPATIENT
Start: 2022-05-28 | End: 2022-05-30 | Stop reason: HOSPADM

## 2022-05-28 RX ORDER — ASPIRIN 81 MG/1
81 TABLET, CHEWABLE ORAL DAILY
Status: DISCONTINUED | OUTPATIENT
Start: 2022-05-29 | End: 2022-05-30 | Stop reason: HOSPADM

## 2022-05-28 RX ORDER — POTASSIUM CHLORIDE 20 MEQ/1
40 TABLET, EXTENDED RELEASE ORAL PRN
Status: DISCONTINUED | OUTPATIENT
Start: 2022-05-28 | End: 2022-05-30 | Stop reason: HOSPADM

## 2022-05-28 RX ORDER — GLUCAGON 1 MG/ML
1 KIT INJECTION PRN
Status: DISCONTINUED | OUTPATIENT
Start: 2022-05-28 | End: 2022-05-30 | Stop reason: HOSPADM

## 2022-05-28 RX ORDER — ATORVASTATIN CALCIUM 20 MG/1
20 TABLET, FILM COATED ORAL NIGHTLY
Status: DISCONTINUED | OUTPATIENT
Start: 2022-05-28 | End: 2022-05-30 | Stop reason: HOSPADM

## 2022-05-28 RX ORDER — MAGNESIUM SULFATE IN WATER 40 MG/ML
2000 INJECTION, SOLUTION INTRAVENOUS PRN
Status: DISCONTINUED | OUTPATIENT
Start: 2022-05-28 | End: 2022-05-30 | Stop reason: HOSPADM

## 2022-05-28 RX ORDER — MAGNESIUM HYDROXIDE/ALUMINUM HYDROXICE/SIMETHICONE 120; 1200; 1200 MG/30ML; MG/30ML; MG/30ML
30 SUSPENSION ORAL EVERY 6 HOURS PRN
Status: DISCONTINUED | OUTPATIENT
Start: 2022-05-28 | End: 2022-05-30 | Stop reason: HOSPADM

## 2022-05-28 RX ORDER — POLYETHYLENE GLYCOL 3350 17 G/17G
17 POWDER, FOR SOLUTION ORAL DAILY PRN
Status: DISCONTINUED | OUTPATIENT
Start: 2022-05-28 | End: 2022-05-30 | Stop reason: HOSPADM

## 2022-05-28 RX ORDER — ONDANSETRON 2 MG/ML
4 INJECTION INTRAMUSCULAR; INTRAVENOUS EVERY 6 HOURS PRN
Status: DISCONTINUED | OUTPATIENT
Start: 2022-05-28 | End: 2022-05-30 | Stop reason: HOSPADM

## 2022-05-28 RX ORDER — HEPARIN SODIUM 1000 [USP'U]/ML
4000 INJECTION, SOLUTION INTRAVENOUS; SUBCUTANEOUS PRN
Status: DISCONTINUED | OUTPATIENT
Start: 2022-05-28 | End: 2022-05-30 | Stop reason: HOSPADM

## 2022-05-28 RX ORDER — SODIUM CHLORIDE 0.9 % (FLUSH) 0.9 %
5-40 SYRINGE (ML) INJECTION EVERY 12 HOURS SCHEDULED
Status: DISCONTINUED | OUTPATIENT
Start: 2022-05-28 | End: 2022-05-30 | Stop reason: HOSPADM

## 2022-05-28 RX ORDER — HEPARIN SODIUM 1000 [USP'U]/ML
2000 INJECTION, SOLUTION INTRAVENOUS; SUBCUTANEOUS PRN
Status: DISCONTINUED | OUTPATIENT
Start: 2022-05-28 | End: 2022-05-30 | Stop reason: HOSPADM

## 2022-05-28 RX ORDER — INSULIN LISPRO 100 [IU]/ML
0-8 INJECTION, SOLUTION INTRAVENOUS; SUBCUTANEOUS
Status: DISCONTINUED | OUTPATIENT
Start: 2022-05-28 | End: 2022-05-30 | Stop reason: HOSPADM

## 2022-05-28 RX ORDER — GLIPIZIDE 5 MG/1
10 TABLET ORAL
Status: DISCONTINUED | OUTPATIENT
Start: 2022-05-29 | End: 2022-05-30 | Stop reason: HOSPADM

## 2022-05-28 RX ORDER — LISINOPRIL 20 MG/1
20 TABLET ORAL 2 TIMES DAILY
Status: DISCONTINUED | OUTPATIENT
Start: 2022-05-28 | End: 2022-05-30 | Stop reason: HOSPADM

## 2022-05-28 RX ORDER — CARVEDILOL 12.5 MG/1
12.5 TABLET ORAL 2 TIMES DAILY WITH MEALS
Status: DISCONTINUED | OUTPATIENT
Start: 2022-05-28 | End: 2022-05-30 | Stop reason: HOSPADM

## 2022-05-28 RX ORDER — FAMOTIDINE 20 MG/1
20 TABLET, FILM COATED ORAL 2 TIMES DAILY
Status: DISCONTINUED | OUTPATIENT
Start: 2022-05-28 | End: 2022-05-30 | Stop reason: HOSPADM

## 2022-05-28 RX ORDER — DEXTROSE MONOHYDRATE 50 MG/ML
100 INJECTION, SOLUTION INTRAVENOUS PRN
Status: DISCONTINUED | OUTPATIENT
Start: 2022-05-28 | End: 2022-05-30 | Stop reason: HOSPADM

## 2022-05-28 RX ORDER — ACETAMINOPHEN 650 MG/1
650 SUPPOSITORY RECTAL EVERY 6 HOURS PRN
Status: DISCONTINUED | OUTPATIENT
Start: 2022-05-28 | End: 2022-05-30 | Stop reason: HOSPADM

## 2022-05-28 RX ORDER — NITROGLYCERIN 0.4 MG/1
0.4 TABLET SUBLINGUAL EVERY 5 MIN PRN
Status: DISCONTINUED | OUTPATIENT
Start: 2022-05-28 | End: 2022-05-30 | Stop reason: HOSPADM

## 2022-05-28 RX ORDER — INSULIN LISPRO 100 [IU]/ML
0-8 INJECTION, SOLUTION INTRAVENOUS; SUBCUTANEOUS EVERY 4 HOURS
Status: DISCONTINUED | OUTPATIENT
Start: 2022-05-29 | End: 2022-05-28

## 2022-05-28 RX ORDER — SPIRONOLACTONE 25 MG/1
25 TABLET ORAL DAILY
Status: DISCONTINUED | OUTPATIENT
Start: 2022-05-28 | End: 2022-05-30 | Stop reason: HOSPADM

## 2022-05-28 RX ORDER — POTASSIUM CHLORIDE 7.45 MG/ML
10 INJECTION INTRAVENOUS PRN
Status: DISCONTINUED | OUTPATIENT
Start: 2022-05-28 | End: 2022-05-30 | Stop reason: HOSPADM

## 2022-05-28 RX ORDER — SODIUM CHLORIDE 0.9 % (FLUSH) 0.9 %
5-40 SYRINGE (ML) INJECTION PRN
Status: DISCONTINUED | OUTPATIENT
Start: 2022-05-28 | End: 2022-05-30 | Stop reason: HOSPADM

## 2022-05-28 RX ORDER — SODIUM CHLORIDE 9 MG/ML
INJECTION, SOLUTION INTRAVENOUS PRN
Status: DISCONTINUED | OUTPATIENT
Start: 2022-05-28 | End: 2022-05-30 | Stop reason: HOSPADM

## 2022-05-28 RX ORDER — GUAIFENESIN 100 MG/5ML
200 SOLUTION ORAL EVERY 4 HOURS PRN
Status: DISCONTINUED | OUTPATIENT
Start: 2022-05-28 | End: 2022-05-30 | Stop reason: HOSPADM

## 2022-05-28 RX ADMIN — ATORVASTATIN CALCIUM 20 MG: 20 TABLET, FILM COATED ORAL at 21:36

## 2022-05-28 RX ADMIN — FAMOTIDINE 20 MG: 20 TABLET, FILM COATED ORAL at 21:37

## 2022-05-28 RX ADMIN — SODIUM CHLORIDE, PRESERVATIVE FREE 10 ML: 5 INJECTION INTRAVENOUS at 21:37

## 2022-05-28 RX ADMIN — CARVEDILOL 12.5 MG: 12.5 TABLET, FILM COATED ORAL at 21:36

## 2022-05-28 RX ADMIN — LISINOPRIL 20 MG: 20 TABLET ORAL at 21:36

## 2022-05-28 NOTE — PROGRESS NOTES
Attending Addendum to Mario Packer NP    Patient independently seen and examined by me. Agree with above note by physician extender with the following additions and exceptions: 66yo WM with a history of CAD s/p CABG c/b ICM with EF 40%, CHB s/p St Te dual chamber PPM (>99% RV paced), HTN, DM, CVA, admitted with cough, sore throat, not feeling well, and elevated troponin at OSH. Key findings are:  No CP or MERAZ  CV- RRR without murmur  Lungs- Clear bilaterally  Ext- no edema    Plan: etiology of elevated troponin unclear       --no chest pain, worsening SOB       --cycle troponin, check TTE       --heparin gtt already started at OSH       --low threshold to stop heparin pending labs and clinical course       --clinical history not c/w NSTEMI/ACS       --symptoms suggestive of viral URI       --further plan as noted in Mario Mack NP and pending clinical course    Sagrario PICHARDO  [address] [facility] Cardiology

## 2022-05-29 ENCOUNTER — APPOINTMENT (OUTPATIENT)
Dept: NON INVASIVE DIAGNOSTICS | Age: 77
DRG: 153 | End: 2022-05-29
Attending: INTERNAL MEDICINE
Payer: MEDICARE

## 2022-05-29 LAB
ANION GAP SERPL CALC-SCNC: 9 MMOL/L (ref 7–16)
APPEARANCE UR: CLEAR
BILIRUB UR QL: NEGATIVE
BUN SERPL-MCNC: 14 MG/DL (ref 8–23)
CALCIUM SERPL-MCNC: 9.7 MG/DL (ref 8.3–10.4)
CHLORIDE SERPL-SCNC: 100 MMOL/L (ref 98–107)
CHOLEST SERPL-MCNC: 79 MG/DL
CO2 SERPL-SCNC: 24 MMOL/L (ref 21–32)
COLOR UR: YELLOW
CREAT SERPL-MCNC: 1 MG/DL (ref 0.8–1.5)
ECHO AO ASC DIAM: 2.6 CM
ECHO AO ASCENDING AORTA INDEX: 1.2 CM/M2
ECHO AO ROOT DIAM: 3 CM
ECHO AO ROOT INDEX: 1.39 CM/M2
ECHO AV AREA PEAK VELOCITY: 1.8 CM2
ECHO AV AREA VTI: 1.9 CM2
ECHO AV AREA/BSA PEAK VELOCITY: 0.8 CM2/M2
ECHO AV AREA/BSA VTI: 0.9 CM2/M2
ECHO AV MEAN GRADIENT: 5 MMHG
ECHO AV MEAN VELOCITY: 1.1 M/S
ECHO AV PEAK GRADIENT: 10 MMHG
ECHO AV PEAK VELOCITY: 1.6 M/S
ECHO AV VELOCITY RATIO: 0.56
ECHO AV VTI: 27 CM
ECHO EST RA PRESSURE: 3 MMHG
ECHO IVC PROX: 1.4 CM
ECHO LA AREA 2C: 18.1 CM2
ECHO LA AREA 4C: 20.7 CM2
ECHO LA DIAMETER INDEX: 2.08 CM/M2
ECHO LA DIAMETER: 4.5 CM
ECHO LA MAJOR AXIS: 5.4 CM
ECHO LA MINOR AXIS: 4.9 CM
ECHO LA TO AORTIC ROOT RATIO: 1.5
ECHO LA VOL BP: 60 ML (ref 18–58)
ECHO LA VOL/BSA BIPLANE: 28 ML/M2 (ref 16–34)
ECHO LV E' LATERAL VELOCITY: 9 CM/S
ECHO LV E' SEPTAL VELOCITY: 7 CM/S
ECHO LV EDV A4C: 150 ML
ECHO LV EDV INDEX A4C: 69 ML/M2
ECHO LV EJECTION FRACTION A4C: 45 %
ECHO LV ESV A4C: 82 ML
ECHO LV ESV INDEX A4C: 38 ML/M2
ECHO LV FRACTIONAL SHORTENING: 18 % (ref 28–44)
ECHO LV INTERNAL DIMENSION DIASTOLE INDEX: 1.85 CM/M2
ECHO LV INTERNAL DIMENSION DIASTOLIC: 4 CM (ref 4.2–5.9)
ECHO LV INTERNAL DIMENSION SYSTOLIC INDEX: 1.53 CM/M2
ECHO LV INTERNAL DIMENSION SYSTOLIC: 3.3 CM
ECHO LV IVSD: 1.3 CM (ref 0.6–1)
ECHO LV MASS 2D: 175.8 G (ref 88–224)
ECHO LV MASS INDEX 2D: 81.4 G/M2 (ref 49–115)
ECHO LV POSTERIOR WALL DIASTOLIC: 1.2 CM (ref 0.6–1)
ECHO LV RELATIVE WALL THICKNESS RATIO: 0.6
ECHO LVOT AREA: 3.1 CM2
ECHO LVOT AV VTI INDEX: 0.6
ECHO LVOT DIAM: 2 CM
ECHO LVOT MEAN GRADIENT: 2 MMHG
ECHO LVOT PEAK GRADIENT: 3 MMHG
ECHO LVOT PEAK VELOCITY: 0.9 M/S
ECHO LVOT STROKE VOLUME INDEX: 23.4 ML/M2
ECHO LVOT SV: 50.6 ML
ECHO LVOT VTI: 16.1 CM
ECHO MV A VELOCITY: 1.5 M/S
ECHO MV AREA VTI: 1.6 CM2
ECHO MV E DECELERATION TIME (DT): 144 MS
ECHO MV E VELOCITY: 0.97 M/S
ECHO MV E/A RATIO: 0.65
ECHO MV E/E' LATERAL: 10.78
ECHO MV E/E' RATIO (AVERAGED): 12.32
ECHO MV E/E' SEPTAL: 13.86
ECHO MV LVOT VTI INDEX: 1.98
ECHO MV MAX VELOCITY: 1.9 M/S
ECHO MV MEAN GRADIENT: 6 MMHG
ECHO MV MEAN VELOCITY: 1.2 M/S
ECHO MV PEAK GRADIENT: 14 MMHG
ECHO MV VTI: 31.8 CM
ECHO PV ACCELERATION TIME (AT): 111 MS
ECHO PV MAX VELOCITY: 0.8 M/S
ECHO PV PEAK GRADIENT: 2 MMHG
ECHO RIGHT VENTRICULAR SYSTOLIC PRESSURE (RVSP): 32 MMHG
ECHO RV BASAL DIMENSION: 3.9 CM
ECHO RV TAPSE: 1.3 CM (ref 1.7–?)
ECHO TV REGURGITANT MAX VELOCITY: 2.69 M/S
ECHO TV REGURGITANT PEAK GRADIENT: 29 MMHG
ERYTHROCYTE [DISTWIDTH] IN BLOOD BY AUTOMATED COUNT: 12.8 % (ref 11.9–14.6)
GLUCOSE BLD STRIP.AUTO-MCNC: 135 MG/DL (ref 65–100)
GLUCOSE BLD STRIP.AUTO-MCNC: 137 MG/DL (ref 65–100)
GLUCOSE BLD STRIP.AUTO-MCNC: 152 MG/DL (ref 65–100)
GLUCOSE BLD STRIP.AUTO-MCNC: 164 MG/DL (ref 65–100)
GLUCOSE SERPL-MCNC: 170 MG/DL (ref 65–100)
GLUCOSE UR STRIP.AUTO-MCNC: NEGATIVE MG/DL
HCT VFR BLD AUTO: 38.8 % (ref 41.1–50.3)
HDLC SERPL-MCNC: 39 MG/DL (ref 40–60)
HDLC SERPL: 2 {RATIO}
HGB BLD-MCNC: 13.3 G/DL (ref 13.6–17.2)
HGB UR QL STRIP: NEGATIVE
KETONES UR QL STRIP.AUTO: NEGATIVE MG/DL
LDLC SERPL CALC-MCNC: 12 MG/DL
LEUKOCYTE ESTERASE UR QL STRIP.AUTO: NEGATIVE
MCH RBC QN AUTO: 31.2 PG (ref 26.1–32.9)
MCHC RBC AUTO-ENTMCNC: 34.3 G/DL (ref 31.4–35)
MCV RBC AUTO: 91.1 FL (ref 79.6–97.8)
NITRITE UR QL STRIP.AUTO: NEGATIVE
NRBC # BLD: 0 K/UL (ref 0–0.2)
PH UR STRIP: 7 [PH] (ref 5–9)
PLATELET # BLD AUTO: 135 K/UL (ref 150–450)
PMV BLD AUTO: 12.4 FL (ref 9.4–12.3)
POTASSIUM SERPL-SCNC: 4.1 MMOL/L (ref 3.5–5.1)
PROT UR STRIP-MCNC: NEGATIVE MG/DL
RBC # BLD AUTO: 4.26 M/UL (ref 4.23–5.6)
SERVICE CMNT-IMP: ABNORMAL
SODIUM SERPL-SCNC: 133 MMOL/L (ref 138–145)
SP GR UR REFRACTOMETRY: 1.01 (ref 1–1.02)
TRIGL SERPL-MCNC: 140 MG/DL (ref 35–150)
TROPONIN I SERPL HS-MCNC: 1215.2 PG/ML (ref 0–14)
TROPONIN I SERPL HS-MCNC: 1272.6 PG/ML (ref 0–14)
UFH PPP CHRO-ACNC: 0.15 IU/ML (ref 0.3–0.7)
UFH PPP CHRO-ACNC: 0.29 IU/ML (ref 0.3–0.7)
UFH PPP CHRO-ACNC: 0.35 IU/ML (ref 0.3–0.7)
UROBILINOGEN UR QL STRIP.AUTO: 0.2 EU/DL (ref 0.2–1)
VLDLC SERPL CALC-MCNC: 28 MG/DL (ref 6–23)
WBC # BLD AUTO: 10.6 K/UL (ref 4.3–11.1)

## 2022-05-29 PROCEDURE — 85520 HEPARIN ASSAY: CPT

## 2022-05-29 PROCEDURE — 82962 GLUCOSE BLOOD TEST: CPT

## 2022-05-29 PROCEDURE — 1100000003 HC PRIVATE W/ TELEMETRY

## 2022-05-29 PROCEDURE — 99232 SBSQ HOSP IP/OBS MODERATE 35: CPT | Performed by: INTERNAL MEDICINE

## 2022-05-29 PROCEDURE — 6360000002 HC RX W HCPCS: Performed by: NURSE PRACTITIONER

## 2022-05-29 PROCEDURE — 6370000000 HC RX 637 (ALT 250 FOR IP): Performed by: NURSE PRACTITIONER

## 2022-05-29 PROCEDURE — 93306 TTE W/DOPPLER COMPLETE: CPT | Performed by: INTERNAL MEDICINE

## 2022-05-29 PROCEDURE — 36415 COLL VENOUS BLD VENIPUNCTURE: CPT

## 2022-05-29 PROCEDURE — C8929 TTE W OR WO FOL WCON,DOPPLER: HCPCS

## 2022-05-29 PROCEDURE — 85027 COMPLETE CBC AUTOMATED: CPT

## 2022-05-29 PROCEDURE — 80048 BASIC METABOLIC PNL TOTAL CA: CPT

## 2022-05-29 PROCEDURE — 80061 LIPID PANEL: CPT

## 2022-05-29 PROCEDURE — 6360000004 HC RX CONTRAST MEDICATION: Performed by: INTERNAL MEDICINE

## 2022-05-29 PROCEDURE — 81003 URINALYSIS AUTO W/O SCOPE: CPT

## 2022-05-29 PROCEDURE — 84484 ASSAY OF TROPONIN QUANT: CPT

## 2022-05-29 PROCEDURE — 2580000003 HC RX 258: Performed by: NURSE PRACTITIONER

## 2022-05-29 RX ORDER — SODIUM CHLORIDE 9 MG/ML
INJECTION, SOLUTION INTRAVENOUS CONTINUOUS
Status: DISCONTINUED | OUTPATIENT
Start: 2022-05-29 | End: 2022-05-30 | Stop reason: HOSPADM

## 2022-05-29 RX ADMIN — ASPIRIN 81 MG: 81 TABLET, CHEWABLE ORAL at 09:32

## 2022-05-29 RX ADMIN — LEVOTHYROXINE SODIUM 137 MCG: 0.11 TABLET ORAL at 04:54

## 2022-05-29 RX ADMIN — FAMOTIDINE 20 MG: 20 TABLET, FILM COATED ORAL at 09:32

## 2022-05-29 RX ADMIN — LISINOPRIL 20 MG: 20 TABLET ORAL at 09:32

## 2022-05-29 RX ADMIN — FAMOTIDINE 20 MG: 20 TABLET, FILM COATED ORAL at 22:31

## 2022-05-29 RX ADMIN — LISINOPRIL 20 MG: 20 TABLET ORAL at 22:32

## 2022-05-29 RX ADMIN — ATORVASTATIN CALCIUM 20 MG: 20 TABLET, FILM COATED ORAL at 22:31

## 2022-05-29 RX ADMIN — ACETAMINOPHEN 650 MG: 325 TABLET ORAL at 22:30

## 2022-05-29 RX ADMIN — CARVEDILOL 12.5 MG: 12.5 TABLET, FILM COATED ORAL at 09:32

## 2022-05-29 RX ADMIN — CARVEDILOL 12.5 MG: 12.5 TABLET, FILM COATED ORAL at 16:14

## 2022-05-29 RX ADMIN — SODIUM CHLORIDE: 900 INJECTION, SOLUTION INTRAVENOUS at 17:46

## 2022-05-29 RX ADMIN — PERFLUTREN 4 ML: 6.52 INJECTION, SUSPENSION INTRAVENOUS at 10:40

## 2022-05-29 RX ADMIN — SODIUM CHLORIDE, PRESERVATIVE FREE 10 ML: 5 INJECTION INTRAVENOUS at 09:37

## 2022-05-29 RX ADMIN — SODIUM CHLORIDE: 900 INJECTION, SOLUTION INTRAVENOUS at 04:54

## 2022-05-29 RX ADMIN — HEPARIN SODIUM 2000 UNITS: 1000 INJECTION INTRAVENOUS; SUBCUTANEOUS at 06:27

## 2022-05-29 RX ADMIN — HEPARIN SODIUM 2000 UNITS: 1000 INJECTION INTRAVENOUS; SUBCUTANEOUS at 22:26

## 2022-05-29 RX ADMIN — SODIUM CHLORIDE, PRESERVATIVE FREE 10 ML: 5 INJECTION INTRAVENOUS at 22:32

## 2022-05-29 RX ADMIN — HEPARIN SODIUM AND DEXTROSE 14 UNITS/KG/HR: 10000; 5 INJECTION INTRAVENOUS at 09:27

## 2022-05-29 ASSESSMENT — PAIN - FUNCTIONAL ASSESSMENT: PAIN_FUNCTIONAL_ASSESSMENT: ACTIVITIES ARE NOT PREVENTED

## 2022-05-29 ASSESSMENT — PAIN SCALES - GENERAL: PAINLEVEL_OUTOF10: 3

## 2022-05-29 ASSESSMENT — PAIN DESCRIPTION - ORIENTATION: ORIENTATION: ANTERIOR

## 2022-05-29 ASSESSMENT — PAIN DESCRIPTION - DESCRIPTORS: DESCRIPTORS: ACHING;SORE

## 2022-05-29 ASSESSMENT — PAIN DESCRIPTION - ONSET: ONSET: SUDDEN

## 2022-05-29 ASSESSMENT — PAIN DESCRIPTION - LOCATION: LOCATION: HEAD

## 2022-05-29 ASSESSMENT — PAIN DESCRIPTION - PAIN TYPE: TYPE: ACUTE PAIN

## 2022-05-29 NOTE — PROGRESS NOTES
TRANSFER - IN REPORT:    Pt arrived before shift change around 1800 from [facility].    Verbal report received from [address] [place] [address], [facility] on Jose Eduardo Sales  being received from [facility] for routine progression of patient care      Report consisted of patient's Situation, Background, Assessment and   Recommendations(SBAR). Information from the following report(s) Nurse Handoff Report, Index, Intake/Output, MAR and Recent Results was reviewed with the receiving nurse. Opportunity for questions and clarification was provided. Assessment completed upon patient's arrival to unit and care assumed. Dual skin assessment completed with [address] [place] [address], RN at shift change. Sacrum and heels C/D/I. Scars noted on chest from pacer insertion and previous procedure. Scattered additional scars present. No additional abnormalities noted.

## 2022-05-29 NOTE — PROGRESS NOTES
Dorinda Kumar called to notify of critical troponin level. Dr. Ena Dent notified in person, face to face.

## 2022-05-29 NOTE — H&P
[facility] CARDIOLOGY   History & Physical                 Primary Cardiologist: Dr. Wendy Horne    Primary Care Physician: Dr. Cara Clemens    Admitting Physician: Dr. Debbie Tan:     Patient is a 64 y.o.  male with PMHx of CAD s/p CABG c/b ICM with EF 40%, CHB s/p St Te dual chamber PPM (>99% RV paced), HTN, HLD, DM, CVA and Hypothyroidism who presented to the ED at [facility] with c/o generalized weakness, sore throat and cough. He reports that he has had the cough for approx 1 week. Yesterday states he coughed up a large amount of \"yellow stuff. \" Today, his throat was sore and he felt very weak. States no HA, dizziness, palpitations, syncope, CP, SOB, MERAZ, N/VC/D, dysuria, edema, diaphoresis or F/C/S. States that he has \"sinus trouble\" and has been sneezing a lot as well. Reports taking all meds as directed. In the ED: Pt was found to have elevated Trop 0.36. He was given ASA + IV Heparin bolus +gtt. Transfer to [facility] was requested. Cardiac History:    1. History of coronary artery bypass grafting 2007, LIMA to LAD, SVG to OM, SVG to right PDA. 2.   PCI 11/2019, details unavailable. 3.   Cardiac catheterization report not in cardiology tab ([facility]). Procedure note reviewed with PCI to mid diagonal 2.25 x 15 Helio drug eluting stent. 4.   Cardiac catheterization, NSTEMI, two of three patent bypass grafts, LIMA to LAD patent, vein graft to PDA patent, occluded vein graft to OM. 5.   05/2020 echocardiogram - ejection fraction 35%. 6.   7/2020  PCI to D1 2.25 x 15 HUGO and 2.0 x 18 HUGO.   7.   8/2020 Echo EF 45%  8.   11/2020 device interrogation the 99% RV pacing  9.   7/16/2021 Device interrogation 99% RV pacing       Past Medical History:   Diagnosis Date    Acquired hypothyroidism 3/19/2018    AV junctional bradycardia     CAD (coronary artery disease)     Congestive heart failure (HCC)     Diabetes (HCC)     Endocrine disease     hypothyriod    Fatty liver     Fatty liver     Fatty liver     Headache 2017    Heart failure ([facility] [address]     Hypertension     Stroke [facility])       Past Surgical History:   Procedure Laterality Date    CHOLECYSTECTOMY      MA CARDIAC SURG PROCEDURE UNLIST      3 vessel bypass      Allergies   Allergen Reactions    Ticagrelor Anaphylaxis    Empagliflozin Other (See Comments)     Dyspepsia and atypical chest pain     Social History     Tobacco Use    Smoking status: Former Smoker     Quit date: 1972     Years since quittin.4    Smokeless tobacco: Never Used   Substance Use Topics    Alcohol use: No      FH:   Family History   Problem Relation Age of Onset    Diabetes Sister     Cancer Sister     Diabetes Sister     Heart Disease Mother         Review of Systems  General: no acute weight change, no appetite change, +weakness, no fatigue, no fever or chills, no diaphoresis  Skin: no rashes, lumps, wounds, sores or other skin changes  HEENT: no headache, dizziness, lightheadedness, vision changes, hearing changes, tinnitus, vertigo, sinus pressure/pain, bleeding gums, sore throat, or hoarseness  Neck: no swollen glands, goiter, pain or stiffness  Respiratory: +cough, +sputum, no hemoptysis, no dyspnea, no wheezing  Cardiovascular: + as per HPI, no palpitations, syncope, orthopnea, PND  Gastrointestinal: no increased reflux, no constipation, diarrhea, liver problems, GI bleeding, N/V, no abdominal pain or distension  Urinary: no frequency, urgency, hematuria, burning/pain with urination, flank pain, polyuria, nocturia, or difficulty urinating  Peripheral Vascular: no claudication, leg cramps, prior DVTs, swelling of BLE, color change, or swelling with redness or tenderness  Musculoskeletal: no muscle or joint pain/stiffness, joint swelling, erythema of joints, or back pain  Psychiatric: no increased depression, anxiety or excessive stress  Neurological: no sensory or motor loss, seizures, syncope, tremors, numbness, tingling, no changes in mood, attention, or speech, no changes in orientation, memory, insight, or judgment. Hematologic: no anemia, no easy bruising or bleeding  Endocrine: +thyroid problems, no heat or cold intolerance, excessive sweating, polyuria, polydipsia, +diabetes. Objective:       /80   Pulse (!) 104   Temp 98.2 °F (36.8 °C) (Oral)   Resp 22   Wt 202 lb 9.6 oz (91.9 kg)   SpO2 93%   BMI 26.73 kg/m²     No intake/output data recorded. No intake/output data recorded. Physical Exam:  General: well developed, well nourished, NAD, resting comfortably  HEENT: PERRLA, no abnormalities noted, sclera clear, EOMs intact  Neck: supple, no JVD, trachea midline, no masses  Heart: S1S2 with RRR without murmurs, rubs or gallops  Lungs: clear bilaterally, normal effort on RA, no wheezing or rales  Abd: soft, nontender, nondistended, +bowel sounds  Ext: warm, no edema, calves supple/nontender, pulses 2+ bilaterally  Skin: warm and dry, intact to view  Psychiatric: appropriate mood and affect, cooperative  Neurologic: A&O X 3, moves all 4 equally, CNs intact      ECG: ordered and pending, BEH EKG V paced    ECHO: ordered and pending    CXR: (from [id number] [facility])  IMPRESSION:   1. No acute process is identified. 2. Please see other potentially clinically significant findings and details as described above.        Data Review: Reviewed from [id number] [facility]  WBC 15  Neut 82.6      K 4.7  Mag 1.5  Tbili 2.3  ALT 26  AST 18  Trop 0.36        Assessment/Plan:   Principal Problem:    Weakness generalized -- new onset of feeling weak earlier today, s/s more consistent with URI, however, will cont to trend Claude and will cont IV heparin at this time, will add on UA, LA and PCT to further evaluate his leukocytosis, check ECHO in AM, further POC pending clinical course     Active Problems:    Chronic coronary artery disease -- cont ASA, BB, ACEi and statin      Complete heart block -- has PPM      Hypertension -- cont home meds, monitor BP and adjust meds as indicated      Chronic systolic heart failure -- check ECHO in AM, BNP from  but Pt w/o s/s of VO, follow daily labs/lytes and I&O      Pacemaker -- consider interrogation in AM, known 99% RV pacing      Fatty liver -- noted elevated TBili, chronic ??, consider US for better evaluation      Acquired hypothyroidism -- cont Synthroid      Type 2 diabetes mellitus without complication -- holding home PO meds for now, use SSI PRN, DM diet tonight      Hyperlipidemia -- cont statin, check lipids in AM      Ischemic cardiomyopathy -- known, check ECHO in AM      Leukocytosis -- f/u labs, unclear source -- viral ?      Cough -- Robitussin PRN          JACOBO Damon - CNP-C  5/28/2022  9:14 PM

## 2022-05-29 NOTE — PROGRESS NOTES
[facility] CARDIOLOGY PROGRESS NOTE           5/29/2022 9:09 AM    Admit Date: 5/28/2022    Admit Diagnosis: Chest pain [R07.9]      Subjective:   No complaints this AM, no chest pain or shortness of breath    Interval History: (History of pertinent interval events obtained from nursing staff)    ROS:  GEN:  No fever or chills  Cardiovascular:  As noted above  Pulmonary:  As noted above  Neuro:  No new focal motor or sensory loss      Objective:     Vitals:    05/29/22 0019 05/29/22 0514 05/29/22 0604 05/29/22 0741   BP: 133/70 (!) 120/56  (!) 106/56   Pulse: 92 93  95   Resp: 20 22 18   Temp: 97.7 °F (36.5 °C) 98.1 °F (36.7 °C)  98.1 °F (36.7 °C)   TempSrc: Temporal Temporal  Oral   SpO2: 94% 97%  91%   Weight:  202 lb (91.6 kg)     Height:   6' 1\" (1.854 m)        Physical Exam:  [place], Well Nourished, No Acute Distress, Alert & Oriented x 3, appropriate mood. Neck- supple, no JVD  CV- regular rate and rhythm no MRG  Lung- clear bilaterally  Abd- soft, nontender, nondistended  Ext- no edema bilaterally.   Skin- warm and dry    Current Facility-Administered Medications   Medication Dose Route Frequency    0.9 % sodium chloride infusion   IntraVENous Continuous    aspirin chewable tablet 81 mg  81 mg Oral Daily    atorvastatin (LIPITOR) tablet 20 mg  20 mg Oral Nightly    carvedilol (COREG) tablet 12.5 mg  12.5 mg Oral BID WC    glipiZIDE (GLUCOTROL) tablet 10 mg  10 mg Oral BID AC    levothyroxine (SYNTHROID) tablet 137 mcg  137 mcg Oral Daily    lisinopril (PRINIVIL;ZESTRIL) tablet 20 mg  20 mg Oral BID    [Held by provider] spironolactone (ALDACTONE) tablet 25 mg  25 mg Oral Daily    [Held by provider] Semaglutide TABS 7 mg (Rybelsus) - patient supplied (Patient Supplied)  7 mg Oral QAM AC    [Held by provider] acarbose (PRECOSE) tablet 25 mg  25 mg Oral TID WC    sodium chloride flush 0.9 % injection 5-40 mL  5-40 mL IntraVENous 2 times per day    sodium chloride flush 0.9 % injection 5-40 mL  5-40 mL IntraVENous PRN    0.9 % sodium chloride infusion   IntraVENous PRN    ondansetron (ZOFRAN-ODT) disintegrating tablet 4 mg  4 mg Oral Q8H PRN    Or    ondansetron (ZOFRAN) injection 4 mg  4 mg IntraVENous Q6H PRN    acetaminophen (TYLENOL) tablet 650 mg  650 mg Oral Q6H PRN    Or    acetaminophen (TYLENOL) suppository 650 mg  650 mg Rectal Q6H PRN    polyethylene glycol (GLYCOLAX) packet 17 g  17 g Oral Daily PRN    potassium chloride (KLOR-CON M) extended release tablet 40 mEq  40 mEq Oral PRN    Or    potassium bicarb-citric acid (EFFER-K) effervescent tablet 40 mEq  40 mEq Oral PRN    Or    potassium chloride 10 mEq/100 mL IVPB (Peripheral Line)  10 mEq IntraVENous PRN    magnesium sulfate 2000 mg in 50 mL IVPB premix  2,000 mg IntraVENous PRN    aluminum & magnesium hydroxide-simethicone (MAALOX) 200-200-20 MG/5ML suspension 30 mL  30 mL Oral Q6H PRN    famotidine (PEPCID) tablet 20 mg  20 mg Oral BID    nitroGLYCERIN (NITROSTAT) SL tablet 0.4 mg  0.4 mg SubLINGual Q5 Min PRN    guaiFENesin (ROBITUSSIN) 100 MG/5ML oral solution 200 mg  200 mg Oral Q4H PRN    benzocaine-menthol (CEPACOL SORE THROAT) lozenge 1 lozenge  1 lozenge Oral Q2H PRN    heparin (porcine) injection 4,000 Units  4,000 Units IntraVENous PRN    heparin (porcine) injection 2,000 Units  2,000 Units IntraVENous PRN    heparin 25,000 units in dextrose 5% 250 mL (premix) infusion  5-30 Units/kg/hr IntraVENous Continuous    glucose chewable tablet 16 g  4 tablet Oral PRN    dextrose bolus 10% 125 mL  125 mL IntraVENous PRN    Or    dextrose bolus 10% 250 mL  250 mL IntraVENous PRN    glucagon injection 1 mg  1 mg IntraMUSCular PRN    dextrose 5 % solution  100 mL/hr IntraVENous PRN    insulin lispro (HUMALOG) injection vial 0-8 Units  0-8 Units SubCUTAneous 4x Daily AC & HS     Data Review:   Recent Results (from the past 24 hour(s))   EKG 12 Lead    Collection Time: 05/28/22  8:06 PM Result Value Ref Range    Ventricular Rate 90 BPM    Atrial Rate 90 BPM    P-R Interval 164 ms    QRS Duration 176 ms    Q-T Interval 434 ms    QTc Calculation (Bazett) 530 ms    P Axis 66 degrees    R Axis -74 degrees    T Axis 109 degrees    Diagnosis Atrial-sensed ventricular-paced rhythm    Anti-Xa, Unfractionated Heparin    Collection Time: 05/28/22  9:14 PM   Result Value Ref Range    Anti-XA Unfrac Heparin 0.36 0.3 - 0.7 IU/mL   Magnesium    Collection Time: 05/28/22  9:14 PM   Result Value Ref Range    Magnesium 2.0 1.8 - 2.4 mg/dL   Procalcitonin    Collection Time: 05/28/22  9:14 PM   Result Value Ref Range    Procalcitonin 0.15 0.00 - 0.49 ng/mL   Lactic Acid    Collection Time: 05/28/22  9:14 PM   Result Value Ref Range    Lactic Acid, Plasma 1.5 0.4 - 2.0 MMOL/L   Troponin    Collection Time: 05/28/22  9:14 PM   Result Value Ref Range    Troponin, High Sensitivity 1,410.4 (HH) 0 - 14 pg/mL   POCT Glucose    Collection Time: 05/28/22  9:26 PM   Result Value Ref Range    POC Glucose 120 (H) 65 - 100 mg/dL    Performed by: Olga Crandall    Troponin    Collection Time: 05/28/22 11:01 PM   Result Value Ref Range    Troponin, High Sensitivity 1,272.6 (HH) 0 - 14 pg/mL   Urinalysis w rflx microscopic    Collection Time: 05/29/22  2:39 AM   Result Value Ref Range    Color, UA YELLOW      Appearance CLEAR      Specific Gravity, UA 1.010 1.001 - 1.023      pH, Urine 7.0 5.0 - 9.0      Protein, UA Negative NEG mg/dL    Glucose, UA Negative mg/dL    Ketones, Urine Negative NEG mg/dL    Bilirubin Urine Negative NEG      Blood, Urine Negative NEG      Urobilinogen, Urine 0.2 0.2 - 1.0 EU/dL    Nitrite, Urine Negative NEG      Leukocyte Esterase, Urine Negative NEG     Basic Metabolic Panel    Collection Time: 05/29/22  3:25 AM   Result Value Ref Range    Sodium 133 (L) 138 - 145 mmol/L    Potassium 4.1 3.5 - 5.1 mmol/L    Chloride 100 98 - 107 mmol/L    CO2 24 21 - 32 mmol/L    Anion Gap 9 7 - 16 mmol/L    Glucose 170 (H) 65 - 100 mg/dL    BUN 14 8 - 23 MG/DL    CREATININE 1.00 0.8 - 1.5 MG/DL    GFR African American >60 >60 ml/min/1.73m2    GFR Non- >60 >60 ml/min/1.73m2    Calcium 9.7 8.3 - 10.4 MG/DL   Troponin    Collection Time: 05/29/22  3:25 AM   Result Value Ref Range    Troponin, High Sensitivity 1,215.2 (HH) 0 - 14 pg/mL   Lipid panel - fasting    Collection Time: 05/29/22  3:25 AM   Result Value Ref Range    Cholesterol, Total 79 <200 MG/DL    Triglycerides 140 35 - 150 MG/DL    HDL 39 (L) 40 - 60 MG/DL    LDL Calculated 12 <100 MG/DL    VLDL Cholesterol Calculated 28 (H) 6.0 - 23.0 MG/DL    Chol/HDL Ratio 2.0     Anti-Xa, Unfractionated Heparin    Collection Time: 05/29/22  3:25 AM   Result Value Ref Range    Anti-XA Unfrac Heparin 0.15 (L) 0.3 - 0.7 IU/mL       EKG:  (EKG has been independently visualized by me with interpretation below)  Assessment:     Principal Problem:    Weakness generalized  Active Problems:    Leukocytosis    Cough    Chronic coronary artery disease    Complete heart block (HCC)    Hypertension    Chronic systolic heart failure (HCC)    Pacemaker    Fatty liver    Acquired hypothyroidism    Type 2 diabetes mellitus without complication, without long-term current use of insulin (HCC)    Hyperlipidemia    Ischemic cardiomyopathy  Resolved Problems:    * No resolved hospital problems. *    Plan:   1. Elevated troponin: unclear etiology, relatively flat, check TTE today, will consider stopping heparin pending TTE  2. URI: supportive care  3. PPM: dual chamber, 100% RV paced, could consider BiV upgrade pending clinical course or as OP, echo pending  4. CAD: cont ASA, BB, ACE, statin  5. HTN: controlled, cont meds  6. PPx: on heparin    Ventura Stein MD  Cardiology/Electrophysiology

## 2022-05-30 VITALS
TEMPERATURE: 97.8 F | RESPIRATION RATE: 18 BRPM | OXYGEN SATURATION: 92 % | DIASTOLIC BLOOD PRESSURE: 57 MMHG | WEIGHT: 198.3 LBS | SYSTOLIC BLOOD PRESSURE: 115 MMHG | HEART RATE: 92 BPM | HEIGHT: 73 IN | BODY MASS INDEX: 26.28 KG/M2

## 2022-05-30 LAB
ANION GAP SERPL CALC-SCNC: 6 MMOL/L (ref 7–16)
BUN SERPL-MCNC: 16 MG/DL (ref 8–23)
CALCIUM SERPL-MCNC: 9.2 MG/DL (ref 8.3–10.4)
CHLORIDE SERPL-SCNC: 103 MMOL/L (ref 98–107)
CO2 SERPL-SCNC: 26 MMOL/L (ref 21–32)
CREAT SERPL-MCNC: 1 MG/DL (ref 0.8–1.5)
ERYTHROCYTE [DISTWIDTH] IN BLOOD BY AUTOMATED COUNT: 12.4 % (ref 11.9–14.6)
GLUCOSE BLD STRIP.AUTO-MCNC: 159 MG/DL (ref 65–100)
GLUCOSE SERPL-MCNC: 176 MG/DL (ref 65–100)
HCT VFR BLD AUTO: 38.6 % (ref 41.1–50.3)
HGB BLD-MCNC: 13.2 G/DL (ref 13.6–17.2)
MCH RBC QN AUTO: 31.1 PG (ref 26.1–32.9)
MCHC RBC AUTO-ENTMCNC: 34.2 G/DL (ref 31.4–35)
MCV RBC AUTO: 90.8 FL (ref 79.6–97.8)
NRBC # BLD: 0 K/UL (ref 0–0.2)
PLATELET # BLD AUTO: 135 K/UL (ref 150–450)
PMV BLD AUTO: 10.9 FL (ref 9.4–12.3)
POTASSIUM SERPL-SCNC: 3.7 MMOL/L (ref 3.5–5.1)
RBC # BLD AUTO: 4.25 M/UL (ref 4.23–5.6)
SERVICE CMNT-IMP: ABNORMAL
SODIUM SERPL-SCNC: 135 MMOL/L (ref 136–145)
UFH PPP CHRO-ACNC: 0.38 IU/ML (ref 0.3–0.7)
WBC # BLD AUTO: 9.1 K/UL (ref 4.3–11.1)

## 2022-05-30 PROCEDURE — 80048 BASIC METABOLIC PNL TOTAL CA: CPT

## 2022-05-30 PROCEDURE — 6370000000 HC RX 637 (ALT 250 FOR IP): Performed by: NURSE PRACTITIONER

## 2022-05-30 PROCEDURE — 85520 HEPARIN ASSAY: CPT

## 2022-05-30 PROCEDURE — 2580000003 HC RX 258: Performed by: NURSE PRACTITIONER

## 2022-05-30 PROCEDURE — 36415 COLL VENOUS BLD VENIPUNCTURE: CPT

## 2022-05-30 PROCEDURE — 6360000002 HC RX W HCPCS: Performed by: NURSE PRACTITIONER

## 2022-05-30 PROCEDURE — 82962 GLUCOSE BLOOD TEST: CPT

## 2022-05-30 PROCEDURE — 99238 HOSP IP/OBS DSCHRG MGMT 30/<: CPT | Performed by: INTERNAL MEDICINE

## 2022-05-30 PROCEDURE — 85027 COMPLETE CBC AUTOMATED: CPT

## 2022-05-30 RX ADMIN — FAMOTIDINE 20 MG: 20 TABLET, FILM COATED ORAL at 08:20

## 2022-05-30 RX ADMIN — LISINOPRIL 20 MG: 20 TABLET ORAL at 08:20

## 2022-05-30 RX ADMIN — CARVEDILOL 12.5 MG: 12.5 TABLET, FILM COATED ORAL at 08:20

## 2022-05-30 RX ADMIN — SODIUM CHLORIDE: 900 INJECTION, SOLUTION INTRAVENOUS at 08:24

## 2022-05-30 RX ADMIN — HEPARIN SODIUM AND DEXTROSE 16 UNITS/KG/HR: 10000; 5 INJECTION INTRAVENOUS at 06:20

## 2022-05-30 RX ADMIN — LEVOTHYROXINE SODIUM 137 MCG: 0.11 TABLET ORAL at 05:06

## 2022-05-30 RX ADMIN — GLIPIZIDE 10 MG: 5 TABLET ORAL at 08:20

## 2022-05-30 RX ADMIN — SODIUM CHLORIDE, PRESERVATIVE FREE 10 ML: 5 INJECTION INTRAVENOUS at 08:23

## 2022-05-30 RX ADMIN — ASPIRIN 81 MG: 81 TABLET, CHEWABLE ORAL at 08:20

## 2022-05-30 ASSESSMENT — PAIN SCALES - GENERAL: PAINLEVEL_OUTOF10: 0

## 2022-05-30 NOTE — PROGRESS NOTES
Pt with discharge orders this day. Chart screened by  for discharge planning. No CM needs identified at time of discharge. Milestones met.        05/29/22 0600   Service Assessment   Support Systems Family Members   Discharge Planning   Type of Residence Trailer/Mobile Home;Other (Comment)  ([facility])   Living Arrangements Alone   Current Services Prior To Admission Other (Comment); Home Care  (home health)   Potential Assistance Needed N/A   DME Ordered? No   Potential Assistance Purchasing Medications No   Type of Home Care Services Nursing Services  (home health)   Patient expects to be discharged to: Other (comment)  ([facility])   One/Two Story Residence One story   History of falls?  0   Services At/After Discharge   Confirm Follow Up Transport Family   Condition of Participation: Discharge Planning   The Plan for Transition of Care is related to the following treatment goals: Home

## 2022-05-30 NOTE — PLAN OF CARE
Discharge instructions were reviewed with patient. An opportunity was given for questions. All medications were reviewed, and information was given on new medications, if any. Patient verbalized understanding, and has no questions at this time. IV's and telemetry box removed. E-sign pad not working.

## 2022-05-30 NOTE — H&P
[facility] Cardiology Discharge Summary     Patient ID:  Sangeetha Post  [id number]  89 y.o.  1945    Admit date: 5/28/2022    Discharge date and time:  5/30/2022    Admitting Physician: Karri Melendez MD     Discharge Physician: Dr. Aman Connolly    Admission Diagnoses: Chest pain [R07.9]    Discharge Diagnoses:    Diagnosis    Weakness generalized    Leukocytosis    Cough    Chronic coronary artery disease    Complete heart block ([address]    Hypertension    Chronic systolic heart failure ([facility] [address]    Hyperlipidemia    Acquired hypothyroidism    Pacemaker    Ischemic cardiomyopathy    Type 2 diabetes mellitus without complication, without long-term current use of insulin [facility])     Cardiology Procedures this admission:  EchoCardiogram  Consults: none    Hospital Course: Patient is a 64 y.o.  male with PMHx of CAD s/p CABG c/b ICM with EF 40%, CHB s/p St Te dual chamber PPM (>99% RV paced), HTN, HLD, DM, CVA and Hypothyroidism who presented to the ED at [facility] with c/o generalized weakness, sore throat and cough. He reports that he has had the cough for approx 1 week. Yesterday states he coughed up a large amount of \"yellow stuff. \" Today, his throat was sore and he felt very weak. States no HA, dizziness, palpitations, syncope, CP, SOB, MERAZ, N/VC/D, dysuria, edema, diaphoresis or F/C/S. States that he has \"sinus trouble\" and has been sneezing a lot as well. Reports taking all meds as directed. In the ED: Pt was found to have elevated Trop 0.36. He was given ASA + IV Heparin bolus +gtt. Transfer to [facility] was requested. Upon evaluation, symptoms were more consistent with URI, however continued to trend troponin. Elevated troponin remained relatively flat. Felt to be secondary to chronic ischemic cardiomyopathy. ECHO showed:      Left Ventricle: Left ventricle size is normal. Mildly increased wall thickness.   Left ventricular function is mild to moderately impaired with an ejection fraction of around 40% (calculated biplane EF 45%). There is severe hypokinesis of the mid to distal septum/apical segments. Grade I diastolic dysfunction with normal LAP.   Mitral Valve: Mild to moderate regurgitation which is eccentric and anteriorly directed.   Technical qualifiers: Technically difficult study due to patient's body habitus, color flow Doppler was performed and pulse wave and/or continuous wave Doppler was performed.   Contrast used: Definity. The following morning Pt was up feeling well without any complaints of CP, SOB, palpitations or LE swelling. Pt's labs were stable. Pt was seen and examined by Dr. Newton Phillips and determined stable and ready for discharge. Pt was instructed on the importance of medication compliance and office follow up. The patient has been scheduled for follow up with [facility]iology -- Dr. Zeynep Hollis in 2-4 weeks. DISPOSITION: The patient is being discharged home in stable condition on a low saturated fat, low cholesterol and low salt diet. Pt is instructed to follow a fluid restricted diet with no more than 2 liters per day. Pt is instructed to advance activities as tolerated limited to fatigue or shortness of breath. Pt is instructed to call office or return to ER for immediate evaluation of any shortness of breath or chest pain. Discharge Exam: BP (!) 115/57   Pulse 92   Temp 97.8 °F (36.6 °C) (Oral)   Resp 18   Ht 6' 1\" (1.854 m)   Wt 198 lb 4.8 oz (89.9 kg)   SpO2 92%   BMI 26.16 kg/m²   Pt has been seen by Dr Newton Phillips: see his progress note for exam details.     Recent Results (from the past 24 hour(s))   Transthoracic echocardiogram (TTE) complete with contrast, bubble, strain, and 3D PRN    Collection Time: 05/29/22 10:47 AM   Result Value Ref Range    LA Minor Axis 4.9 cm    LA Major Axis 5.4 cm    LA Area 2C 18.1 cm2    LA Area 4C 20.7 cm2    LA Volume BP 60 (A) 18 - 58 mL    LA Diameter 4.5 cm    LV EDV A4C 150 mL    LV ESV A4C 82 mL    IVSd 1.3 (A) 0.6 - 1.0 cm    LVIDd 4.0 (A) 4.2 - 5.9 cm    LVIDs 3.3 cm    LVOT Diameter 2.0 cm    LVOT Mean Gradient 2 mmHg    LVOT VTI 16.1 cm    LVOT Peak Velocity 0.9 m/s    LVOT Peak Gradient 3 mmHg    LVPWd 1.2 (A) 0.6 - 1.0 cm    LV E' Lateral Velocity 9 cm/s    LV E' Septal Velocity 7 cm/s    LV Ejection Fraction A4C 45 %    LVOT Area 3.1 cm2    LVOT SV 50.6 ml    AV Mean Velocity 1.1 m/s    AV Mean Gradient 5 mmHg    AV VTI 27.0 cm    AV Peak Velocity 1.6 m/s    AV Peak Gradient 10 mmHg    AV Area by VTI 1.9 cm2    AV Area by Peak Velocity 1.8 cm2    Aortic Root 3.0 cm    Ascending Aorta 2.6 cm    IVC Proxmal 1.4 cm    MV E Wave Deceleration Time 144.0 ms    MV A Velocity 1.50 m/s    MV E Velocity 0.97 m/s    MV Mean Gradient 6 mmHg    MV VTI 31.8 cm    MV Mean Velocity 1.2 m/s    MV Max Velocity 1.9 m/s    MV Peak Gradient 14 mmHg    MV Area by VTI 1.6 cm2    PV .0 ms    PV Max Velocity 0.8 m/s    PV Peak Gradient 2 mmHg    Est. RA Pressure 3 mmHg    RV Basal Dimension 3.9 cm    TAPSE 1.3 (A) 1.7 cm    TR Max Velocity 2.69 m/s    TR Peak Gradient 29 mmHg    Fractional Shortening 2D 18 28 - 44 %    LV ESV Index A4C 38 mL/m2    LV EDV Index A4C 69 mL/m2    LVIDd Index 1.85 cm/m2    LVIDs Index 1.53 cm/m2    LV RWT Ratio 0.60     LV Mass 2D 175.8 88 - 224 g    LV Mass 2D Index 81.4 49 - 115 g/m2    MV E/A 0.65     E/E' Ratio (Averaged) 12.32     E/E' Lateral 10.78     E/E' Septal 13.86     LA Volume Index BP 28 16 - 34 ml/m2    LVOT Stroke Volume Index 23.4 mL/m2    LA Size Index 2.08 cm/m2    LA/AO Root Ratio 1.50     Ao Root Index 1.39 cm/m2    Ascending Aorta Index 1.20 cm/m2    AV Velocity Ratio 0.56     LVOT:AV VTI Index 0.60     BUZZ/BSA VTI 0.9 cm2/m2    BUZZ/BSA Peak Velocity 0.8 cm2/m2    MV:LVOT VTI Index 1.98     RVSP 32 mmHg   POCT Glucose    Collection Time: 05/29/22 12:35 PM   Result Value Ref Range    POC Glucose 152 (H) 65 - 100 mg/dL    Performed by: Prosper    Anti-Xa, Unfractionated Heparin Collection Time: 05/29/22  1:01 PM   Result Value Ref Range    Anti-XA Unfrac Heparin 0.35 0.3 - 0.7 IU/mL   POCT Glucose    Collection Time: 05/29/22  3:57 PM   Result Value Ref Range    POC Glucose 135 (H) 65 - 100 mg/dL    Performed by: Xavi    Anti-Xa, Unfractionated Heparin    Collection Time: 05/29/22  7:28 PM   Result Value Ref Range    Anti-XA Unfrac Heparin 0.29 (L) 0.3 - 0.7 IU/mL   POCT Glucose    Collection Time: 05/29/22  8:16 PM   Result Value Ref Range    POC Glucose 137 (H) 65 - 100 mg/dL    Performed by: Kimberly Cabrera    Basic Metabolic Panel    Collection Time: 05/30/22  4:26 AM   Result Value Ref Range    Sodium 135 (L) 136 - 145 mmol/L    Potassium 3.7 3.5 - 5.1 mmol/L    Chloride 103 98 - 107 mmol/L    CO2 26 21 - 32 mmol/L    Anion Gap 6 (L) 7 - 16 mmol/L    Glucose 176 (H) 65 - 100 mg/dL    BUN 16 8 - 23 MG/DL    CREATININE 1.00 0.8 - 1.5 MG/DL    GFR African American >60 >60 ml/min/1.73m2    GFR Non- >60 >60 ml/min/1.73m2    Calcium 9.2 8.3 - 10.4 MG/DL   CBC    Collection Time: 05/30/22  4:26 AM   Result Value Ref Range    WBC 9.1 4.3 - 11.1 K/uL    RBC 4.25 4.23 - 5.6 M/uL    Hemoglobin 13.2 (L) 13.6 - 17.2 g/dL    Hematocrit 38.6 (L) 41.1 - 50.3 %    MCV 90.8 79.6 - 97.8 FL    MCH 31.1 26.1 - 32.9 PG    MCHC 34.2 31.4 - 35.0 g/dL    RDW 12.4 11.9 - 14.6 %    Platelets 881 (L) 992 - 450 K/uL    MPV 10.9 9.4 - 12.3 FL    nRBC 0.00 0.0 - 0.2 K/uL   Anti-Xa, Unfractionated Heparin    Collection Time: 05/30/22  4:26 AM   Result Value Ref Range    Anti-XA Unfrac Heparin 0.38 0.3 - 0.7 IU/mL   POCT Glucose    Collection Time: 05/30/22  7:45 AM   Result Value Ref Range    POC Glucose 159 (H) 65 - 100 mg/dL    Performed by: Emelyn Watkins      Patient Instructions:   Current Discharge Medication List           Details   acarbose (PRECOSE) 25 MG tablet Take 25 mg by mouth 3 times daily (with meals)      aspirin 81 MG chewable tablet Take 81 mg by mouth atorvastatin (LIPITOR) 20 MG tablet Take 20 mg by mouth      carvedilol (COREG) 12.5 MG tablet Take 12.5 mg by mouth 2 times daily (with meals)      vitamin D 25 MCG (1000 UT) CAPS Take by mouth daily      glimepiride (AMARYL) 4 MG tablet 1 po bid with a meal for diabetes instead of the glipizide)      levothyroxine (SYNTHROID) 137 MCG tablet TAKE 1 TABLET BY MOUTH ONCE DAILY BEFORE BREAKFAST      lisinopril (PRINIVIL;ZESTRIL) 20 MG tablet Take 20 mg by mouth 2 times daily      metFORMIN (GLUCOPHAGE) 1000 MG tablet Take 1,000 mg by mouth 2 times daily (with meals) Pt states he \"cut(s) pill in half\"      nitroGLYCERIN (NITROSTAT) 0.4 MG SL tablet Place 0.4 mg under the tongue      !! Semaglutide 3 MG TABS Take 3 mg by mouth every morning (before breakfast) Pt doesn't know dose      !! Semaglutide 7 MG TABS Take 7 mg by mouth every morning (before breakfast)      spironolactone (ALDACTONE) 25 MG tablet Take 25 mg by mouth daily      triamcinolone (KENALOG) 0.1 % cream Apply topically 2 times daily       ! ! - Potential duplicate medications found. Please discuss with provider.             Signed:  MARY ANN Ha  5/30/2022  9:42 AM

## 2022-05-30 NOTE — DISCHARGE SUMMARY
[facility] Cardiology Discharge Summary     Patient ID:  Ole Flores  [id number]  64 y.o. Admit date: 5/28/2022    Discharge date and time:  5/30/2022    Admitting Physician: Debbie Murray MD     Discharge Physician: Dr. Zeina Kincaid    Admission Diagnoses: Chest pain [R07.9]    Discharge Diagnoses:    Diagnosis    Weakness generalized    Leukocytosis    Cough    Chronic coronary artery disease    Complete heart block ([address]    Hypertension    Chronic systolic heart failure ([facility] [address]    Hyperlipidemia    Acquired hypothyroidism    Pacemaker    Ischemic cardiomyopathy    Type 2 diabetes mellitus without complication, without long-term current use of insulin [facility])     Cardiology Procedures this admission:  EchoCardiogram  Consults: none    Hospital Course: Patient is a 64 y.o.  male with PMHx of CAD s/p CABG c/b ICM with EF 40%, CHB s/p St Te dual chamber PPM (>99% RV paced), HTN, HLD, DM, CVA and Hypothyroidism who presented to the ED at [facility] with c/o generalized weakness, sore throat and cough. He reports that he has had the cough for approx 1 week. Yesterday states he coughed up a large amount of \"yellow stuff. \" Today, his throat was sore and he felt very weak. States no HA, dizziness, palpitations, syncope, CP, SOB, MERAZ, N/VC/D, dysuria, edema, diaphoresis or F/C/S. States that he has \"sinus trouble\" and has been sneezing a lot as well. Reports taking all meds as directed. In the ED: Pt was found to have elevated Trop 0.36. He was given ASA + IV Heparin bolus +gtt. Transfer to [facility] was requested. Upon evaluation, symptoms were more consistent with URI, however continued to trend troponin. Elevated troponin remained relatively flat. Felt to be secondary to chronic ischemic cardiomyopathy. ECHO showed:      Left Ventricle: Left ventricle size is normal. Mildly increased wall thickness.   Left ventricular function is mild to moderately impaired with an ejection fraction of around 40% (calculated biplane EF 45%). There is severe hypokinesis of the mid to distal septum/apical segments. Grade I diastolic dysfunction with normal LAP.   Mitral Valve: Mild to moderate regurgitation which is eccentric and anteriorly directed.   Technical qualifiers: Technically difficult study due to patient's body habitus, color flow Doppler was performed and pulse wave and/or continuous wave Doppler was performed.   Contrast used: Definity. The following morning Pt was up feeling well without any complaints of CP, SOB, palpitations or LE swelling. Pt's labs were stable. Pt was seen and examined by Dr. Liseth Estrella and determined stable and ready for discharge. Pt was instructed on the importance of medication compliance and office follow up. The patient has been scheduled for follow up with [facility]iology -- Dr. Rita Leal in 2-4 weeks. DISPOSITION: The patient is being discharged home in stable condition on a low saturated fat, low cholesterol and low salt diet. Pt is instructed to follow a fluid restricted diet with no more than 2 liters per day. Pt is instructed to advance activities as tolerated limited to fatigue or shortness of breath. Pt is instructed to call office or return to ER for immediate evaluation of any shortness of breath or chest pain. Discharge Exam: BP (!) 115/57   Pulse 92   Temp 97.8 °F (36.6 °C) (Oral)   Resp 18   Ht 6' 1\" (1.854 m)   Wt 198 lb 4.8 oz (89.9 kg)   SpO2 92%   BMI 26.16 kg/m²   Pt has been seen by Dr Liseth Estrella: see his progress note for exam details.     Recent Results (from the past 24 hour(s))   Transthoracic echocardiogram (TTE) complete with contrast, bubble, strain, and 3D PRN    Collection Time: 05/29/22 10:47 AM   Result Value Ref Range    LA Minor Axis 4.9 cm    LA Major Axis 5.4 cm    LA Area 2C 18.1 cm2    LA Area 4C 20.7 cm2    LA Volume BP 60 (A) 18 - 58 mL    LA Diameter 4.5 cm    LV EDV A4C 150 mL    LV ESV A4C 82 mL    IVSd 1.3 (A) 0.6 - 1.0 cm LVIDd 4.0 (A) 4.2 - 5.9 cm    LVIDs 3.3 cm    LVOT Diameter 2.0 cm    LVOT Mean Gradient 2 mmHg    LVOT VTI 16.1 cm    LVOT Peak Velocity 0.9 m/s    LVOT Peak Gradient 3 mmHg    LVPWd 1.2 (A) 0.6 - 1.0 cm    LV E' Lateral Velocity 9 cm/s    LV E' Septal Velocity 7 cm/s    LV Ejection Fraction A4C 45 %    LVOT Area 3.1 cm2    LVOT SV 50.6 ml    AV Mean Velocity 1.1 m/s    AV Mean Gradient 5 mmHg    AV VTI 27.0 cm    AV Peak Velocity 1.6 m/s    AV Peak Gradient 10 mmHg    AV Area by VTI 1.9 cm2    AV Area by Peak Velocity 1.8 cm2    Aortic Root 3.0 cm    Ascending Aorta 2.6 cm    IVC Proxmal 1.4 cm    MV E Wave Deceleration Time 144.0 ms    MV A Velocity 1.50 m/s    MV E Velocity 0.97 m/s    MV Mean Gradient 6 mmHg    MV VTI 31.8 cm    MV Mean Velocity 1.2 m/s    MV Max Velocity 1.9 m/s    MV Peak Gradient 14 mmHg    MV Area by VTI 1.6 cm2    PV .0 ms    PV Max Velocity 0.8 m/s    PV Peak Gradient 2 mmHg    Est. RA Pressure 3 mmHg    RV Basal Dimension 3.9 cm    TAPSE 1.3 (A) 1.7 cm    TR Max Velocity 2.69 m/s    TR Peak Gradient 29 mmHg    Fractional Shortening 2D 18 28 - 44 %    LV ESV Index A4C 38 mL/m2    LV EDV Index A4C 69 mL/m2    LVIDd Index 1.85 cm/m2    LVIDs Index 1.53 cm/m2    LV RWT Ratio 0.60     LV Mass 2D 175.8 88 - 224 g    LV Mass 2D Index 81.4 49 - 115 g/m2    MV E/A 0.65     E/E' Ratio (Averaged) 12.32     E/E' Lateral 10.78     E/E' Septal 13.86     LA Volume Index BP 28 16 - 34 ml/m2    LVOT Stroke Volume Index 23.4 mL/m2    LA Size Index 2.08 cm/m2    LA/AO Root Ratio 1.50     Ao Root Index 1.39 cm/m2    Ascending Aorta Index 1.20 cm/m2    AV Velocity Ratio 0.56     LVOT:AV VTI Index 0.60     BUZZ/BSA VTI 0.9 cm2/m2    BUZZ/BSA Peak Velocity 0.8 cm2/m2    MV:LVOT VTI Index 1.98     RVSP 32 mmHg   POCT Glucose    Collection Time: 05/29/22 12:35 PM   Result Value Ref Range    POC Glucose 152 (H) 65 - 100 mg/dL    Performed by: Prosper    Anti-Xa, Unfractionated Heparin    Collection Time: 05/29/22  1:01 PM   Result Value Ref Range    Anti-XA Unfrac Heparin 0.35 0.3 - 0.7 IU/mL   POCT Glucose    Collection Time: 05/29/22  3:57 PM   Result Value Ref Range    POC Glucose 135 (H) 65 - 100 mg/dL    Performed by: Xavi    Anti-Xa, Unfractionated Heparin    Collection Time: 05/29/22  7:28 PM   Result Value Ref Range    Anti-XA Unfrac Heparin 0.29 (L) 0.3 - 0.7 IU/mL   POCT Glucose    Collection Time: 05/29/22  8:16 PM   Result Value Ref Range    POC Glucose 137 (H) 65 - 100 mg/dL    Performed by: Dail Gaucher    Basic Metabolic Panel    Collection Time: 05/30/22  4:26 AM   Result Value Ref Range    Sodium 135 (L) 136 - 145 mmol/L    Potassium 3.7 3.5 - 5.1 mmol/L    Chloride 103 98 - 107 mmol/L    CO2 26 21 - 32 mmol/L    Anion Gap 6 (L) 7 - 16 mmol/L    Glucose 176 (H) 65 - 100 mg/dL    BUN 16 8 - 23 MG/DL    CREATININE 1.00 0.8 - 1.5 MG/DL    GFR African American >60 >60 ml/min/1.73m2    GFR Non- >60 >60 ml/min/1.73m2    Calcium 9.2 8.3 - 10.4 MG/DL   CBC    Collection Time: 05/30/22  4:26 AM   Result Value Ref Range    WBC 9.1 4.3 - 11.1 K/uL    RBC 4.25 4.23 - 5.6 M/uL    Hemoglobin 13.2 (L) 13.6 - 17.2 g/dL    Hematocrit 38.6 (L) 41.1 - 50.3 %    MCV 90.8 79.6 - 97.8 FL    MCH 31.1 26.1 - 32.9 PG    MCHC 34.2 31.4 - 35.0 g/dL    RDW 12.4 11.9 - 14.6 %    Platelets 187 (L) 190 - 450 K/uL    MPV 10.9 9.4 - 12.3 FL    nRBC 0.00 0.0 - 0.2 K/uL   Anti-Xa, Unfractionated Heparin    Collection Time: 05/30/22  4:26 AM   Result Value Ref Range    Anti-XA Unfrac Heparin 0.38 0.3 - 0.7 IU/mL   POCT Glucose    Collection Time: 05/30/22  7:45 AM   Result Value Ref Range    POC Glucose 159 (H) 65 - 100 mg/dL    Performed by: Jan Rosas      Patient Instructions:   Current Discharge Medication List           Details   acarbose (PRECOSE) 25 MG tablet Take 25 mg by mouth 3 times daily (with meals)      aspirin 81 MG chewable tablet Take 81 mg by mouth atorvastatin (LIPITOR) 20 MG tablet Take 20 mg by mouth      carvedilol (COREG) 12.5 MG tablet Take 12.5 mg by mouth 2 times daily (with meals)      vitamin D 25 MCG (1000 UT) CAPS Take by mouth daily      glimepiride (AMARYL) 4 MG tablet 1 po bid with a meal for diabetes instead of the glipizide)      levothyroxine (SYNTHROID) 137 MCG tablet TAKE 1 TABLET BY MOUTH ONCE DAILY BEFORE BREAKFAST      lisinopril (PRINIVIL;ZESTRIL) 20 MG tablet Take 20 mg by mouth 2 times daily      metFORMIN (GLUCOPHAGE) 1000 MG tablet Take 1,000 mg by mouth 2 times daily (with meals) Pt states he \"cut(s) pill in half\"      nitroGLYCERIN (NITROSTAT) 0.4 MG SL tablet Place 0.4 mg under the tongue      !! Semaglutide 3 MG TABS Take 3 mg by mouth every morning (before breakfast) Pt doesn't know dose      !! Semaglutide 7 MG TABS Take 7 mg by mouth every morning (before breakfast)      spironolactone (ALDACTONE) 25 MG tablet Take 25 mg by mouth daily      triamcinolone (KENALOG) 0.1 % cream Apply topically 2 times daily       ! ! - Potential duplicate medications found. Please discuss with provider.             Signed:  MARY ANN Ochoa  5/30/2022  9:42 AM

## 2022-05-30 NOTE — PROGRESS NOTES
[facility] CARDIOLOGY PROGRESS NOTE           5/30/2022 8:40 AM    Admit Date: 5/28/2022    Admit Diagnosis: Chest pain [R07.9]      Subjective:   No complaints this AM, no chest pain or shortness of breath    Interval History: (History of pertinent interval events obtained from nursing staff)    ROS:  GEN:  No fever or chills  Cardiovascular:  As noted above  Pulmonary:  As noted above  Neuro:  No new focal motor or sensory loss      Objective:     Vitals:    05/29/22 2232 05/30/22 0147 05/30/22 0416 05/30/22 0820   BP: (!) 121/58 (!) 112/59 129/60 (!) 115/57   Pulse:  87 82 92   Resp:  16 20    Temp:  98.1 °F (36.7 °C) 97.3 °F (36.3 °C)    TempSrc:  Oral Oral    SpO2:  92% 93%    Weight:   198 lb 4.8 oz (89.9 kg)    Height:           Physical Exam:  General-Well Developed, Well Nourished, No Acute Distress, Alert & Oriented x 3, appropriate mood. Neck- supple, no JVD  CV- regular rate and rhythm no MRG  Lung- clear bilaterally  Abd- soft, nontender, nondistended  Ext- no edema bilaterally.   Skin- warm and dry    Current Facility-Administered Medications   Medication Dose Route Frequency    0.9 % sodium chloride infusion   IntraVENous Continuous    aspirin chewable tablet 81 mg  81 mg Oral Daily    atorvastatin (LIPITOR) tablet 20 mg  20 mg Oral Nightly    carvedilol (COREG) tablet 12.5 mg  12.5 mg Oral BID WC    glipiZIDE (GLUCOTROL) tablet 10 mg  10 mg Oral BID AC    levothyroxine (SYNTHROID) tablet 137 mcg  137 mcg Oral Daily    lisinopril (PRINIVIL;ZESTRIL) tablet 20 mg  20 mg Oral BID    [Held by provider] spironolactone (ALDACTONE) tablet 25 mg  25 mg Oral Daily    [Held by provider] Semaglutide TABS 7 mg (Rybelsus) - patient supplied (Patient Supplied)  7 mg Oral QAM AC    [Held by provider] acarbose (PRECOSE) tablet 25 mg  25 mg Oral TID WC    sodium chloride flush 0.9 % injection 5-40 mL  5-40 mL IntraVENous 2 times per day    sodium chloride flush 0.9 % injection 5-40 mL 5-40 mL IntraVENous PRN    0.9 % sodium chloride infusion   IntraVENous PRN    ondansetron (ZOFRAN-ODT) disintegrating tablet 4 mg  4 mg Oral Q8H PRN    Or    ondansetron (ZOFRAN) injection 4 mg  4 mg IntraVENous Q6H PRN    acetaminophen (TYLENOL) tablet 650 mg  650 mg Oral Q6H PRN    Or    acetaminophen (TYLENOL) suppository 650 mg  650 mg Rectal Q6H PRN    polyethylene glycol (GLYCOLAX) packet 17 g  17 g Oral Daily PRN    potassium chloride (KLOR-CON M) extended release tablet 40 mEq  40 mEq Oral PRN    Or    potassium bicarb-citric acid (EFFER-K) effervescent tablet 40 mEq  40 mEq Oral PRN    Or    potassium chloride 10 mEq/100 mL IVPB (Peripheral Line)  10 mEq IntraVENous PRN    magnesium sulfate 2000 mg in 50 mL IVPB premix  2,000 mg IntraVENous PRN    aluminum & magnesium hydroxide-simethicone (MAALOX) 200-200-20 MG/5ML suspension 30 mL  30 mL Oral Q6H PRN    famotidine (PEPCID) tablet 20 mg  20 mg Oral BID    nitroGLYCERIN (NITROSTAT) SL tablet 0.4 mg  0.4 mg SubLINGual Q5 Min PRN    guaiFENesin (ROBITUSSIN) 100 MG/5ML oral solution 200 mg  200 mg Oral Q4H PRN    benzocaine-menthol (CEPACOL SORE THROAT) lozenge 1 lozenge  1 lozenge Oral Q2H PRN    heparin (porcine) injection 4,000 Units  4,000 Units IntraVENous PRN    heparin (porcine) injection 2,000 Units  2,000 Units IntraVENous PRN    heparin 25,000 units in dextrose 5% 250 mL (premix) infusion  5-30 Units/kg/hr IntraVENous Continuous    glucose chewable tablet 16 g  4 tablet Oral PRN    dextrose bolus 10% 125 mL  125 mL IntraVENous PRN    Or    dextrose bolus 10% 250 mL  250 mL IntraVENous PRN    glucagon injection 1 mg  1 mg IntraMUSCular PRN    dextrose 5 % solution  100 mL/hr IntraVENous PRN    insulin lispro (HUMALOG) injection vial 0-8 Units  0-8 Units SubCUTAneous 4x Daily AC & HS     Data Review:   Recent Results (from the past 24 hour(s))   POCT Glucose    Collection Time: 05/29/22  9:20 AM   Result Value Ref Range    POC Glucose 164 (H) 65 - 100 mg/dL    [facility]    Transthoracic echocardiogram (TTE) complete with contrast, bubble, strain, and 3D PRN    Collection Time: 05/29/22 10:47 AM   Result Value Ref Range    LA Minor Axis 4.9 cm    LA Major Axis 5.4 cm    LA Area 2C 18.1 cm2    LA Area 4C 20.7 cm2    LA Volume BP 60 (A) 18 - 58 mL    LA Diameter 4.5 cm    LV EDV A4C 150 mL    LV ESV A4C 82 mL    IVSd 1.3 (A) 0.6 - 1.0 cm    LVIDd 4.0 (A) 4.2 - 5.9 cm    LVIDs 3.3 cm    LVOT Diameter 2.0 cm    LVOT Mean Gradient 2 mmHg    LVOT VTI 16.1 cm    LVOT Peak Velocity 0.9 m/s    LVOT Peak Gradient 3 mmHg    LVPWd 1.2 (A) 0.6 - 1.0 cm    LV E' Lateral Velocity 9 cm/s    LV E' Septal Velocity 7 cm/s    LV Ejection Fraction A4C 45 %    LVOT Area 3.1 cm2    LVOT SV 50.6 ml    AV Mean Velocity 1.1 m/s    AV Mean Gradient 5 mmHg    AV VTI 27.0 cm    AV Peak Velocity 1.6 m/s    AV Peak Gradient 10 mmHg    AV Area by VTI 1.9 cm2    AV Area by Peak Velocity 1.8 cm2    Aortic Root 3.0 cm    Ascending Aorta 2.6 cm    IVC Proxmal 1.4 cm    MV E Wave Deceleration Time 144.0 ms    MV A Velocity 1.50 m/s    MV E Velocity 0.97 m/s    MV Mean Gradient 6 mmHg    MV VTI 31.8 cm    MV Mean Velocity 1.2 m/s    MV Max Velocity 1.9 m/s    MV Peak Gradient 14 mmHg    MV Area by VTI 1.6 cm2    PV .0 ms    PV Max Velocity 0.8 m/s    PV Peak Gradient 2 mmHg    Est. RA Pressure 3 mmHg    RV Basal Dimension 3.9 cm    TAPSE 1.3 (A) 1.7 cm    TR Max Velocity 2.69 m/s    TR Peak Gradient 29 mmHg    Fractional Shortening 2D 18 28 - 44 %    LV ESV Index A4C 38 mL/m2    LV EDV Index A4C 69 mL/m2    LVIDd Index 1.85 cm/m2    LVIDs Index 1.53 cm/m2    LV RWT Ratio 0.60     LV Mass 2D 175.8 88 - 224 g    LV Mass 2D Index 81.4 49 - 115 g/m2    MV E/A 0.65     E/E' Ratio (Averaged) 12.32     E/E' Lateral 10.78     E/E' Septal 13.86     LA Volume Index BP 28 16 - 34 ml/m2    LVOT Stroke Volume Index 23.4 mL/m2    LA Size Index 2.08 cm/m2    LA/AO Root Ratio 1.50     Ao Root Index 1.39 cm/m2    Ascending Aorta Index 1.20 cm/m2    AV Velocity Ratio 0.56     LVOT:AV VTI Index 0.60     BUZZ/BSA VTI 0.9 cm2/m2    BUZZ/BSA Peak Velocity 0.8 cm2/m2    MV:LVOT VTI Index 1.98     RVSP 32 mmHg   POCT Glucose    Collection Time: 05/29/22 12:35 PM   Result Value Ref Range    POC Glucose 152 (H) 65 - 100 mg/dL    Performed by: Patsy Andrew    Anti-Xa, Unfractionated Heparin    Collection Time: 05/29/22  1:01 PM   Result Value Ref Range    Anti-XA Unfrac Heparin 0.35 0.3 - 0.7 IU/mL   POCT Glucose    Collection Time: 05/29/22  3:57 PM   Result Value Ref Range    POC Glucose 135 (H) 65 - 100 mg/dL    Performed by: Xavi    Anti-Xa, Unfractionated Heparin    Collection Time: 05/29/22  7:28 PM   Result Value Ref Range    Anti-XA Unfrac Heparin 0.29 (L) 0.3 - 0.7 IU/mL   POCT Glucose    Collection Time: 05/29/22  8:16 PM   Result Value Ref Range    POC Glucose 137 (H) 65 - 100 mg/dL    Performed by: Haritha William    Basic Metabolic Panel    Collection Time: 05/30/22  4:26 AM   Result Value Ref Range    Sodium 135 (L) 136 - 145 mmol/L    Potassium 3.7 3.5 - 5.1 mmol/L    Chloride 103 98 - 107 mmol/L    CO2 26 21 - 32 mmol/L    Anion Gap 6 (L) 7 - 16 mmol/L    Glucose 176 (H) 65 - 100 mg/dL    BUN 16 8 - 23 MG/DL    CREATININE 1.00 0.8 - 1.5 MG/DL    GFR African American >60 >60 ml/min/1.73m2    GFR Non- >60 >60 ml/min/1.73m2    Calcium 9.2 8.3 - 10.4 MG/DL   CBC    Collection Time: 05/30/22  4:26 AM   Result Value Ref Range    WBC 9.1 4.3 - 11.1 K/uL    RBC 4.25 4.23 - 5.6 M/uL    Hemoglobin 13.2 (L) 13.6 - 17.2 g/dL    Hematocrit 38.6 (L) 41.1 - 50.3 %    MCV 90.8 79.6 - 97.8 FL    MCH 31.1 26.1 - 32.9 PG    MCHC 34.2 31.4 - 35.0 g/dL    RDW 12.4 11.9 - 14.6 %    Platelets 017 (L) 934 - 450 K/uL    MPV 10.9 9.4 - 12.3 FL    nRBC 0.00 0.0 - 0.2 K/uL   Anti-Xa, Unfractionated Heparin    Collection Time: 05/30/22  4:26 AM   Result Value Ref Range    Anti-XA Unfrac Heparin 0.38 0.3 - 0.7 IU/mL   POCT Glucose    Collection Time: 05/30/22  7:45 AM   Result Value Ref Range    POC Glucose 159 (H) 65 - 100 mg/dL    Performed by: Callum Livingston        EKG:  (EKG has been independently visualized by me with interpretation below)  Assessment:     Principal Problem:    Weakness generalized  Active Problems:    Leukocytosis    Cough    Chronic coronary artery disease    Complete heart block (HCC)    Hypertension    Chronic systolic heart failure (HCC)    Pacemaker    Fatty liver    Acquired hypothyroidism    Type 2 diabetes mellitus without complication, without long-term current use of insulin ([facility] [address]    Hyperlipidemia    Ischemic cardiomyopathy  Resolved Problems:    * No resolved hospital problems. *    Plan:   1. Elevated troponin: unclear etiology, relatively flat, echo relatively unchanged with EF 40-45%, stop heparin  2. URI: supportive care  3. PPM: dual chamber, 100% RV paced, could consider BiV upgrade as OP  4. CAD: cont ASA, BB, ACE, statin  5. HTN: controlled, cont meds  6. Dispo: home today       Manju Nugent.  Sarita LO  Cardiology/Electrophysiology

## 2022-05-30 NOTE — PLAN OF CARE
Problem: Discharge Planning  Goal: Discharge to home or other facility with appropriate resources  Outcome: Completed     Problem: Safety - Adult  Goal: Free from fall injury  Outcome: Completed     Problem: ABCDS Injury Assessment  Goal: Absence of physical injury  Outcome: Completed

## 2022-05-31 ENCOUNTER — CARE COORDINATION (OUTPATIENT)
Dept: CARE COORDINATION | Facility: CLINIC | Age: 77
End: 2022-05-31

## 2022-05-31 NOTE — CARE COORDINATION
Susan [facility] Transitions Initial Follow Up Call    Call within 2 business days of discharge: Yes    Patient: Adrianne Delatorre Patient : 1945   MRN: [id number]  Reason for Admission: Chest pain  Discharge Date: 22 RARS: Readmission Risk Score: 9.4 ( )      Last Discharge [facility]       Complaint Diagnosis Description Type Department Provider    22  Ischemic cardiomyopathy Admission (Discharged) Ghassan Zhang MD        Transitions of Care Initial Call    Was this an external facility discharge? No Discharge Facility: DT    Challenges to be reviewed by the provider   Additional needs identified to be addressed with provider: No  none             Method of communication with provider : none    Advance Care Planning:   Does patient have an Advance Directive: decision maker updated. Care Transition Nurse contacted the patient by telephone to perform post hospital discharge assessment. Verified name and  with patient as identifiers. Provided introduction to self, and explanation of the CTN role. CTN reviewed discharge instructions, medical action plan and red flags with patient who verbalized understanding. Patient given an opportunity to ask questions and does not have any further questions or concerns at this time. Were discharge instructions available to patient? Yes. Reviewed appropriate site of care based on symptoms and resources available to patient including: PCP  Specialist  Urgent care clinics  [facility] . The patient agrees to contact the PCP office for questions related to their healthcare. Medication reconciliation was performed with patient, who verbalizes understanding of administration of home medications. Advised obtaining a 90-day supply of all daily and as-needed medications. Was patient discharged with a pulse oximeter? no    CTN provided contact information.  Plan for follow-up call in 5-7 days based on severity of symptoms and risk factors. Plan for next call: symptom management        Non-face-to-face services provided:  Obtained and reviewed discharge summary and/or continuity of care documents  Discussed with patient the importance of PCP follow up. CTN scheduled PCP follow up 6/6/2022 at 1:45 pm with labs scheduled 6/1/2022  Discussed importance of adequate nutrition and fluid intake no more than 2L per day. Discussed the importance of returning to ED with SOB or Chest pain. Patient verbalized understanding. Goals Addressed                 This Visit's Progress     Returns to baseline activity level.              Care Transitions 24 Hour Call    Schedule Follow Up Appointment with PCP: Completed  Do you have a copy of your discharge instructions?: Yes  Do you have all of your prescriptions and are they filled?: Yes  Have you been contacted by a [id number] Pharmacist?: No  Have you scheduled your follow up appointment?: No (Comment: CTN assisted with scheduling)  Do you have support at home?: Alone  Do you feel like you have everything you need to keep you well at home?: Yes  Are you an active caregiver in your home?: No  Care Transitions Interventions         Follow Up  Future Appointments   Date Time Provider Tavon Napoles   6/1/2022 10:30 AM FPA MISCELLANEOUS FPA GVL AMB   6/6/2022  1:45 PM Trae Hale MD FPA [facility]VL AMB   7/12/2022 10:30 AM [facility] ECHO 36 UCDE GVL AMB   7/15/2022 10:15 AM Mika Deleon MD DE GVL AMB   8/9/2022  9:45 AM [facility] REMOTE PACER [facility] Julissa Jiménez RN

## 2022-06-01 ENCOUNTER — NURSE ONLY (OUTPATIENT)
Dept: FAMILY MEDICINE CLINIC | Facility: CLINIC | Age: 77
End: 2022-06-01

## 2022-06-01 DIAGNOSIS — E11.9 TYPE 2 DIABETES MELLITUS WITHOUT COMPLICATION, WITHOUT LONG-TERM CURRENT USE OF INSULIN (HCC): ICD-10-CM

## 2022-06-01 DIAGNOSIS — D72.829 LEUKOCYTOSIS, UNSPECIFIED TYPE: ICD-10-CM

## 2022-06-01 DIAGNOSIS — I10 PRIMARY HYPERTENSION: ICD-10-CM

## 2022-06-01 LAB
BASOPHILS # BLD: 0.1 K/UL (ref 0–0.2)
BASOPHILS NFR BLD: 1 % (ref 0–2)
DIFFERENTIAL METHOD BLD: ABNORMAL
EOSINOPHIL # BLD: 0.3 K/UL (ref 0–0.8)
EOSINOPHIL NFR BLD: 3 % (ref 0.5–7.8)
ERYTHROCYTE [DISTWIDTH] IN BLOOD BY AUTOMATED COUNT: 12.8 % (ref 11.9–14.6)
EST. AVERAGE GLUCOSE BLD GHB EST-MCNC: 148 MG/DL
HBA1C MFR BLD: 6.8 % (ref 4.2–6.3)
HCT VFR BLD AUTO: 39.3 % (ref 41.1–50.3)
HGB BLD-MCNC: 13.3 G/DL (ref 13.6–17.2)
IMM GRANULOCYTES # BLD AUTO: 0 K/UL (ref 0–0.5)
IMM GRANULOCYTES NFR BLD AUTO: 1 % (ref 0–5)
LYMPHOCYTES # BLD: 1 K/UL (ref 0.5–4.6)
LYMPHOCYTES NFR BLD: 13 % (ref 13–44)
MCH RBC QN AUTO: 31.5 PG (ref 26.1–32.9)
MCHC RBC AUTO-ENTMCNC: 33.8 G/DL (ref 31.4–35)
MCV RBC AUTO: 93.1 FL (ref 79.6–97.8)
MONOCYTES # BLD: 0.8 K/UL (ref 0.1–1.3)
MONOCYTES NFR BLD: 10 % (ref 4–12)
NEUTS SEG # BLD: 5.8 K/UL (ref 1.7–8.2)
NEUTS SEG NFR BLD: 72 % (ref 43–78)
NRBC # BLD: 0 K/UL (ref 0–0.2)
PLATELET # BLD AUTO: 210 K/UL (ref 150–450)
PMV BLD AUTO: 11.7 FL (ref 9.4–12.3)
RBC # BLD AUTO: 4.22 M/UL (ref 4.23–5.6)
WBC # BLD AUTO: 8 K/UL (ref 4.3–11.1)

## 2022-06-06 ENCOUNTER — OFFICE VISIT (OUTPATIENT)
Dept: FAMILY MEDICINE CLINIC | Facility: CLINIC | Age: 77
End: 2022-06-06
Payer: MEDICARE

## 2022-06-06 VITALS
TEMPERATURE: 97.1 F | WEIGHT: 201.2 LBS | SYSTOLIC BLOOD PRESSURE: 126 MMHG | RESPIRATION RATE: 17 BRPM | HEART RATE: 84 BPM | HEIGHT: 73 IN | DIASTOLIC BLOOD PRESSURE: 64 MMHG | BODY MASS INDEX: 26.66 KG/M2 | OXYGEN SATURATION: 97 %

## 2022-06-06 DIAGNOSIS — R07.9 CHEST PAIN, UNSPECIFIED TYPE: ICD-10-CM

## 2022-06-06 DIAGNOSIS — R06.89 DECREASED BREATH SOUNDS: ICD-10-CM

## 2022-06-06 DIAGNOSIS — I70.228 ATHEROSCLEROSIS OF NATIVE ARTERIES OF EXTREMITIES WITH REST PAIN, OTHER EXTREMITY (HCC): ICD-10-CM

## 2022-06-06 DIAGNOSIS — I50.22 CHRONIC SYSTOLIC HEART FAILURE (HCC): ICD-10-CM

## 2022-06-06 DIAGNOSIS — I11.0 HYPERTENSIVE CARDIOMYOPATHY, WITH HEART FAILURE (HCC): ICD-10-CM

## 2022-06-06 DIAGNOSIS — R05.9 COUGH: ICD-10-CM

## 2022-06-06 DIAGNOSIS — E78.2 MIXED HYPERLIPIDEMIA: ICD-10-CM

## 2022-06-06 DIAGNOSIS — I51.89 DIASTOLIC DYSFUNCTION: ICD-10-CM

## 2022-06-06 DIAGNOSIS — E11.9 TYPE 2 DIABETES MELLITUS WITHOUT COMPLICATION, WITHOUT LONG-TERM CURRENT USE OF INSULIN (HCC): Primary | ICD-10-CM

## 2022-06-06 DIAGNOSIS — R53.1 WEAKNESS GENERALIZED: ICD-10-CM

## 2022-06-06 DIAGNOSIS — J20.9 ACUTE BRONCHITIS, UNSPECIFIED ORGANISM: ICD-10-CM

## 2022-06-06 DIAGNOSIS — I43 HYPERTENSIVE CARDIOMYOPATHY, WITH HEART FAILURE (HCC): ICD-10-CM

## 2022-06-06 DIAGNOSIS — E03.9 ACQUIRED HYPOTHYROIDISM: ICD-10-CM

## 2022-06-06 PROBLEM — M79.604 RIGHT LEG PAIN: Status: ACTIVE | Noted: 2019-07-02

## 2022-06-06 PROBLEM — M47.26 OSTEOARTHRITIS OF SPINE WITH RADICULOPATHY, LUMBAR REGION: Status: ACTIVE | Noted: 2019-06-10

## 2022-06-06 PROCEDURE — 99496 TRANSJ CARE MGMT HIGH F2F 7D: CPT | Performed by: FAMILY MEDICINE

## 2022-06-06 RX ORDER — ATORVASTATIN CALCIUM 20 MG/1
20 TABLET, FILM COATED ORAL DAILY
Qty: 90 TABLET | Refills: 1 | Status: SHIPPED | OUTPATIENT
Start: 2022-06-06

## 2022-06-06 RX ORDER — CEPHALEXIN 500 MG/1
500 CAPSULE ORAL 3 TIMES DAILY
Qty: 30 CAPSULE | Refills: 0 | Status: SHIPPED | OUTPATIENT
Start: 2022-06-06 | End: 2022-06-16

## 2022-06-06 ASSESSMENT — ENCOUNTER SYMPTOMS
RHINORRHEA: 1
SINUS PAIN: 0
ABDOMINAL PAIN: 0
SWOLLEN GLANDS: 0
SHORTNESS OF BREATH: 1
DIARRHEA: 0
NAUSEA: 0
COUGH: 1
WHEEZING: 0
VOMITING: 1
SORE THROAT: 1

## 2022-06-06 ASSESSMENT — ANXIETY QUESTIONNAIRES
6. BECOMING EASILY ANNOYED OR IRRITABLE: 0
2. NOT BEING ABLE TO STOP OR CONTROL WORRYING: 0
3. WORRYING TOO MUCH ABOUT DIFFERENT THINGS: 0
5. BEING SO RESTLESS THAT IT IS HARD TO SIT STILL: 0
1. FEELING NERVOUS, ANXIOUS, OR ON EDGE: 0
GAD7 TOTAL SCORE: 0
7. FEELING AFRAID AS IF SOMETHING AWFUL MIGHT HAPPEN: 0
4. TROUBLE RELAXING: 0

## 2022-06-06 ASSESSMENT — PATIENT HEALTH QUESTIONNAIRE - PHQ9
SUM OF ALL RESPONSES TO PHQ QUESTIONS 1-9: 0
2. FEELING DOWN, DEPRESSED OR HOPELESS: 0
1. LITTLE INTEREST OR PLEASURE IN DOING THINGS: 0
SUM OF ALL RESPONSES TO PHQ9 QUESTIONS 1 & 2: 0

## 2022-06-06 NOTE — PROGRESS NOTES
HISTORY OF PRESENT ILLNESS  Chief Complaint   Patient presents with    Follow-Up from [address]; discharged 5/30/2022     Feels so so. Never got anything for the cough. ST comes and goes. Was coughing up thick stuff and not as bad but yellow green sputum and mucous. Still weak but not as bad as before. Care Transitions Initial Follow Up Call  5/31/2022    Previously said, \" 5/28/2022 - 5/30/2022 (2 days)  [facility]    Admission Diagnoses: Chest pain [R07.9]  Hospital Course: Patient is a 64 y.o.  male with PMHx of CAD s/p CABG c/b ICM with EF 40%, CHB s/p St Te dual chamber PPM (>99% RV paced), HTN, HLD, DM, CVA and Hypothyroidism who presented to the ED at [facility] with c/o generalized weakness, sore throat and cough. He reports that he has had the cough for approx 1 week. Yesterday states he coughed up a large amount of \"yellow stuff. \" Today, his throat was sore and he felt very weak. States no HA, dizziness, palpitations, syncope, CP, SOB, MERAZ, N/VC/D, dysuria, edema, diaphoresis or F/C/S. States that he has \"sinus trouble\" and has been sneezing a lot as well. Reports taking all meds as directed. In the ED: Pt was found to have elevated Trop 0.36. He was given ASA + IV Heparin bolus +gtt. Transfer to [facility] was requested. Upon evaluation, symptoms were more consistent with URI, however continued to trend troponin. Elevated troponin remained relatively flat. Felt to be secondary to chronic ischemic cardiomyopathy. ECHO showed:      Left Ventricle: Left ventricle size is normal. Mildly increased wall thickness. Left ventricular function is mild to moderately impaired with an ejection fraction of around 40% (calculated biplane EF 45%). There is severe hypokinesis of the mid to distal septum/apical segments. Grade I diastolic dysfunction with normal LAP.   Mitral Valve: Mild to moderate regurgitation which is eccentric and anteriorly directed.   The patient has been scheduled for follow up with [facility]iology -- Dr. Nikhil Stoner in 2-4 weeks. \"    No home health. Diabetes Mellitus: Patient presents for follow up of diabetes. Symptoms: none. Symptoms have asymptomatic. Patient denies nausea, polydipsia, polyuria and visual disturbances. Evaluation to date has been included: No results found for: HBA1C, GESTF, MCACR, MCA2, MCAU, LDL, LDLC, ZORAN, CREA . Home sugars: BGs consistently in an acceptable range. Treatment to date: medications. Diabetic issues reviewed with him: low cholesterol diet, weight control and daily exercise discussed. acarbose - 25 mg  glimepiride - 4 MG  metFORMIN - 1000 MG    Wt Readings from Last 3 Encounters:   06/06/22 201 lb 3.2 oz (91.3 kg)   05/30/22 198 lb 4.8 oz (89.9 kg)   03/08/22 204 lb (92.5 kg)      BP Readings from Last 3 Encounters:   06/06/22 126/64   05/30/22 (!) 115/57   03/08/22 126/65    blood sugar has been better    URI   This is a new problem. The current episode started in the past 7 days. The problem has been gradually improving. There has been no fever. Associated symptoms include congestion, coughing, rhinorrhea, a sore throat and vomiting. Pertinent negatives include no abdominal pain, chest pain, diarrhea, dysuria, ear pain, headaches, joint pain, joint swelling, nausea, neck pain, plugged ear sensation, rash, sinus pain, sneezing, swollen glands or wheezing. Review of Systems   Constitutional: Negative for chills, fatigue and fever. HENT: Positive for congestion, rhinorrhea and sore throat. Negative for ear pain, sinus pressure, sinus pain and sneezing. Respiratory: Positive for cough and shortness of breath. Negative for wheezing. Cardiovascular: Negative for chest pain and palpitations. Gastrointestinal: Positive for vomiting. Negative for abdominal pain, constipation, diarrhea and nausea. Genitourinary: Negative for difficulty urinating, dysuria, frequency and urgency.    Musculoskeletal: Negative for normal.      Nose: Congestion present. No mucosal edema or rhinorrhea. Right Turbinates: Not enlarged or swollen. Left Turbinates: Not enlarged or swollen. Mouth/Throat:      Mouth: Mucous membranes are moist. No lacerations, oral lesions or angioedema. Dentition: Normal dentition. Tongue: No lesions. Tongue does not deviate from midline. Palate: No mass and lesions. Pharynx: Oropharynx is clear. Uvula midline. Tonsils: No tonsillar exudate. Eyes:      General: Lids are normal.      Extraocular Movements: Extraocular movements intact. Conjunctiva/sclera: Conjunctivae normal.      Pupils: Pupils are equal.   Cardiovascular:      Heart sounds: Normal heart sounds. No murmur heard. No systolic murmur is present. No friction rub. No gallop. Pulmonary:      Effort: Pulmonary effort is normal. No respiratory distress. Breath sounds: No stridor. Decreased breath sounds present. No wheezing, rhonchi or rales. Chest:      Chest wall: No tenderness. Lymphadenopathy:      Cervical: No cervical adenopathy. Skin:     General: Skin is warm and dry. Capillary Refill: Capillary refill takes less than 2 seconds. Neurological:      General: No focal deficit present. Mental Status: He is alert. Psychiatric:         Mood and Affect: Mood normal.         Behavior: Behavior normal.         Thought Content: Thought content normal.         Judgment: Judgment normal.        Outside records were obtained and reviewed, including notes and any lab or x-ray reports. ASSESSMENT and PLAN   Diagnosis Orders   1. Type 2 diabetes mellitus without complication, without long-term current use of insulin (HCC)  Hemoglobin A1C   2. Mixed hyperlipidemia  atorvastatin (LIPITOR) 20 MG tablet    Lipid Panel   3. Atherosclerosis of native arteries of extremities with rest pain, other extremity ([address]     4.  Acute bronchitis, unspecified organism  cephALEXin (KEFLEX) 500 MG capsule   5. Decreased breath sounds     6. Chest pain, unspecified type     7. Diastolic dysfunction     8. Chronic systolic heart failure (HCC)     9. Hypertensive cardiomyopathy, with heart failure ([address]  Basic Metabolic Panel   10. Acquired hypothyroidism     11. Weakness generalized     12. Cough       3m labs and then ov in 2-7d    Reviewed medications and side effects in detail        The current DM, HTN medical regimen  is  effective. He will continue continue present plan and medications. MDM  Number of Diagnoses or Management Options  Acquired hypothyroidism: established and improving  Acute bronchitis, unspecified organism: new and does not require workup  Atherosclerosis of native arteries of extremities with rest pain, other extremity ([address]): established and improving  Chest pain, unspecified type: new and does not require workup  Chronic systolic heart failure ([address]): established and worsening  Decreased breath sounds: new and does not require workup  Diastolic dysfunction: established and worsening  Hypertensive cardiomyopathy, with heart failure ([address]): established and worsening  Mixed hyperlipidemia: established and improving  Type 2 diabetes mellitus without complication, without long-term current use of insulin ([address]): established and improving     Amount and/or Complexity of Data Reviewed  Clinical lab tests: reviewed  Tests in the radiology section of CPT®: reviewed  Independent visualization of images, tracings, or specimens: yes           His other chronic conditions are stable at this time. He is stable on the current treatment and tolerating it well. No significant sides effects. Will not adjust therapy at this time, unless noted above. We will continue to monitor for any problems. Medications refilled and lab work has been ordered where needed. Reviewed medications are explained including any potential interactions or side effects in detail.  The patient's questions were

## 2022-06-06 NOTE — PROGRESS NOTES
Laz James MD  [address] [place] [address]  Phone: [phone]  Fax: [phone]            Today's Date: 6/6/2022     Subjective:   HPI:  Jessica Ferris 1945  presents for transitions of care from the hospital. I have reviewed record obtained in hospital.  These symptoms are improving.     Admission date: 5/28/2022   Discharge date: 5/30/2022      48 business hour phone call documented and reviewed in chart on date: 5/31/2022    Patient Active Problem List   Diagnosis    Chronic coronary artery disease    Complete heart block ([address]    Diabetes ([address]    Hypertension    Chronic systolic heart failure ([address]    Syncope    Hypertensive cardiomyopathy, with heart failure ([address]    Pacemaker    Fatty liver    Acquired hypothyroidism    Type 2 diabetes mellitus without complication, without long-term current use of insulin (HCC)    Paralysis of conjugate gaze    NSTEMI (non-ST elevated myocardial infarction) ([address]    Blurry vision    Stroke ([address]    Non-rheumatic mitral regurgitation    Hyperlipidemia    Ischemic cardiomyopathy    Weakness generalized    Leukocytosis    Cough    Osteoarthritis of spine with radiculopathy, lumbar region    Right leg pain    Atherosclerosis of native arteries of extremities with rest pain, other extremity (HCC)    Diastolic dysfunction      Allergies   Allergen Reactions    Ticagrelor Anaphylaxis    Empagliflozin Other (See Comments)     Dyspepsia and atypical chest pain      Current Outpatient Medications   Medication Sig    atorvastatin (LIPITOR) 20 MG tablet Take 1 tablet by mouth daily    cephALEXin (KEFLEX) 500 MG capsule Take 1 capsule by mouth 3 times daily for 10 days    acarbose (PRECOSE) 25 MG tablet Take 25 mg by mouth 3 times daily (with meals)    aspirin 81 MG chewable tablet Take 81 mg by mouth    carvedilol (COREG) 12.5 MG tablet Take 12.5 mg by mouth 2 times daily (with meals)    vitamin D 25 MCG (1000 UT) CAPS Take by mouth daily    glimepiride (AMARYL) 4 MG tablet 1 po bid with a meal for diabetes instead of the glipizide)    levothyroxine (SYNTHROID) 137 MCG tablet TAKE 1 TABLET BY MOUTH ONCE DAILY BEFORE BREAKFAST    lisinopril (PRINIVIL;ZESTRIL) 20 MG tablet Take 20 mg by mouth 2 times daily    metFORMIN (GLUCOPHAGE) 1000 MG tablet Take 1,000 mg by mouth 2 times daily (with meals) Pt states he \"cut(s) pill in half\"    nitroGLYCERIN (NITROSTAT) 0.4 MG SL tablet Place 0.4 mg under the tongue    Semaglutide 3 MG TABS Take 3 mg by mouth every morning (before breakfast) Pt doesn't know dose    Semaglutide 7 MG TABS Take 7 mg by mouth every morning (before breakfast)    spironolactone (ALDACTONE) 25 MG tablet Take 25 mg by mouth daily    triamcinolone (KENALOG) 0.1 % cream Apply topically 2 times daily     No current facility-administered medications for this visit. Referral to Home Health: no  Referral to Social Work: no  Referral to Specialist: no  DME ordered: no    Follow-up and Dispositions    · Return in about 3 months (around 9/6/2022).         Lou Yung MD

## 2022-06-07 ENCOUNTER — CARE COORDINATION (OUTPATIENT)
Dept: CARE COORDINATION | Facility: CLINIC | Age: 77
End: 2022-06-07

## 2022-06-07 NOTE — CARE COORDINATION
Susan 45 Transitions Follow Up Call    2022    Patient: Lou Das  Patient : 1945   MRN: [id number]  Reason for Admission: chest pain  Discharge Date: 22 RARS: Readmission Risk Score: 9.4 ( )    Care Transitions Follow Up Call    Needs to be reviewed by the provider   Additional needs identified to be addressed with provider: No  none             Method of communication with provider : none      Care Transition Nurse contacted the patient by telephone to follow up after admission on 2022. Verified name and  with patient as identifiers. Addressed changes since last contact: Patient reports feeling some better. Still with weakness. Discussed [facility] referral. Patient declined at present time. Attended PCP follow up 2022. K-flex prescribed for acute bronchitis. Patient confirmed he is taking antibiotic. Advised to take with food. Patient verbalized understanding. Discussed follow-up appointments. If no appointment was previously scheduled, appointment scheduling offered: Yes. Is follow up appointment scheduled within 7 days of discharge? Yes. Advance Care Planning:   Does patient have an Advance Directive: decision maker updated. CTN reviewed discharge instructions, medical action plan and red flags with patient and discussed any barriers to care and/or understanding of plan of care after discharge. Discussed appropriate site of care based on symptoms and resources available to patient including: PCP  Specialist  Urgent care clinics  [facility] . The patient agrees to contact the PCP office for questions related to their healthcare. Patients top risk factors for readmission: medical condition-CHF, DM, CAD, bronchitis  Interventions to address risk factors:   Continue taking all medications as prescribed  Take antibiotic with food  Monitor for signs of infection  Continue with adequate nutrition and fluid intake no more than 2L per day.   Discussed the importance of returning to ED with SOB or Chest pain. Patient verbalized understanding. Goals Addressed                 This Visit's Progress     Returns to baseline activity level. On track          [id number] Lucia Rogers follow up appointment(s): n/a    CTN provided contact information for future needs. Plan for follow-up call in 7-10 days based on severity of symptoms and risk factors. Plan for next call: symptom management            Care Transitions Subsequent and Final Call    Schedule Follow Up Appointment with PCP: Completed  Subsequent and Final Calls  Do you have any ongoing symptoms?: Yes  Onset of Patient-reported symptoms: In the past 7 days  Patient-reported symptoms: Cough  Interventions for patient-reported symptoms: Notified PCP/Physician  Have your medications changed?: Yes  Patient Reports: K-flex prescribed  by PCP 6/6/2022  Do you currently have any active services?: No  Do you have any needs or concerns that I can assist you with?: No  Identified Barriers: Other  Care Transitions Interventions  Other Interventions:            Follow Up  Future Appointments   Date Time Provider Tavon Napoles   6/28/2022  2:30 PM Sarah Medina MD DE GVL AMB   7/12/2022 10:30 AM [facility] ECHO 36 DE GVL AMB   7/15/2022 10:15 AM MD PRATIBHA Jones GVL AMB   8/9/2022  9:45 AM [facility] REMOTE PACER UCDG GVL AMB   9/7/2022 10:30 AM KI MISCELLANEOUS KI GVL AMB   9/12/2022 10:15 AM Christie Avila MD ELISSA GVL AMB       Butch Altamirano RN

## 2022-06-13 ASSESSMENT — ENCOUNTER SYMPTOMS
SINUS PRESSURE: 0
CONSTIPATION: 0
BACK PAIN: 0
COLOR CHANGE: 0

## 2022-06-14 ENCOUNTER — CARE COORDINATION (OUTPATIENT)
Dept: CARE COORDINATION | Facility: CLINIC | Age: 77
End: 2022-06-14

## 2022-06-14 NOTE — CARE COORDINATION
Susan 45 Transitions Follow Up Call    2022    Patient: Raj Last  Patient : 1945   MRN: [id number]  Reason for Admission: chest pain  Discharge Date: 22 RARS: Readmission Risk Score: 9.4 ( )    Care Transitions Follow Up Call    Needs to be reviewed by the provider   Additional needs identified to be addressed with provider: No  none             Method of communication with provider : none      Care Transition Nurse contacted the patient by telephone to follow up after admission on 2022. Verified name and  with patient as identifiers. Addressed changes since last contact: Reports feeling much better but still having a cough and tickle in his throat. He remains on antibiotic from PCP visit. Patient advised to complete antibiotic, drink plenty of fluid up to 2L restricitons, monitor for temperature, SOB and contact PCP with any concerns. Discussed follow-up appointments. If no appointment was previously scheduled, appointment scheduling offered: Yes. Is follow up appointment scheduled within 7 days of discharge? Yes. Advance Care Planning:   Does patient have an Advance Directive: decision maker updated. CTN reviewed discharge instructions, medical action plan and red flags with patient and discussed any barriers to care and/or understanding of plan of care after discharge. Discussed appropriate site of care based on symptoms and resources available to patient including: PCP  Specialist  Urgent care clinics  [facility] . The patient agrees to contact the PCP office for questions related to their healthcare. Patients top risk factors for readmission: CHF, DM, CAD, bronchitis  Interventions to address risk factors:   Continue taking all medications as prescribed  Complete antibiotic  Monitor for signs of infection  Continue with adequate nutrition and fluid intake no more than 2L per day. Discussed the importance of returning to ED with SOB or Chest pain. Patient verbalized understanding. Non-[facility] follow up appointment(s): n/a    CTN provided contact information for future needs. Will outreach to patient upon returning to office. Plan for next call: symptom management           Care Transitions Subsequent and Final Call    Schedule Follow Up Appointment with PCP: Completed  Subsequent and Final Calls  Do you have any ongoing symptoms?: Yes  Patient-reported symptoms: Cough  Interventions for patient-reported symptoms: Other  Have your medications changed?: No  Do you have any questions related to your medications?: No  Do you currently have any active services?: No  Do you have any needs or concerns that I can assist you with?: No  Care Transitions Interventions  Other Interventions:            Follow Up  Future Appointments   Date Time Provider Tavon Napoles   6/28/2022  2:30 PM MD PRATIBHA Hilario GVL AMB   7/12/2022 10:30 AM [facility] 36 DE GVL AMB   8/9/2022  9:45 AM [facility] REMOTE PACER UC GVL AMB   8/11/2022 10:15 AM MD PRATIBHA Hilario GVL AMB   9/7/2022 10:30 AM KI MISCELLANEOUS KI GVL AMB   9/12/2022 10:15 AM Abelino Fuchs MD FPELISSA GVL AMB       Andrew Davalos, RN

## 2022-06-27 PROBLEM — R05.9 COUGH: Status: RESOLVED | Noted: 2022-05-28 | Resolved: 2022-06-27

## 2022-06-28 ENCOUNTER — CARE COORDINATION (OUTPATIENT)
Dept: CARE COORDINATION | Facility: CLINIC | Age: 77
End: 2022-06-28

## 2022-06-28 ENCOUNTER — OFFICE VISIT (OUTPATIENT)
Dept: CARDIOLOGY CLINIC | Age: 77
End: 2022-06-28
Payer: MEDICARE

## 2022-06-28 VITALS
DIASTOLIC BLOOD PRESSURE: 66 MMHG | HEART RATE: 96 BPM | SYSTOLIC BLOOD PRESSURE: 110 MMHG | OXYGEN SATURATION: 95 % | WEIGHT: 200 LBS | BODY MASS INDEX: 26.51 KG/M2 | HEIGHT: 73 IN

## 2022-06-28 DIAGNOSIS — R49.0 HOARSENESS: ICD-10-CM

## 2022-06-28 DIAGNOSIS — I44.2 COMPLETE HEART BLOCK (HCC): Primary | ICD-10-CM

## 2022-06-28 DIAGNOSIS — I50.9 CONGESTIVE HEART FAILURE, UNSPECIFIED HF CHRONICITY, UNSPECIFIED HEART FAILURE TYPE (HCC): ICD-10-CM

## 2022-06-28 DIAGNOSIS — I10 HYPERTENSION, UNSPECIFIED TYPE: ICD-10-CM

## 2022-06-28 PROCEDURE — 99214 OFFICE O/P EST MOD 30 MIN: CPT | Performed by: INTERNAL MEDICINE

## 2022-06-28 PROCEDURE — 1123F ACP DISCUSS/DSCN MKR DOCD: CPT | Performed by: INTERNAL MEDICINE

## 2022-06-28 RX ORDER — LOSARTAN POTASSIUM 50 MG/1
50 TABLET ORAL DAILY
Qty: 90 TABLET | Refills: 3 | Status: SHIPPED | OUTPATIENT
Start: 2022-06-28

## 2022-06-28 RX ORDER — BENZONATATE 100 MG/1
100 CAPSULE ORAL 2 TIMES DAILY PRN
Qty: 20 CAPSULE | Refills: 0 | Status: SHIPPED | OUTPATIENT
Start: 2022-06-28 | End: 2022-07-05

## 2022-06-28 ASSESSMENT — ENCOUNTER SYMPTOMS
ABDOMINAL PAIN: 0
EYE PAIN: 0
NAIL CHANGES: 0
APHONIA: 0
STRIDOR: 0
COUGH: 0

## 2022-06-28 NOTE — PROGRESS NOTES
[address], [address] [place] [address]  PHONE: [phone]    SUBJECTIVE:   Tatyana Barrios is a 76 y.o. male 1945   seen for a follow up visit regarding the following:     Chief Complaint   Patient presents with    Congestive Heart Failure     hospital f/u          History of present illness: 76 y.o. male presented for follow-up 6/28/22   admitted 5/2022 generalized weakness minimally elevated troponin discharge with no additional procedures performed elevated troponin likely secondary to ischemic cardiomyopathy and not acute coronary syndrome. Continues to have dry cough. Interval Hx:   He was admitted to [facility] 05/12/2020 with chest discomfort. The patient has a history of coronary artery bypass grafting with LIMA to LAD, SVG to PDA, SVG to OM. Last PCI 11/2019, previous EF of 40%. The patient was evaluated with heart  catheterization during his hospitalization. He had an occluded vein graft to the right circumflex system that was managed medically. Ejection fraction was 30-35%. He presented for followup 06/2020. No complaints of shortness of breath or chest discomfort. The patient is on diuretic therapy with Lasix and Furosemide. Cardiac History:    1. History of coronary artery bypass grafting 2007, LIMA to LAD, SVG to OM, SVG to right PDA. 2.   PCI 11/2019, details unavailable. 3.   Cardiac catheterization report not in cardiology tab ([facility]). Procedure note reviewed with PCI to mid diagonal 2.25 x 15 Helio drug eluting stent. 4.   Cardiac catheterization, NSTEMI, two of three patent bypass grafts, LIMA to LAD patent, vein graft to PDA patent, occluded vein graft to OM. 5.   05/2020 echocardiogram - ejection fraction 35%. 6.   7/2020  PCI to D1 2.25 x 15 HUGO and 2.0 x 18 HUGO.   7.   8/2020 Echo EF 45%  8.   11/2020 device interrogation the 99% RV pacing  9.   7/16/2021 Device interrogation 99% RV pacing   10.   6/28/2022 changed lisinopril to losartan due cough       Assessment and Plan:    1. Horseness   · Previously seen by Dr. Johnny York   2. Flatulence  ·   Discussed dietary modifications   ·   Patient has diet high in cabbage beans. These should be avoided stil eating 02/08/22   ·   Discussed acarbose  2. Long term use of diuretic  ·   On aldactone   3. Pacemaker in situ  ·   High degree of RV pacing with EF less than 50%  ·   Indications for CRT-P were discussed with EP in the past.    ·   Revisited 6/28/2022. Patient will see EP to discuss. 4.   CAD   ·   Patient with medically managed OM disease,. ·   PCI to diagonal in 2020   ·   completed 1 year DAPT. ·   Stopped plavix   5. CHF  ·   Controlled on appropriate therapy   ·   No ICD needed at this time EF >40%  · Changed ACE to ARB due to cough   Key CAD CHF Meds          atorvastatin (LIPITOR) 20 MG tablet    Sig - Route: Take 1 tablet by mouth daily - Oral    carvedilol (COREG) 12.5 MG tablet    Class: Historical Med    lisinopril (PRINIVIL;ZESTRIL) 20 MG tablet    Class: Historical Med    spironolactone (ALDACTONE) 25 MG tablet    Class: Historical Med         6. Diabetes. ·   Patient allergic to Jardiance  acarbose - 25 MG  glimepiride - 4 MG  metFORMIN - 1000 MG   7. Hypertension,   ·   Controlled. Continue medications at doses listed below. Key CAD CHF Meds          atorvastatin (LIPITOR) 20 MG tablet    Sig - Route:  Take 1 tablet by mouth daily - Oral    carvedilol (COREG) 12.5 MG tablet    Class: Historical Med    lisinopril (PRINIVIL;ZESTRIL) 20 MG tablet    Class: Historical Med    spironolactone (ALDACTONE) 25 MG tablet    Class: Historical Med             Current Outpatient Medications   Medication Sig    atorvastatin (LIPITOR) 20 MG tablet Take 1 tablet by mouth daily    acarbose (PRECOSE) 25 MG tablet Take 25 mg by mouth 3 times daily (with meals)    aspirin 81 MG chewable tablet Take 81 mg by mouth    carvedilol (COREG) 12.5 MG tablet Take 12.5 mg by mouth 2 times daily (with meals)    vitamin D 25 MCG (1000 UT) CAPS Take by mouth daily    glimepiride (AMARYL) 4 MG tablet 1 po bid with a meal for diabetes instead of the glipizide)    levothyroxine (SYNTHROID) 137 MCG tablet TAKE 1 TABLET BY MOUTH ONCE DAILY BEFORE BREAKFAST    lisinopril (PRINIVIL;ZESTRIL) 20 MG tablet Take 20 mg by mouth 2 times daily    metFORMIN (GLUCOPHAGE) 1000 MG tablet Take 1,000 mg by mouth 2 times daily (with meals) Pt states he \"cut(s) pill in half\"    nitroGLYCERIN (NITROSTAT) 0.4 MG SL tablet Place 0.4 mg under the tongue    Semaglutide 3 MG TABS Take 3 mg by mouth every morning (before breakfast) Pt doesn't know dose    Semaglutide 7 MG TABS Take 7 mg by mouth every morning (before breakfast)    spironolactone (ALDACTONE) 25 MG tablet Take 25 mg by mouth daily    triamcinolone (KENALOG) 0.1 % cream Apply topically 2 times daily     No current facility-administered medications for this visit. Past Medical History, Past Surgical History, Family history, Social History, and Medications were all reviewed with the patient today and updated as necessary.        Allergies   Allergen Reactions    Ticagrelor Anaphylaxis    Empagliflozin Other (See Comments)     Dyspepsia and atypical chest pain     Past Medical History:   Diagnosis Date    Acquired hypothyroidism 3/19/2018    AV junctional bradycardia     CAD (coronary artery disease)     Congestive heart failure (HCC)     Diabetes (HCC)     Endocrine disease     hypothyriod    Fatty liver     Fatty liver     Fatty liver     Headache 8/5/2017    Heart failure ([facility] [address]     Hypertension     Osteoarthritis of spine with radiculopathy, lumbar region 6/10/2019    Stroke [facility])      Past Surgical History:   Procedure Laterality Date    CHOLECYSTECTOMY      OR CARDIAC SURG PROCEDURE UNLIST      3 vessel bypass     Family History   Problem Relation Age of Onset    Diabetes Sister     Cancer Sister     Diabetes Sister     Heart Disease Mother       Social History     Tobacco Use    Smoking status: Former Smoker     Quit date: 1972     Years since quittin.5    Smokeless tobacco: Never Used   Substance Use Topics    Alcohol use: No       ROS:    Review of Systems   Constitutional: Negative for fever. HENT: Negative for stridor. Eyes: Negative for pain. Cardiovascular: Negative for chest pain. Respiratory: Negative for cough. Endocrine: Negative for cold intolerance. Skin: Negative for nail changes. Musculoskeletal: Negative for arthritis. Gastrointestinal: Negative for abdominal pain. Genitourinary: Negative for dysuria. Neurological: Negative for aphonia. Psychiatric/Behavioral: Negative for altered mental status. Allergic/Immunologic: Negative for hives. PHYSICAL EXAM:    /66   Pulse 96   Ht 6' 1\" (1.854 m)   Wt 200 lb (90.7 kg)   SpO2 95%   BMI 26.39 kg/m²        Wt Readings from Last 3 Encounters:   22 201 lb 3.2 oz (91.3 kg)   22 198 lb 4.8 oz (89.9 kg)   22 204 lb (92.5 kg)     BP Readings from Last 3 Encounters:   22 126/64   22 (!) 115/57   22 126/65         Physical Exam  Vitals reviewed. HENT:      Head: Normocephalic. Right Ear: External ear normal.      Left Ear: External ear normal.      Nose: Nose normal.   Eyes:      General: No scleral icterus. Pulmonary:      Effort: Pulmonary effort is normal.   Abdominal:      General: There is no distension. Musculoskeletal:      Cervical back: Neck supple. Skin:     General: Skin is warm. Neurological:      Mental Status: He is alert. Mental status is at baseline. Medical problems and test results were reviewed with the patient today.            Recent Results (from the past 672 hour(s))   Comprehensive Metabolic Panel    Collection Time: 22 10:32 AM   Result Value Ref Range    Sodium 136 136 - 145 mmol/L    Potassium 3.9 3.5 - 5.1 mmol/L    Chloride 102 98 - 107 mmol/L    CO2 26 21 - 32 mmol/L    Anion Gap 8 7 - 16 mmol/L    Glucose 158 (H) 65 - 100 mg/dL    BUN 16 8 - 23 MG/DL    CREATININE 1.10 0.8 - 1.5 MG/DL    GFR African American >60 >60 ml/min/1.73m2    GFR Non- >60 >60 ml/min/1.73m2    Calcium 9.4 8.3 - 10.4 MG/DL    Total Bilirubin 1.2 (H) 0.2 - 1.1 MG/DL    ALT 48 12 - 65 U/L    AST 33 15 - 37 U/L    Alk Phosphatase 79 50 - 136 U/L    Total Protein 6.7 6.3 - 8.2 g/dL    Albumin 3.5 3.2 - 4.6 g/dL    Globulin 3.2 2.3 - 3.5 g/dL    Albumin/Globulin Ratio 1.1 (L) 1.2 - 3.5     CBC with Auto Differential    Collection Time: 06/01/22 10:32 AM   Result Value Ref Range    WBC 8.0 4.3 - 11.1 K/uL    RBC 4.22 (L) 4.23 - 5.6 M/uL    Hemoglobin 13.3 (L) 13.6 - 17.2 g/dL    Hematocrit 39.3 (L) 41.1 - 50.3 %    MCV 93.1 79.6 - 97.8 FL    MCH 31.5 26.1 - 32.9 PG    MCHC 33.8 31.4 - 35.0 g/dL    RDW 12.8 11.9 - 14.6 %    Platelets 827 247 - 234 K/uL    MPV 11.7 9.4 - 12.3 FL    nRBC 0.00 0.0 - 0.2 K/uL    Differential Type AUTOMATED      Seg Neutrophils 72 43 - 78 %    Lymphocytes 13 13 - 44 %    Monocytes 10 4.0 - 12.0 %    Eosinophils % 3 0.5 - 7.8 %    Basophils 1 0.0 - 2.0 %    Immature Granulocytes 1 0.0 - 5.0 %    Segs Absolute 5.8 1.7 - 8.2 K/UL    Absolute Lymph # 1.0 0.5 - 4.6 K/UL    Absolute Mono # 0.8 0.1 - 1.3 K/UL    Absolute Eos # 0.3 0.0 - 0.8 K/UL    Basophils Absolute 0.1 0.0 - 0.2 K/UL    Absolute Immature Granulocyte 0.0 0.0 - 0.5 K/UL   Hemoglobin A1C    Collection Time: 06/01/22 10:32 AM   Result Value Ref Range    Hemoglobin A1C 6.8 (H) 4.20 - 6.30 %    eAG 148 mg/dL     Lab Results   Component Value Date    CHOL 79 05/29/2022    HDL 39 05/29/2022    VLDL 34 03/01/2022              JES  Melody Head was seen today for congestive heart failure.     Diagnoses and all orders for this visit:    Complete heart block ([facility] [address]  -     Michael Pope MD, Cardiac Electrophysiology, [place]    Hypertension, unspecified type    Congestive heart failure, unspecified HF chronicity, unspecified heart failure type ([facility]  -     Petr Tyler MD, Cardiac Electrophysiology, [place]    Other orders  -     losartan (COZAAR) 50 MG tablet; Take 1 tablet by mouth daily  -     benzonatate (TESSALON) 100 MG capsule; Take 1 capsule by mouth 2 times daily as needed for Cough      Return in about 6 months (around 12/28/2022).        Mago Rogers MD  6/28/2022  2:27 PM

## 2022-06-28 NOTE — CARE COORDINATION
CTN attempted outreach to patient for JULIETH follow up call. Unable to reach patient. Message left with contact information requesting a return call. Will continue to outreach per protocol if no return call received.

## 2022-06-29 ENCOUNTER — CARE COORDINATION (OUTPATIENT)
Dept: CARE COORDINATION | Facility: CLINIC | Age: 77
End: 2022-06-29

## 2022-06-29 NOTE — CARE COORDINATION
[place] [id number] Transitions Follow Up Call    2022    Patient: Chema Lainez  Patient : 1945   MRN: [id number]  Reason for Admission: chest pain  Discharge Date: 22 RARS: Readmission Risk Score: 9.4 ( )    Patient has graduated from the Care Transitions program on 2022. Patient/family has the ability to self-manage at this time. Patient declined referral to the [facility] team for further management. Patient has Care Transition Nurse's contact information for any further questions, concerns, or needs. Patients upcoming visits:    Future Appointments   Date Time Provider Tavon Napoles   2022  9:30 AM MD ANA Phillips GVL AMB   2022 10:30 AM [facility] 36 UCDE GVL AMB   2022  9:45 AM [facility] PACER UCDG GVL AMB   2022 10:30 AM FPA MISCELLANEOUS FPA GVL AMB   2022 10:15 AM MD KI Smith GVL AMB   2022  3:15 PM Hai Bianchi MD DE GVL AMB          Care Transitions Subsequent and Final Call    Schedule Follow Up Appointment with PCP: Completed  Subsequent and Final Calls  Do you have any ongoing symptoms?: Yes  Patient-reported symptoms: Cough, Other  Interventions for patient-reported symptoms: Other  Have your medications changed?: Yes  Patient Reports: Tessalon 100 mg 2 times daily as needed for cough; Cozaar 50 mg daily. Medications obtained and taking as ordered  Do you have any questions related to your medications?: No  Do you currently have any active services?: No  Do you have any needs or concerns that I can assist you with?: No  Reviewed medication changes with patient.       Follow Up  Future Appointments   Date Time Provider Tavon Napoles   2022  9:30 AM MD ANA Phillips GVL AMB   2022 10:30 AM [facility] ECHO 36 UCDE GVL AMB   2022  9:45 AM [facility] PACER UCDG GVL AMB   2022 10:30 AM FPA MISCELLANEOUS FPA GVL AMB   2022 10:15 AM MD KI Smith GVL SULAIMAN   11/22/2022  3:15 PM Lorenza Martell MD UCDE GVL SULAIMAN Asencio RN

## 2022-07-08 ENCOUNTER — OFFICE VISIT (OUTPATIENT)
Dept: ENT CLINIC | Age: 77
End: 2022-07-08
Payer: MEDICARE

## 2022-07-08 VITALS
SYSTOLIC BLOOD PRESSURE: 110 MMHG | DIASTOLIC BLOOD PRESSURE: 80 MMHG | BODY MASS INDEX: 26.35 KG/M2 | WEIGHT: 198.8 LBS | HEIGHT: 73 IN

## 2022-07-08 DIAGNOSIS — R49.0 HOARSENESS: Primary | ICD-10-CM

## 2022-07-08 PROCEDURE — 99213 OFFICE O/P EST LOW 20 MIN: CPT | Performed by: OTOLARYNGOLOGY

## 2022-07-08 PROCEDURE — 31575 DIAGNOSTIC LARYNGOSCOPY: CPT | Performed by: OTOLARYNGOLOGY

## 2022-07-08 PROCEDURE — 1123F ACP DISCUSS/DSCN MKR DOCD: CPT | Performed by: OTOLARYNGOLOGY

## 2022-07-08 RX ORDER — PREDNISONE 20 MG/1
20 TABLET ORAL DAILY
Qty: 7 TABLET | Refills: 0 | Status: SHIPPED | OUTPATIENT
Start: 2022-07-08 | End: 2022-07-15

## 2022-07-08 ASSESSMENT — ENCOUNTER SYMPTOMS
TROUBLE SWALLOWING: 1
EYE DISCHARGE: 0
COUGH: 0
SORE THROAT: 1
ABDOMINAL PAIN: 0

## 2022-07-08 NOTE — PROGRESS NOTES
Chief Complaint   Patient presents with   [facility]     Patient here for hoarseness. He states it states as a sore throat about a month ago now it is getting worse. HPI:  Fara Hernandez is a 76 y.o. male seen today in initial consultation for hoarseness. I had seen him in the past for epistaxis- most recently back on 2/16/21. He had a sore throat/cough about 1 mo ago and since then, he has been more hoarse. He was treated initially w/ some abx from his PCP. His throat feels better overall, but he has remained fairly hoarse. Some days are better than others- his voice is worse w/ overuse as well. He has no previous laryngeal pathology. There is no odonyphagia or hemoptysis. No oral steroids yet. Past Medical History, Past Surgical History, Family history, Social History, and Medications were all reviewed with the patient today and updated as necessary.      Allergies   Allergen Reactions    Ticagrelor Anaphylaxis    Empagliflozin Other (See Comments)     Dyspepsia and atypical chest pain     Patient Active Problem List   Diagnosis    Chronic coronary artery disease    Complete heart block (HCC)    Diabetes ([address]    Hypertension    Chronic systolic heart failure (HCC)    Syncope    Hypertensive cardiomyopathy, with heart failure ([address]    Pacemaker    Fatty liver    Acquired hypothyroidism    Type 2 diabetes mellitus without complication, without long-term current use of insulin (HCC)    Paralysis of conjugate gaze    NSTEMI (non-ST elevated myocardial infarction) ([address]    Blurry vision    Stroke ([address]    Non-rheumatic mitral regurgitation    Hyperlipidemia    Ischemic cardiomyopathy    Weakness generalized    Leukocytosis    Osteoarthritis of spine with radiculopathy, lumbar region    Right leg pain    Atherosclerosis of native arteries of extremities with rest pain, other extremity (HCC)    Diastolic dysfunction     Current Outpatient Medications   Medication Sig    predniSONE (DELTASONE) 20 MG tablet Take 1 tablet by mouth daily for 7 days    Multiple Vitamin (MULTIVITAMIN ADULT PO) Take by mouth    losartan (COZAAR) 50 MG tablet Take 1 tablet by mouth daily    atorvastatin (LIPITOR) 20 MG tablet Take 1 tablet by mouth daily    acarbose (PRECOSE) 25 MG tablet Take 25 mg by mouth 3 times daily (with meals)    aspirin 81 MG chewable tablet Take 81 mg by mouth    carvedilol (COREG) 12.5 MG tablet Take 12.5 mg by mouth 2 times daily (with meals)    vitamin D 25 MCG (1000 UT) CAPS Take by mouth daily    glimepiride (AMARYL) 4 MG tablet 1 po bid with a meal for diabetes instead of the glipizide)    levothyroxine (SYNTHROID) 137 MCG tablet TAKE 1 TABLET BY MOUTH ONCE DAILY BEFORE BREAKFAST    metFORMIN (GLUCOPHAGE) 1000 MG tablet Take 1,000 mg by mouth 2 times daily (with meals) Pt states he \"cut(s) pill in half\"    nitroGLYCERIN (NITROSTAT) 0.4 MG SL tablet Place 0.4 mg under the tongue    Semaglutide 3 MG TABS Take 3 mg by mouth every morning (before breakfast) Pt doesn't know dose    Semaglutide 7 MG TABS Take 7 mg by mouth every morning (before breakfast)    spironolactone (ALDACTONE) 25 MG tablet Take 25 mg by mouth daily    triamcinolone (KENALOG) 0.1 % cream Apply topically 2 times daily     No current facility-administered medications for this visit.      Past Medical History:   Diagnosis Date    Acquired hypothyroidism 3/19/2018    AV junctional bradycardia     CAD (coronary artery disease)     Congestive heart failure (HCC)     Diabetes (HCC)     Endocrine disease     hypothyriod    Fatty liver     Fatty liver     Fatty liver     Headache 2017    Heart failure [facility]     Hypertension     Osteoarthritis of spine with radiculopathy, lumbar region 6/10/2019    Stroke [facility])      Social History     Tobacco Use    Smoking status: Former Smoker     Quit date: 1972     Years since quittin.5    Smokeless tobacco: Never Used   Substance Use Topics    Alcohol use: No     Past Surgical History:   Procedure Laterality Date    CHOLECYSTECTOMY      AR CARDIAC SURG PROCEDURE UNLIST      3 vessel bypass     Family History   Problem Relation Age of Onset    Diabetes Sister     Cancer Sister     Diabetes Sister     Heart Disease Mother         ROS:    Review of Systems   Constitutional: Negative for fever. HENT: Positive for sore throat and trouble swallowing. Negative for ear discharge and ear pain. Eyes: Negative for discharge. Respiratory: Negative for cough. Cardiovascular: Negative for chest pain. Gastrointestinal: Negative for abdominal pain. Musculoskeletal: Negative for neck pain. Skin: Negative for rash. Allergic/Immunologic: Negative for environmental allergies. Neurological: Negative for dizziness. Hematological: Negative for adenopathy. Psychiatric/Behavioral: Negative for agitation. PHYSICAL EXAM:    /80 (Site: Left Upper Arm, Position: Sitting)   Ht 6' 1\" (1.854 m)   Wt 198 lb 12.8 oz (90.2 kg)   BMI 26.23 kg/m²     General: NAD, well-appearing  Neuro: No gross neuro deficits. CN's II-XII intact. No facial weakness. Eyes: EOMI. Pupils reactive. No periorbital edema/ecchymosis. No nystagmus. Skin: No facial erythema, rashes or concerning lesions. Nose: No external deviations or saddling. Intranasally, septum is midline without perforations, nasal mucosa appears healthy with no erythema, mucopurulence, or polyps. Mouth: Moist mucus membranes, normal tongue/palate mobility, no concerning mucosal lesions. Oropharynx clear with no erythema/exudate, no tonsillar hypertrophy. Ears: Normal appearing auricles, no hematomas. EACs clear with no cerumen impaction, healthy canal skin, TM's intact with no perforations or retraction pockets. No middle ear effusions. Neck: Soft, supple, no palpable lateral neck masses. No palpable parotid or submandibular masses. No thyromegaly or palpable thyroid nodules.  No surgical scars.  Lymphatics: No palpable cervical LAD. Resp: No audible stridor or wheezing. CV: No murmurs, no JVD. Extremities: No clubbing or cyanosis. PROCEDURE: Flexible laryngoscopy  INDICATIONS: Hoarseness  DESCRIPTION: After verbal consent was obtained and a timeout was performed, the flexible scope was advanced down one of the patient's nares into the nasopharynx. The nasal cavity and nasopharynx were clear. The scope was then turned distally down towards the oropharynx. The BOT and vallecula were clear and the epiglottis was normal in appearance. Both aryepiglottic folds were clear and there was a normal appearing glottis w/ healthy TVCs. No nodules or polyps and the cords were fully mobile. No concerning lesions seen along post-cricoid region or within either piriform sinus. The posterior pharyngeal was clear as well. The scope was then carefully removed. The patient tolerated the procedure well and there were no complications. ASSESSMENT and PLAN      ICD-10-CM    1. Hoarseness  R49.0 LARYNGOSCOPY,FLEX FIBER,DIAGNOSTIC     His laryngoscopy was clear today w/ normal mobility and no concerning masses or lesions. I think he has been dealing w/ chronic laryngitis since his recent URI. I started him on low dose Prednisone to help w/ any inflammation and he will increase his hydration as well. I reassured him of my findings and will see him back PRN.     Olivia Arreola MD  7/8/2022    Electronically signed by Olivia Arreola MD on 7/8/2022 at 9:29 AM

## 2022-07-11 LAB
ALBUMIN SERPL-MCNC: 3.5 G/DL (ref 3.5–5)
ALBUMIN/GLOB SERPL: 1.1 {RATIO} (ref 1.1–2.2)
ALP SERPL-CCNC: 79 U/L (ref 50–136)
ALT SERPL-CCNC: 48 U/L (ref 30–65)
ANION GAP SERPL CALC-SCNC: 8 MMOL/L (ref 5–15)
AST SERPL-CCNC: 33 U/L (ref 15–37)
BILIRUB SERPL-MCNC: 1.2 MG/DL
BUN SERPL-MCNC: 16 MG/DL (ref 6–20)
CALCIUM SERPL-MCNC: 9.4 MG/DL (ref 8.5–10.1)
CHLORIDE SERPL-SCNC: 102 MMOL/L (ref 97–108)
CO2 SERPL-SCNC: 26 MMOL/L (ref 21–32)
CREAT SERPL-MCNC: 1.1 MG/DL (ref 0.7–1.3)
GLOBULIN SER CALC-MCNC: 3.2 G/DL (ref 2–4)
GLUCOSE SERPL-MCNC: 158 MG/DL (ref 50–100)
POTASSIUM SERPL-SCNC: 3.9 MMOL/L (ref 3.5–5.1)
PROT SERPL-MCNC: 6.7 G/DL (ref 6.4–8.2)
SODIUM SERPL-SCNC: 136 MMOL/L (ref 136–145)

## 2022-08-24 PROCEDURE — 93294 REM INTERROG EVL PM/LDLS PM: CPT | Performed by: INTERNAL MEDICINE

## 2022-08-24 PROCEDURE — 93296 REM INTERROG EVL PM/IDS: CPT | Performed by: INTERNAL MEDICINE

## 2022-09-07 ENCOUNTER — NURSE ONLY (OUTPATIENT)
Dept: FAMILY MEDICINE CLINIC | Facility: CLINIC | Age: 77
End: 2022-09-07

## 2022-09-07 DIAGNOSIS — I43 HYPERTENSIVE CARDIOMYOPATHY, WITH HEART FAILURE (HCC): ICD-10-CM

## 2022-09-07 DIAGNOSIS — I11.0 HYPERTENSIVE CARDIOMYOPATHY, WITH HEART FAILURE (HCC): ICD-10-CM

## 2022-09-07 DIAGNOSIS — E78.2 MIXED HYPERLIPIDEMIA: ICD-10-CM

## 2022-09-07 DIAGNOSIS — E11.9 TYPE 2 DIABETES MELLITUS WITHOUT COMPLICATION, WITHOUT LONG-TERM CURRENT USE OF INSULIN (HCC): ICD-10-CM

## 2022-09-07 LAB
EST. AVERAGE GLUCOSE BLD GHB EST-MCNC: 134 MG/DL
HBA1C MFR BLD: 6.3 % (ref 4.8–5.6)

## 2022-09-08 LAB
ANION GAP SERPL CALC-SCNC: 6 MMOL/L (ref 4–13)
BUN SERPL-MCNC: 18 MG/DL (ref 8–23)
CALCIUM SERPL-MCNC: 9.5 MG/DL (ref 8.3–10.4)
CHLORIDE SERPL-SCNC: 110 MMOL/L (ref 101–110)
CHOLEST SERPL-MCNC: 87 MG/DL
CO2 SERPL-SCNC: 25 MMOL/L (ref 21–32)
CREAT SERPL-MCNC: 1.2 MG/DL (ref 0.8–1.5)
GLUCOSE SERPL-MCNC: 141 MG/DL (ref 65–100)
HDLC SERPL-MCNC: 34 MG/DL (ref 40–60)
HDLC SERPL: 2.6
LDLC SERPL CALC-MCNC: 26.4 MG/DL
POTASSIUM SERPL-SCNC: 4.4 MMOL/L (ref 3.5–5.1)
SODIUM SERPL-SCNC: 141 MMOL/L (ref 136–145)
TRIGL SERPL-MCNC: 133 MG/DL (ref 35–150)
VLDLC SERPL CALC-MCNC: 26.6 MG/DL (ref 6–23)

## 2022-09-12 ENCOUNTER — OFFICE VISIT (OUTPATIENT)
Dept: FAMILY MEDICINE CLINIC | Facility: CLINIC | Age: 77
End: 2022-09-12
Payer: MEDICARE

## 2022-09-12 VITALS
DIASTOLIC BLOOD PRESSURE: 61 MMHG | BODY MASS INDEX: 26.45 KG/M2 | OXYGEN SATURATION: 97 % | WEIGHT: 199.6 LBS | HEART RATE: 43 BPM | TEMPERATURE: 97.8 F | SYSTOLIC BLOOD PRESSURE: 104 MMHG | RESPIRATION RATE: 18 BRPM | HEIGHT: 73 IN

## 2022-09-12 DIAGNOSIS — I11.0 HYPERTENSIVE CARDIOMYOPATHY, WITH HEART FAILURE (HCC): Primary | ICD-10-CM

## 2022-09-12 DIAGNOSIS — E78.2 MIXED HYPERLIPIDEMIA: ICD-10-CM

## 2022-09-12 DIAGNOSIS — E03.9 ACQUIRED HYPOTHYROIDISM: ICD-10-CM

## 2022-09-12 DIAGNOSIS — E11.9 TYPE 2 DIABETES MELLITUS WITHOUT COMPLICATION, WITHOUT LONG-TERM CURRENT USE OF INSULIN (HCC): ICD-10-CM

## 2022-09-12 DIAGNOSIS — I10 PRIMARY HYPERTENSION: ICD-10-CM

## 2022-09-12 DIAGNOSIS — I43 HYPERTENSIVE CARDIOMYOPATHY, WITH HEART FAILURE (HCC): Primary | ICD-10-CM

## 2022-09-12 PROCEDURE — 3044F HG A1C LEVEL LT 7.0%: CPT | Performed by: FAMILY MEDICINE

## 2022-09-12 PROCEDURE — 99214 OFFICE O/P EST MOD 30 MIN: CPT | Performed by: FAMILY MEDICINE

## 2022-09-12 PROCEDURE — 1123F ACP DISCUSS/DSCN MKR DOCD: CPT | Performed by: FAMILY MEDICINE

## 2022-09-12 RX ORDER — METFORMIN HYDROCHLORIDE 500 MG/1
TABLET, EXTENDED RELEASE ORAL
Qty: 180 TABLET | Refills: 3 | Status: SHIPPED | OUTPATIENT
Start: 2022-09-12

## 2022-09-12 RX ORDER — LEVOTHYROXINE SODIUM 137 UG/1
TABLET ORAL
Qty: 90 TABLET | Refills: 3 | Status: SHIPPED | OUTPATIENT
Start: 2022-09-12

## 2022-09-12 RX ORDER — SPIRONOLACTONE 25 MG/1
TABLET ORAL
Qty: 30 TABLET | Refills: 0
Start: 2022-09-12

## 2022-09-12 NOTE — PROGRESS NOTES
HISTORY OF PRESENT ILLNESS  Chief Complaint   Patient presents with    Follow-up     3 month, fasting         Has been having episodes every now and then while looking down. Almost feels like he will pass out. Has had carotid tests years ago - 2017 - no significant changes. Needs us to write morning and night on his meds thinks that that may be the problem    - 141    BP  103/61    Has sciatic nerve problems in the right hip. Went to the ortho and they gave him some medication that helped. Has an eye appt coming up. Gets up 4-5 times at night to go pee. Goes a normal amount. Does snore. Diabetes Mellitus: Patient presents for follow up of diabetes. Symptoms: taking medications as instructed, no medication side effects noted, no TIA's, no chest pain on exertion, no dyspnea on exertion, no swelling of ankles. Symptoms have well controlled. Patient denies visual disturbances. Evaluation to date has been included below. Home sugars: BGs consistently in an acceptable range. Treatment to date: medications. Diabetic issues reviewed with him: low cholesterol diet, weight control and daily exercise discussed. Key Antihyperglycemic Medications            metFORMIN (GLUCOPHAGE XR) 500 MG extended release tablet (Taking)    Si po bid with meals instead of the 1000 mg dose.     acarbose (PRECOSE) 25 MG tablet (Taking)    Class: Historical Med    glimepiride (AMARYL) 4 MG tablet (Taking)    Class: Historical Med    metFORMIN (GLUCOPHAGE) 1000 MG tablet (Taking)    Class: Historical Med    Semaglutide 3 MG TABS (Taking)    Class: Historical Med    Semaglutide 7 MG TABS (Taking)    Class: Historical Med           Lab Results   Component Value Date    LABA1C 6.3 (H) 2022     Lab Results   Component Value Date     2022      Lab Results   Component Value Date    CREATININE 1.20 2022      No results found for: LABMICR, [id number]   Wt Readings from Last 3 Encounters: 09/12/22 199 lb 9.6 oz (90.5 kg)   07/12/22 200 lb (90.7 kg)   07/08/22 198 lb 12.8 oz (90.2 kg)      BP Readings from Last 3 Encounters:   09/12/22 104/61   07/12/22 108/64   07/08/22 110/80        HPI  Diabetes  The history is provided by the patient. This is a chronic problem. The current episode started more than 1 week ago. The problem occurs daily. The problem has not changed since onset. Pertinent negatives include no chest pain, no abdominal pain, no headaches and no shortness of breath. The symptoms are aggravated by eating. The symptoms are relieved by medications. The treatment provided moderate relief. Hypertension   Pertinent negatives include no chest pain, no palpitations, no malaise/fatigue, no blurred vision, no headaches, no dizziness, no shortness of breath, no nausea and no vomiting    Review of Systems   Constitutional:  Negative for chills, fatigue and fever. HENT:  Negative for congestion, sinus pressure and sinus pain. Respiratory:  Negative for shortness of breath. Cardiovascular:  Negative for chest pain and palpitations. Gastrointestinal:  Negative for abdominal pain, constipation, diarrhea, nausea and vomiting. Genitourinary:  Negative for difficulty urinating, dysuria, frequency and urgency. Musculoskeletal:  Negative for arthralgias, back pain and neck pain. Skin:  Negative for color change and pallor. Allergic/Immunologic: Negative for environmental allergies. Neurological:  Negative for headaches. Psychiatric/Behavioral:  The patient is not nervous/anxious.      Patient Active Problem List   Diagnosis    Chronic coronary artery disease    Complete heart block (HCC)    Diabetes ([address]    Hypertension    Chronic systolic heart failure (HCC)    Syncope    Hypertensive cardiomyopathy, with heart failure ([address]    Pacemaker    Fatty liver    Acquired hypothyroidism    Type 2 diabetes mellitus without complication, without long-term current use of insulin ([address] Paralysis of conjugate gaze    NSTEMI (non-ST elevated myocardial infarction) ([facility]    Blurry vision    Stroke [facility])    Non-rheumatic mitral regurgitation    Hyperlipidemia    Ischemic cardiomyopathy    Weakness generalized    Leukocytosis    Osteoarthritis of spine with radiculopathy, lumbar region    Right leg pain    Atherosclerosis of native arteries of extremities with rest pain, other extremity (HCC)    Diastolic dysfunction         Blood pressure 104/61, pulse (!) 43, temperature 97.8 °F (36.6 °C), temperature source Temporal, resp. rate 18, height 6' 1\" (1.854 m), weight 199 lb 9.6 oz (90.5 kg), SpO2 97 %. Pain Scale: /10 Pain Location:   Body mass index is 26.33 kg/m². Physical Exam  Vitals and nursing note reviewed. Constitutional:       General: He is not in acute distress. Appearance: He is normal weight. He is not toxic-appearing. HENT:      Head: Normocephalic. Eyes:      General: Lids are normal.      Extraocular Movements: Extraocular movements intact. Conjunctiva/sclera: Conjunctivae normal.      Pupils: Pupils are equal.   Neck:      Vascular: No carotid bruit. Cardiovascular:      Heart sounds: Normal heart sounds. No murmur heard. No friction rub. No gallop. Pulmonary:      Effort: Pulmonary effort is normal. No respiratory distress. Breath sounds: Normal breath sounds. No stridor. No wheezing, rhonchi or rales. Chest:      Chest wall: No tenderness. Musculoskeletal:      Cervical back: Normal range of motion and neck supple. No tenderness. Skin:     General: Skin is warm and dry. Capillary Refill: Capillary refill takes less than 2 seconds. Neurological:      General: No focal deficit present. Mental Status: He is alert and oriented to person, place, and time. Psychiatric:         Mood and Affect: Mood normal.         Behavior: Behavior normal.         Thought Content:  Thought content normal.         Judgment: Judgment normal. ASSESSMENT and PLAN   Diagnosis Orders   1. Hypertensive cardiomyopathy, with heart failure ([address]        2. Type 2 diabetes mellitus without complication, without long-term current use of insulin ([facility])  metFORMIN (GLUCOPHAGE XR) 500 MG extended release tablet      3. Mixed hyperlipidemia        4. Primary hypertension  spironolactone (ALDACTONE) 25 MG tablet      5. Acquired hypothyroidism  levothyroxine (SYNTHROID) 137 MCG tablet          Reviewed medications and side effects in detail    Change spironolactone to 1/2 tablet each morning. His other chronic conditions are stable at this time. He is stable on the current treatment and tolerating it well. No significant sides effects. Will not adjust therapy at this time, unless noted above. We will continue to monitor for any problems. Medications refilled and lab work has been ordered where needed. Reviewed medications are explained including any potential interactions or side effects in detail. The patient's questions were answered. He  understands the above and has no further questions. Further workup and treatment should be done if symptoms persist, worsen or new symptoms occur. He  will call to notify us of any problems, complications or worsening symptoms. Follow-up and Dispositions    Return in about 3 months (around 12/12/2022) for AWE. There are no Patient Instructions on file for this visit. (Some details in this note may have been created with speech-recognition software. Some errors in speech recognition may have occurred.    Most of those have been corrected but some may have been missed.)         Bennett Delarosa MD  [facility]  [address] [facility] [address]  Phone [phone]

## 2022-09-18 ASSESSMENT — ENCOUNTER SYMPTOMS
SINUS PRESSURE: 0
SINUS PAIN: 0
ABDOMINAL PAIN: 0
DIARRHEA: 0
SHORTNESS OF BREATH: 0
COLOR CHANGE: 0
VOMITING: 0
NAUSEA: 0
BACK PAIN: 0
CONSTIPATION: 0

## 2022-09-21 DIAGNOSIS — Z95.0 PACEMAKER: ICD-10-CM

## 2022-09-21 DIAGNOSIS — I44.2 COMPLETE HEART BLOCK (HCC): Primary | ICD-10-CM

## 2022-09-21 DIAGNOSIS — I44.2 COMPLETE HEART BLOCK (HCC): ICD-10-CM

## 2022-10-14 ENCOUNTER — CLINICAL DOCUMENTATION (OUTPATIENT)
Dept: FAMILY MEDICINE CLINIC | Facility: CLINIC | Age: 77
End: 2022-10-14

## 2022-10-26 PROBLEM — E11.319 DIABETIC RETINOPATHY WITHOUT MACULAR EDEMA ASSOCIATED WITH TYPE 2 DIABETES MELLITUS (HCC): Status: ACTIVE | Noted: 2022-10-26

## 2022-11-22 ENCOUNTER — OFFICE VISIT (OUTPATIENT)
Dept: CARDIOLOGY CLINIC | Age: 77
End: 2022-11-22
Payer: MEDICARE

## 2022-11-22 VITALS
WEIGHT: 205 LBS | HEART RATE: 60 BPM | SYSTOLIC BLOOD PRESSURE: 136 MMHG | HEIGHT: 73 IN | BODY MASS INDEX: 27.17 KG/M2 | DIASTOLIC BLOOD PRESSURE: 62 MMHG

## 2022-11-22 DIAGNOSIS — I10 PRIMARY HYPERTENSION: ICD-10-CM

## 2022-11-22 DIAGNOSIS — Z95.0 PACEMAKER: ICD-10-CM

## 2022-11-22 DIAGNOSIS — I10 HYPERTENSION, UNSPECIFIED TYPE: ICD-10-CM

## 2022-11-22 DIAGNOSIS — I50.22 CHRONIC SYSTOLIC (CONGESTIVE) HEART FAILURE (HCC): Primary | ICD-10-CM

## 2022-11-22 DIAGNOSIS — E78.2 MIXED HYPERLIPIDEMIA: ICD-10-CM

## 2022-11-22 PROCEDURE — 3078F DIAST BP <80 MM HG: CPT | Performed by: INTERNAL MEDICINE

## 2022-11-22 PROCEDURE — 3074F SYST BP LT 130 MM HG: CPT | Performed by: INTERNAL MEDICINE

## 2022-11-22 PROCEDURE — 1123F ACP DISCUSS/DSCN MKR DOCD: CPT | Performed by: INTERNAL MEDICINE

## 2022-11-22 PROCEDURE — 99214 OFFICE O/P EST MOD 30 MIN: CPT | Performed by: INTERNAL MEDICINE

## 2022-11-22 RX ORDER — CARVEDILOL 12.5 MG/1
12.5 TABLET ORAL 2 TIMES DAILY WITH MEALS
Qty: 180 TABLET | Refills: 0 | Status: SHIPPED | OUTPATIENT
Start: 2022-11-22 | End: 2023-02-20

## 2022-11-22 RX ORDER — ATORVASTATIN CALCIUM 20 MG/1
20 TABLET, FILM COATED ORAL DAILY
Qty: 90 TABLET | Refills: 1 | Status: SHIPPED | OUTPATIENT
Start: 2022-11-22

## 2022-11-22 RX ORDER — SPIRONOLACTONE 25 MG/1
TABLET ORAL
Qty: 30 TABLET | Refills: 0 | Status: SHIPPED | OUTPATIENT
Start: 2022-11-22

## 2022-11-22 ASSESSMENT — ENCOUNTER SYMPTOMS
NAIL CHANGES: 0
ABDOMINAL PAIN: 0
COUGH: 0
STRIDOR: 0
EYE PAIN: 0
APHONIA: 0

## 2022-11-22 NOTE — PROGRESS NOTES
[address], [address] [place] [address], [address] [place] [address]  PHONE: [phone]    SUBJECTIVE:   Mackenzie Leahy is a 76 y.o. male 1945   seen for a follow up visit regarding the following:     Chief Complaint   Patient presents with    Cardiomyopathy           History of present illness: 76 y.o. male presented for follow-up 11/22/22   dizziness prior to appt improved when taking lower dose of aldactone. Last carotid studies were performed. He is planning on having an MRI for chronic hip pain. He has an MRI conditional device. We have discussed indications for CRT-P pacing in the past he has class I CHF symptoms and EF was mildly diminished on echocardiogram.  Electrophysiology follow-up was recommended but has not been completed. We have agreed on serial echocardiogram prior to additional procedures. Interval Hx:   He was admitted to [facility] 05/12/2020 with chest discomfort. The patient has a history of coronary artery bypass grafting with LIMA to LAD, SVG to PDA, SVG to OM. Last PCI 11/2019, previous EF of 40%. The patient was evaluated with heart  catheterization during his hospitalization. He had an occluded vein graft to the right circumflex system that was managed medically. Ejection fraction was 30-35%. He presented for followup 06/2020. No complaints of shortness of breath or chest discomfort. The patient is on diuretic therapy with Lasix and Furosemide. Cardiac History:      History of coronary artery bypass grafting 2007, LIMA to LAD, SVG to OM, SVG to right PDA. PCI 11/2019, details unavailable. Cardiac catheterization report not in cardiology tab ([facility]). Procedure note reviewed with PCI to mid diagonal 2.25 x 15 Helio drug eluting stent.      Cardiac catheterization, NSTEMI, two of three patent bypass grafts, LIMA to LAD patent, vein graft to PDA patent, occluded vein graft to OM.      05/2020 echocardiogram - ejection fraction 35%. 2020  PCI to D1 2.25 x 15 HUGO and 2.0 x 18 HUGO.    2020 Echo EF 45%    2020 device interrogation the 99% RV pacing    2021 Device interrogation 99% RV pacing     2022 changed lisinopril to losartan due cough   Improved Flatulenc  Discussed dietary modifications Patient has diet high in cabbage beans. These should be avoided stil eating 22      Assessment and Plan:          Long term use of diuretic    On aldactone     Pacemaker in situ    High degree of RV pacing with EF less than 50%  Assurity MRI(TM) device. [employer] Jude. CAD     Patient with medically managed OM disease,. PCI to diagonal in      CHF    Controlled on appropriate therapy   Changed ACE to ARB due to cough   Key CAD CHF Meds            spironolactone (ALDACTONE) 25 MG tablet (Taking)    Si/2 tablet po qam    atorvastatin (LIPITOR) 20 MG tablet (Taking)    Sig - Route: Take 1 tablet by mouth daily - Oral    carvedilol (COREG) 12.5 MG tablet (Taking)    Sig - Route: Take 1 tablet by mouth 2 times daily (with meals) - Oral    losartan (COZAAR) 50 MG tablet (Taking)    Sig - Route: Take 1 tablet by mouth daily - Oral             Diabetes. Patient allergic to Jardiance  acarbose - 25 MG  glimepiride - 4 MG  metFORMIN - 500 MG     Hypertension,     Controlled. Continue medications at doses listed below. Key CAD CHF Meds            spironolactone (ALDACTONE) 25 MG tablet (Taking)    Si/2 tablet po qam    atorvastatin (LIPITOR) 20 MG tablet (Taking)    Sig - Route: Take 1 tablet by mouth daily - Oral    carvedilol (COREG) 12.5 MG tablet (Taking)    Sig - Route: Take 1 tablet by mouth 2 times daily (with meals) - Oral    losartan (COZAAR) 50 MG tablet (Taking)    Sig - Route:  Take 1 tablet by mouth daily - Oral               Current Outpatient Medications   Medication Sig    spironolactone (ALDACTONE) 25 MG tablet 1/2 tablet po qam    atorvastatin (LIPITOR) 20 MG tablet Take 1 tablet by mouth daily    carvedilol (COREG) 12.5 MG tablet Take 1 tablet by mouth 2 times daily (with meals)    levothyroxine (SYNTHROID) 137 MCG tablet TAKE 1 TABLET BY MOUTH ONCE DAILY BEFORE BREAKFAST    metFORMIN (GLUCOPHAGE XR) 500 MG extended release tablet 1 po bid with meals instead of the 1000 mg dose. Multiple Vitamin (MULTIVITAMIN ADULT PO) Take by mouth    losartan (COZAAR) 50 MG tablet Take 1 tablet by mouth daily    acarbose (PRECOSE) 25 MG tablet Take 25 mg by mouth 3 times daily (with meals)    aspirin 81 MG chewable tablet Take 81 mg by mouth    vitamin D 25 MCG (1000 UT) CAPS Take by mouth daily    glimepiride (AMARYL) 4 MG tablet 1 po bid with a meal for diabetes instead of the glipizide)    nitroGLYCERIN (NITROSTAT) 0.4 MG SL tablet Place 0.4 mg under the tongue    Semaglutide 3 MG TABS Take 3 mg by mouth every morning (before breakfast) Pt doesn't know dose    Semaglutide 7 MG TABS Take 7 mg by mouth every morning (before breakfast)    triamcinolone (KENALOG) 0.1 % cream Apply topically 2 times daily     No current facility-administered medications for this visit. Past Medical History, Past Surgical History, Family history, Social History, and Medications were all reviewed with the patient today and updated as necessary.        Allergies   Allergen Reactions    Ticagrelor Anaphylaxis    Empagliflozin Other (See Comments)     Dyspepsia and atypical chest pain     Past Medical History:   Diagnosis Date    Acquired hypothyroidism 3/19/2018    AV junctional bradycardia     CAD (coronary artery disease)     Congestive heart failure (HCC)     Diabetes ([address]     Diabetic retinopathy without macular edema associated with type 2 diabetes mellitus ([address] 10/26/2022    [place]    Endocrine disease     hypothyriod    Fatty liver     Fatty liver     Fatty liver     Headache 8/5/2017    Heart failure ([address]     Hypertension     Osteoarthritis of spine with radiculopathy, lumbar region 6/10/2019    Stroke ([address] Past Surgical History:   Procedure Laterality Date    CHOLECYSTECTOMY      VA CARDIAC SURG PROCEDURE UNLIST      3 vessel bypass     Family History   Problem Relation Age of Onset    Diabetes Sister     Cancer Sister     Diabetes Sister     Heart Disease Mother       Social History     Tobacco Use    Smoking status: Former     Types: Cigarettes     Quit date: 1972     Years since quittin.9    Smokeless tobacco: Never   Substance Use Topics    Alcohol use: No       ROS:    Review of Systems   Constitutional: Negative for fever. HENT:  Negative for stridor. Eyes:  Negative for pain. Cardiovascular:  Negative for chest pain. Respiratory:  Negative for cough. Endocrine: Negative for cold intolerance. Skin:  Negative for nail changes. Musculoskeletal:  Negative for arthritis. Gastrointestinal:  Negative for abdominal pain. Genitourinary:  Negative for dysuria. Neurological:  Negative for aphonia. Psychiatric/Behavioral:  Negative for altered mental status. Allergic/Immunologic: Negative for hives. PHYSICAL EXAM:    /62   Pulse 60   Ht 6' 1\" (1.854 m)   Wt 205 lb (93 kg)   BMI 27.05 kg/m²        Wt Readings from Last 3 Encounters:   22 205 lb (93 kg)   22 199 lb 9.6 oz (90.5 kg)   22 200 lb (90.7 kg)     BP Readings from Last 3 Encounters:   22 136/62   22 104/61   22 108/64         Physical Exam  Vitals reviewed. HENT:      Head: Normocephalic. Right Ear: External ear normal.      Left Ear: External ear normal.      Nose: Nose normal.   Eyes:      General: No scleral icterus. Pulmonary:      Effort: Pulmonary effort is normal.   Abdominal:      General: There is no distension. Musculoskeletal:      Cervical back: Neck supple. Skin:     General: Skin is warm. Neurological:      Mental Status: He is alert. Mental status is at baseline. Medical problems and test results were reviewed with the patient today. No results found for this or any previous visit (from the past 672 hour(s)). Lab Results   Component Value Date/Time    CHOL 87 09/07/2022 10:19 AM    HDL 34 09/07/2022 10:19 AM    VLDL 34 03/01/2022 09:05 AM              JES Eldridge was seen today for cardiomyopathy. Diagnoses and all orders for this visit:    Chronic systolic (congestive) heart failure (HCC)  -     Transthoracic echocardiogram (TTE) complete with contrast, bubble, strain, and 3D PRN; Future    Hypertension, unspecified type    Pacemaker  -     Transthoracic echocardiogram (TTE) complete with contrast, bubble, strain, and 3D PRN; Future    Primary hypertension  -     spironolactone (ALDACTONE) 25 MG tablet; 1/2 tablet po qam    Mixed hyperlipidemia  -     atorvastatin (LIPITOR) 20 MG tablet; Take 1 tablet by mouth daily    Other orders  -     carvedilol (COREG) 12.5 MG tablet; Take 1 tablet by mouth 2 times daily (with meals)    Return in about 6 months (around 5/22/2023).        Matilde Khan MD  11/22/2022  3:38 PM

## 2022-12-14 DIAGNOSIS — Z95.0 PACEMAKER: ICD-10-CM

## 2022-12-14 DIAGNOSIS — I44.2 COMPLETE HEART BLOCK (HCC): ICD-10-CM

## 2022-12-14 RX ORDER — ACARBOSE 25 MG/1
25 TABLET ORAL
Qty: 270 TABLET | Refills: 1 | Status: CANCELLED | OUTPATIENT
Start: 2022-12-14

## 2022-12-14 ASSESSMENT — ENCOUNTER SYMPTOMS
SHORTNESS OF BREATH: 0
ORTHOPNEA: 0
BLURRED VISION: 0

## 2022-12-15 ENCOUNTER — OFFICE VISIT (OUTPATIENT)
Dept: FAMILY MEDICINE CLINIC | Facility: CLINIC | Age: 77
End: 2022-12-15
Payer: MEDICARE

## 2022-12-15 VITALS
SYSTOLIC BLOOD PRESSURE: 135 MMHG | OXYGEN SATURATION: 98 % | WEIGHT: 199 LBS | DIASTOLIC BLOOD PRESSURE: 75 MMHG | HEIGHT: 73 IN | HEART RATE: 71 BPM | BODY MASS INDEX: 26.37 KG/M2 | TEMPERATURE: 97 F | RESPIRATION RATE: 16 BRPM

## 2022-12-15 DIAGNOSIS — I10 PRIMARY HYPERTENSION: ICD-10-CM

## 2022-12-15 DIAGNOSIS — E03.9 ACQUIRED HYPOTHYROIDISM: ICD-10-CM

## 2022-12-15 DIAGNOSIS — Z00.00 MEDICARE ANNUAL WELLNESS VISIT, SUBSEQUENT: Primary | ICD-10-CM

## 2022-12-15 DIAGNOSIS — E11.9 TYPE 2 DIABETES MELLITUS WITHOUT COMPLICATION, WITHOUT LONG-TERM CURRENT USE OF INSULIN (HCC): ICD-10-CM

## 2022-12-15 DIAGNOSIS — E78.2 MIXED HYPERLIPIDEMIA: ICD-10-CM

## 2022-12-15 DIAGNOSIS — D72.829 LEUKOCYTOSIS, UNSPECIFIED TYPE: ICD-10-CM

## 2022-12-15 PROBLEM — M16.11 PRIMARY LOCALIZED OSTEOARTHRITIS OF RIGHT HIP: Status: ACTIVE | Noted: 2022-09-15

## 2022-12-15 PROCEDURE — 3044F HG A1C LEVEL LT 7.0%: CPT | Performed by: FAMILY MEDICINE

## 2022-12-15 PROCEDURE — 3078F DIAST BP <80 MM HG: CPT | Performed by: FAMILY MEDICINE

## 2022-12-15 PROCEDURE — G0439 PPPS, SUBSEQ VISIT: HCPCS | Performed by: FAMILY MEDICINE

## 2022-12-15 PROCEDURE — 3074F SYST BP LT 130 MM HG: CPT | Performed by: FAMILY MEDICINE

## 2022-12-15 PROCEDURE — 1123F ACP DISCUSS/DSCN MKR DOCD: CPT | Performed by: FAMILY MEDICINE

## 2022-12-15 RX ORDER — GLIMEPIRIDE 4 MG/1
TABLET ORAL
Qty: 180 TABLET | Refills: 1 | Status: SHIPPED | OUTPATIENT
Start: 2022-12-15

## 2022-12-15 RX ORDER — SPIRONOLACTONE 25 MG/1
TABLET ORAL
Qty: 90 TABLET | Refills: 1 | Status: SHIPPED | OUTPATIENT
Start: 2022-12-15

## 2022-12-15 RX ORDER — CARVEDILOL 12.5 MG/1
12.5 TABLET ORAL 2 TIMES DAILY WITH MEALS
Qty: 180 TABLET | Refills: 0 | Status: SHIPPED | OUTPATIENT
Start: 2022-12-15 | End: 2023-03-15

## 2022-12-15 RX ORDER — ATORVASTATIN CALCIUM 20 MG/1
20 TABLET, FILM COATED ORAL DAILY
Qty: 90 TABLET | Refills: 1 | Status: SHIPPED | OUTPATIENT
Start: 2022-12-15

## 2022-12-15 ASSESSMENT — LIFESTYLE VARIABLES
HOW MANY STANDARD DRINKS CONTAINING ALCOHOL DO YOU HAVE ON A TYPICAL DAY: PATIENT DOES NOT DRINK
HOW OFTEN DO YOU HAVE A DRINK CONTAINING ALCOHOL: NEVER

## 2022-12-15 ASSESSMENT — PATIENT HEALTH QUESTIONNAIRE - PHQ9
1. LITTLE INTEREST OR PLEASURE IN DOING THINGS: 0
2. FEELING DOWN, DEPRESSED OR HOPELESS: 0
SUM OF ALL RESPONSES TO PHQ9 QUESTIONS 1 & 2: 0
SUM OF ALL RESPONSES TO PHQ QUESTIONS 1-9: 0

## 2022-12-15 NOTE — PATIENT INSTRUCTIONS
Advance Directives: Care Instructions  Overview  An advance directive is a legal way to state your wishes at the end of your life. It tells your family and your doctor what to do if you can't say what you want. There are two main types of advance directives. You can change them any time your wishes change. Living will. This form tells your family and your doctor your wishes about life support and other treatment. The form is also called a declaration. Medical power of . This form lets you name a person to make treatment decisions for you when you can't speak for yourself. This person is called a health care agent (health care proxy, health care surrogate). The form is also called a durable power of  for health care. If you do not have an advance directive, decisions about your medical care may be made by a family member, or by a doctor or a  who doesn't know you. It may help to think of an advance directive as a gift to the people who care for you. If you have one, they won't have to make tough decisions by themselves. For more information, including forms for your state, see the [address] website (www.caringinfo.org/planning/advance-directives/). Follow-up care is a key part of your treatment and safety. Be sure to make and go to all appointments, and call your doctor if you are having problems. It's also a good idea to know your test results and keep a list of the medicines you take. What should you include in an advance directive? Many states have a unique advance directive form. (It may ask you to address specific issues.) Or you might use a universal form that's approved by many states. If your form doesn't tell you what to address, it may be hard to know what to include in your advance directive. Use the questions below to help you get started. Who do you want to make decisions about your medical care if you are not able to?   What life-support measures do you want if you have a serious illness that gets worse over time or can't be cured? What are you most afraid of that might happen? (Maybe you're afraid of having pain, losing your independence, or being kept alive by machines.)  Where would you prefer to die? (Your home? A hospital? A nursing home?)  Do you want to donate your organs when you die? Do you want certain Restorationist practices performed before you die? When should you call for help? Be sure to contact your doctor if you have any questions. Where can you learn more? Go to http://www.reyes.com/ and enter R264 to learn more about \"Advance Directives: Care Instructions. \"  Current as of: June 16, 2022               Content Version: 13.5  © 2039-6911 [employer], Incorporated. Care instructions adapted under license by [facility] ([facility]). If you have questions about a medical condition or this instruction, always ask your healthcare professional. [place] [address] any warranty or liability for your use of this information. Personalized Preventive Plan for Beatriz Jarvis - 12/15/2022  Medicare offers a range of preventive health benefits. Some of the tests and screenings are paid in full while other may be subject to a deductible, co-insurance, and/or copay. Some of these benefits include a comprehensive review of your medical history including lifestyle, illnesses that may run in your family, and various assessments and screenings as appropriate. After reviewing your medical record and screening and assessments performed today your provider may have ordered immunizations, labs, imaging, and/or referrals for you. A list of these orders (if applicable) as well as your Preventive Care list are included within your After Visit Summary for your review.     Other Preventive Recommendations:    A preventive eye exam performed by an eye specialist is recommended every 1-2 years to screen for glaucoma; cataracts, macular degeneration, and other eye disorders. A preventive dental visit is recommended every 6 months. Try to get at least 150 minutes of exercise per week or 10,000 steps per day on a pedometer . Order or download the FREE \"Exercise & Physical Activity: Your Everyday Guide\" from [employer]. Call 1-225.564.6659 or search [employer] online. You need 4217-0910 mg of calcium and 4524-3147 IU of vitamin D per day. It is possible to meet your calcium requirement with diet alone, but a vitamin D supplement is usually necessary to meet this goal.  When exposed to the sun, use a sunscreen that protects against both UVA and UVB radiation with an SPF of 30 or greater. Reapply every 2 to 3 hours or after sweating, drying off with a towel, or swimming. Always wear a seat belt when traveling in a car. Always wear a helmet when riding a bicycle or motorcycle.

## 2022-12-15 NOTE — PROGRESS NOTES
HISTORY OF PRESENT ILLNESS  Chief Complaint   Patient presents with    Medicare AW     Seeing [facility] about his back. They are wanting to do surgery on it. Plans to do an MRI after the first of the year. Do you or your family notice any trouble with your hearing that hasn't been managed with hearing aids? Yes      Do you always fasten your seatbelt when you are in a car? No Abnormal      Do you have a Living Will? No     Copy given  BS Running 120-150's    Will have new insurance next year. Diabetes Mellitus: Patient presents for follow up of diabetes. Symptoms: taking medications as instructed, no medication side effects noted, no TIA's, no chest pain on exertion, notes stable dyspnea on exertion, no change, no swelling of ankles. Symptoms have reasonably well controlled. Patient denies visual disturbances. Evaluation to date has been included below. Home sugars: BGs consistently in an acceptable range. Treatment to date: medications. Diabetic issues reviewed with him: low cholesterol diet, weight control and daily exercise discussed. Key Antihyperglycemic Medications            glimepiride (AMARYL) 4 MG tablet (Taking)    Si po bid with a meal for diabetes instead of the glipizide)    Semaglutide 7 MG TABS (Taking)    Sig - Route: Take 7 mg by mouth every morning (before breakfast) - Oral    metFORMIN (GLUCOPHAGE XR) 500 MG extended release tablet (Taking)    Si po bid with meals instead of the 1000 mg dose. Key CAD CHF Meds            carvedilol (COREG) 12.5 MG tablet (Taking)    Sig - Route: Take 1 tablet by mouth 2 times daily (with meals) - Oral    atorvastatin (LIPITOR) 20 MG tablet (Taking)    Sig - Route: Take 1 tablet by mouth daily - Oral    spironolactone (ALDACTONE) 25 MG tablet (Taking)    Si/2 tablet po qam    losartan (COZAAR) 50 MG tablet (Taking)    Sig - Route:  Take 1 tablet by mouth daily - Oral           Lab Results   Component Value Date LABA1C 6.3 (H) 09/07/2022     Lab Results   Component Value Date     09/07/2022      Lab Results   Component Value Date    CREATININE 1.20 09/07/2022      No results found for: LABMICR, [id number]   Wt Readings from Last 3 Encounters:   12/15/22 199 lb (90.3 kg)   11/22/22 205 lb (93 kg)   09/12/22 199 lb 9.6 oz (90.5 kg)      BP Readings from Last 3 Encounters:   12/15/22 135/75   11/22/22 136/62   09/12/22 104/61        Hypertension  This is a chronic problem. The current episode started more than 1 year ago. The problem is unchanged. The problem is controlled. Pertinent negatives include no anxiety, blurred vision, chest pain, headaches, malaise/fatigue, neck pain, orthopnea, palpitations, peripheral edema, PND, shortness of breath or sweats. Past treatments include diuretics, angiotensin blockers and beta blockers. The current treatment provides significant improvement. There are no compliance problems. Review of Systems   Constitutional:  Negative for chills, fatigue, fever and malaise/fatigue. HENT:  Negative for congestion, sinus pressure and sinus pain. Eyes:  Negative for blurred vision. Respiratory:  Negative for shortness of breath. Cardiovascular:  Negative for chest pain, palpitations, orthopnea and PND. Gastrointestinal:  Negative for abdominal pain, constipation, diarrhea, nausea and vomiting. Genitourinary:  Negative for difficulty urinating, dysuria, frequency and urgency. Musculoskeletal:  Negative for arthralgias, back pain and neck pain. Skin:  Negative for color change and pallor. Allergic/Immunologic: Negative for environmental allergies. Neurological:  Negative for headaches. Psychiatric/Behavioral:  The patient is not nervous/anxious.      Patient Active Problem List   Diagnosis    Chronic coronary artery disease    Complete heart block (HCC)    Diabetes ([facility] [address]    Hypertension    Chronic systolic heart failure (HCC)    Syncope    Hypertensive cardiomyopathy, with heart failure ([facility] [address]    Pacemaker    Fatty liver    Acquired hypothyroidism    Type 2 diabetes mellitus without complication, without long-term current use of insulin ([facility])    Paralysis of conjugate gaze    NSTEMI (non-ST elevated myocardial infarction) ([facility] [address]    Blurry vision    Stroke ([facility])    Non-rheumatic mitral regurgitation    Hyperlipidemia    Ischemic cardiomyopathy    Weakness generalized    Leukocytosis    Osteoarthritis of spine with radiculopathy, lumbar region    Right leg pain    Atherosclerosis of native arteries of extremities with rest pain, other extremity ([facility])    Diastolic dysfunction    Diabetic retinopathy without macular edema associated with type 2 diabetes mellitus ([facility])    Primary localized osteoarthritis of right hip         Blood pressure 135/75, pulse 71, temperature 97 °F (36.1 °C), temperature source Temporal, resp. rate 16, height 6' 1\" (1.854 m), weight 199 lb (90.3 kg), SpO2 98 %. Pain Scale: 6/10 Pain Location: Back  Body mass index is 26.25 kg/m². Physical Exam  Vitals and nursing note reviewed. Constitutional:       General: He is not in acute distress. Appearance: He is normal weight. He is not toxic-appearing. HENT:      Head: Normocephalic. Eyes:      General: Lids are normal.      Extraocular Movements: Extraocular movements intact. Conjunctiva/sclera: Conjunctivae normal.      Pupils: Pupils are equal.   Cardiovascular:      Rate and Rhythm: Normal rate and regular rhythm. Heart sounds: Normal heart sounds. No murmur heard. No friction rub. No gallop. Pulmonary:      Effort: Pulmonary effort is normal. No respiratory distress. Breath sounds: Normal breath sounds. No stridor. No wheezing, rhonchi or rales. Chest:      Chest wall: No tenderness. Skin:     General: Skin is warm and dry. Capillary Refill: Capillary refill takes less than 2 seconds. Neurological:      General: No focal deficit present.       Mental Status: He is alert and oriented to person, place, and time. Psychiatric:         Mood and Affect: Mood normal.         Behavior: Behavior normal.         Thought Content: Thought content normal.         Judgment: Judgment normal.            ASSESSMENT and PLAN   Diagnosis Orders   1. Medicare annual wellness visit, subsequent        2. Mixed hyperlipidemia  atorvastatin (LIPITOR) 20 MG tablet    Lipid Panel      3. Primary hypertension  carvedilol (COREG) 12.5 MG tablet    spironolactone (ALDACTONE) 25 MG tablet    Comprehensive Metabolic Panel      4. Type 2 diabetes mellitus without complication, without long-term current use of insulin ([facility])  glimepiride (AMARYL) 4 MG tablet    Semaglutide 7 MG TABS    Hemoglobin A1C      5. Acquired hypothyroidism  TSH      6. Leukocytosis, unspecified type  CBC with Auto Differential          Reviewed medications and side effects in detail    Ov 6 m , labs 1 week prior      His other chronic conditions are stable at this time. He is stable on the current treatment and tolerating it well. No significant sides effects. Will not adjust therapy at this time, unless noted above. We will continue to monitor for any problems. Medications refilled and lab work has been ordered where needed. Reviewed medications are explained including any potential interactions or side effects in detail. The patient's questions were answered. He  understands the above and has no further questions. Further workup and treatment should be done if symptoms persist, worsen or new symptoms occur. He  will call to notify us of any problems, complications or worsening symptoms. Follow-up and Dispositions    Return in 6 months (on 6/15/2023) for Medicare Annual Wellness Visit in 1 year. There are no Patient Instructions on file for this visit. (Some details in this note may have been created with speech-recognition software. Some errors in speech recognition may have occurred. Most of those have been corrected but some may have been missed.)         Peterson Schwab, MD  [facility] [address]  Phone [phone]      Medicare Annual Wellness Visit    Lolita Barrios is here for Medicare AWV    Assessment & Plan   Medicare annual wellness visit, subsequent  Mixed hyperlipidemia  -     atorvastatin (LIPITOR) 20 MG tablet; Take 1 tablet by mouth daily, Disp-90 tablet, R-1Normal  -     Lipid Panel; Future  Primary hypertension  -     carvedilol (COREG) 12.5 MG tablet; Take 1 tablet by mouth 2 times daily (with meals), Disp-180 tablet, R-0Normal  -     spironolactone (ALDACTONE) 25 MG tablet; 1/2 tablet po qam, Disp-90 tablet, R-1Normal  -     Comprehensive Metabolic Panel; Future  Type 2 diabetes mellitus without complication, without long-term current use of insulin (HCC)  -     glimepiride (AMARYL) 4 MG tablet; 1 po bid with a meal for diabetes instead of the glipizide), Disp-180 tablet, R-1Normal  -     Semaglutide 7 MG TABS; Take 7 mg by mouth every morning (before breakfast), Disp-30 tablet, R-5Normal  -     Hemoglobin A1C; Future  Acquired hypothyroidism  -     TSH; Future  Leukocytosis, unspecified type  -     CBC with Auto Differential; Future      Recommendations for Preventive Services Due: see orders and patient instructions/AVS.  Recommended screening schedule for the next 5-10 years is provided to the patient in written form: see Patient Instructions/AVS.     Return in 6 months (on 6/15/2023) for Medicare Annual Wellness Visit in 1 year. Subjective       Patient's complete Health Risk Assessment and screening values have been reviewed and are found in Flowsheets. The following problems were reviewed today and where indicated follow up appointments were made and/or referrals ordered.     Positive Risk Factor Screenings with Interventions:                   Hearing Screen:  Do you or your family notice any trouble with your hearing that hasn't been managed with hearing aids?: (!) Yes    Interventions:  Patient declines any further evaluation or treatment     Safety:  Do you always fasten your seatbelt when you are in a car?: (!) No  Interventions: pt states that he always wears his seatbelt! Patient declined any further interventions or treatment     Advanced Directives:  Do you have a Living Will?: (!) No    Intervention:  has NO advanced directive - information provided                       Objective   Vitals:    12/15/22 0946   BP: 135/75   Site: Left Upper Arm   Position: Sitting   Cuff Size: Small Adult   Pulse: 71   Resp: 16   Temp: 97 °F (36.1 °C)   TempSrc: Temporal   SpO2: 98%   Weight: 199 lb (90.3 kg)   Height: 6' 1\" (1.854 m)      Body mass index is 26.25 kg/m². Allergies   Allergen Reactions    Ticagrelor Anaphylaxis    Empagliflozin Other (See Comments)     Dyspepsia and atypical chest pain     Prior to Visit Medications    Medication Sig Taking? Authorizing Provider   glimepiride (AMARYL) 4 MG tablet 1 po bid with a meal for diabetes instead of the glipizide) Yes Yanna Ann MD   Semaglutide 7 MG TABS Take 7 mg by mouth every morning (before breakfast) Yes Yanna Ann MD   carvedilol (COREG) 12.5 MG tablet Take 1 tablet by mouth 2 times daily (with meals) Yes Yanna Ann MD   atorvastatin (LIPITOR) 20 MG tablet Take 1 tablet by mouth daily Yes Yanna Ann MD   spironolactone (ALDACTONE) 25 MG tablet 1/2 tablet po qam Yes Yanna Ann MD   levothyroxine (SYNTHROID) 137 MCG tablet TAKE 1 TABLET BY MOUTH ONCE DAILY BEFORE BREAKFAST Yes Yanna Ann MD   metFORMIN (GLUCOPHAGE XR) 500 MG extended release tablet 1 po bid with meals instead of the 1000 mg dose.  Yes Yanna Ann MD   Multiple Vitamin (MULTIVITAMIN ADULT PO) Take by mouth Yes Historical Provider, MD   losartan (COZAAR) 50 MG tablet Take 1 tablet by mouth daily Yes Yonny Branch MD   aspirin 81 MG chewable tablet Take 81 mg by mouth Yes Ar Automatic Reconciliation   vitamin D 25 MCG (1000 UT) CAPS Take by mouth daily Yes Ar Automatic Reconciliation   nitroGLYCERIN (NITROSTAT) 0.4 MG SL tablet Place 0.4 mg under the tongue Yes Ar Automatic Reconciliation   triamcinolone (KENALOG) 0.1 % cream Apply topically 2 times daily Yes Ar Automatic Reconciliation       CareTeam (Including outside providers/suppliers regularly involved in providing care):   Patient Care Team:  Debbie Murillo MD as PCP - Jayson Rivers MD as PCP - [facility] Empaneled Provider  Kain Lima MD as Physician (Otolaryngology)     Reviewed and updated this visit:  Tobacco  Allergies  Meds  Problems  Med Hx  Surg Hx  Soc Hx  Fam Hx

## 2022-12-21 ASSESSMENT — ENCOUNTER SYMPTOMS
SINUS PRESSURE: 0
DIARRHEA: 0
SINUS PAIN: 0
BACK PAIN: 0
ABDOMINAL PAIN: 0
COLOR CHANGE: 0
NAUSEA: 0
VOMITING: 0
CONSTIPATION: 0

## 2023-02-13 ENCOUNTER — APPOINTMENT (OUTPATIENT)
Dept: CT IMAGING | Age: 78
End: 2023-02-13
Payer: MEDICARE

## 2023-02-13 ENCOUNTER — HOSPITAL ENCOUNTER (INPATIENT)
Age: 78
LOS: 5 days | Discharge: HOME | End: 2023-02-18
Attending: EMERGENCY MEDICINE | Admitting: FAMILY MEDICINE
Payer: MEDICARE

## 2023-02-13 ENCOUNTER — APPOINTMENT (OUTPATIENT)
Dept: NON INVASIVE DIAGNOSTICS | Age: 78
End: 2023-02-13
Payer: MEDICARE

## 2023-02-13 ENCOUNTER — APPOINTMENT (OUTPATIENT)
Dept: GENERAL RADIOLOGY | Age: 78
End: 2023-02-13
Payer: MEDICARE

## 2023-02-13 DIAGNOSIS — R77.8 ELEVATED TROPONIN: ICD-10-CM

## 2023-02-13 DIAGNOSIS — R09.02 HYPOXIA: ICD-10-CM

## 2023-02-13 DIAGNOSIS — A41.9 SEVERE SEPSIS (HCC): Primary | ICD-10-CM

## 2023-02-13 DIAGNOSIS — R65.20 SEVERE SEPSIS (HCC): Primary | ICD-10-CM

## 2023-02-13 DIAGNOSIS — R06.02 SHORTNESS OF BREATH: ICD-10-CM

## 2023-02-13 DIAGNOSIS — I10 PRIMARY HYPERTENSION: ICD-10-CM

## 2023-02-13 DIAGNOSIS — R41.82 ALTERED MENTAL STATUS, UNSPECIFIED ALTERED MENTAL STATUS TYPE: ICD-10-CM

## 2023-02-13 PROBLEM — G93.41 SEPSIS WITH ENCEPHALOPATHY WITHOUT SEPTIC SHOCK, DUE TO UNSPECIFIED ORGANISM (HCC): Status: ACTIVE | Noted: 2023-02-13

## 2023-02-13 PROBLEM — R79.89 ELEVATED TROPONIN: Status: ACTIVE | Noted: 2023-02-13

## 2023-02-13 PROBLEM — Z86.73 HISTORY OF CVA (CEREBROVASCULAR ACCIDENT): Chronic | Status: ACTIVE | Noted: 2023-02-13

## 2023-02-13 PROBLEM — Z66 DNR (DO NOT RESUSCITATE): Status: ACTIVE | Noted: 2023-02-13

## 2023-02-13 PROBLEM — E11.65 TYPE 2 DIABETES MELLITUS WITH HYPERGLYCEMIA, WITHOUT LONG-TERM CURRENT USE OF INSULIN (HCC): Chronic | Status: ACTIVE | Noted: 2017-12-19

## 2023-02-13 PROBLEM — G93.40 SEPSIS WITH ENCEPHALOPATHY WITHOUT SEPTIC SHOCK, DUE TO UNSPECIFIED ORGANISM (HCC): Status: ACTIVE | Noted: 2023-02-13

## 2023-02-13 PROBLEM — R06.89 ACUTE RESPIRATORY INSUFFICIENCY: Status: ACTIVE | Noted: 2023-02-13

## 2023-02-13 PROBLEM — R53.1 WEAKNESS GENERALIZED: Status: RESOLVED | Noted: 2022-05-28 | Resolved: 2023-02-13

## 2023-02-13 LAB
ALBUMIN SERPL-MCNC: 3.4 G/DL (ref 3.2–4.6)
ALBUMIN/GLOB SERPL: 0.8 (ref 0.4–1.6)
ALP SERPL-CCNC: 85 U/L (ref 50–136)
ALT SERPL-CCNC: 50 U/L (ref 12–65)
ANION GAP SERPL CALC-SCNC: 15 MMOL/L (ref 2–11)
APPEARANCE UR: CLEAR
AST SERPL-CCNC: 51 U/L (ref 15–37)
BACTERIA URNS QL MICRO: NEGATIVE /HPF
BASOPHILS # BLD: 0 K/UL (ref 0–0.2)
BASOPHILS NFR BLD: 0 % (ref 0–2)
BILIRUB SERPL-MCNC: 1.7 MG/DL (ref 0.2–1.1)
BILIRUB UR QL: NEGATIVE
BUN SERPL-MCNC: 27 MG/DL (ref 8–23)
CALCIUM SERPL-MCNC: 9.5 MG/DL (ref 8.3–10.4)
CASTS URNS QL MICRO: ABNORMAL /LPF
CHLORIDE SERPL-SCNC: 100 MMOL/L (ref 101–110)
CK SERPL-CCNC: 293 U/L (ref 21–215)
CO2 SERPL-SCNC: 19 MMOL/L (ref 21–32)
COLOR UR: ABNORMAL
CREAT SERPL-MCNC: 1.8 MG/DL (ref 0.8–1.5)
DIFFERENTIAL METHOD BLD: ABNORMAL
ECHO AO ROOT DIAM: 2.9 CM
ECHO AO ROOT INDEX: 1.36 CM/M2
ECHO AV AREA PEAK VELOCITY: 1.9 CM2
ECHO AV AREA VTI: 2.1 CM2
ECHO AV AREA/BSA PEAK VELOCITY: 0.9 CM2/M2
ECHO AV AREA/BSA VTI: 1 CM2/M2
ECHO AV MEAN GRADIENT: 5 MMHG
ECHO AV MEAN VELOCITY: 1.1 M/S
ECHO AV PEAK GRADIENT: 7 MMHG
ECHO AV PEAK VELOCITY: 1.4 M/S
ECHO AV VELOCITY RATIO: 0.57
ECHO AV VTI: 22 CM
ECHO BSA: 2.15 M2
ECHO EST RA PRESSURE: 5 MMHG
ECHO IVC PROX: 2.1 CM
ECHO LA AREA 2C: 17.1 CM2
ECHO LA AREA 4C: 23.1 CM2
ECHO LA MAJOR AXIS: 6 CM
ECHO LA MINOR AXIS: 5.2 CM
ECHO LA VOL 2C: 47 ML (ref 18–58)
ECHO LA VOL 4C: 72 ML (ref 18–58)
ECHO LA VOL BP: 62 ML (ref 18–58)
ECHO LA VOL/BSA BIPLANE: 29 ML/M2 (ref 16–34)
ECHO LA VOLUME INDEX A2C: 22 ML/M2 (ref 16–34)
ECHO LA VOLUME INDEX A4C: 34 ML/M2 (ref 16–34)
ECHO LV EDV A2C: 105 ML
ECHO LV EDV A4C: 167 ML
ECHO LV EDV INDEX A4C: 78 ML/M2
ECHO LV EDV NDEX A2C: 49 ML/M2
ECHO LV EJECTION FRACTION A2C: 29 %
ECHO LV EJECTION FRACTION A4C: 35 %
ECHO LV EJECTION FRACTION BIPLANE: 30 % (ref 55–100)
ECHO LV ESV A2C: 74 ML
ECHO LV ESV A4C: 108 ML
ECHO LV ESV INDEX A2C: 35 ML/M2
ECHO LV ESV INDEX A4C: 50 ML/M2
ECHO LV FRACTIONAL SHORTENING: 14 % (ref 28–44)
ECHO LV INTERNAL DIMENSION DIASTOLE INDEX: 2.29 CM/M2
ECHO LV INTERNAL DIMENSION DIASTOLIC: 4.9 CM (ref 4.2–5.9)
ECHO LV INTERNAL DIMENSION SYSTOLIC INDEX: 1.96 CM/M2
ECHO LV INTERNAL DIMENSION SYSTOLIC: 4.2 CM
ECHO LV IVSD: 0.9 CM (ref 0.6–1)
ECHO LV MASS 2D: 153 G (ref 88–224)
ECHO LV MASS INDEX 2D: 71.5 G/M2 (ref 49–115)
ECHO LV POSTERIOR WALL DIASTOLIC: 0.9 CM (ref 0.6–1)
ECHO LV RELATIVE WALL THICKNESS RATIO: 0.37
ECHO LVOT AREA: 3.1 CM2
ECHO LVOT AV VTI INDEX: 0.67
ECHO LVOT DIAM: 2 CM
ECHO LVOT MEAN GRADIENT: 1 MMHG
ECHO LVOT PEAK GRADIENT: 3 MMHG
ECHO LVOT PEAK VELOCITY: 0.8 M/S
ECHO LVOT STROKE VOLUME INDEX: 21.6 ML/M2
ECHO LVOT SV: 46.2 ML
ECHO LVOT VTI: 14.7 CM
ECHO PV MAX VELOCITY: 0.9 M/S
ECHO PV PEAK GRADIENT: 3 MMHG
ECHO RIGHT VENTRICULAR SYSTOLIC PRESSURE (RVSP): 51 MMHG
ECHO RV BASAL DIMENSION: 3.3 CM
ECHO RV TAPSE: 1.7 CM (ref 1.7–?)
ECHO TV REGURGITANT MAX VELOCITY: 3.38 M/S
ECHO TV REGURGITANT PEAK GRADIENT: 46 MMHG
EKG ATRIAL RATE: 103 BPM
EKG DIAGNOSIS: ABNORMAL
EKG P AXIS: 147 DEGREES
EKG P-R INTERVAL: 112 MS
EKG Q-T INTERVAL: 414 MS
EKG QRS DURATION: 172 MS
EKG QTC CALCULATION (BAZETT): 545 MS
EKG R AXIS: 256 DEGREES
EKG T AXIS: 138 DEGREES
EKG VENTRICULAR RATE: 104 BPM
EOSINOPHIL # BLD: 0 K/UL (ref 0–0.8)
EOSINOPHIL NFR BLD: 0 % (ref 0.5–7.8)
EPI CELLS #/AREA URNS HPF: ABNORMAL /HPF
ERYTHROCYTE [DISTWIDTH] IN BLOOD BY AUTOMATED COUNT: 13.1 % (ref 11.9–14.6)
ERYTHROCYTE [DISTWIDTH] IN BLOOD BY AUTOMATED COUNT: 13.1 % (ref 11.9–14.6)
FERRITIN SERPL-MCNC: 141 NG/ML (ref 8–388)
FLUAV RNA SPEC QL NAA+PROBE: NOT DETECTED
FLUBV RNA SPEC QL NAA+PROBE: NOT DETECTED
GLOBULIN SER CALC-MCNC: 4.3 G/DL (ref 2.8–4.5)
GLUCOSE BLD STRIP.AUTO-MCNC: 320 MG/DL (ref 65–100)
GLUCOSE BLD STRIP.AUTO-MCNC: 339 MG/DL (ref 65–100)
GLUCOSE SERPL-MCNC: 385 MG/DL (ref 65–100)
GLUCOSE UR STRIP.AUTO-MCNC: 100 MG/DL
HCT VFR BLD AUTO: 43 % (ref 41.1–50.3)
HCT VFR BLD AUTO: 50 % (ref 41.1–50.3)
HGB BLD-MCNC: 14.7 G/DL (ref 13.6–17.2)
HGB BLD-MCNC: 16.6 G/DL (ref 13.6–17.2)
HGB UR QL STRIP: NEGATIVE
IMM GRANULOCYTES # BLD AUTO: 0.1 K/UL (ref 0–0.5)
IMM GRANULOCYTES NFR BLD AUTO: 1 % (ref 0–5)
IRON SATN MFR SERPL: 14 %
IRON SERPL-MCNC: 34 UG/DL (ref 35–150)
KETONES UR QL STRIP.AUTO: 40 MG/DL
LACTATE SERPL-SCNC: 3.9 MMOL/L (ref 0.4–2)
LACTATE SERPL-SCNC: 4.9 MMOL/L (ref 0.4–2)
LACTATE SERPL-SCNC: 6.5 MMOL/L (ref 0.4–2)
LEUKOCYTE ESTERASE UR QL STRIP.AUTO: NEGATIVE
LIPASE SERPL-CCNC: 119 U/L (ref 73–393)
LV EF: 28 %
LVEF MODALITY: ABNORMAL
LYMPHOCYTES # BLD: 1.3 K/UL (ref 0.5–4.6)
LYMPHOCYTES NFR BLD: 7 % (ref 13–44)
MAGNESIUM SERPL-MCNC: 2 MG/DL (ref 1.8–2.4)
MCH RBC QN AUTO: 30.8 PG (ref 26.1–32.9)
MCH RBC QN AUTO: 30.9 PG (ref 26.1–32.9)
MCHC RBC AUTO-ENTMCNC: 33.2 G/DL (ref 31.4–35)
MCHC RBC AUTO-ENTMCNC: 34.2 G/DL (ref 31.4–35)
MCV RBC AUTO: 90.3 FL (ref 82–102)
MCV RBC AUTO: 92.8 FL (ref 82–102)
MONOCYTES # BLD: 1.4 K/UL (ref 0.1–1.3)
MONOCYTES NFR BLD: 7 % (ref 4–12)
NEUTS SEG # BLD: 16.7 K/UL (ref 1.7–8.2)
NEUTS SEG NFR BLD: 85 % (ref 43–78)
NITRITE UR QL STRIP.AUTO: NEGATIVE
NRBC # BLD: 0 K/UL (ref 0–0.2)
NRBC # BLD: 0 K/UL (ref 0–0.2)
NT PRO BNP: ABNORMAL PG/ML
PH UR STRIP: 5 (ref 5–9)
PLATELET # BLD AUTO: 178 K/UL (ref 150–450)
PLATELET # BLD AUTO: 197 K/UL (ref 150–450)
PMV BLD AUTO: 10.8 FL (ref 9.4–12.3)
PMV BLD AUTO: 10.8 FL (ref 9.4–12.3)
POTASSIUM SERPL-SCNC: 4.3 MMOL/L (ref 3.5–5.1)
PROCALCITONIN SERPL-MCNC: <0.05 NG/ML (ref 0–0.49)
PROT SERPL-MCNC: 7.7 G/DL (ref 6.3–8.2)
PROT UR STRIP-MCNC: 30 MG/DL
RBC # BLD AUTO: 4.76 M/UL (ref 4.23–5.6)
RBC # BLD AUTO: 5.39 M/UL (ref 4.23–5.6)
RBC #/AREA URNS HPF: ABNORMAL /HPF
SARS-COV-2 RDRP RESP QL NAA+PROBE: NOT DETECTED
SERVICE CMNT-IMP: ABNORMAL
SERVICE CMNT-IMP: ABNORMAL
SODIUM SERPL-SCNC: 134 MMOL/L (ref 133–143)
SOURCE: NORMAL
SP GR UR REFRACTOMETRY: 1.02 (ref 1–1.02)
TIBC SERPL-MCNC: 247 UG/DL (ref 250–450)
TROPONIN I SERPL HS-MCNC: 9896.6 PG/ML (ref 0–14)
TROPONIN I SERPL HS-MCNC: ABNORMAL PG/ML (ref 0–14)
TSH W FREE THYROID IF ABNORMAL: 1.45 UIU/ML (ref 0.36–3.74)
UFH PPP CHRO-ACNC: 0.24 IU/ML (ref 0.3–0.7)
UROBILINOGEN UR QL STRIP.AUTO: 0.2 EU/DL (ref 0.2–1)
WBC # BLD AUTO: 17.4 K/UL (ref 4.3–11.1)
WBC # BLD AUTO: 19.6 K/UL (ref 4.3–11.1)
WBC URNS QL MICRO: ABNORMAL /HPF

## 2023-02-13 PROCEDURE — 6360000002 HC RX W HCPCS: Performed by: PHYSICIAN ASSISTANT

## 2023-02-13 PROCEDURE — 83605 ASSAY OF LACTIC ACID: CPT

## 2023-02-13 PROCEDURE — 2580000003 HC RX 258: Performed by: FAMILY MEDICINE

## 2023-02-13 PROCEDURE — 81001 URINALYSIS AUTO W/SCOPE: CPT

## 2023-02-13 PROCEDURE — 96365 THER/PROPH/DIAG IV INF INIT: CPT

## 2023-02-13 PROCEDURE — 87040 BLOOD CULTURE FOR BACTERIA: CPT

## 2023-02-13 PROCEDURE — 99223 1ST HOSP IP/OBS HIGH 75: CPT | Performed by: INTERNAL MEDICINE

## 2023-02-13 PROCEDURE — 84484 ASSAY OF TROPONIN QUANT: CPT

## 2023-02-13 PROCEDURE — 85027 COMPLETE CBC AUTOMATED: CPT

## 2023-02-13 PROCEDURE — 85610 PROTHROMBIN TIME: CPT

## 2023-02-13 PROCEDURE — 36415 COLL VENOUS BLD VENIPUNCTURE: CPT

## 2023-02-13 PROCEDURE — 85730 THROMBOPLASTIN TIME PARTIAL: CPT

## 2023-02-13 PROCEDURE — 83880 ASSAY OF NATRIURETIC PEPTIDE: CPT

## 2023-02-13 PROCEDURE — 83540 ASSAY OF IRON: CPT

## 2023-02-13 PROCEDURE — 70450 CT HEAD/BRAIN W/O DYE: CPT

## 2023-02-13 PROCEDURE — 93005 ELECTROCARDIOGRAM TRACING: CPT | Performed by: EMERGENCY MEDICINE

## 2023-02-13 PROCEDURE — 2500000003 HC RX 250 WO HCPCS: Performed by: FAMILY MEDICINE

## 2023-02-13 PROCEDURE — 2580000003 HC RX 258: Performed by: EMERGENCY MEDICINE

## 2023-02-13 PROCEDURE — C8929 TTE W OR WO FOL WCON,DOPPLER: HCPCS

## 2023-02-13 PROCEDURE — 6360000004 HC RX CONTRAST MEDICATION: Performed by: FAMILY MEDICINE

## 2023-02-13 PROCEDURE — 93306 TTE W/DOPPLER COMPLETE: CPT | Performed by: INTERNAL MEDICINE

## 2023-02-13 PROCEDURE — 85025 COMPLETE CBC W/AUTO DIFF WBC: CPT

## 2023-02-13 PROCEDURE — 99285 EMERGENCY DEPT VISIT HI MDM: CPT

## 2023-02-13 PROCEDURE — 6360000002 HC RX W HCPCS: Performed by: FAMILY MEDICINE

## 2023-02-13 PROCEDURE — 87502 INFLUENZA DNA AMP PROBE: CPT

## 2023-02-13 PROCEDURE — 96375 TX/PRO/DX INJ NEW DRUG ADDON: CPT

## 2023-02-13 PROCEDURE — C9113 INJ PANTOPRAZOLE SODIUM, VIA: HCPCS | Performed by: FAMILY MEDICINE

## 2023-02-13 PROCEDURE — 87086 URINE CULTURE/COLONY COUNT: CPT

## 2023-02-13 PROCEDURE — 83735 ASSAY OF MAGNESIUM: CPT

## 2023-02-13 PROCEDURE — 6360000002 HC RX W HCPCS: Performed by: EMERGENCY MEDICINE

## 2023-02-13 PROCEDURE — 82550 ASSAY OF CK (CPK): CPT

## 2023-02-13 PROCEDURE — 82962 GLUCOSE BLOOD TEST: CPT

## 2023-02-13 PROCEDURE — 80053 COMPREHEN METABOLIC PANEL: CPT

## 2023-02-13 PROCEDURE — 85520 HEPARIN ASSAY: CPT

## 2023-02-13 PROCEDURE — 84443 ASSAY THYROID STIM HORMONE: CPT

## 2023-02-13 PROCEDURE — 96361 HYDRATE IV INFUSION ADD-ON: CPT

## 2023-02-13 PROCEDURE — 87641 MR-STAPH DNA AMP PROBE: CPT

## 2023-02-13 PROCEDURE — 94761 N-INVAS EAR/PLS OXIMETRY MLT: CPT

## 2023-02-13 PROCEDURE — 6370000000 HC RX 637 (ALT 250 FOR IP): Performed by: EMERGENCY MEDICINE

## 2023-02-13 PROCEDURE — 71045 X-RAY EXAM CHEST 1 VIEW: CPT

## 2023-02-13 PROCEDURE — 87635 SARS-COV-2 COVID-19 AMP PRB: CPT

## 2023-02-13 PROCEDURE — 82728 ASSAY OF FERRITIN: CPT

## 2023-02-13 PROCEDURE — 6370000000 HC RX 637 (ALT 250 FOR IP): Performed by: FAMILY MEDICINE

## 2023-02-13 PROCEDURE — 96368 THER/DIAG CONCURRENT INF: CPT

## 2023-02-13 PROCEDURE — 1100000003 HC PRIVATE W/ TELEMETRY

## 2023-02-13 PROCEDURE — 83690 ASSAY OF LIPASE: CPT

## 2023-02-13 PROCEDURE — 84145 PROCALCITONIN (PCT): CPT

## 2023-02-13 PROCEDURE — A4216 STERILE WATER/SALINE, 10 ML: HCPCS | Performed by: FAMILY MEDICINE

## 2023-02-13 RX ORDER — SODIUM CHLORIDE, SODIUM LACTATE, POTASSIUM CHLORIDE, AND CALCIUM CHLORIDE .6; .31; .03; .02 G/100ML; G/100ML; G/100ML; G/100ML
1000 INJECTION, SOLUTION INTRAVENOUS ONCE
Status: COMPLETED | OUTPATIENT
Start: 2023-02-13 | End: 2023-02-13

## 2023-02-13 RX ORDER — MAGNESIUM SULFATE IN WATER 40 MG/ML
2000 INJECTION, SOLUTION INTRAVENOUS PRN
Status: DISCONTINUED | OUTPATIENT
Start: 2023-02-13 | End: 2023-02-18 | Stop reason: HOSPADM

## 2023-02-13 RX ORDER — ONDANSETRON 2 MG/ML
4 INJECTION INTRAMUSCULAR; INTRAVENOUS
Status: COMPLETED | OUTPATIENT
Start: 2023-02-13 | End: 2023-02-13

## 2023-02-13 RX ORDER — ACETAMINOPHEN 650 MG/1
650 SUPPOSITORY RECTAL EVERY 6 HOURS PRN
Status: DISCONTINUED | OUTPATIENT
Start: 2023-02-13 | End: 2023-02-18 | Stop reason: HOSPADM

## 2023-02-13 RX ORDER — MAGNESIUM SULFATE IN WATER 40 MG/ML
2000 INJECTION, SOLUTION INTRAVENOUS ONCE
Status: DISCONTINUED | OUTPATIENT
Start: 2023-02-13 | End: 2023-02-13

## 2023-02-13 RX ORDER — INSULIN GLARGINE 100 [IU]/ML
0.15 INJECTION, SOLUTION SUBCUTANEOUS NIGHTLY
Status: DISCONTINUED | OUTPATIENT
Start: 2023-02-13 | End: 2023-02-17

## 2023-02-13 RX ORDER — SODIUM CHLORIDE, SODIUM LACTATE, POTASSIUM CHLORIDE, CALCIUM CHLORIDE 600; 310; 30; 20 MG/100ML; MG/100ML; MG/100ML; MG/100ML
INJECTION, SOLUTION INTRAVENOUS CONTINUOUS
Status: ACTIVE | OUTPATIENT
Start: 2023-02-13 | End: 2023-02-15

## 2023-02-13 RX ORDER — HEPARIN SODIUM 1000 [USP'U]/ML
4000 INJECTION, SOLUTION INTRAVENOUS; SUBCUTANEOUS PRN
Status: DISCONTINUED | OUTPATIENT
Start: 2023-02-13 | End: 2023-02-17 | Stop reason: ALTCHOICE

## 2023-02-13 RX ORDER — POTASSIUM CHLORIDE 7.45 MG/ML
10 INJECTION INTRAVENOUS PRN
Status: DISCONTINUED | OUTPATIENT
Start: 2023-02-13 | End: 2023-02-18 | Stop reason: HOSPADM

## 2023-02-13 RX ORDER — POTASSIUM CHLORIDE 20 MEQ/1
40 TABLET, EXTENDED RELEASE ORAL PRN
Status: DISCONTINUED | OUTPATIENT
Start: 2023-02-13 | End: 2023-02-18 | Stop reason: HOSPADM

## 2023-02-13 RX ORDER — HEPARIN SODIUM 5000 [USP'U]/ML
5000 INJECTION, SOLUTION INTRAVENOUS; SUBCUTANEOUS EVERY 8 HOURS SCHEDULED
Status: DISCONTINUED | OUTPATIENT
Start: 2023-02-13 | End: 2023-02-13

## 2023-02-13 RX ORDER — SODIUM CHLORIDE 0.9 % (FLUSH) 0.9 %
5-40 SYRINGE (ML) INJECTION EVERY 12 HOURS SCHEDULED
Status: DISCONTINUED | OUTPATIENT
Start: 2023-02-13 | End: 2023-02-18 | Stop reason: HOSPADM

## 2023-02-13 RX ORDER — ASPIRIN 81 MG/1
324 TABLET, CHEWABLE ORAL
Status: DISPENSED | OUTPATIENT
Start: 2023-02-13 | End: 2023-02-14

## 2023-02-13 RX ORDER — HEPARIN SODIUM 1000 [USP'U]/ML
2000 INJECTION, SOLUTION INTRAVENOUS; SUBCUTANEOUS PRN
Status: DISCONTINUED | OUTPATIENT
Start: 2023-02-13 | End: 2023-02-17 | Stop reason: ALTCHOICE

## 2023-02-13 RX ORDER — HEPARIN SODIUM 10000 [USP'U]/100ML
5-30 INJECTION, SOLUTION INTRAVENOUS CONTINUOUS
Status: DISCONTINUED | OUTPATIENT
Start: 2023-02-13 | End: 2023-02-16

## 2023-02-13 RX ORDER — SODIUM CHLORIDE 0.9 % (FLUSH) 0.9 %
5-40 SYRINGE (ML) INJECTION PRN
Status: DISCONTINUED | OUTPATIENT
Start: 2023-02-13 | End: 2023-02-18 | Stop reason: HOSPADM

## 2023-02-13 RX ORDER — INSULIN LISPRO 100 [IU]/ML
0-4 INJECTION, SOLUTION INTRAVENOUS; SUBCUTANEOUS NIGHTLY
Status: DISCONTINUED | OUTPATIENT
Start: 2023-02-13 | End: 2023-02-18 | Stop reason: HOSPADM

## 2023-02-13 RX ORDER — PROCHLORPERAZINE EDISYLATE 5 MG/ML
5 INJECTION INTRAMUSCULAR; INTRAVENOUS EVERY 4 HOURS PRN
Status: DISCONTINUED | OUTPATIENT
Start: 2023-02-13 | End: 2023-02-18 | Stop reason: HOSPADM

## 2023-02-13 RX ORDER — MAGNESIUM HYDROXIDE/ALUMINUM HYDROXICE/SIMETHICONE 120; 1200; 1200 MG/30ML; MG/30ML; MG/30ML
30 SUSPENSION ORAL EVERY 6 HOURS PRN
Status: DISCONTINUED | OUTPATIENT
Start: 2023-02-13 | End: 2023-02-18 | Stop reason: HOSPADM

## 2023-02-13 RX ORDER — ACETAMINOPHEN 325 MG/1
650 TABLET ORAL EVERY 6 HOURS PRN
Status: DISCONTINUED | OUTPATIENT
Start: 2023-02-13 | End: 2023-02-18 | Stop reason: HOSPADM

## 2023-02-13 RX ORDER — DEXTROSE MONOHYDRATE 100 MG/ML
INJECTION, SOLUTION INTRAVENOUS CONTINUOUS PRN
Status: DISCONTINUED | OUTPATIENT
Start: 2023-02-13 | End: 2023-02-18 | Stop reason: HOSPADM

## 2023-02-13 RX ORDER — SODIUM CHLORIDE, SODIUM LACTATE, POTASSIUM CHLORIDE, AND CALCIUM CHLORIDE .6; .31; .03; .02 G/100ML; G/100ML; G/100ML; G/100ML
30 INJECTION, SOLUTION INTRAVENOUS ONCE
Status: DISCONTINUED | OUTPATIENT
Start: 2023-02-13 | End: 2023-02-13

## 2023-02-13 RX ORDER — HEPARIN SODIUM 1000 [USP'U]/ML
4000 INJECTION, SOLUTION INTRAVENOUS; SUBCUTANEOUS ONCE
Status: COMPLETED | OUTPATIENT
Start: 2023-02-13 | End: 2023-02-13

## 2023-02-13 RX ORDER — INSULIN LISPRO 100 [IU]/ML
0-8 INJECTION, SOLUTION INTRAVENOUS; SUBCUTANEOUS
Status: DISCONTINUED | OUTPATIENT
Start: 2023-02-13 | End: 2023-02-18 | Stop reason: HOSPADM

## 2023-02-13 RX ORDER — SODIUM CHLORIDE 9 MG/ML
INJECTION, SOLUTION INTRAVENOUS PRN
Status: DISCONTINUED | OUTPATIENT
Start: 2023-02-13 | End: 2023-02-18 | Stop reason: HOSPADM

## 2023-02-13 RX ADMIN — INSULIN GLARGINE 13 UNITS: 100 INJECTION, SOLUTION SUBCUTANEOUS at 21:30

## 2023-02-13 RX ADMIN — SODIUM CHLORIDE 40 MG: 9 INJECTION, SOLUTION INTRAMUSCULAR; INTRAVENOUS; SUBCUTANEOUS at 17:08

## 2023-02-13 RX ADMIN — INSULIN HUMAN 10 UNITS: 100 INJECTION, SOLUTION PARENTERAL at 16:23

## 2023-02-13 RX ADMIN — SODIUM CHLORIDE, SODIUM LACTATE, POTASSIUM CHLORIDE, AND CALCIUM CHLORIDE: 600; 310; 30; 20 INJECTION, SOLUTION INTRAVENOUS at 21:37

## 2023-02-13 RX ADMIN — HEPARIN SODIUM 2000 UNITS: 1000 INJECTION INTRAVENOUS; SUBCUTANEOUS at 23:40

## 2023-02-13 RX ADMIN — SODIUM CHLORIDE, PRESERVATIVE FREE 0.45 ML: 5 INJECTION INTRAVENOUS at 16:34

## 2023-02-13 RX ADMIN — CEFTRIAXONE 1000 MG: 1 INJECTION, POWDER, FOR SOLUTION INTRAMUSCULAR; INTRAVENOUS at 13:52

## 2023-02-13 RX ADMIN — SODIUM CHLORIDE, POTASSIUM CHLORIDE, SODIUM LACTATE AND CALCIUM CHLORIDE 1000 ML: 600; 310; 30; 20 INJECTION, SOLUTION INTRAVENOUS at 14:54

## 2023-02-13 RX ADMIN — SODIUM CHLORIDE, POTASSIUM CHLORIDE, SODIUM LACTATE AND CALCIUM CHLORIDE 1000 ML: 600; 310; 30; 20 INJECTION, SOLUTION INTRAVENOUS at 18:14

## 2023-02-13 RX ADMIN — AZITHROMYCIN MONOHYDRATE 500 MG: 500 INJECTION, POWDER, LYOPHILIZED, FOR SOLUTION INTRAVENOUS at 13:56

## 2023-02-13 RX ADMIN — TUBERCULIN PURIFIED PROTEIN DERIVATIVE 5 UNITS: 5 INJECTION, SOLUTION INTRADERMAL at 21:29

## 2023-02-13 RX ADMIN — ONDANSETRON 4 MG: 2 INJECTION INTRAMUSCULAR; INTRAVENOUS at 14:54

## 2023-02-13 RX ADMIN — SODIUM CHLORIDE, PRESERVATIVE FREE 10 ML: 5 INJECTION INTRAVENOUS at 21:37

## 2023-02-13 RX ADMIN — HEPARIN SODIUM 4000 UNITS: 1000 INJECTION, SOLUTION INTRAVENOUS; SUBCUTANEOUS at 17:02

## 2023-02-13 RX ADMIN — HEPARIN SODIUM 11 UNITS/KG/HR: 10000 INJECTION, SOLUTION INTRAVENOUS at 17:05

## 2023-02-13 RX ADMIN — INSULIN LISPRO 4 UNITS: 100 INJECTION, SOLUTION INTRAVENOUS; SUBCUTANEOUS at 21:36

## 2023-02-13 ASSESSMENT — ENCOUNTER SYMPTOMS
VOMITING: 1
NAUSEA: 1

## 2023-02-13 NOTE — ED TRIAGE NOTES
Patient arriving from home via EMS. [facility] Welfare checkPatient noted to be sitting in chair covered in vomit and urine. Alert to self., baseline is alert and oriented per family. Able to stand with assistance. . 02 saturation 86 on RA. EMS placed 4 liters and was able to increase to 97%. Lungs are coarse bilaterally.

## 2023-02-13 NOTE — PROGRESS NOTES
TRANSFER - IN REPORT:    Verbal [place] on [id number]  being received from ED for routine progression of patient care      Report consisted of patient's Situation, Background, Assessment and   Recommendations(SBAR). Information from the following report(s) Nurse Handoff Report, ED Encounter Summary, ED SBAR, Adult Overview, MAR, and Recent Results was reviewed with the receiving nurse. Opportunity for questions and clarification was provided. Assessment completed upon patient's arrival to unit and care assumed.

## 2023-02-13 NOTE — CONSULTS
[facility] Cardiology Initial Cardiac Evaluation   Primary Cardiologist: Dr. Debo Pacheco  Attending Cardiologist: Dr. Albert Dent      Date of  Admission: 2/13/2023 12:11 PM     HPI:  Nena Batres is a 76 y.o. male with h/o CAD s/p CABG, ICM with EF 40%, CHB s/p St Te dual chamber PPM, HTN, HLD, DM, CVA and hypothyroidism who lives alone and spoke with sister yesterday c/o \"not feeling well, like he may black out. \" She called back and did not get an answer. They went to his home but could not get in as the door was locked. They contacted the police who assisted them getting in the home. Pt was found sitting in chair covered in vomit, urine and feces. EMS was called and found 02 saturation 86% on RA and EMS placed 4 L with increase to 97%. Pt was brought to [facility] ED. In ED, /72, P 110, lactic 6.5, WBC 19.6, total bili 1.7, HS troponin 9,896.6, proBNP 19,204, BUN 27, Cr 1.80. ECG shows V- paced. CXR:  Impression   1. Cardiomegaly with mild central vascular congestion. Head CT:  Impression   1. Age-related senescent changes and chronic microvascular disease without   acute intracranial abnormality. Pt is being admitted by the hospitalist for sepsis, hypoxia, LETTY. Cardiology consulted for elevated troponin and proBNP. Pt is unable to answer any questions on exam. Sister and niece are at bedside and provide history information. Pt had an echo in 7/2022 showing:    Left Ventricle: Normal left ventricular systolic function with a visually estimated EF of 55 - 60%. Left ventricle size is normal. Normal wall thickness. Mild hypokinesis of the following segments: mid anteroseptal and apical septal. Abnormal diastolic function. Aortic Valve: Mild regurgitation. Mitral Valve: Mild regurgitation. Left Atrium: Left atrium is mildly dilated. LA Vol Index is  33 ml/m2.     Technical qualifiers: Technically difficult study with poor endocardial visualization, color flow Doppler was performed and pulse wave and/or continuous wave Doppler was performed. Contrast used: Definity. Cardiac History:      History of coronary artery bypass grafting 2007, LIMA to LAD, SVG to OM, SVG to right PDA. PCI 11/2019, details unavailable. Cardiac catheterization report not in cardiology tab ([place]). Procedure note reviewed with PCI to mid diagonal 2.25 x 15 Helio drug eluting stent. Cardiac catheterization, NSTEMI, two of three patent bypass grafts, LIMA to LAD patent, vein graft to PDA patent, occluded vein graft to OM.      05/2020 echocardiogram - ejection fraction 35%.      7/2020  PCI to D1 2.25 x 15 HUGO and 2.0 x 18 HUGO.    8/2020 Echo EF 45%    11/2020 device interrogation the 99% RV pacing    7/16/2021 Device interrogation 99% RV pacing     6/28/2022 changed lisinopril to losartan due cough      Past Medical History:   Diagnosis Date    Acquired hypothyroidism 3/19/2018    AV junctional bradycardia     CAD (coronary artery disease)     Congestive heart failure (HCC)     Diabetes ([address]     Diabetic retinopathy without macular edema associated with type 2 diabetes mellitus ([address] 10/26/2022    [place]    Endocrine disease     hypothyriod    Fatty liver     Fatty liver     Fatty liver     Headache 8/5/2017    Heart failure ([address]     Hypertension     Osteoarthritis of spine with radiculopathy, lumbar region 6/10/2019    Stroke [facility])       Past Surgical History:   Procedure Laterality Date    CHOLECYSTECTOMY      IL UNLISTED PROCEDURE CARDIAC SURGERY      3 vessel bypass       Allergies   Allergen Reactions    Ticagrelor Anaphylaxis    Empagliflozin Other (See Comments)     Dyspepsia and atypical chest pain      Social History     Socioeconomic History    Marital status:      Spouse name: Not on file    Number of children: Not on file    Years of education: Not on file    Highest education level: Not on file   Occupational History    Not on file   Tobacco Use    Smoking status: Former     Types: Cigarettes     Quit date: 1972     Years since quittin.1    Smokeless tobacco: Never   Vaping Use    Vaping Use: Never used   Substance and Sexual Activity    Alcohol use: No    Drug use: No    Sexual activity: Not on file   Other Topics Concern    Not on file   Social History Narrative    Telly Ruiz is his sister     Social Determinants of Health     Financial Resource Strain: Not on file   Food Insecurity: Not on file   Transportation Needs: Not on file   Physical Activity: Insufficiently Active    Days of Exercise per Week: 2 days    Minutes of Exercise per Session: 10 min   Stress: Not on file   Social Connections: Not on file   Intimate Partner Violence: Not on file   Housing Stability: Not on file     Family History   Problem Relation Age of Onset    Diabetes Sister     Cancer Sister     Diabetes Sister     Heart Disease Mother         Current Facility-Administered Medications   Medication Dose Route Frequency    lactated ringers bolus  1,000 mL IntraVENous Once    aspirin chewable tablet 324 mg  324 mg Oral NOW    pantoprazole (PROTONIX) 40 mg in sodium chloride (PF) 0.9 % 10 mL injection  40 mg IntraVENous Daily     Current Outpatient Medications   Medication Sig    glimepiride (AMARYL) 4 MG tablet 1 po bid with a meal for diabetes instead of the glipizide)    Semaglutide 7 MG TABS Take 7 mg by mouth every morning (before breakfast)    carvedilol (COREG) 12.5 MG tablet Take 1 tablet by mouth 2 times daily (with meals)    atorvastatin (LIPITOR) 20 MG tablet Take 1 tablet by mouth daily    spironolactone (ALDACTONE) 25 MG tablet 1/2 tablet po qam    levothyroxine (SYNTHROID) 137 MCG tablet TAKE 1 TABLET BY MOUTH ONCE DAILY BEFORE BREAKFAST    metFORMIN (GLUCOPHAGE XR) 500 MG extended release tablet 1 po bid with meals instead of the 1000 mg dose.     Multiple Vitamin (MULTIVITAMIN ADULT PO) Take by mouth    losartan (COZAAR) 50 MG tablet Take 1 tablet by mouth daily    aspirin 81 MG chewable tablet Take 81 mg by mouth    vitamin D 25 MCG (1000 UT) CAPS Take by mouth daily    nitroGLYCERIN (NITROSTAT) 0.4 MG SL tablet Place 0.4 mg under the tongue    triamcinolone (KENALOG) 0.1 % cream Apply topically 2 times daily       Review of symptoms:  Unable to obtain       Subjective:   Physical Exam  Vitals:    02/13/23 1300 02/13/23 1315 02/13/23 1345 02/13/23 1400   BP:  118/74 119/69 109/68   Pulse:  (!) 110 (!) 107 (!) 108   Resp:  27 27 27   Temp:       TempSrc:       SpO2: 93% 94% 91% 94%   Weight:       Height:          GEN: Alert, appear ill  HEENT: NCAT  CV: S1S2 RRR, rapid, paced on monitor  Chest: coarse BS rhonchi  Abd: soft, +BS, non tender  Ext: no LE edema    Labs:   Recent Results (from the past 24 hour(s))   EKG 12 Lead    Collection Time: 02/13/23 12:31 PM   Result Value Ref Range    Ventricular Rate 104 BPM    Atrial Rate 103 BPM    P-R Interval 112 ms    QRS Duration 172 ms    Q-T Interval 414 ms    QTc Calculation (Bazett) 545 ms    P Axis 147 degrees    R Axis 256 degrees    T Axis 138 degrees    Diagnosis Ventricular-paced rhythm    CBC with Auto Differential    Collection Time: 02/13/23 12:48 PM   Result Value Ref Range    WBC 19.6 (H) 4.3 - 11.1 K/uL    RBC 5.39 4.23 - 5.6 M/uL    Hemoglobin 16.6 13.6 - 17.2 g/dL    Hematocrit 50.0 41.1 - 50.3 %    MCV 92.8 82 - 102 FL    MCH 30.8 26.1 - 32.9 PG    MCHC 33.2 31.4 - 35.0 g/dL    RDW 13.1 11.9 - 14.6 %    Platelets 171 416 - 121 K/uL    MPV 10.8 9.4 - 12.3 FL    nRBC 0.00 0.0 - 0.2 K/uL    Differential Type AUTOMATED      Seg Neutrophils 85 (H) 43 - 78 %    Lymphocytes 7 (L) 13 - 44 %    Monocytes 7 4.0 - 12.0 %    Eosinophils % 0 (L) 0.5 - 7.8 %    Basophils 0 0.0 - 2.0 %    Immature Granulocytes 1 0.0 - 5.0 %    Segs Absolute 16.7 (H) 1.7 - 8.2 K/UL    Absolute Lymph # 1.3 0.5 - 4.6 K/UL    Absolute Mono # 1.4 (H) 0.1 - 1.3 K/UL    Absolute Eos # 0.0 0.0 - 0.8 K/UL    Basophils Absolute 0.0 0.0 - 0.2 K/UL    Absolute Immature Granulocyte 0.1 0.0 - 0.5 K/UL   CMP    Collection Time: 02/13/23 12:48 PM   Result Value Ref Range    Sodium 134 133 - 143 mmol/L    Potassium 4.3 3.5 - 5.1 mmol/L    Chloride 100 (L) 101 - 110 mmol/L    CO2 19 (L) 21 - 32 mmol/L    Anion Gap 15 (H) 2 - 11 mmol/L    Glucose 385 (H) 65 - 100 mg/dL    BUN 27 (H) 8 - 23 MG/DL    Creatinine 1.80 (H) 0.8 - 1.5 MG/DL    Est, Glom Filt Rate 38 (L) >60 ml/min/1.73m2    Calcium 9.5 8.3 - 10.4 MG/DL    Total Bilirubin 1.7 (H) 0.2 - 1.1 MG/DL    ALT 50 12 - 65 U/L    AST 51 (H) 15 - 37 U/L    Alk Phosphatase 85 50 - 136 U/L    Total Protein 7.7 6.3 - 8.2 g/dL    Albumin 3.4 3.2 - 4.6 g/dL    Globulin 4.3 2.8 - 4.5 g/dL    Albumin/Globulin Ratio 0.8 0.4 - 1.6     Lactic Acid Now and in 2 Hours    Collection Time: 02/13/23 12:48 PM   Result Value Ref Range    Lactic Acid, Plasma 6.5 (HH) 0.4 - 2.0 MMOL/L   TSH with Reflex    Collection Time: 02/13/23 12:48 PM   Result Value Ref Range    TSH w Free Thyroid if Abnormal 1.45 0.358 - 3.740 UIU/ML   Urinalysis w rflx microscopic    Collection Time: 02/13/23 12:48 PM   Result Value Ref Range    Color, UA YELLOW/STRAW      Appearance CLEAR      Specific Gravity, UA 1.024 (H) 1.001 - 1.023      pH, Urine 5.0 5.0 - 9.0      Protein, UA 30 (A) NEG mg/dL    Glucose,  mg/dL    Ketones, Urine 40 (A) NEG mg/dL    Bilirubin Urine Negative NEG      Blood, Urine Negative NEG      Urobilinogen, Urine 0.2 0.2 - 1.0 EU/dL    Nitrite, Urine Negative NEG      Leukocyte Esterase, Urine Negative NEG      WBC, UA 0-4 U4 /hpf    RBC, UA 0-5 U5 /hpf    Epithelial Cells UA 0-5 U5 /hpf    BACTERIA, URINE Negative NEG /hpf    Casts 0-2 U2 /lpf   Troponin    Collection Time: 02/13/23 12:48 PM   Result Value Ref Range    Troponin, High Sensitivity 9,896.6 (HH) 0 - 14 pg/mL   Magnesium    Collection Time: 02/13/23 12:48 PM   Result Value Ref Range    Magnesium 2.0 1.8 - 2.4 mg/dL   Brain Natriuretic Peptide    Collection Time: 02/13/23 12:48 PM   Result Value Ref Range NT Pro-BNP 19,204 (H) <450 PG/ML       Pt was seen and examined by Dr. Annika Berman, he agrees with the following A&P. Assessment/Plan:         Patient Active Problem List    Diagnosis    Sepsis- with hypoxia and hypotension- Pt is being admitted by hospitalist for IV Abx, sepsis w/u    Leukocytosis- as above    Lactic acidosis- 6.5    Elevated troponin- uncertain significance in setting of sepsis, AMS, hypoxia, Pt unable to provide history- add heparin, conservative treatment, check echo to evaluate EF and for WMA    LETTY- IVF per primary team, hold Aldactone and ARB, monitor    CHB s/p St Te DC PPM- previously referred to EP for consideration of BiV upgrade    CAD s/p CABG/PCI- ASA, hold BB/ARB with lower BPs and sepsis, statin    Hypertension- hold meds with sepsis and hypotension    Type 2 diabetes mellitus without complication, without long-term current use of insulin ([facility] [address]- SSI, POC glucose per primary team    Non-rheumatic mitral regurgitation    Hyperlipidemia- statin    Acquired hypothyroidism- synthroid per primary team    Osteoarthritis of spine with radiculopathy, lumbar region    H/o Stroke [facility])       Thank you for allowing us to participate in the care of this patient, we will continue to follow along with you.     Ana Carmona PA-C

## 2023-02-13 NOTE — H&P
Hospitalist History and Physical   Admit Date:  2023 12:11 PM   Name:  Ryanne Parker   Age:  76 y.o. Sex:  male  :  1945   MRN:  [id number]   Room:  ER29/    Presenting Complaint: Altered Mental Status     Reason(s) for Admission: No admission diagnoses are documented for this encounter. History of Present Illness: Ryanne Parker is a 76 y.o. male with medical history of CAD s/p CABG, ICM, CHB s/p dual chamber PPM, HLD, DM, CVA, hypothyroidism who presented from home via EMS with c/o AMS. Sister spoke to patient yesterday and was complaining he felt like he was going to pass and didn't feel well. Couldn't get in touch with him and couldn't get into his house. Family called for a welfare check and patient found AMS in chair covered in vomit and urine. Family noted emesis was dark, almost black. Prior to yesterday, he was in his usual state of health. Alert to self only. Baseline A&Ox4 per family. Able to stand with asstance. Found to be hypoxic on EMS arrival spo2 86% and placed on 4lpm NC. In the ED, he is afebrile, tachycardic , tachypneic RR 27, /66. Labs significant for AGMA - AG `15 HCO3 19 with 40 ketones and lactic acidosis LA 6.5. LETTY Scr 1.8 BUN 27(  BL cr 1-1.2 )  ; elevated cardiac biomarkers - ntprobnp 19,204 and HS-trop [id number]. AST elevated 51. T bili up 1.7. WBC 19.6k procal nil. CXR: cardiomegaly with mild vascular congestion   CTH WO contrast - no acute findings     Rec'd IV protonix 40, zofran 4 mg IV, rocephin/azithromycin,  ordered not give d/t nausea and witnessed emesis. 1 L LR bolus, insulin regular 10 IV     Cardiology was consulted in the ED. No history of seizures. Suffered CVA that affected his vision about 10 years ago. No h/o GIB.  Non drinker, non smoker, no recreational drugs     Assessment & Plan:     Sepsis with acute metabolic encephalopathy, LETTY and lactic acidosis - source unclear possibly GI vs aspiration PNA 30 cc/kg sepsis bolus not ordered d/t CHF   Bcx2 in process. Repeat LA  Check influneza/covid, CK, MRSA PCR screen     Check fecal occult, iron stores. IV PPI q12 for now. Repeat hgb. Cont. Rocephin IV and azithromycin IV for now     Acute metabolic encephalopathy - CTH WO contrast no caute findinds. Elevated troponin - unable to r/o NSTEMI,  possibly demand ischemia related to sepsis. Cardiology consulted. Plans for heparin gtt. TTE to check EF and  WMA. Check FE and FOBT      CAD s/p CABG/PCI // chronic HFmrEF- asa, heparin, ARB/MRA/BB  held d/t LETTY/hypotension    LETTY on CKD2 2/2 PRA/ATN from GI losses and sepsis on aldactone, arb   Scr 1.8 (Bcr 1-1.2)  Hold losartan, aldactone pending renal recovery   Start  cc/hr plus prn small boluses for evidence of hypoperfusion. Maintain MAP >65 mm hg   Avoid NSAIDs, anti-RAAS agents, nephrotoxic agents, and   Renal Dosing, Daily Weights, Daily BMP/CMP, GLU <200      Elevated T bili and AST - possibly due to sepsis. Check CK. Pending clinical progression CT a/p or RUQ US may warranted. T2DM with hyperglycemia - start l antus 13 U qhs plus SSI. Check a1c in AM   Hypothyroidism - resume synthroid. Check TSH in AM     Patient is critically ill. Without intervention, there is a high probability of acute organ impairment or life-threatening deterioration. Critical care interventions: see plan of care  Total critical care time spent: 33 minutes. PT/OT evals and PPD needed/ordered? Yes    Diet: No diet orders on file  VTE prophylaxis:Heparin  Code status: Prior    Hospital Problems:  Active Problems:    * No active hospital problems. *  Resolved Problems:    * No resolved hospital problems.  *       Past History:     Past Medical History:   Diagnosis Date    Acquired hypothyroidism 3/19/2018    AV junctional bradycardia     CAD (coronary artery disease)     Congestive heart failure (HCC)     Diabetes (HCC)     Diabetic retinopathy without macular edema associated with type 2 diabetes mellitus ([facility] [address] 10/26/2022    [address] [place] [address] [place]    Endocrine disease     hypothyriod    Fatty liver     Fatty liver     Fatty liver     Headache 2017    Heart failure ([facility]     Hypertension     Osteoarthritis of spine with radiculopathy, lumbar region 6/10/2019    Stroke [facility])        Past Surgical History:   Procedure Laterality Date    CHOLECYSTECTOMY      CA UNLISTED PROCEDURE CARDIAC SURGERY      3 vessel bypass        Social History     Tobacco Use    Smoking status: Former     Types: Cigarettes     Quit date: 1972     Years since quittin.1    Smokeless tobacco: Never   Substance Use Topics    Alcohol use: No      Social History     Substance and Sexual Activity   Drug Use No       Family History   Problem Relation Age of Onset    Diabetes Sister     Cancer Sister     Diabetes Sister     Heart Disease Mother         Immunization History   Administered Date(s) Administered    COVID-19, J&J, (age 18y+), IM, 0.5 mL 2021    COVID-19, MODERNA BLUE border, Primary or Immunocompromised, (age 12y+), IM, 100 mcg/0.5mL 2021    Influenza Virus Vaccine 2017    Influenza, FLUZONE (age 72 y+), High Dose, 0.7mL 09/15/2020, 10/11/2022    Influenza, High Dose (Fluzone 65 yrs and older) 10/12/2018, 10/01/2020, 10/21/2021    Influenza, Triv, inactivated, subunit, adjuvanted, IM (Fluad 65 yrs and older) 10/28/2019    Pneumococcal Conjugate 13-valent ([id number]) 2018    Pneumococcal Polysaccharide ([id number]) 2019    Td (Adult), 2 Lf Tetanus Toxoid, Pf (Td, Absorbed) 2021     Allergies   Allergen Reactions    Ticagrelor Anaphylaxis    Empagliflozin Other (See Comments)     Dyspepsia and atypical chest pain     Prior to Admit Medications:  Current Outpatient Medications   Medication Instructions    aspirin 81 mg, Oral    atorvastatin (LIPITOR) 20 mg, Oral, DAILY    carvedilol (COREG) 12.5 mg, Oral, 2 TIMES DAILY WITH MEALS    glimepiride (AMARYL) 4 MG tablet 1 po bid with a meal for diabetes instead of the glipizide)    levothyroxine (SYNTHROID) 137 MCG tablet TAKE 1 TABLET BY MOUTH ONCE DAILY BEFORE BREAKFAST    losartan (COZAAR) 50 mg, Oral, DAILY    metFORMIN (GLUCOPHAGE XR) 500 MG extended release tablet 1 po bid with meals instead of the 1000 mg dose. Multiple Vitamin (MULTIVITAMIN ADULT PO) Oral    nitroGLYCERIN (NITROSTAT) 0.4 mg, SubLINGual    Semaglutide 7 mg, Oral, DAILY BEFORE BREAKFAST    spironolactone (ALDACTONE) 25 MG tablet 1/2 tablet po qam    triamcinolone (KENALOG) 0.1 % cream Topical, 2 TIMES DAILY    vitamin D 25 MCG (1000 UT) CAPS Oral, DAILY         Objective:   Patient Vitals for the past 24 hrs:   Temp Pulse Resp BP SpO2   02/13/23 1515 -- (!) 113 25 102/66 93 %   02/13/23 1445 98.6 °F (37 °C) (!) 119 29 105/69 94 %   02/13/23 1400 -- (!) 108 27 109/68 94 %   02/13/23 1345 -- (!) 107 27 119/69 91 %   02/13/23 1315 -- (!) 110 27 118/74 94 %   02/13/23 1300 -- -- -- -- 93 %   02/13/23 1221 98.2 °F (36.8 °C) 69 19 110/72 92 %       Oxygen Therapy  SpO2: 93 %  Pulse via Oximetry: 115 beats per minute  O2 Device: None (Room air)    Estimated body mass index is 26.12 kg/m² as calculated from the following:    Height as of this encounter: 6' 1\" (1.854 m). Weight as of this encounter: 198 lb (89.8 kg). Intake/Output Summary (Last 24 hours) at 2/13/2023 1610  Last data filed at 2/13/2023 1221  Gross per 24 hour   Intake --   Output 500 ml   Net -500 ml         Physical Exam:    Blood pressure 102/66, pulse (!) 113, temperature 98.6 °F (37 °C), temperature source Axillary, resp. rate 25, height 6' 1\" (1.854 m), weight 198 lb (89.8 kg), SpO2 93 %. Physical Exam  Vitals and nursing note reviewed. Constitutional:       Appearance: He is normal weight. He is ill-appearing. HENT:      Head: Normocephalic and atraumatic. Nose: Nose normal.      Mouth/Throat:      Mouth: Mucous membranes are dry.       Comments: Tongue stained with orange debri Eyes:      Extraocular Movements: Extraocular movements intact. Conjunctiva/sclera: Conjunctivae normal.      Pupils: Pupils are equal, round, and reactive to light. Cardiovascular:      Rate and Rhythm: Regular rhythm. Tachycardia present. Heart sounds: No murmur heard. Pulmonary:      Effort: Tachypnea present. Breath sounds: Decreased air movement present. Decreased breath sounds present. No wheezing, rhonchi or rales. Comments: Audible upper airway rattling/gurgle   Abdominal:      General: Abdomen is flat. Bowel sounds are normal. There is no distension. Palpations: Abdomen is soft. Tenderness: There is no abdominal tenderness. Musculoskeletal:      Cervical back: Neck supple. Right lower leg: No edema. Left lower leg: No edema. Skin:     Capillary Refill: Capillary refill takes more than 3 seconds. Coloration: Skin is pale. Findings: No rash. Comments: Pale, distal extremities cool    Neurological:      General: No focal deficit present. Mental Status: He is lethargic. GCS: GCS eye subscore is 4. GCS verbal subscore is 1. GCS motor subscore is 6. Motor: Weakness present.    Psychiatric:         Mood and Affect: Mood normal.         Behavior: Behavior normal.         I have personally reviewed labs and tests:  Recent Labs:  Recent Results (from the past 24 hour(s))   EKG 12 Lead    Collection Time: 02/13/23 12:31 PM   Result Value Ref Range    Ventricular Rate 104 BPM    Atrial Rate 103 BPM    P-R Interval 112 ms    QRS Duration 172 ms    Q-T Interval 414 ms    QTc Calculation (Bazett) 545 ms    P Axis 147 degrees    R Axis 256 degrees    T Axis 138 degrees    Diagnosis Ventricular-paced rhythm    CBC with Auto Differential    Collection Time: 02/13/23 12:48 PM   Result Value Ref Range    WBC 19.6 (H) 4.3 - 11.1 K/uL    RBC 5.39 4.23 - 5.6 M/uL    Hemoglobin 16.6 13.6 - 17.2 g/dL    Hematocrit 50.0 41.1 - 50.3 %    MCV 92.8 82 - 102 FL    MCH 30.8 26.1 - 32.9 PG    MCHC 33.2 31.4 - 35.0 g/dL    RDW 13.1 11.9 - 14.6 %    Platelets 196 653 - 383 K/uL    MPV 10.8 9.4 - 12.3 FL    nRBC 0.00 0.0 - 0.2 K/uL    Differential Type AUTOMATED      Seg Neutrophils 85 (H) 43 - 78 %    Lymphocytes 7 (L) 13 - 44 %    Monocytes 7 4.0 - 12.0 %    Eosinophils % 0 (L) 0.5 - 7.8 %    Basophils 0 0.0 - 2.0 %    Immature Granulocytes 1 0.0 - 5.0 %    Segs Absolute 16.7 (H) 1.7 - 8.2 K/UL    Absolute Lymph # 1.3 0.5 - 4.6 K/UL    Absolute Mono # 1.4 (H) 0.1 - 1.3 K/UL    Absolute Eos # 0.0 0.0 - 0.8 K/UL    Basophils Absolute 0.0 0.0 - 0.2 K/UL    Absolute Immature Granulocyte 0.1 0.0 - 0.5 K/UL   CMP    Collection Time: 02/13/23 12:48 PM   Result Value Ref Range    Sodium 134 133 - 143 mmol/L    Potassium 4.3 3.5 - 5.1 mmol/L    Chloride 100 (L) 101 - 110 mmol/L    CO2 19 (L) 21 - 32 mmol/L    Anion Gap 15 (H) 2 - 11 mmol/L    Glucose 385 (H) 65 - 100 mg/dL    BUN 27 (H) 8 - 23 MG/DL    Creatinine 1.80 (H) 0.8 - 1.5 MG/DL    Est, Glom Filt Rate 38 (L) >60 ml/min/1.73m2    Calcium 9.5 8.3 - 10.4 MG/DL    Total Bilirubin 1.7 (H) 0.2 - 1.1 MG/DL    ALT 50 12 - 65 U/L    AST 51 (H) 15 - 37 U/L    Alk Phosphatase 85 50 - 136 U/L    Total Protein 7.7 6.3 - 8.2 g/dL    Albumin 3.4 3.2 - 4.6 g/dL    Globulin 4.3 2.8 - 4.5 g/dL    Albumin/Globulin Ratio 0.8 0.4 - 1.6     Lactic Acid Now and in 2 Hours    Collection Time: 02/13/23 12:48 PM   Result Value Ref Range    Lactic Acid, Plasma 6.5 (HH) 0.4 - 2.0 MMOL/L   TSH with Reflex    Collection Time: 02/13/23 12:48 PM   Result Value Ref Range    TSH w Free Thyroid if Abnormal 1.45 0.358 - 3.740 UIU/ML   Urinalysis w rflx microscopic    Collection Time: 02/13/23 12:48 PM   Result Value Ref Range    Color, UA YELLOW/STRAW      Appearance CLEAR      Specific Gravity, UA 1.024 (H) 1.001 - 1.023      pH, Urine 5.0 5.0 - 9.0      Protein, UA 30 (A) NEG mg/dL    Glucose,  mg/dL    Ketones, Urine 40 (A) NEG mg/dL    Bilirubin Urine Negative NEG      Blood, Urine Negative NEG      Urobilinogen, Urine 0.2 0.2 - 1.0 EU/dL    Nitrite, Urine Negative NEG      Leukocyte Esterase, Urine Negative NEG      WBC, UA 0-4 U4 /hpf    RBC, UA 0-5 U5 /hpf    Epithelial Cells UA 0-5 U5 /hpf    BACTERIA, URINE Negative NEG /hpf    Casts 0-2 U2 /lpf   Troponin    Collection Time: 02/13/23 12:48 PM   Result Value Ref Range    Troponin, High Sensitivity 9,896.6 (HH) 0 - 14 pg/mL   Magnesium    Collection Time: 02/13/23 12:48 PM   Result Value Ref Range    Magnesium 2.0 1.8 - 2.4 mg/dL   Brain Natriuretic Peptide    Collection Time: 02/13/23 12:48 PM   Result Value Ref Range    NT Pro-BNP 19,204 (H) <450 PG/ML   Procalcitonin    Collection Time: 02/13/23 12:48 PM   Result Value Ref Range    Procalcitonin <0.05 0.00 - 0.49 ng/mL   Troponin    Collection Time: 02/13/23  2:41 PM   Result Value Ref Range    Troponin, High Sensitivity 11,977.3 (HH) 0 - 14 pg/mL   Blood Culture 1    Collection Time: 02/13/23  2:41 PM    Specimen: Blood   Result Value Ref Range    Special Requests LEFT  FOREARM        Culture PENDING    POCT Glucose    Collection Time: 02/13/23  3:03 PM   Result Value Ref Range    POC Glucose 339 (H) 65 - 100 mg/dL    Performed by: Sunita Dorman I have personally reviewed imaging studies:  CT HEAD WO CONTRAST    Result Date: 2/13/2023  Exam: CT HEAD WO CONTRAST on 2/13/2023 1:33 PM Clinical History: The Male patient is 76years old  presenting for alteration of consciousness/awareness. Technique: Thin slice axial CT images through the brain were obtained. All CT scans at this facility are performed using dose reduction/dose modulation techniques, as appropriate the performed exam, including the following: Automated Exposure Control;  Adjustment of the mA and/or kV according to patient size (this includes techniques or standardized protocols for targeted exams where dose is matched to indication/reason for exam); and Use of Iterative Reconstruction Technique. Radiation Exposure Indices: Reference Air Kerma (Nanda Harrington) = 21  mGy-cm Comparison:  Head CT 8/5/2017. Findings:  Cerebrum: Age-related senescent changes are seen with sulcal and ventricular prominence. . There is mild chronic periventricular white matter disease with scattered lacunae throughout the corona radiata and centrum semiovale. No evidence of intracranial hemorrhage, mass, or other space-occupying lesion is seen. There are no abnormal extra-axial fluid collections. Cerebellum: Involutional changes are demonstrated. CSF spaces: The ventricular system is within normal limits. The basilar cisterns are unremarkable. Brainstem: No evidence of ischemia, hemorrhage, or mass. Extracranial tissues: Visualized orbits and extracranial soft tissues are unremarkable. Paranasal sinuses/Mastoids: Well-pneumatized and aerated. . Calvarium: No acute osseous abnormality. 1.  Age-related senescent changes and chronic microvascular disease without acute intracranial abnormality. CPT code 21921    XR CHEST PORTABLE    Result Date: 2/13/2023  Exam: XR CHEST PORTABLE on 2/13/2023 1:01 PM Clinical History: The Male patient is 76years old  presenting for alteration of consciousness/awareness. Comparison:  Chest x-ray 6/30/2020 Findings:  Frontal view of the chest was obtained. The lung fields are clear. No pleural effusions are demonstrated. The cardiomediastinal silhouette is borderline enlarged with mild central vascular congestion. There are no acute osseous abnormalities. There is incidental prominent pannus dorsal to the odontoid suggesting rheumatoid arthritis. Right subclavian pacing device in place. Sternotomy changes are demonstrated. 1. Cardiomegaly with mild central vascular congestion.  CPT code(s) 38023       Echocardiogram:  07/12/22    TRANSTHORACIC ECHOCARDIOGRAM (TTE) COMPLETE (CONTRAST/BUBBLE/3D PRN) 07/13/2022  7:31 AM, 07/13/2022 12:00 AM (Final)    Interpretation Summary    Left Ventricle: Normal left ventricular systolic function with a visually estimated EF of 55 - 60%. Left ventricle size is normal. Normal wall thickness. Mild hypokinesis of the following segments: mid anteroseptal and apical septal. Abnormal diastolic function. Aortic Valve: Mild regurgitation. Mitral Valve: Mild regurgitation. Left Atrium: Left atrium is mildly dilated. LA Vol Index is  33 ml/m2. Technical qualifiers: Technically difficult study with poor endocardial visualization, color flow Doppler was performed and pulse wave and/or continuous wave Doppler was performed. Contrast used: Definity. Signed by: Sam Hurtado MD on 7/13/2022  7:31 AM, Signed by: Unknown Provider Result on 7/13/2022 12:00 AM        Orders Placed This Encounter   Medications    AND Linked Order Group     cefTRIAXone (ROCEPHIN) 1,000 mg in sodium chloride 0.9 % 50 mL IVPB (mini-bag)      Order Specific Question:   Antimicrobial Indications      Answer:   Pneumonia (CAP)     azithromycin (ZITHROMAX) 500 mg in sodium chloride 0.9 % 250 mL IVPB ([id number])      Order Specific Question:   Antimicrobial Indications      Answer:   Pneumonia (CAP)    DISCONTD: lactated ringers bolus    lactated ringers bolus    aspirin chewable tablet 324 mg    ondansetron (ZOFRAN) injection 4 mg    pantoprazole (PROTONIX) 40 mg in sodium chloride (PF) 0.9 % 10 mL injection    insulin regular (HUMULIN R;NOVOLIN R) injection 10 Units         Signed:  Milena Max DO DO    Part of this note may have been written by using a voice dictation software. The note has been proof read but may still contain some grammatical/other typographical errors.

## 2023-02-13 NOTE — ED PROVIDER NOTES
Emergency Department Provider Note                   PCP:                Eda Monet MD               Age: 77 y.o.      Sex: male       ICD-10-CM    1. Severe sepsis (HCC)  A41.9     R65.20       2. Elevated troponin  R77.8 Transthoracic echocardiogram (TTE) complete with contrast, bubble, strain, and 3D PRN     Transthoracic echocardiogram (TTE) complete with contrast, bubble, strain, and 3D PRN      3. Altered mental status, unspecified altered mental status type  R41.82       4. Shortness of breath  R06.02       5. Hypoxia  R09.02           DISPOSITION Decision To Admit 02/13/2023 03:58:17 PM       Medical Decision Making  7-year-old male patient here with altered mental status  On exam the patient has coarse rhonchorous breath sounds  He is afebrile but only maintaining a sat of 92% on room air    We will proceed with work-up including chest x-ray head CT and labs    2:06 PM  Lab results a lactic acid of 6.5 at this time  Will start IV fluids along with antibiotics    Given the patient's history of heart failure I will only administer 1 L initially and monitor from there  Pending repeat lactic acid level as well.    2:47 PM  Initial troponin resulted at a little over 9000 initial BNP 19,000  I reviewed findings with on-call cardiology Dr. Cannon  They will consult on the patient.  Given his complex medical history and sepsis we will involve the hospitalist service for admission    3:59 PM  Hospitalist service paged for admission  Will administer insulin given the patient's high blood sugar and mild anion gap acidosis    Amount and/or Complexity of Data Reviewed  Independent Historian: EMS  Labs: ordered.  Radiology: ordered.  ECG/medicine tests: ordered and independent interpretation performed.    Risk  OTC drugs.  Prescription drug management.  Decision regarding hospitalization.  Risk Details: Elements of this note have been dictated via voice recognition software.  Text and phrases may be limited by  the accuracy of the software. The chart has been reviewed, but errors may still be present. Critical Care  Total time providing critical care:  minutes     Complexity of Problem:1 acute or chronic illness that poses a threat to life or bodily function. (5)  The patients assessment required an independent historian: I spoke with a family member. The patients assessment required an independent historian: I spoke with the paramedic. I have conducted an independent ordering and review of Labs. I have conducted an independent ordering and review of EKG. I have conducted an independent ordering and review of X-rays. I have conducted an independent ordering and review of CT Scan. I have reviewed records from an external source: ED records from outside this hospital.  I have reviewed records from an external source: provider visit notes from outside specialist.  Considerations: Shared decision making was utilized in the care of this patient. Considerations: Hospitalization was considered. Patient was admitted I have communication with admitting physician.          Orders Placed This Encounter   Procedures    Critical Care    Culture, Urine    Blood Culture 1    Blood Culture 2    XR CHEST PORTABLE    CT HEAD WO CONTRAST    CBC with Auto Differential    CMP    Lactic Acid Now and in 2 Hours    TSH with Reflex    Urinalysis w rflx microscopic    Troponin    Magnesium    Brain Natriuretic Peptide    Procalcitonin    Lactic Acid    Lipase    POCT Urine Dipstick    Pulse Oximetry    Neurologic Status Assessment    Strict intake and output    Vital Signs Per Unit Routine    POCT Glucose    POCT Glucose    EKG 12 Lead    Saline lock IV        Medications   lactated ringers bolus (1,000 mLs IntraVENous New Bag 2/13/23 1454)   aspirin chewable tablet 324 mg (324 mg Oral Not Given 2/13/23 1506)   pantoprazole (PROTONIX) 40 mg in sodium chloride (PF) 0.9 % 10 mL injection (has no administration in time range)   insulin regular (HUMULIN R;NOVOLIN R) injection 10 Units (has no administration in time range)   cefTRIAXone (ROCEPHIN) 1,000 mg in sodium chloride 0.9 % 50 mL IVPB (mini-bag) (0 mg IntraVENous Stopped 2/13/23 1452)     And   azithromycin (ZITHROMAX) 500 mg in sodium chloride 0.9 % 250 mL IVPB (Zpyc2Div) (0 mg IntraVENous Stopped 2/13/23 1507)   ondansetron (ZOFRAN) injection 4 mg (4 mg IntraVENous Given 2/13/23 1454)       New Prescriptions    No medications on file        Manish Price is a 76 y.o. male who presents to the Emergency Department with chief complaint of    Chief Complaint   Patient presents with    Altered Mental Status      77-year-old male patient brought in by EMS after being checked on for a well fair check  Patient found responsive only to his name covered in vomit and feces and urine    Patient has a history of diabetes congestive heart failure CAD    Patient's blood sugar 260  Patient is responsive really only to his name    The history is provided by the patient and the EMS personnel. Altered Mental Status  Presenting symptoms: partial responsiveness    Severity:  Severe  Most recent episode: Today  Episode history:  Unable to specify  Duration:  12 hours  Timing:  Constant  Progression:  Unchanged  Context: not head injury and not recent illness    Associated symptoms: nausea and vomiting       Review of Systems   Unable to perform ROS: Mental status change   Gastrointestinal:  Positive for nausea and vomiting.      Past Medical History:   Diagnosis Date    Acquired hypothyroidism 3/19/2018    AV junctional bradycardia     CAD (coronary artery disease)     Congestive heart failure (HCC)     Diabetes ([facility] [address]     Diabetic retinopathy without macular edema associated with type 2 diabetes mellitus ([address] 10/26/2022    Jhonny    Endocrine disease     hypothyriod    Fatty liver     Fatty liver     Fatty liver     Headache 8/5/2017    Heart failure (HCC)     Hypertension     Osteoarthritis of spine with radiculopathy, lumbar region 6/10/2019    Stroke [facility])         Past Surgical History:   Procedure Laterality Date    CHOLECYSTECTOMY      IL UNLISTED PROCEDURE CARDIAC SURGERY      3 vessel bypass        Family History   Problem Relation Age of Onset    Diabetes Sister     Cancer Sister     Diabetes Sister     Heart Disease Mother         Social History     Socioeconomic History    Marital status:      Spouse name: None    Number of children: None    Years of education: None    Highest education level: None   Tobacco Use    Smoking status: Former     Types: Cigarettes     Quit date: 1972     Years since quittin.1    Smokeless tobacco: Never   Vaping Use    Vaping Use: Never used   Substance and Sexual Activity    Alcohol use: No    Drug use: No   Social History Narrative    Kadie Molina is his sister     Social Determinants of Health     Physical Activity: Insufficiently Active    Days of Exercise per Week: 2 days    Minutes of Exercise per Session: 10 min        Allergies: Ticagrelor and Empagliflozin    Previous Medications    ASPIRIN 81 MG CHEWABLE TABLET    Take 81 mg by mouth    ATORVASTATIN (LIPITOR) 20 MG TABLET    Take 1 tablet by mouth daily    CARVEDILOL (COREG) 12.5 MG TABLET    Take 1 tablet by mouth 2 times daily (with meals)    GLIMEPIRIDE (AMARYL) 4 MG TABLET    1 po bid with a meal for diabetes instead of the glipizide)    LEVOTHYROXINE (SYNTHROID) 137 MCG TABLET    TAKE 1 TABLET BY MOUTH ONCE DAILY BEFORE BREAKFAST    LOSARTAN (COZAAR) 50 MG TABLET    Take 1 tablet by mouth daily    METFORMIN (GLUCOPHAGE XR) 500 MG EXTENDED RELEASE TABLET    1 po bid with meals instead of the 1000 mg dose.     MULTIPLE VITAMIN (MULTIVITAMIN ADULT PO)    Take by mouth    NITROGLYCERIN (NITROSTAT) 0.4 MG SL TABLET    Place 0.4 mg under the tongue    SEMAGLUTIDE 7 MG TABS    Take 7 mg by mouth every morning (before breakfast)    SPIRONOLACTONE (ALDACTONE) 25 MG TABLET    1/2 tablet po qam TRIAMCINOLONE (KENALOG) 0.1 % CREAM    Apply topically 2 times daily    VITAMIN D 25 MCG (1000 UT) CAPS    Take by mouth daily        Vitals signs and nursing note reviewed. Patient Vitals for the past 4 hrs:   Temp Pulse Resp BP SpO2   02/13/23 1515 -- (!) 113 25 102/66 93 %   02/13/23 1445 98.6 °F (37 °C) (!) 119 29 105/69 94 %   02/13/23 1400 -- (!) 108 27 109/68 94 %   02/13/23 1345 -- (!) 107 27 119/69 91 %   02/13/23 1315 -- (!) 110 27 118/74 94 %   02/13/23 1300 -- -- -- -- 93 %   02/13/23 1221 98.2 °F (36.8 °C) 69 19 110/72 92 %          Physical Exam  Vitals and nursing note reviewed. Constitutional:       General: He is in acute distress. Appearance: Normal appearance. He is well-developed and normal weight. HENT:      Head: Normocephalic and atraumatic. Right Ear: External ear normal.      Left Ear: External ear normal.      Nose: Nose normal.   Eyes:      General: No scleral icterus. Extraocular Movements: Extraocular movements intact. Conjunctiva/sclera: Conjunctivae normal.      Pupils: Pupils are equal, round, and reactive to light. Cardiovascular:      Rate and Rhythm: Normal rate and regular rhythm. Pulses: Normal pulses. Heart sounds: Normal heart sounds. Pulmonary:      Effort: Pulmonary effort is normal. No respiratory distress. Breath sounds: Rhonchi present. Abdominal:      General: Abdomen is flat. Bowel sounds are normal. There is no distension. Palpations: Abdomen is soft. There is no mass. Tenderness: There is no abdominal tenderness. Musculoskeletal:         General: No deformity or signs of injury. Normal range of motion. Cervical back: Normal range of motion and neck supple. Skin:     General: Skin is warm and dry. Capillary Refill: Capillary refill takes less than 2 seconds. Coloration: Skin is pale. Neurological:      General: No focal deficit present. Mental Status: He is alert.  He is disoriented and confused. GCS: GCS eye subscore is 4. GCS verbal subscore is 4. GCS motor subscore is 6. Psychiatric:         Mood and Affect: Affect is blunt and flat. Speech: He is noncommunicative. Behavior: Behavior is slowed. Cognition and Memory: Memory is impaired. He exhibits impaired recent memory.         EKG 12 Lead    Date/Time: 2/13/2023 1:29 PM  Performed by: Tracey Sequeira MD  Authorized by: Tracey Sequeira MD     Previous ECG:     Previous ECG:  Compared to current    Similarity:  No change  Interpretation:     Interpretation: abnormal    Rhythm:     Rhythm: paced    Pacing:     Type of pacing:  Ventricular  Ectopy:     Ectopy: none    ST segments:     ST segments:  Normal  T waves:     T waves: normal    Comments:      100% paced ventricular rhythm   no ectopy  Critical Care  Performed by: Tracey Sequeira MD  Authorized by: Tracey Sequeira MD     Critical care provider statement:     Critical care time (minutes):  85    Critical care time was exclusive of:  Separately billable procedures and treating other patients    Critical care was necessary to treat or prevent imminent or life-threatening deterioration of the following conditions:  Circulatory failure, respiratory failure and sepsis    Critical care was time spent personally by me on the following activities:  Blood draw for specimens, development of treatment plan with patient or surrogate, discussions with consultants, evaluation of patient's response to treatment, examination of patient, obtaining history from patient or surrogate, ordering and performing treatments and interventions, ordering and review of laboratory studies, ordering and review of radiographic studies, pulse oximetry, re-evaluation of patient's condition and review of old charts    I assumed direction of critical care for this patient from another provider in my specialty: no      Care discussed with: admitting provider      ED EKG Interpretation  EKG was interpreted in the absence of a cardiologist.      Is this patient to be included in the SEP-1 core measure due to severe sepsis or septic shock? Yes SEP-1 CORE MEASURE DATA      Sepsis Criteria   Severe Sepsis Criteria   Septic Shock Criteria       Must meet 2:    []Temp >100.9 F (38.3 C) or < 96.8 F (36 C)  [x]HR > 90  []RR > 20  [x]WBC > 12 or < 4 or 10% bands    AND:    [] Infection Confirmed or Suspected. Must meet 1:    [x]Lactate > 2       or   [x]Signs of Organ Dysfunction:    - SBP < 90 or MAP < 65  -Creatinine > 2 or increased from baseline  -Urine Output < 0.5 ml/kg/hr  -Bilirubin > 2  -INR > 1.5 (not anticoagulated)  -Platelets < 759,095  -Acute Respiratory Failure as evidenced by new need for NIPPV or mechanical ventilation   Must meet 1:    [x]Lactate > 4        or   []SBP < 90 or MAP < 65 for at least two readings in the first hour after fluid bolus administration    []Vasopressors initiated (if hypotension persists after fluid resuscitation)   Patient Vitals for the past 6 hrs:   BP Temp Pulse Resp SpO2 Height Weight Weight Method Percent Weight Change   02/13/23 1221 110/72 98.2 °F (36.8 °C) 69 19 92 % 6' 1\" (1.854 m) 198 lb (89.8 kg) Stated 0   02/13/23 1300 -- -- -- -- 93 % -- -- -- --   02/13/23 1315 118/74 -- (!) 110 27 94 % -- -- -- --   02/13/23 1345 119/69 -- (!) 107 27 91 % -- -- -- --   02/13/23 1400 109/68 -- (!) 108 27 94 % -- -- -- --   02/13/23 1445 105/69 98.6 °F (37 °C) (!) 119 29 94 % -- -- -- --   02/13/23 1515 102/66 -- (!) 113 25 93 % -- -- -- --      Recent Labs     02/13/23  1248   WBC 19.6*   CREATININE 1.80*   BILITOT 1.7*           Septic shock identified date: 2/13/2023 time: 1300    Fluid Resuscitation Rationale: less than 30mL/kg because of a history of CHF NYHA III or IV with symptoms with minimal exertion/at rest.  Instead, 1 L was ordered.   More fluid initially would be potentially detrimental to the patient    Repeat lactate level: ordered and pending at this time    Reassessment Exam: I have reassessed tissue perfusion and hemodynamic status after fluid bolus at this date/time: 2/13/2023 1500      Results for orders placed or performed during the hospital encounter of 02/13/23   Blood Culture 1    Specimen: Blood   Result Value Ref Range    Special Requests LEFT  FOREARM        Culture PENDING    XR CHEST PORTABLE    Narrative    Exam: XR CHEST PORTABLE on 2/13/2023 1:01 PM    Clinical History: The Male patient is 76years old  presenting for alteration of  consciousness/awareness. Comparison:  Chest x-ray 6/30/2020    Findings:  Frontal view of the chest was obtained. The lung fields are clear. No pleural effusions are demonstrated. The  cardiomediastinal silhouette is borderline enlarged with mild central vascular  congestion. There are no acute osseous abnormalities. There is incidental  prominent pannus dorsal to the odontoid suggesting rheumatoid arthritis. Right  subclavian pacing device in place. Sternotomy changes are demonstrated. Impression    1. Cardiomegaly with mild central vascular congestion. CPT code(s) 79203           CT HEAD WO CONTRAST    Narrative    Exam: CT HEAD WO CONTRAST on 2/13/2023 1:33 PM    Clinical History: The Male patient is 76years old  presenting for alteration of  consciousness/awareness. Technique: Thin slice axial CT images through the brain were obtained. All CT scans at this facility are performed using dose reduction/dose modulation  techniques, as appropriate the performed exam, including the following:   Automated Exposure Control; Adjustment of the mA and/or kV according to patient  size (this includes techniques or standardized protocols for targeted exams  where dose is matched to indication/reason for exam); and Use of Iterative  Reconstruction Technique. Radiation Exposure Indices:  Reference Air Kerma ([facility]) = 21  mGy-cm    Comparison:  Head CT 8/5/2017.     Findings:      Cerebrum: Age-related senescent changes are seen with sulcal and ventricular  prominence. . There is mild chronic periventricular white matter disease with  scattered lacunae throughout the corona radiata and centrum semiovale. No  evidence of intracranial hemorrhage, mass, or other space-occupying lesion is  seen. There are no abnormal extra-axial fluid collections. Cerebellum: Involutional changes are demonstrated. CSF spaces: The ventricular system is within normal limits. The basilar cisterns  are unremarkable. Brainstem: No evidence of ischemia, hemorrhage, or mass. Extracranial tissues: Visualized orbits and extracranial soft tissues are  unremarkable. Paranasal sinuses/Mastoids: Well-pneumatized and aerated. .    Calvarium: No acute osseous abnormality. Impression    1. Age-related senescent changes and chronic microvascular disease without  acute intracranial abnormality.       CPT code 21931   CBC with Auto Differential   Result Value Ref Range    WBC 19.6 (H) 4.3 - 11.1 K/uL    RBC 5.39 4.23 - 5.6 M/uL    Hemoglobin 16.6 13.6 - 17.2 g/dL    Hematocrit 50.0 41.1 - 50.3 %    MCV 92.8 82 - 102 FL    MCH 30.8 26.1 - 32.9 PG    MCHC 33.2 31.4 - 35.0 g/dL    RDW 13.1 11.9 - 14.6 %    Platelets 190 447 - 296 K/uL    MPV 10.8 9.4 - 12.3 FL    nRBC 0.00 0.0 - 0.2 K/uL    Differential Type AUTOMATED      Seg Neutrophils 85 (H) 43 - 78 %    Lymphocytes 7 (L) 13 - 44 %    Monocytes 7 4.0 - 12.0 %    Eosinophils % 0 (L) 0.5 - 7.8 %    Basophils 0 0.0 - 2.0 %    Immature Granulocytes 1 0.0 - 5.0 %    Segs Absolute 16.7 (H) 1.7 - 8.2 K/UL    Absolute Lymph # 1.3 0.5 - 4.6 K/UL    Absolute Mono # 1.4 (H) 0.1 - 1.3 K/UL    Absolute Eos # 0.0 0.0 - 0.8 K/UL    Basophils Absolute 0.0 0.0 - 0.2 K/UL    Absolute Immature Granulocyte 0.1 0.0 - 0.5 K/UL   CMP   Result Value Ref Range    Sodium 134 133 - 143 mmol/L    Potassium 4.3 3.5 - 5.1 mmol/L    Chloride 100 (L) 101 - 110 mmol/L    CO2 19 (L) 21 - 32 mmol/L    Anion Gap 15 (H) 2 - 11 mmol/L    Glucose 385 (H) 65 - 100 mg/dL    BUN 27 (H) 8 - 23 MG/DL    Creatinine 1.80 (H) 0.8 - 1.5 MG/DL    Est, Glom Filt Rate 38 (L) >60 ml/min/1.73m2    Calcium 9.5 8.3 - 10.4 MG/DL    Total Bilirubin 1.7 (H) 0.2 - 1.1 MG/DL    ALT 50 12 - 65 U/L    AST 51 (H) 15 - 37 U/L    Alk Phosphatase 85 50 - 136 U/L    Total Protein 7.7 6.3 - 8.2 g/dL    Albumin 3.4 3.2 - 4.6 g/dL    Globulin 4.3 2.8 - 4.5 g/dL    Albumin/Globulin Ratio 0.8 0.4 - 1.6     Lactic Acid Now and in 2 Hours   Result Value Ref Range    Lactic Acid, Plasma 6.5 (HH) 0.4 - 2.0 MMOL/L   TSH with Reflex   Result Value Ref Range    TSH w Free Thyroid if Abnormal 1.45 0.358 - 3.740 UIU/ML   Urinalysis w rflx microscopic   Result Value Ref Range    Color, UA YELLOW/STRAW      Appearance CLEAR      Specific Gravity, UA 1.024 (H) 1.001 - 1.023      pH, Urine 5.0 5.0 - 9.0      Protein, UA 30 (A) NEG mg/dL    Glucose,  mg/dL    Ketones, Urine 40 (A) NEG mg/dL    Bilirubin Urine Negative NEG      Blood, Urine Negative NEG      Urobilinogen, Urine 0.2 0.2 - 1.0 EU/dL    Nitrite, Urine Negative NEG      Leukocyte Esterase, Urine Negative NEG      WBC, UA 0-4 U4 /hpf    RBC, UA 0-5 U5 /hpf    Epithelial Cells UA 0-5 U5 /hpf    BACTERIA, URINE Negative NEG /hpf    Casts 0-2 U2 /lpf   Troponin   Result Value Ref Range    Troponin, High Sensitivity 9,896.6 (HH) 0 - 14 pg/mL   Troponin   Result Value Ref Range    Troponin, High Sensitivity 11,977.3 (HH) 0 - 14 pg/mL   Magnesium   Result Value Ref Range    Magnesium 2.0 1.8 - 2.4 mg/dL   Brain Natriuretic Peptide   Result Value Ref Range    NT Pro-BNP 19,204 (H) <450 PG/ML   Procalcitonin   Result Value Ref Range    Procalcitonin <0.05 0.00 - 0.49 ng/mL   POCT Glucose   Result Value Ref Range    POC Glucose 339 (H) 65 - 100 mg/dL    Performed by: Amna    EKG 12 Lead   Result Value Ref Range    Ventricular Rate 104 BPM    Atrial Rate 103 BPM    P-R Interval 112 ms    QRS Duration 172 ms    Q-T Interval 414 ms    QTc Calculation (Bazett) 545 ms    P Axis 147 degrees    R Axis 256 degrees    T Axis 138 degrees    Diagnosis Ventricular-paced rhythm         CT HEAD WO CONTRAST   Final Result      1. Age-related senescent changes and chronic microvascular disease without   acute intracranial abnormality. CPT code 95421      XR CHEST PORTABLE   Final Result      1. Cardiomegaly with mild central vascular congestion. CPT code(s) 65095                                        Voice dictation software was used during the making of this note. This software is not perfect and grammatical and other typographical errors may be present. This note has not been completely proofread for errors.      Geremias Sung MD  02/13/23 [id number]

## 2023-02-13 NOTE — ACP (ADVANCE CARE PLANNING)
[facility] Hospitalist Service  At the heart of better care     Advance Care Planning   Admit Date:  2023 12:11 PM   Name:  Katharina Alonso   Age:  76 y.o. Sex:  male  :  1945   MRN:  [id number]   Room:  Sabrina Clifford is able to make his own decisions:   No, oriented x 0 at this time     If pt unable to make decisions, POA/surrogate decision maker:  His next of kin is his sister Dalton Jovel - not  and no kids     Other people present:   Dalton Jovel #[phone] and his niece Adi Arango # [phone],    Patient / surrogate decision-maker directed code status:  DNR - sister notes he does not have any POA documents completed but she states he would want DNR     Other ACP topics discussed, if applicable:   N/a    Patient or surrogate consented to discussion of the current conditions, workup, management plans, prognosis, and the risk for further deterioration. Time spent: 16 minutes in direct discussion.       Signed:  Trudy Timmons DO, DO

## 2023-02-14 LAB
ALBUMIN SERPL-MCNC: 2.9 G/DL (ref 3.2–4.6)
ALBUMIN/GLOB SERPL: 0.9 (ref 0.4–1.6)
ALP SERPL-CCNC: 61 U/L (ref 50–136)
ALT SERPL-CCNC: 28 U/L (ref 12–65)
ANION GAP SERPL CALC-SCNC: 8 MMOL/L (ref 2–11)
APTT PPP: 60.1 SEC (ref 24.5–34.2)
AST SERPL-CCNC: 50 U/L (ref 15–37)
BACTERIA SPEC CULT: ABNORMAL
BASOPHILS # BLD: 0 K/UL (ref 0–0.2)
BASOPHILS NFR BLD: 0 % (ref 0–2)
BILIRUB SERPL-MCNC: 1.2 MG/DL (ref 0.2–1.1)
BUN SERPL-MCNC: 37 MG/DL (ref 8–23)
CALCIUM SERPL-MCNC: 8.8 MG/DL (ref 8.3–10.4)
CHLORIDE SERPL-SCNC: 106 MMOL/L (ref 101–110)
CO2 SERPL-SCNC: 26 MMOL/L (ref 21–32)
CREAT SERPL-MCNC: 1.4 MG/DL (ref 0.8–1.5)
DIFFERENTIAL METHOD BLD: ABNORMAL
EOSINOPHIL # BLD: 0 K/UL (ref 0–0.8)
EOSINOPHIL NFR BLD: 0 % (ref 0.5–7.8)
ERYTHROCYTE [DISTWIDTH] IN BLOOD BY AUTOMATED COUNT: 13 % (ref 11.9–14.6)
EST. AVERAGE GLUCOSE BLD GHB EST-MCNC: 183 MG/DL
GLOBULIN SER CALC-MCNC: 3.2 G/DL (ref 2.8–4.5)
GLUCOSE BLD STRIP.AUTO-MCNC: 243 MG/DL (ref 65–100)
GLUCOSE BLD STRIP.AUTO-MCNC: 256 MG/DL (ref 65–100)
GLUCOSE BLD STRIP.AUTO-MCNC: 273 MG/DL (ref 65–100)
GLUCOSE BLD STRIP.AUTO-MCNC: 294 MG/DL (ref 65–100)
GLUCOSE SERPL-MCNC: 267 MG/DL (ref 65–100)
HBA1C MFR BLD: 8 % (ref 4.8–5.6)
HCT VFR BLD AUTO: 41.5 % (ref 41.1–50.3)
HGB BLD-MCNC: 14.3 G/DL (ref 13.6–17.2)
IMM GRANULOCYTES # BLD AUTO: 0.1 K/UL (ref 0–0.5)
IMM GRANULOCYTES NFR BLD AUTO: 0 % (ref 0–5)
INR PPP: 1.2
LACTATE SERPL-SCNC: 1.8 MMOL/L (ref 0.4–2)
LACTATE SERPL-SCNC: 2.4 MMOL/L (ref 0.4–2)
LACTATE SERPL-SCNC: 2.9 MMOL/L (ref 0.4–2)
LYMPHOCYTES # BLD: 1.5 K/UL (ref 0.5–4.6)
LYMPHOCYTES NFR BLD: 10 % (ref 13–44)
MAGNESIUM SERPL-MCNC: 1.9 MG/DL (ref 1.8–2.4)
MCH RBC QN AUTO: 31.3 PG (ref 26.1–32.9)
MCHC RBC AUTO-ENTMCNC: 34.5 G/DL (ref 31.4–35)
MCV RBC AUTO: 90.8 FL (ref 82–102)
MM INDURATION, POC: 0 MM (ref 0–5)
MONOCYTES # BLD: 1.3 K/UL (ref 0.1–1.3)
MONOCYTES NFR BLD: 9 % (ref 4–12)
NEUTS SEG # BLD: 12 K/UL (ref 1.7–8.2)
NEUTS SEG NFR BLD: 81 % (ref 43–78)
NRBC # BLD: 0 K/UL (ref 0–0.2)
PLATELET # BLD AUTO: 147 K/UL (ref 150–450)
PMV BLD AUTO: 10.6 FL (ref 9.4–12.3)
POTASSIUM SERPL-SCNC: 3.8 MMOL/L (ref 3.5–5.1)
PPD, POC: NEGATIVE
PROT SERPL-MCNC: 6.1 G/DL (ref 6.3–8.2)
PROTHROMBIN TIME: 15.8 SEC (ref 12.6–14.3)
RBC # BLD AUTO: 4.57 M/UL (ref 4.23–5.6)
SERVICE CMNT-IMP: ABNORMAL
SODIUM SERPL-SCNC: 140 MMOL/L (ref 133–143)
UFH PPP CHRO-ACNC: 0.36 IU/ML (ref 0.3–0.7)
UFH PPP CHRO-ACNC: 0.39 IU/ML (ref 0.3–0.7)
UFH PPP CHRO-ACNC: 0.44 IU/ML (ref 0.3–0.7)
UFH PPP CHRO-ACNC: 0.48 IU/ML (ref 0.3–0.7)
WBC # BLD AUTO: 14.9 K/UL (ref 4.3–11.1)

## 2023-02-14 PROCEDURE — 97535 SELF CARE MNGMENT TRAINING: CPT

## 2023-02-14 PROCEDURE — 2580000003 HC RX 258: Performed by: FAMILY MEDICINE

## 2023-02-14 PROCEDURE — A4216 STERILE WATER/SALINE, 10 ML: HCPCS | Performed by: FAMILY MEDICINE

## 2023-02-14 PROCEDURE — 83036 HEMOGLOBIN GLYCOSYLATED A1C: CPT

## 2023-02-14 PROCEDURE — 6360000002 HC RX W HCPCS: Performed by: FAMILY MEDICINE

## 2023-02-14 PROCEDURE — 97162 PT EVAL MOD COMPLEX 30 MIN: CPT

## 2023-02-14 PROCEDURE — 6360000002 HC RX W HCPCS: Performed by: PHYSICIAN ASSISTANT

## 2023-02-14 PROCEDURE — 85025 COMPLETE CBC W/AUTO DIFF WBC: CPT

## 2023-02-14 PROCEDURE — 85520 HEPARIN ASSAY: CPT

## 2023-02-14 PROCEDURE — 83605 ASSAY OF LACTIC ACID: CPT

## 2023-02-14 PROCEDURE — C9113 INJ PANTOPRAZOLE SODIUM, VIA: HCPCS | Performed by: FAMILY MEDICINE

## 2023-02-14 PROCEDURE — 99232 SBSQ HOSP IP/OBS MODERATE 35: CPT | Performed by: INTERNAL MEDICINE

## 2023-02-14 PROCEDURE — 80053 COMPREHEN METABOLIC PANEL: CPT

## 2023-02-14 PROCEDURE — 6360000002 HC RX W HCPCS: Performed by: HOSPITALIST

## 2023-02-14 PROCEDURE — 82962 GLUCOSE BLOOD TEST: CPT

## 2023-02-14 PROCEDURE — 1100000003 HC PRIVATE W/ TELEMETRY

## 2023-02-14 PROCEDURE — 97530 THERAPEUTIC ACTIVITIES: CPT

## 2023-02-14 PROCEDURE — 6370000000 HC RX 637 (ALT 250 FOR IP): Performed by: INTERNAL MEDICINE

## 2023-02-14 PROCEDURE — 92610 EVALUATE SWALLOWING FUNCTION: CPT

## 2023-02-14 PROCEDURE — 6370000000 HC RX 637 (ALT 250 FOR IP): Performed by: FAMILY MEDICINE

## 2023-02-14 PROCEDURE — 51798 US URINE CAPACITY MEASURE: CPT

## 2023-02-14 PROCEDURE — 36415 COLL VENOUS BLD VENIPUNCTURE: CPT

## 2023-02-14 PROCEDURE — 97166 OT EVAL MOD COMPLEX 45 MIN: CPT

## 2023-02-14 PROCEDURE — 83735 ASSAY OF MAGNESIUM: CPT

## 2023-02-14 RX ORDER — FUROSEMIDE 10 MG/ML
20 INJECTION INTRAMUSCULAR; INTRAVENOUS DAILY
Status: DISCONTINUED | OUTPATIENT
Start: 2023-02-15 | End: 2023-02-18 | Stop reason: HOSPADM

## 2023-02-14 RX ORDER — ASPIRIN 81 MG/1
81 TABLET, CHEWABLE ORAL DAILY
Status: DISCONTINUED | OUTPATIENT
Start: 2023-02-14 | End: 2023-02-18 | Stop reason: HOSPADM

## 2023-02-14 RX ORDER — CARVEDILOL 3.12 MG/1
3.12 TABLET ORAL 2 TIMES DAILY WITH MEALS
Status: DISCONTINUED | OUTPATIENT
Start: 2023-02-14 | End: 2023-02-15

## 2023-02-14 RX ORDER — FUROSEMIDE 10 MG/ML
40 INJECTION INTRAMUSCULAR; INTRAVENOUS ONCE
Status: COMPLETED | OUTPATIENT
Start: 2023-02-14 | End: 2023-02-14

## 2023-02-14 RX ADMIN — INSULIN LISPRO 4 UNITS: 100 INJECTION, SOLUTION INTRAVENOUS; SUBCUTANEOUS at 17:54

## 2023-02-14 RX ADMIN — SODIUM CHLORIDE, PRESERVATIVE FREE 10 ML: 5 INJECTION INTRAVENOUS at 08:52

## 2023-02-14 RX ADMIN — AZITHROMYCIN MONOHYDRATE 500 MG: 500 INJECTION, POWDER, LYOPHILIZED, FOR SOLUTION INTRAVENOUS at 14:35

## 2023-02-14 RX ADMIN — CARVEDILOL 3.12 MG: 3.12 TABLET, FILM COATED ORAL at 17:55

## 2023-02-14 RX ADMIN — INSULIN GLARGINE 13 UNITS: 100 INJECTION, SOLUTION SUBCUTANEOUS at 21:11

## 2023-02-14 RX ADMIN — INSULIN LISPRO 2 UNITS: 100 INJECTION, SOLUTION INTRAVENOUS; SUBCUTANEOUS at 08:52

## 2023-02-14 RX ADMIN — HEPARIN SODIUM 13 UNITS/KG/HR: 10000 INJECTION, SOLUTION INTRAVENOUS at 14:50

## 2023-02-14 RX ADMIN — SODIUM CHLORIDE 40 MG: 9 INJECTION, SOLUTION INTRAMUSCULAR; INTRAVENOUS; SUBCUTANEOUS at 17:55

## 2023-02-14 RX ADMIN — SODIUM CHLORIDE, PRESERVATIVE FREE 10 ML: 5 INJECTION INTRAVENOUS at 21:15

## 2023-02-14 RX ADMIN — SODIUM CHLORIDE 40 MG: 9 INJECTION, SOLUTION INTRAMUSCULAR; INTRAVENOUS; SUBCUTANEOUS at 04:41

## 2023-02-14 RX ADMIN — CEFTRIAXONE 1000 MG: 1 INJECTION, POWDER, FOR SOLUTION INTRAMUSCULAR; INTRAVENOUS at 13:11

## 2023-02-14 RX ADMIN — INSULIN LISPRO 2 UNITS: 100 INJECTION, SOLUTION INTRAVENOUS; SUBCUTANEOUS at 13:10

## 2023-02-14 RX ADMIN — FUROSEMIDE 40 MG: 10 INJECTION, SOLUTION INTRAMUSCULAR; INTRAVENOUS at 05:59

## 2023-02-14 NOTE — PROGRESS NOTES
Heparin XA: 0.24. Pharmacy notified. Bolus given and rate increased to 13 from 11, per protocol. Next lab to be drawn: [id number].

## 2023-02-14 NOTE — PROGRESS NOTES
Patient admitted for sepsis w/ AMS. Patient w/ 150cc of UOP this shift. IVF are 100cc/hr. Bladder scan: 200cc. Patient also with some expository coarseness. Patient with CHF and EF: 25% and low UOP / production. Hospitalist notified. New orders received for 1 time dose of lasix.

## 2023-02-14 NOTE — PROGRESS NOTES
LTG: Patient will tolerate least restrictive oral diet without overt s/sx of airway compromise. STG: Patient will participate in modified barium swallow study to objectively assess swallow function as medically indicated. SPEECH LANGUAGE PATHOLOGY: DYSPHAGIA  Initial Assessment    NAME: Karen Martinez  : 1945  MRN: [id number]    ADMISSION DATE: 2023  PRIMARY DIAGNOSIS: Sepsis with encephalopathy without septic shock (HCC)  Shortness of breath [R06.02]  Hypoxia [R09.02]  Elevated troponin [R77.8]  Severe sepsis ([address] [A41.9, R65.20]  Altered mental status, unspecified altered mental status type [R41.82]  Sepsis with encephalopathy without septic shock, due to unspecified organism ([address] [A41.9, R65.20, G93.40]    ICD-10: Treatment Diagnosis: R13.12 Dysphagia, Oropharyngeal Phase    RECOMMENDATIONS   Diet:  Diet Solids Recommendation: Easy to Chew  Liquid Consistency Recommendation: Thin    Medications: PO (May need meds crushed in puree)     Recommendations: Modified barium swallow study     Compensatory Swallowing Strategies:      Therapeutic Intervention:Patient/Family education     Patient continues to require skilled intervention: Yes  D/C Recommendations: Ongoing speech therapy is recommended during this hospitalization       ASSESSMENT    Dysphagia Diagnosis: Mild oral stage dysphagia  Dysphagia Impression : Mild oral dysphagia that appears to be related to altered mental status. He benefits from verbal cues to attend to food in oral cavity and to fully clear. He responds well to verbal cues. No overt s/sx of airway compromise; however intermittent wet cough that occured spontaneously throughout session. Suspect cough is baseline, but would benefit from instrumental assessment to further evaluate. Recommend initiate easy to chew diet and thin liquids. Medications as tolerated- likely will need them crushed in puree. Please provide supervision and cuing during meals to improve safety.  Plan for instrumental swallow assessment on 2/15/23 to objectively assess swallow function. GENERAL    Subjective: Patient alert. Laying in bed with mitts in place. Oriented to person and place. Wet, congested baseline cough. Concerns for aspiration on xray  Behavior/Cognition: Alert; Cooperative;Pleasant mood  Patient Position: upright in bed  Communication Observation: Functional  Follows Directions: Simple        O2 Device: Nasal cannula        History of Present Injury/Illness: Mr. Geo Richter  has a past medical history of Acquired hypothyroidism, AV junctional bradycardia, CAD (coronary artery disease), Congestive heart failure ([address] Diabetes ([address] Diabetic retinopathy without macular edema associated with type 2 diabetes mellitus ([address] Endocrine disease, Fatty liver, Fatty liver, Fatty liver, Headache, Heart failure ([address] Hypertension, Osteoarthritis of spine with radiculopathy, lumbar region, and Stroke ([address] . He also  has a past surgical history that includes Cholecystectomy and pr unlisted procedure cardiac surgery. Prior Dysphagia History: None     Current Diet : NPO  Current Liquid Diet : NPO    Pain:   Patient does not c/o pain                   Vision: Within Functional Limits            Hearing: Within functional limits         OBJECTIVE    Oral Motor Function:  Patient Positioning: Upright in bed   Oral Motor   Labial: No impairment  Dentition: Intact  Oral Hygiene: Moist;Clean  Lingual: No impairment  Mandible: No impairment  Dentition: Adequate        Baseline Vocal Quality: Normal    Oropharyngeal Phase:     Assessment Method(s): Observation;Palpation  Patient Position: upright in bed  Vocal Quality: No Impairment  Consistency Presented: Mixed consistency; Regular;Pureed; Thin  How Presented: SLP-fed/Presented;Self-fed/presented;Spoon;Straw;Successive Swallows (Needed assistance with self-feeding)  Bolus Acceptance: No impairment  Bolus Formation/Control:  (Decreased attention.  Mild bolus holding; needed verbal cues to continue oral prep.)  Propulsion: No impairment  Oral Residue: 10-50% of bolus (related to attention; Able to clear after verbal cues)  Initiation of Swallow: No impairment  Laryngeal Elevation: Functional  Aspiration Signs/Symptoms: None  Pharyngeal Phase Characteristics: No impairment, issues, or problems          Oral Phase - Comment: Mild oral dysphagia due to altered mental status. He would begin masticating bolus, then become internally distracted. He needed verbal cues to resume oral prep. Good oral clearance after being reoriented to task  Pharyngeal Phase: No overt s/sx of airway compromise. Intermittent coughing throughout session that was not directly tied to po intake. PLAN    Duration/Frequency: Continue to follow patient 2x/week for duration of hospitalization and/or until goals met    Dysphagia Outcome and Severity Scale (MICHELLE)  Dysphagia Outcome Severity Scale: Level 5: Mild dysphagia- Distant supervision. May need one diet consistency restricted  Interpretation of Tool: The Dysphagia Outcome and Severity Scale (MICHELLE) is a simple, easy-to-use, 7-point scale developed to systematically rate the functional severity of dysphagia based on objective assessment and make recommendations for diet level, independence level, and type of nutrition.   Normal(7), Functional(6), Mild(5), Mild-Moderate(4), Moderate(3), Moderate-Severe(2), Severe(1)    Speech Therapy Prognosis  Prognosis: Good  Prognosis Considerations: Previous Level of Function;Participation Level    Education: Patient, RN    Role of SLP, Dysphagia concerns, Evaluation results, Recommended diet, SLP Plan  Patient Education Response: Verbalizes understanding    PRECAUTIONS/ALLERGIES: Ticagrelor and Empagliflozin   Safety Devices in place: Yes  Type of devices: Nurse notified, Call light within reach, Left in bed  Restraints Initially in Place: Yes  Restraints: Mitts in place    Therapy Time  SLP Individual Minutes  Time In: 2708  Time Out: 5 [facility]  Minutes: [address]  2/14/2023 9:32 AM

## 2023-02-14 NOTE — PROGRESS NOTES
ACUTE OCCUPATIONAL THERAPY GOALS:   (Developed with and agreed upon by patient and/or caregiver.)  1. Pt will complete LB ADL Mod A with AE as needed. 2. Pt will complete toileting Mod A with AE as needed. 3. Pt will complete UB ADL Min A.  4. Pt will tolerate 25 minutes of OT treatment requiring 1-2 breaks as needed. 5. Pt will complete grooming/feeding tasks while seated EOB with SBA. 6. Pt will complete functional mobility via RW Mod A.   7. Pt will tolerate BUE exercises to increase strength for safe, functional transfers, ADL participation. Timeframe: 7 visits     OCCUPATIONAL THERAPY Initial Assessment, Daily Note, and PM       OT Visit Days: 1  Acknowledge Orders  Time  OT Charge Capture  Rehab Caseload Tracker      Roxei Barahona is a 76 y.o. male   PRIMARY DIAGNOSIS: Sepsis with encephalopathy without septic shock (HCC)  Shortness of breath [R06.02]  Hypoxia [R09.02]  Elevated troponin [R77.8]  Severe sepsis ([address] [A41.9, R65.20]  Altered mental status, unspecified altered mental status type [R41.82]  Sepsis with encephalopathy without septic shock, due to unspecified organism ([address] [A41.9, R65.20, G93.40]       Reason for Referral: Generalized Muscle Weakness (M62.81)  Inpatient: Payor: Mihcael Gavin / Plan: [facility] / Product Type: *No Product type* /     ASSESSMENT:     REHAB RECOMMENDATIONS:   Recommendation to date pending progress:  Setting:  Short-term Rehab    Equipment:    To Be Determined     ASSESSMENT:  Mr. Kamar Vera is a 76 y M with a hx of DM, CHF, CAD. Pt was admitted for sepsis with encephalopathy. No demographics obtained due to confusion and not being oriented. Pt was received supine in bed asleep easy to arouse. Pt required assistance for functional mobility and ADLs today due to confusion, generalized weakness, posterior lean. Attempted SPT with PCT and pt's legs buckled requiring Max A x2 to put back to bed. Max verbal cueing to lean forward in sitting. Pt has deficits in strength, balance, activity tolerance, and performing ADLs. Pt requires continued skilled OT services due to performing functionally below baseline. [facility] [place] [address] [address] [facility] 6 Clicks Daily Activity Inpatient Short Form:    AM-PAC Daily Activity - Inpatient   How much help is needed for putting on and taking off regular lower body clothing?: A Lot  How much help is needed for bathing (which includes washing, rinsing, drying)?: A Lot  How much help is needed for toileting (which includes using toilet, bedpan, or urinal)?: A Lot  How much help is needed for putting on and taking off regular upper body clothing?: A Lot  How much help is needed for taking care of personal grooming?: A Little  How much help for eating meals?: A Little  AM-[facility] Inpatient Daily Activity Raw Score: 14  AM-PAC Inpatient ADL T-Scale Score : 33.39  ADL Inpatient CMS 0-100% Score: 59.67  ADL Inpatient CMS G-Code Modifier : CK           SUBJECTIVE:     Mr. Geo Richter states, \"Down. \"     Social/Functional Lives With: Alone    OBJECTIVE:     Amy Sanchez / [employer] / AIRWAY: IV    RESTRICTIONS/PRECAUTIONS:  Restrictions/Precautions: Fall Risk    PAIN: VITALS / O2:   Pre Treatment:   Pain Assessment: None - Denies Pain      Post Treatment: none       Vitals          Oxygen            GROSS EVALUATION: INTACT IMPAIRED   (See Comments)   UE AROM [] []   UE PROM [] []   Strength []  Generalized weakness 2+/5 to 3/5 MMT     Posture / Balance [] Sitting - Static:  (F/F-)  Sitting - Dynamic:  (F/F-)  Standing - Static: Poor  Standing - Dynamic: Poor   Sensation []     Coordination []  Decreased due to confusion, weakness     Tone [x]       Edema [x]    Activity Tolerance [] Treatment limited secondary to decreased cognition     Hand Dominance R [x] L []      COGNITION/  PERCEPTION: INTACT IMPAIRED   (See Comments)   Orientation []  Alert only   Vision []     Hearing []     Cognition  []  confused   Perception []       MOBILITY: I Mod I S SBA CGA Min Mod Max Total  NT x2 Comments:   Bed Mobility    Rolling [] [] [] [] [] [] [] [] [] [] []    Supine to Sit [] [] [] [] [] [] [] [x] [] [] []    Scooting [] [] [] [] [] [] [] [x] [] [] []    Sit to Supine [] [] [] [] [] [] [] [] [] [] []    Transfers    Sit to Stand [] [] [] [] [] [] [] [x] [] [] [x]    Bed to Chair [] [] [] [] [] [] [] [] [] [] []    Stand to Sit [] [] [] [] [] [] [] [x] [] [] [x]    Tub/Shower [] [] [] [] [] [] [] [] [] [] []     Toilet [] [] [] [] [] [] [] [] [] [] []      [] [] [] [] [] [] [] [] [] [] []    I=Independent, Mod I=Modified Independent, S=Supervision/Setup, SBA=Standby Assistance, CGA=Contact Guard Assistance, Min=Minimal Assistance, Mod=Moderate Assistance, Max=Maximal Assistance, Total=Total Assistance, NT=Not Tested    ACTIVITIES OF DAILY LIVING: I Mod I S SBA CGA Min Mod Max Total NT Comments   BASIC ADLs:              Upper Body Bathing  [] [] [] [] [] [] [] [] [] []    Lower Body Bathing [] [] [] [] [] [] [] [] [] []    Toileting [] [] [] [] [] [] [] [] [] []    Upper Body Dressing [] [] [] [] [] [] [] [] [] []    Lower Body Dressing [] [] [] [] [] [] [] [] [] []    Feeding [] [] [] [] [] [x] [] [] [] [] Sitting EOB self feeding; A for sitting balance and hand to mouth. Pt spilled fruit in lap.    Grooming [] [] [] [] [] [] [] [] [] []    Personal Device Care [] [] [] [] [] [] [] [] [] []    Functional Mobility [] [] [] [] [] [] [] [x] [] [] X2 STS; attempted SPT   I=Independent, Mod I=Modified Independent, S=Supervision/Setup, SBA=Standby Assistance, CGA=Contact Guard Assistance, Min=Minimal Assistance, Mod=Moderate Assistance, Max=Maximal Assistance, Total=Total Assistance, NT=Not Tested    PLAN:   FREQUENCY/DURATION   OT Plan of Care: 3 times/week for duration of hospital stay or until stated goals are met, whichever comes first.    PROBLEM LIST:   (Skilled intervention is medically necessary to address:)  Decreased ADL/Functional Activities  Decreased Activity Tolerance  Decreased AROM/PROM  Decreased Balance  Decreased Cognition  Decreased Gait Ability  Decreased Safety Awareness  Decreased Strength  Decreased Transfer Abilities   INTERVENTIONS PLANNED:  (Benefits and precautions of occupational therapy have been discussed with the patient.)  Self Care Training  Therapeutic Activity  Therapeutic Exercise/HEP  Neuromuscular Re-education  Gait Training  Manual Therapy  Education         TREATMENT:     EVALUATION: MODERATE COMPLEXITY: (Untimed Charge)    TREATMENT:   Co-Treatment PT/OT necessary due to patient's decreased overall endurance/tolerance levels, as well as need for high level skilled assistance to complete functional transfers/mobility and functional tasks  Self Care (15 minutes): Patient participated in self feeding ADLs in unsupported sitting with minimal verbal, manual, and tactile cueing to increase independence, decrease assistance required, increase activity tolerance, and increase safety awareness. Patient also participated in bed mobility training to increase independence, decrease assistance required, increase activity tolerance, and increase safety awareness. TREATMENT GRID:  N/A    AFTER TREATMENT PRECAUTIONS: Alarm Activated, Bed, Bed/Chair Locked, Call light within reach, Needs within reach, RN notified, and Side rails x3    INTERDISCIPLINARY COLLABORATION:  RN/ PCT, PT/ PTA, and OT/ GILMORE    EDUCATION:  Education Given To: Patient  Education Provided: Role of Therapy;Plan of Care; Fall Prevention Strategies  Education Outcome: Continued education needed    TOTAL TREATMENT DURATION AND TIME:  Time In: 1451  Time Out: 1510  Minutes: [address] OT

## 2023-02-14 NOTE — ED NOTES
TRANSFER - OUT REPORT:    Verbal report given to Edgar Duffy RN on Aayush Rao  being transferred to  [phone] for routine progression of patient care       Report consisted of patient's Situation, Background, Assessment and   Recommendations(SBAR). Information from the following report(s) Nurse Handoff Report was reviewed with the receiving nurse. [place] Assessment: No data recorded  Lines:   Peripheral IV Right Wrist (Active)       Peripheral IV Left Antecubital (Active)        Opportunity for questions and clarification was provided.       Patient transported with:  Registered Nurse          Hassan Boeck, RN  02/13/23 0590

## 2023-02-14 NOTE — PROGRESS NOTES
Called sister and left  informing patient is on the 2nd floor in room 222. Phone number also left on VM if family has nay questions.

## 2023-02-14 NOTE — PROGRESS NOTES
Writer received a return call from the family (Niece, Chapito Pacheco). Family updated with patients location and visiting hours. Niece has been asked to be notified for family communication d/t the sisters inability to answer phone.  [address] [facility]

## 2023-02-14 NOTE — PROGRESS NOTES
ACUTE PHYSICAL THERAPY GOALS:   (Developed with and agreed upon by patient and/or caregiver. )    LTG:  (1.)Mr. Fischer will move from supine to sit and sit to supine , scoot up and down and roll side to side in flat bed without siderails with  INDEPENDENT within 7 day(s). (2.)Mr. Fischer will perform all functional transfers with  INDEPENDENT using the least restrictive/no device within 7 day(s). (3.)Mr. Fischer will ambulate with  STAND BY ASSIST for 250+ feet with normal vital sign response with the least restrictive/no device within 7 day(s). PHYSICAL THERAPY Initial Assessment and Daily Note  (Link to Caseload Tracking: PT Visit Days : 1  Acknowledge Orders  Time In/Out  PT Charge Capture  Rehab Caseload Tracker    Manju Bryant is a 76 y.o. male   PRIMARY DIAGNOSIS: Sepsis with encephalopathy without septic shock ([address]  Shortness of breath [R06.02]  Hypoxia [R09.02]  Elevated troponin [R77.8]  Severe sepsis ([address] [A41.9, R65.20]  Altered mental status, unspecified altered mental status type [R41.82]  Sepsis with encephalopathy without septic shock, due to unspecified organism ([address] [A41.9, R65.20, G93.40]       Reason for Referral: Other abnormalities of gait and mobility (R26.89)  Inpatient: Payor: Betty Nicholas / Plan: Antonette Tanner / Product Type: *No Product type* /     ASSESSMENT:     REHAB RECOMMENDATIONS:   Recommendation to date pending progress:  Setting:  Short-term Rehab    Equipment:    To Be Determined     ASSESSMENT:  Mr. Christelle Avendaño was supine in bed. Oriented x2 but not speaking much. Min A given for bed mobility and STS. Patient ambulated 12'x2 with min to mod HHAx2. .  Patient very unsteady in standing with abrupt loss of balance at times. Worked on sit to stand from bed several times and commode. Patient has declined in functional mobility.   Mr. Christelle Avendaño would benefit from skilled physical therapy while in acute care (medically necessary) to address his deficits and maximize his function.         [address] [facility] [address] [address] AM-PAC 6 Clicks Basic Mobility Inpatient Short Form  AM-PAC Basic Mobility - Inpatient   How much help is needed turning from your back to your side while in a flat bed without using bedrails?: A Little  How much help is needed moving from lying on your back to sitting on the side of a flat bed without using bedrails?: A Little  How much help is needed moving to and from a bed to a chair?: A Little  How much help is needed standing up from a chair using your arms?: A Little  How much help is needed walking in hospital room?: A Lot  How much help is needed climbing 3-5 steps with a railing?: A Lot  AM-PAC Inpatient Mobility Raw Score : 16  AM-PAC Inpatient T-Scale Score : 40.78  Mobility Inpatient CMS 0-100% Score: 54.16  Mobility Inpatient CMS G-Code Modifier : CK    SUBJECTIVE:   Mr. Francesco Ford states, \"Two\"     Social/Functional Lives With: Alone    OBJECTIVE:     PAIN: Francisco J Males / O2: PRECAUTION / Chago Lyme / Lynette Lowing:   Pre Treatment:   Pain Assessment: None - Denies Pain      Post Treatment: 0 Vitals        Oxygen      IV    RESTRICTIONS/PRECAUTIONS:                    GROSS EVALUATION: Intact Impaired (Comments):   AROM [x]     PROM []    Strength []     Balance []     Posture [] Forward Head  Rounded Shoulders   Sensation []     Coordination [x]      Tone [x]     Edema []    Activity Tolerance [] Patient limited by fatigue    []      COGNITION/  PERCEPTION: Intact Impaired (Comments):   Orientation []  Oriented to self and place, month but not year   Vision []     Hearing [x]     Cognition  []  impaired     MOBILITY: I Mod I S SBA CGA Min Mod Max Total  NT x2 Comments:   Bed Mobility    Rolling [] [] [] [] [] [x] [] [] [] [] []    Supine to Sit [] [] [] [] [x] [x] [] [] [] [] []    Scooting [] [] [] [] [] [x] [] [] [] [] []    Sit to Supine [] [] [] [] [x] [x] [] [] [] [] []    Transfers    Sit to Stand [] [] [] [] [] [x] [] [] [] [] []    Bed to Chair [] [] [] [] [] [x] [] [] [] [] [x]    Stand to Sit [] [] [] [] [] [x] [] [] [] [] []     [] [] [] [] [] [] [] [] [] [] []    I=Independent, Mod I=Modified Independent, S=Supervision, SBA=Standby Assistance, CGA=Contact Guard Assistance,   Min=Minimal Assistance, Mod=Moderate Assistance, Max=Maximal Assistance, Total=Total Assistance, NT=Not Tested    GAIT: I Mod I S SBA CGA Min Mod Max Total  NT x2 Comments:   Level of Assistance [] [] [] [] [] [x] [x] [] [] [] [x]    Distance 12x2 feet    DME Gait Belt and HHAx2    Gait Quality Altered arm swing, Decreased cindy , Decreased step length, Path deviations , and Trunk sway increased    Weightbearing Status      Stairs      I=Independent, Mod I=Modified Independent, S=Supervision, SBA=Standby Assistance, CGA=Contact Guard Assistance,   Min=Minimal Assistance, Mod=Moderate Assistance, Max=Maximal Assistance, Total=Total Assistance, NT=Not Tested    PLAN:   FREQUENCY AND DURATION: 3 times/week for duration of hospital stay or until stated goals are met, whichever comes first.    THERAPY PROGNOSIS: Good    PROBLEM LIST:   (Skilled intervention is medically necessary to address:)  Decreased Activity Tolerance  Decreased Balance  Decreased Coordination  Decreased Gait Ability  Decreased Strength  Decreased Transfer Abilities INTERVENTIONS PLANNED:   (Benefits and precautions of physical therapy have been discussed with the patient.)  Self Care Training  Therapeutic Activity  Therapeutic Exercise/HEP  Gait Training  Education       TREATMENT:   EVALUATION: MODERATE COMPLEXITY: (Untimed Charge)    TREATMENT:   Therapeutic Activity (25 Minutes): Therapeutic activity included Rolling, Supine to Sit, Sit to Supine, Scooting, Transfer Training, Ambulation on level ground, Sitting balance , and Standing balance to improve functional Activity tolerance, Balance, Coordination, and Strength.     TREATMENT GRID:  N/A    AFTER TREATMENT PRECAUTIONS: Alarm Activated, Bed, Bed/Chair Locked, Call light within reach, Needs within reach, Restraints , and RN notified    INTERDISCIPLINARY COLLABORATION:  RN/ PCT, PT/ PTA, and RN Case Manager/      EDUCATION: Education Given To: Patient  Education Provided: Role of Therapy;Plan of Care  Education Outcome: Continued education needed    TIME IN/OUT:  Time In: 1004  Time Out: 1034  Minutes: [address], PT

## 2023-02-14 NOTE — CARE COORDINATION
This CM met with pt and sister at bedside this day to complete assessment. Pt resting soundly and difficult to arouse - pt's sister, Martha Sanders, completed assessment with this CM. She verified pt's PCP, insurance, emergency contact, and home address. She reports no difficulty with pt obtaining his medications in the community. Pt lives at home alone with 5-6 steps to enter and no home DME. Prior to admission at baseline pt is independent with his ADLs including bathing, dressing, cooking, and driving. Pt has had confusion/disorientation since admission to the hospital.  Pt is not  and has no children so his Cleda Mantle is his sister, Martha Sanders. This CM discussed recommendation from therapy that pt would benefit from post acute short term rehab. Pt's sister is agreeable and understanding. This CM provided SNF list and requested she review and pick 2-3 facilities to have referrals sent to - she verbalized understanding. No additional CM needs at this time. Will continue to monitor and update as needed. 02/14/23 0800   Service Assessment   Patient Orientation Other (see comment)  (completed with pt's sister at bedside)   Cognition Other (see comment)  (pt resting, difficult to arouse)   History Provided By Child/Family   Primary [address] Members   Patient's Healthcare Decision Maker is: Legal Next of Srini 67   PCP Verified by CM Yes   Last Visit to PCP Within last 3 months   Prior Functional Level Independent in ADLs/IADLs   Current Functional Level Other (see comment)  (TBD by clinical team)   Can patient return to prior living arrangement Unknown at present   Ability to make needs known:  Other (see comment)  (unable to assess)   Family able to assist with home care needs: Yes   Would you like for me to discuss the discharge plan with any other family members/significant others, and if so, who?   (unable to assess)   Condition of Participation: Discharge Planning   The Plan for Transition of Care is related to the following treatment goals: pending clinical progress

## 2023-02-14 NOTE — PROGRESS NOTES
END OF SHIFT NOTE:    INTAKE/OUTPUT  02/13 0701 - 02/14 0700  In: 1997.9 [I.V.:1997.9]  Out: 625 [Urine:625]  Voiding: Yes  Catheter: No  Drain:              Flatus: Patient does not have flatus present. Stool:  occurrences. Characteristics:           Stool Assessment  Last BM (including prior to admit):  (patient unable to verbalize)    Emesis:  occurrences. Characteristics:   Emesis Appearance: Steve Luverne, Coffee ground (MD notified.)    VITAL SIGNS  Patient Vitals for the past 12 hrs:   Temp Pulse Resp BP SpO2   02/14/23 0559 -- -- -- 113/68 --   02/14/23 0325 98.4 °F (36.9 °C) (!) 102 17 120/66 91 %   02/13/23 2238 97.3 °F (36.3 °C) (!) 109 17 111/67 93 %       Pain Assessment             Ambulating  No    Shift report given to oncoming nurse at the bedside.     Silvia Forde RN

## 2023-02-14 NOTE — PROGRESS NOTES
Hospitalist Progress Note   Admit Date:  2023 12:11 PM   Name:  Arlet Avila   Age:  76 y.o. Sex:  male  :  1945   MRN:  [id number]   Room:  /    Presenting Complaint: Altered Mental Status     Reason(s) for Admission: Shortness of breath [R06.02]  Hypoxia [R09.02]  Elevated troponin [R77.8]  Severe sepsis ([facility] [address] [A41.9, R65.20]  Altered mental status, unspecified altered mental status type [R41.82]  Sepsis with encephalopathy without septic shock, due to unspecified organism ([address] [A41.9, R65.20, G93.40]     Hospital Course: Arlet Avila is a 76 y.o. male with medical history of CAD s/p CABG, ICM, CHB s/p dual chamber PPM, HLD, DM, CVA, hypothyroidism who presented from home via EMS with c/o AMS. Family called for a welfare check and patient found AMS in chair covered in vomit and urine. Family called for a welfare check and patient found AMS in chair covered in vomit and urine. Found to be hypoxic on EMS arrival spo2 86% and placed on 4lpm NC. Patient admitted with sepsis, unclear source, acute respiratory failure secondary to pulm edema and metabolic encephalopathy. Subjective & 24hr Events (23): Patient is seen at the bedside. In mittens. More alert. Denies chest pain, palpitation, nausea, vomiting or abdominal pain. Does not remember what happened yesterday. Asking to remove mittens.     Assessment & Plan:     Sepsis, unclear etiology:  Patient met sepsis criteria on admission  Chest x-ray with vascular congestion, UA clean, procalcitonin negative  Leukocytosis improving  Follow-up on blood cultures  Continue empiric antibiotics, if blood culture negative for 48 hours, I will discontinue abx    NSTEMI versus demand ischemia:  Unable to rule out NSTEMI  Echocardiogram concerning for stress-induced cardiomyopathy with ejection fraction 25 to 30%  Cardiology discussed cardiac catheterization with family per family report patient wants more conservative approach  Continue heparin GTT  Continue Coreg    Acute metabolic encephalopathy:  Secondary to above  CT head negative for acute pathology  Delirium precautions  Treat underlying conditions    CAD s/p CABG:  Pacemaker:  Ischemic cardiomyopathy:  HFrEF:  Continue aspirin and heparin gtt. Continue Lasix and carvedilol  Holding ARB's and spironolactone due to LETTY  Strict I&O's  Cardiology following    LETTY:  Improved with gentle hydration  Avoid nephrotoxic agent    Type 2 diabetes mellitus:  Continue Lantus  Start patient on sliding scale  Adjust insulin accordingly    Hypothyroidism:  Continue Synthyroid      PT/OT evals and PPD needed/ordered? Yes    Diet:  ADULT DIET; Easy to Chew  VTE prophylaxis:Lovenox  Code status: DNR    Hospital Problems:  Principal Problem:    Sepsis with encephalopathy without septic shock ([facility] [address]  Active Problems:    Coronary artery disease involving native coronary artery of native heart    History of CVA (cerebrovascular accident)    Elevated troponin    Acute respiratory insufficiency    DNR (do not resuscitate)    Pacemaker    Acquired hypothyroidism    Type 2 diabetes mellitus with hyperglycemia, without long-term current use of insulin ([facility] [address]    Ischemic cardiomyopathy  Resolved Problems:    * No resolved hospital problems.  *      Objective:   Patient Vitals for the past 24 hrs:   Temp Pulse Resp BP SpO2   02/14/23 1123 98.1 °F (36.7 °C) 98 -- 107/64 93 %   02/14/23 0725 97.7 °F (36.5 °C) 100 20 114/68 91 %   02/14/23 0559 -- -- -- 113/68 --   02/14/23 0325 98.4 °F (36.9 °C) (!) 102 17 120/66 91 %   02/13/23 2238 97.3 °F (36.3 °C) (!) 109 17 111/67 93 %   02/13/23 1905 -- (!) 111 -- -- --   02/13/23 1835 98 °F (36.7 °C) (!) 111 24 110/68 92 %   02/13/23 1800 -- (!) 114 (!) 31 111/69 93 %   02/13/23 1730 -- (!) 112 30 101/62 93 %   02/13/23 1700 -- (!) 113 30 113/71 92 %   02/13/23 1615 -- (!) 111 30 112/70 90 %   02/13/23 1515 -- (!) 113 25 102/66 93 %   02/13/23 1445 98.6 °F (37 °C) (!) 119 29 105/69 94 %   02/13/23 1400 -- (!) 108 27 109/68 94 %   02/13/23 1345 -- (!) 107 27 119/69 91 %   02/13/23 1315 -- (!) 110 27 118/74 94 %   02/13/23 1300 -- -- -- -- 93 %   02/13/23 1221 98.2 °F (36.8 °C) 69 19 110/72 92 %       Oxygen Therapy  SpO2: 93 %  Pulse via Oximetry: 114 beats per minute  O2 Device: Nasal cannula  O2 Flow Rate (L/min): 4 L/min    Estimated body mass index is 25.81 kg/m² as calculated from the following:    Height as of this encounter: 6' 1\" (1.854 m). Weight as of this encounter: 195 lb 9.6 oz (88.7 kg). Intake/Output Summary (Last 24 hours) at 2/14/2023 1140  Last data filed at 2/14/2023 0800  Gross per 24 hour   Intake 1997.94 ml   Output 825 ml   Net 1172.94 ml         Physical Exam:     Blood pressure 107/64, pulse 98, temperature 98.1 °F (36.7 °C), resp. rate 20, height 6' 1\" (1.854 m), weight 195 lb 9.6 oz (88.7 kg), SpO2 93 %. General:    Well nourished. More alert today, in mitten  Head:  Normocephalic, atraumatic  Eyes:  Sclerae appear normal.  Pupils equally round. ENT:  Nares appear normal.  Moist oral mucosa  Neck:  No restricted ROM. Trachea midline   CV:   RRR. No m/r/g. No jugular venous distension. Lungs:   CTAB. No wheezing, rhonchi, or rales. Symmetric expansion. Abdomen:   Soft, nontender, nondistended. Extremities: No cyanosis or clubbing. No edema  Skin:     No rashes and normal coloration. Warm and dry. Neuro:  CN II-XII grossly intact. Psych:  More alert     I have personally reviewed labs and tests:  Recent Labs:  Recent Results (from the past 48 hour(s))   MSSA/MRSA Screen BY PCR    Collection Time: 02/13/23  2:41 AM    Specimen: Nares; Swab   Result Value Ref Range    Special Requests NO SPECIAL REQUESTS      Culture (A)       MRSA target DNA not detected, SA target DNA detected. A MRSA negative, SA positive test result does not preclude MRSA nasal colonization.    EKG 12 Lead    Collection Time: 02/13/23 12:31 PM   Result Value Ref Range    Ventricular Rate 104 BPM    Atrial Rate 103 BPM    P-R Interval 112 ms    QRS Duration 172 ms    Q-T Interval 414 ms    QTc Calculation (Bazett) 545 ms    P Axis 147 degrees    R Axis 256 degrees    T Axis 138 degrees    Diagnosis Ventricular-paced rhythm    CBC with Auto Differential    Collection Time: 02/13/23 12:48 PM   Result Value Ref Range    WBC 19.6 (H) 4.3 - 11.1 K/uL    RBC 5.39 4.23 - 5.6 M/uL    Hemoglobin 16.6 13.6 - 17.2 g/dL    Hematocrit 50.0 41.1 - 50.3 %    MCV 92.8 82 - 102 FL    MCH 30.8 26.1 - 32.9 PG    MCHC 33.2 31.4 - 35.0 g/dL    RDW 13.1 11.9 - 14.6 %    Platelets 421 914 - 574 K/uL    MPV 10.8 9.4 - 12.3 FL    nRBC 0.00 0.0 - 0.2 K/uL    Differential Type AUTOMATED      Seg Neutrophils 85 (H) 43 - 78 %    Lymphocytes 7 (L) 13 - 44 %    Monocytes 7 4.0 - 12.0 %    Eosinophils % 0 (L) 0.5 - 7.8 %    Basophils 0 0.0 - 2.0 %    Immature Granulocytes 1 0.0 - 5.0 %    Segs Absolute 16.7 (H) 1.7 - 8.2 K/UL    Absolute Lymph # 1.3 0.5 - 4.6 K/UL    Absolute Mono # 1.4 (H) 0.1 - 1.3 K/UL    Absolute Eos # 0.0 0.0 - 0.8 K/UL    Basophils Absolute 0.0 0.0 - 0.2 K/UL    Absolute Immature Granulocyte 0.1 0.0 - 0.5 K/UL   CMP    Collection Time: 02/13/23 12:48 PM   Result Value Ref Range    Sodium 134 133 - 143 mmol/L    Potassium 4.3 3.5 - 5.1 mmol/L    Chloride 100 (L) 101 - 110 mmol/L    CO2 19 (L) 21 - 32 mmol/L    Anion Gap 15 (H) 2 - 11 mmol/L    Glucose 385 (H) 65 - 100 mg/dL    BUN 27 (H) 8 - 23 MG/DL    Creatinine 1.80 (H) 0.8 - 1.5 MG/DL    Est, Glom Filt Rate 38 (L) >60 ml/min/1.73m2    Calcium 9.5 8.3 - 10.4 MG/DL    Total Bilirubin 1.7 (H) 0.2 - 1.1 MG/DL    ALT 50 12 - 65 U/L    AST 51 (H) 15 - 37 U/L    Alk Phosphatase 85 50 - 136 U/L    Total Protein 7.7 6.3 - 8.2 g/dL    Albumin 3.4 3.2 - 4.6 g/dL    Globulin 4.3 2.8 - 4.5 g/dL    Albumin/Globulin Ratio 0.8 0.4 - 1.6     Lactic Acid Now and in 2 Hours    Collection Time: 02/13/23 12:48 PM   Result Value Ref Range Lactic Acid, Plasma 6.5 (HH) 0.4 - 2.0 MMOL/L   TSH with Reflex    Collection Time: 02/13/23 12:48 PM   Result Value Ref Range    TSH w Free Thyroid if Abnormal 1.45 0.358 - 3.740 UIU/ML   Urinalysis w rflx microscopic    Collection Time: 02/13/23 12:48 PM   Result Value Ref Range    Color, UA YELLOW/STRAW      Appearance CLEAR      Specific Gravity, UA 1.024 (H) 1.001 - 1.023      pH, Urine 5.0 5.0 - 9.0      Protein, UA 30 (A) NEG mg/dL    Glucose,  mg/dL    Ketones, Urine 40 (A) NEG mg/dL    Bilirubin Urine Negative NEG      Blood, Urine Negative NEG      Urobilinogen, Urine 0.2 0.2 - 1.0 EU/dL    Nitrite, Urine Negative NEG      Leukocyte Esterase, Urine Negative NEG      WBC, UA 0-4 U4 /hpf    RBC, UA 0-5 U5 /hpf    Epithelial Cells UA 0-5 U5 /hpf    BACTERIA, URINE Negative NEG /hpf    Casts 0-2 U2 /lpf   Troponin    Collection Time: 02/13/23 12:48 PM   Result Value Ref Range    Troponin, High Sensitivity 9,896.6 (HH) 0 - 14 pg/mL   Magnesium    Collection Time: 02/13/23 12:48 PM   Result Value Ref Range    Magnesium 2.0 1.8 - 2.4 mg/dL   Culture, Urine    Collection Time: 02/13/23 12:48 PM    Specimen: Urine, clean catch   Result Value Ref Range    Special Requests NO SPECIAL REQUESTS      Culture NO GROWTH 1 DAY     Brain Natriuretic Peptide    Collection Time: 02/13/23 12:48 PM   Result Value Ref Range    NT Pro-BNP 19,204 (H) <450 PG/ML   Procalcitonin    Collection Time: 02/13/23 12:48 PM   Result Value Ref Range    Procalcitonin <0.05 0.00 - 0.49 ng/mL   Troponin    Collection Time: 02/13/23  2:41 PM   Result Value Ref Range    Troponin, High Sensitivity 11,977.3 (HH) 0 - 14 pg/mL   Blood Culture 1    Collection Time: 02/13/23  2:41 PM    Specimen: Blood   Result Value Ref Range    Special Requests LEFT  FOREARM        Culture NO GROWTH AFTER 18 HOURS     Lipase    Collection Time: 02/13/23  2:41 PM   Result Value Ref Range    Lipase 119 73 - 393 U/L   CK    Collection Time: 02/13/23  2:41 PM Result Value Ref Range    Total  (H) 21 - 215 U/L   POCT Glucose    Collection Time: 02/13/23  3:03 PM   Result Value Ref Range    POC Glucose 339 (H) 65 - 100 mg/dL    Performed by: Richard Echols    Lactic Acid    Collection Time: 02/13/23  4:27 PM   Result Value Ref Range    Lactic Acid, Plasma 4.9 (HH) 0.4 - 2.0 MMOL/L   COVID-19, Rapid    Collection Time: 02/13/23  4:27 PM    Specimen: Nasopharyngeal   Result Value Ref Range    Source NASAL      SARS-CoV-2, Rapid Not detected NOTD     Influenza A/B, Molecular    Collection Time: 02/13/23  4:27 PM    Specimen: Not Specified   Result Value Ref Range    Influenza A, BRIANDA Not detected NOTD      Influenza B, BRIANDA Not detected NOTD     Transthoracic echocardiogram (TTE) complete with contrast, bubble, strain, and 3D PRN    Collection Time: 02/13/23  4:33 PM   Result Value Ref Range    Body Surface Area 2.15 m2    Fractional Shortening 2D 14 28 - 44 %    LV ESV Index A4C 50 mL/m2    LV EDV Index A4C 78 mL/m2    LV ESV Index A2C 35 mL/m2    LV EDV Index A2C 49 mL/m2    LVIDd Index 2.29 cm/m2    LVIDs Index 1.96 cm/m2    LV RWT Ratio 0.37     LV Mass 2D 153.0 88 - 224 g    LV Mass 2D Index 71.5 49 - 115 g/m2    LA Volume Index BP 29 16 - 34 ml/m2    LVOT Stroke Volume Index 21.6 mL/m2    LA Volume Index 2C 22 16 - 34 mL/m2    LA Volume Index 4C 34 16 - 34 mL/m2    Ao Root Index 1.36 cm/m2    AV Velocity Ratio 0.57     LVOT:AV VTI Index 0.67     BUZZ/BSA VTI 1.0 cm2/m2    BUZZ/BSA Peak Velocity 0.9 cm2/m2    Est. RA Pressure 5 mmHg    RVSP 51 mmHg    LV EDV A2C 105 mL    LV EDV A4C 167 mL    LV ESV A2C 74 mL    LV ESV A4C 108 mL    IVSd 0.9 0.6 - 1.0 cm    LVIDd 4.9 4.2 - 5.9 cm    LVIDs 4.2 cm    LVOT Diameter 2.0 cm    LVOT Mean Gradient 1 mmHg    LVOT VTI 14.7 cm    LVOT Peak Velocity 0.8 m/s    LVOT Peak Gradient 3 mmHg    LVPWd 0.9 0.6 - 1.0 cm    LV Ejection Fraction A2C 29 %    LV Ejection Fraction A4C 35 %    EF BP 30 (A) 55 - 100 %    LVOT Area 3.1 cm2 LVOT SV 46.2 ml    LA Minor Axis 5.2 cm    LA Major [place] 6.0 cm    LA Area 2C 17.1 cm2    LA Area 4C 23.1 cm2    LA Volume 2C 47 18 - 58 mL    LA Volume 4C 72 (A) 18 - 58 mL    LA Volume BP 62 (A) 18 - 58 mL    AV Mean Velocity 1.1 m/s    AV Mean Gradient 5 mmHg    AV VTI 22.0 cm    AV Peak Velocity 1.4 m/s    AV Peak Gradient 7 mmHg    AV Area by VTI 2.1 cm2    AV Area by Peak Velocity 1.9 cm2    Aortic Root 2.9 cm    IVC Proxmal 2.1 cm    PV Max Velocity 0.9 m/s    PV Peak Gradient 3 mmHg    RV Basal Dimension 3.3 cm    TAPSE 1.7 1.7 cm    TR Max Velocity 3.38 m/s    TR Peak Gradient 46 mmHg    Left Ventricular Ejection Fraction 28     LVEF MODALITY ECHO    POCT Glucose    Collection Time: 02/13/23  9:00 PM   Result Value Ref Range    POC Glucose 320 (H) 65 - 100 mg/dL    Performed by: Lauri    CBC    Collection Time: 02/13/23 10:11 PM   Result Value Ref Range    WBC 17.4 (H) 4.3 - 11.1 K/uL    RBC 4.76 4.23 - 5.6 M/uL    Hemoglobin 14.7 13.6 - 17.2 g/dL    Hematocrit 43.0 41.1 - 50.3 %    MCV 90.3 82 - 102 FL    MCH 30.9 26.1 - 32.9 PG    MCHC 34.2 31.4 - 35.0 g/dL    RDW 13.1 11.9 - 14.6 %    Platelets 738 087 - 040 K/uL    MPV 10.8 9.4 - 12.3 FL    nRBC 0.00 0.0 - 0.2 K/uL   APTT    Collection Time: 02/13/23 10:11 PM   Result Value Ref Range    PTT 60.1 (H) 24.5 - 34.2 SEC   Protime-INR    Collection Time: 02/13/23 10:11 PM   Result Value Ref Range    Protime 15.8 (H) 12.6 - 14.3 sec    INR 1.2     Anti-Xa, Unfractionated Heparin    Collection Time: 02/13/23 10:11 PM   Result Value Ref Range    Anti-XA Unfrac Heparin 0.24 (L) 0.3 - 0.7 IU/mL   Ferritin    Collection Time: 02/13/23 10:11 PM   Result Value Ref Range    Ferritin 141 8 - 388 NG/ML   Transferrin Saturation    Collection Time: 02/13/23 10:11 PM   Result Value Ref Range    Iron 34 (L) 35 - 150 ug/dL    TIBC 247 (L) 250 - 450 ug/dL    TRANSFERRIN SATURATION 14 (L) >20 %   Lactate, Sepsis    Collection Time: 02/13/23 10:11 PM   Result Value Ref Range    Lactic Acid, Sepsis 3.9 (HH) 0.4 - 2.0 MMOL/L   Lactic Acid    Collection Time: 02/14/23  2:59 AM   Result Value Ref Range    Lactic Acid, Plasma 2.4 (H) 0.4 - 2.0 MMOL/L   Hemoglobin A1c    Collection Time: 02/14/23  5:55 AM   Result Value Ref Range    Hemoglobin A1C 8.0 (H) 4.8 - 5.6 %    eAG 183 mg/dL   Anti-Xa, Unfractionated Heparin    Collection Time: 02/14/23  5:55 AM   Result Value Ref Range    Anti-XA Unfrac Heparin 0.39 0.3 - 0.7 IU/mL   CBC with Auto Differential    Collection Time: 02/14/23  5:55 AM   Result Value Ref Range    WBC 14.9 (H) 4.3 - 11.1 K/uL    RBC 4.57 4.23 - 5.6 M/uL    Hemoglobin 14.3 13.6 - 17.2 g/dL    Hematocrit 41.5 41.1 - 50.3 %    MCV 90.8 82 - 102 FL    MCH 31.3 26.1 - 32.9 PG    MCHC 34.5 31.4 - 35.0 g/dL    RDW 13.0 11.9 - 14.6 %    Platelets 908 (L) 207 - 450 K/uL    MPV 10.6 9.4 - 12.3 FL    nRBC 0.00 0.0 - 0.2 K/uL    Differential Type AUTOMATED      Seg Neutrophils 81 (H) 43 - 78 %    Lymphocytes 10 (L) 13 - 44 %    Monocytes 9 4.0 - 12.0 %    Eosinophils % 0 (L) 0.5 - 7.8 %    Basophils 0 0.0 - 2.0 %    Immature Granulocytes 0 0.0 - 5.0 %    Segs Absolute 12.0 (H) 1.7 - 8.2 K/UL    Absolute Lymph # 1.5 0.5 - 4.6 K/UL    Absolute Mono # 1.3 0.1 - 1.3 K/UL    Absolute Eos # 0.0 0.0 - 0.8 K/UL    Basophils Absolute 0.0 0.0 - 0.2 K/UL    Absolute Immature Granulocyte 0.1 0.0 - 0.5 K/UL   Magnesium    Collection Time: 02/14/23  5:55 AM   Result Value Ref Range    Magnesium 1.9 1.8 - 2.4 mg/dL   Comprehensive Metabolic Panel    Collection Time: 02/14/23  5:55 AM   Result Value Ref Range    Sodium 140 133 - 143 mmol/L    Potassium 3.8 3.5 - 5.1 mmol/L    Chloride 106 101 - 110 mmol/L    CO2 26 21 - 32 mmol/L    Anion Gap 8 2 - 11 mmol/L    Glucose 267 (H) 65 - 100 mg/dL    BUN 37 (H) 8 - 23 MG/DL    Creatinine 1.40 0.8 - 1.5 MG/DL    Est, Glom Filt Rate 51 (L) >60 ml/min/1.73m2    Calcium 8.8 8.3 - 10.4 MG/DL    Total Bilirubin 1.2 (H) 0.2 - 1.1 MG/DL    ALT 28 12 - 65 U/L    AST 50 (H) 15 - 37 U/L    Alk Phosphatase 61 50 - 136 U/L    Total Protein 6.1 (L) 6.3 - 8.2 g/dL    Albumin 2.9 (L) 3.2 - 4.6 g/dL    Globulin 3.2 2.8 - 4.5 g/dL    Albumin/Globulin Ratio 0.9 0.4 - 1.6     Lactic Acid    Collection Time: 02/14/23  5:55 AM   Result Value Ref Range    Lactic Acid, Plasma 1.8 0.4 - 2.0 MMOL/L   POCT Glucose    Collection Time: 02/14/23  7:25 AM   Result Value Ref Range    POC Glucose 243 (H) 65 - 100 mg/dL    Performed by: [place]    POCT Glucose    Collection Time: 02/14/23 11:25 AM   Result Value Ref Range    POC Glucose 273 (H) 65 - 100 mg/dL    Performed by: Sam Lara I have personally reviewed imaging studies:  Other Studies:  CT HEAD WO CONTRAST   Final Result      1. Age-related senescent changes and chronic microvascular disease without   acute intracranial abnormality. CPT code 36366      XR CHEST PORTABLE   Final Result      1. Cardiomegaly with mild central vascular congestion.       CPT code(s) 19550                  FL MODIFIED BARIUM SWALLOW W VIDEO    (Results Pending)       Current Meds:  Current Facility-Administered Medications   Medication Dose Route Frequency    aspirin chewable tablet 81 mg  81 mg Oral Daily    carvedilol (COREG) tablet 3.125 mg  3.125 mg Oral BID WC    levothyroxine (SYNTHROID) tablet 137 mcg  137 mcg Oral QAM AC    tuberculin injection 5 Units  5 Units IntraDERmal Once    sodium chloride flush 0.9 % injection 5-40 mL  5-40 mL IntraVENous 2 times per day    sodium chloride flush 0.9 % injection 5-40 mL  5-40 mL IntraVENous PRN    0.9 % sodium chloride infusion   IntraVENous PRN    acetaminophen (TYLENOL) tablet 650 mg  650 mg Oral Q6H PRN    Or    acetaminophen (TYLENOL) suppository 650 mg  650 mg Rectal Q6H PRN    glucose chewable tablet 16 g  4 tablet Oral PRN    dextrose bolus 10% 125 mL  125 mL IntraVENous PRN    Or    dextrose bolus 10% 250 mL  250 mL IntraVENous PRN    glucagon (rDNA) injection 1 mg  1 mg SubCUTAneous PRN    dextrose 10 % infusion   IntraVENous Continuous PRN    aluminum & magnesium hydroxide-simethicone (MAALOX) 200-200-20 MG/5ML suspension 30 mL  30 mL Oral Q6H PRN    insulin glargine (LANTUS) injection vial 13 Units  0.15 Units/kg SubCUTAneous Nightly    insulin lispro (HUMALOG) injection vial 0-8 Units  0-8 Units SubCUTAneous TID WC    insulin lispro (HUMALOG) injection vial 0-4 Units  0-4 Units SubCUTAneous Nightly    magnesium sulfate 2000 mg in 50 mL IVPB premix  2,000 mg IntraVENous PRN    potassium chloride (KLOR-CON M) extended release tablet 40 mEq  40 mEq Oral PRN    Or    potassium bicarb-citric acid (EFFER-K) effervescent tablet 40 mEq  40 mEq Oral PRN    Or    potassium chloride 10 mEq/100 mL IVPB (Peripheral Line)  10 mEq IntraVENous PRN    lactated ringers IV soln infusion   IntraVENous Continuous    hyoscyamine (LEVSIN/SL) sublingual tablet 125 mcg  125 mcg SubLINGual Q4H PRN    heparin (porcine) injection 4,000 Units  4,000 Units IntraVENous PRN    heparin (porcine) injection 2,000 Units  2,000 Units IntraVENous PRN    heparin 25,000 units in dextrose 5% 250 mL (premix) infusion  5-30 Units/kg/hr IntraVENous Continuous    pantoprazole (PROTONIX) 40 mg in sodium chloride (PF) 0.9 % 10 mL injection  40 mg IntraVENous Q12H    prochlorperazine (COMPAZINE) injection 5 mg  5 mg IntraVENous Q4H PRN    cefTRIAXone (ROCEPHIN) 1,000 mg in sodium chloride 0.9 % 50 mL IVPB (mini-bag)  1,000 mg IntraVENous Q24H    azithromycin (ZITHROMAX) 500 mg in sodium chloride 0.9 % 250 mL IVPB ([id number])  500 mg IntraVENous Q24H       Signed:  Sue Knight MD    Part of this note may have been written by using a voice dictation software. The note has been proof read but may still contain some grammatical/other typographical errors.

## 2023-02-15 ENCOUNTER — APPOINTMENT (OUTPATIENT)
Dept: GENERAL RADIOLOGY | Age: 78
End: 2023-02-15
Payer: MEDICARE

## 2023-02-15 LAB
ALBUMIN SERPL-MCNC: 2.8 G/DL (ref 3.2–4.6)
ALBUMIN/GLOB SERPL: 0.8 (ref 0.4–1.6)
ALP SERPL-CCNC: 59 U/L (ref 50–136)
ALT SERPL-CCNC: 26 U/L (ref 12–65)
ANION GAP SERPL CALC-SCNC: 4 MMOL/L (ref 2–11)
AST SERPL-CCNC: 42 U/L (ref 15–37)
BACTERIA SPEC CULT: NORMAL
BASOPHILS # BLD: 0 K/UL (ref 0–0.2)
BASOPHILS NFR BLD: 0 % (ref 0–2)
BILIRUB SERPL-MCNC: 1.3 MG/DL (ref 0.2–1.1)
BUN SERPL-MCNC: 31 MG/DL (ref 8–23)
CALCIUM SERPL-MCNC: 9.1 MG/DL (ref 8.3–10.4)
CHLORIDE SERPL-SCNC: 105 MMOL/L (ref 101–110)
CO2 SERPL-SCNC: 29 MMOL/L (ref 21–32)
CREAT SERPL-MCNC: 1.2 MG/DL (ref 0.8–1.5)
DIFFERENTIAL METHOD BLD: ABNORMAL
EOSINOPHIL # BLD: 0.2 K/UL (ref 0–0.8)
EOSINOPHIL NFR BLD: 2 % (ref 0.5–7.8)
ERYTHROCYTE [DISTWIDTH] IN BLOOD BY AUTOMATED COUNT: 13 % (ref 11.9–14.6)
GLOBULIN SER CALC-MCNC: 3.4 G/DL (ref 2.8–4.5)
GLUCOSE BLD STRIP.AUTO-MCNC: 199 MG/DL (ref 65–100)
GLUCOSE BLD STRIP.AUTO-MCNC: 283 MG/DL (ref 65–100)
GLUCOSE BLD STRIP.AUTO-MCNC: 284 MG/DL (ref 65–100)
GLUCOSE BLD STRIP.AUTO-MCNC: 377 MG/DL (ref 65–100)
GLUCOSE SERPL-MCNC: 229 MG/DL (ref 65–100)
HCT VFR BLD AUTO: 41 % (ref 41.1–50.3)
HGB BLD-MCNC: 13.8 G/DL (ref 13.6–17.2)
IMM GRANULOCYTES # BLD AUTO: 0 K/UL (ref 0–0.5)
IMM GRANULOCYTES NFR BLD AUTO: 0 % (ref 0–5)
LYMPHOCYTES # BLD: 1.5 K/UL (ref 0.5–4.6)
LYMPHOCYTES NFR BLD: 14 % (ref 13–44)
MAGNESIUM SERPL-MCNC: 2.3 MG/DL (ref 1.8–2.4)
MCH RBC QN AUTO: 30.8 PG (ref 26.1–32.9)
MCHC RBC AUTO-ENTMCNC: 33.7 G/DL (ref 31.4–35)
MCV RBC AUTO: 91.5 FL (ref 82–102)
MM INDURATION, POC: 0 MM (ref 0–5)
MONOCYTES # BLD: 1.1 K/UL (ref 0.1–1.3)
MONOCYTES NFR BLD: 10 % (ref 4–12)
NEUTS SEG # BLD: 8.2 K/UL (ref 1.7–8.2)
NEUTS SEG NFR BLD: 75 % (ref 43–78)
NRBC # BLD: 0 K/UL (ref 0–0.2)
PLATELET # BLD AUTO: 133 K/UL (ref 150–450)
PMV BLD AUTO: 11 FL (ref 9.4–12.3)
POTASSIUM SERPL-SCNC: 3.5 MMOL/L (ref 3.5–5.1)
PPD, POC: NEGATIVE
PROT SERPL-MCNC: 6.2 G/DL (ref 6.3–8.2)
RBC # BLD AUTO: 4.48 M/UL (ref 4.23–5.6)
SERVICE CMNT-IMP: ABNORMAL
SERVICE CMNT-IMP: NORMAL
SODIUM SERPL-SCNC: 138 MMOL/L (ref 133–143)
UFH PPP CHRO-ACNC: 0.34 IU/ML (ref 0.3–0.7)
UFH PPP CHRO-ACNC: 0.53 IU/ML (ref 0.3–0.7)
WBC # BLD AUTO: 11 K/UL (ref 4.3–11.1)

## 2023-02-15 PROCEDURE — 6370000000 HC RX 637 (ALT 250 FOR IP): Performed by: INTERNAL MEDICINE

## 2023-02-15 PROCEDURE — 83735 ASSAY OF MAGNESIUM: CPT

## 2023-02-15 PROCEDURE — 6360000002 HC RX W HCPCS: Performed by: FAMILY MEDICINE

## 2023-02-15 PROCEDURE — 51798 US URINE CAPACITY MEASURE: CPT

## 2023-02-15 PROCEDURE — 97530 THERAPEUTIC ACTIVITIES: CPT

## 2023-02-15 PROCEDURE — 85520 HEPARIN ASSAY: CPT

## 2023-02-15 PROCEDURE — 1100000003 HC PRIVATE W/ TELEMETRY

## 2023-02-15 PROCEDURE — 92611 MOTION FLUOROSCOPY/SWALLOW: CPT

## 2023-02-15 PROCEDURE — 6360000002 HC RX W HCPCS: Performed by: PHYSICIAN ASSISTANT

## 2023-02-15 PROCEDURE — 6370000000 HC RX 637 (ALT 250 FOR IP): Performed by: FAMILY MEDICINE

## 2023-02-15 PROCEDURE — 74230 X-RAY XM SWLNG FUNCJ C+: CPT

## 2023-02-15 PROCEDURE — C9113 INJ PANTOPRAZOLE SODIUM, VIA: HCPCS | Performed by: FAMILY MEDICINE

## 2023-02-15 PROCEDURE — A4216 STERILE WATER/SALINE, 10 ML: HCPCS | Performed by: FAMILY MEDICINE

## 2023-02-15 PROCEDURE — 2580000003 HC RX 258: Performed by: FAMILY MEDICINE

## 2023-02-15 PROCEDURE — 80053 COMPREHEN METABOLIC PANEL: CPT

## 2023-02-15 PROCEDURE — 2500000003 HC RX 250 WO HCPCS: Performed by: FAMILY MEDICINE

## 2023-02-15 PROCEDURE — 82962 GLUCOSE BLOOD TEST: CPT

## 2023-02-15 PROCEDURE — 94760 N-INVAS EAR/PLS OXIMETRY 1: CPT

## 2023-02-15 PROCEDURE — 51702 INSERT TEMP BLADDER CATH: CPT

## 2023-02-15 PROCEDURE — 85025 COMPLETE CBC W/AUTO DIFF WBC: CPT

## 2023-02-15 PROCEDURE — 36415 COLL VENOUS BLD VENIPUNCTURE: CPT

## 2023-02-15 PROCEDURE — 99232 SBSQ HOSP IP/OBS MODERATE 35: CPT | Performed by: INTERNAL MEDICINE

## 2023-02-15 PROCEDURE — 2700000000 HC OXYGEN THERAPY PER DAY

## 2023-02-15 RX ORDER — SPIRONOLACTONE 25 MG/1
25 TABLET ORAL DAILY
Status: DISCONTINUED | OUTPATIENT
Start: 2023-02-15 | End: 2023-02-15

## 2023-02-15 RX ORDER — LISINOPRIL 5 MG/1
5 TABLET ORAL DAILY
Status: DISCONTINUED | OUTPATIENT
Start: 2023-02-15 | End: 2023-02-17

## 2023-02-15 RX ORDER — CARVEDILOL 6.25 MG/1
6.25 TABLET ORAL 2 TIMES DAILY WITH MEALS
Status: DISCONTINUED | OUTPATIENT
Start: 2023-02-15 | End: 2023-02-17

## 2023-02-15 RX ORDER — SPIRONOLACTONE 25 MG/1
25 TABLET ORAL DAILY
Status: DISCONTINUED | OUTPATIENT
Start: 2023-02-16 | End: 2023-02-18 | Stop reason: HOSPADM

## 2023-02-15 RX ADMIN — INSULIN GLARGINE 13 UNITS: 100 INJECTION, SOLUTION SUBCUTANEOUS at 21:40

## 2023-02-15 RX ADMIN — SODIUM CHLORIDE 40 MG: 9 INJECTION, SOLUTION INTRAMUSCULAR; INTRAVENOUS; SUBCUTANEOUS at 05:21

## 2023-02-15 RX ADMIN — ASPIRIN 81 MG 81 MG: 81 TABLET ORAL at 08:55

## 2023-02-15 RX ADMIN — CEFTRIAXONE 1000 MG: 1 INJECTION, POWDER, FOR SOLUTION INTRAMUSCULAR; INTRAVENOUS at 12:31

## 2023-02-15 RX ADMIN — INSULIN LISPRO 4 UNITS: 100 INJECTION, SOLUTION INTRAVENOUS; SUBCUTANEOUS at 12:31

## 2023-02-15 RX ADMIN — HEPARIN SODIUM 13 UNITS/KG/HR: 10000 INJECTION, SOLUTION INTRAVENOUS at 17:16

## 2023-02-15 RX ADMIN — LEVOTHYROXINE SODIUM 137 MCG: 0.11 TABLET ORAL at 05:19

## 2023-02-15 RX ADMIN — AZITHROMYCIN MONOHYDRATE 500 MG: 500 INJECTION, POWDER, LYOPHILIZED, FOR SOLUTION INTRAVENOUS at 14:02

## 2023-02-15 RX ADMIN — SODIUM CHLORIDE 40 MG: 9 INJECTION, SOLUTION INTRAMUSCULAR; INTRAVENOUS; SUBCUTANEOUS at 17:21

## 2023-02-15 RX ADMIN — LISINOPRIL 5 MG: 5 TABLET ORAL at 08:55

## 2023-02-15 RX ADMIN — SODIUM CHLORIDE, PRESERVATIVE FREE 10 ML: 5 INJECTION INTRAVENOUS at 08:56

## 2023-02-15 RX ADMIN — INSULIN LISPRO 8 UNITS: 100 INJECTION, SOLUTION INTRAVENOUS; SUBCUTANEOUS at 21:44

## 2023-02-15 RX ADMIN — BARIUM SULFATE 15 ML: 400 PASTE ORAL at 09:38

## 2023-02-15 RX ADMIN — INSULIN LISPRO 4 UNITS: 100 INJECTION, SOLUTION INTRAVENOUS; SUBCUTANEOUS at 17:22

## 2023-02-15 RX ADMIN — CARVEDILOL 6.25 MG: 6.25 TABLET, FILM COATED ORAL at 08:55

## 2023-02-15 RX ADMIN — BARIUM SULFATE 45 ML: 0.81 POWDER, FOR SUSPENSION ORAL at 09:38

## 2023-02-15 RX ADMIN — SODIUM CHLORIDE, PRESERVATIVE FREE 10 ML: 5 INJECTION INTRAVENOUS at 21:00

## 2023-02-15 RX ADMIN — FUROSEMIDE 20 MG: 10 INJECTION, SOLUTION INTRAMUSCULAR; INTRAVENOUS at 08:55

## 2023-02-15 NOTE — PROGRESS NOTES
am  2/15/2023 6:40 AM    Admit Date: 2/13/2023    Admit Diagnosis: Shortness of breath [R06.02]  Hypoxia [R09.02]  Elevated troponin [R77.8]  Severe sepsis (HCC) [A41.9, R65.20]  Altered mental status, unspecified altered mental status type [R41.82]  Sepsis with encephalopathy without septic shock, due to unspecified organism ([address] [A41.9, R65.20, G93.40]      Subjective:    Patient : Patient appears comfortable with no complaints today. Echo showed severe LV dysfunction with findings consistent with stress-induced cardiomyopathy    Objective:    /65   Pulse 89   Temp 97.2 °F (36.2 °C) (Axillary)   Resp 18   Ht 6' 1\" (1.854 m)   Wt 196 lb 3.2 oz (89 kg)   SpO2 91%   BMI 25.89 kg/m²     ROS:  General ROS: negative for - chills  Hematological and Lymphatic ROS: negative for - blood clots or jaundice  Respiratory ROS: no cough, shortness of breath, or wheezing  Cardiovascular ROS: no chest pain or dyspnea on exertion  Gastrointestinal ROS: no abdominal pain, change in bowel habits, or black or bloody stools  Neurological ROS: no TIA or stroke symptoms    Physical Exam:      Physical Examination: General appearance - Appearance: chronically ill appearing.    Neck/lymph - supple, no significant adenopathy  Chest/CV - clear to auscultation, no wheezes, rales or rhonchi, symmetric air entry  Heart - normal rate, regular rhythm, normal S1, S2, no murmurs, rubs, clicks or gallops  Abdomen/GI - soft, nontender, nondistended, no masses or organomegaly   Musculoskeletal - no joint tenderness, deformity or swelling  Extremities - peripheral pulses normal, no pedal edema, no clubbing or cyanosis  Skin - normal coloration and turgor, no rashes, no suspicious skin lesions noted    Current Facility-Administered Medications   Medication Dose Route Frequency    aspirin chewable tablet 81 mg  81 mg Oral Daily    carvedilol (COREG) tablet 3.125 mg  3.125 mg Oral BID WC    furosemide (LASIX) injection 20 mg  20 mg IntraVENous Daily    levothyroxine (SYNTHROID) tablet 137 mcg  137 mcg Oral QAM AC    sodium chloride flush 0.9 % injection 5-40 mL  5-40 mL IntraVENous 2 times per day    sodium chloride flush 0.9 % injection 5-40 mL  5-40 mL IntraVENous PRN    0.9 % sodium chloride infusion   IntraVENous PRN    acetaminophen (TYLENOL) tablet 650 mg  650 mg Oral Q6H PRN    Or    acetaminophen (TYLENOL) suppository 650 mg  650 mg Rectal Q6H PRN    glucose chewable tablet 16 g  4 tablet Oral PRN    dextrose bolus 10% 125 mL  125 mL IntraVENous PRN    Or    dextrose bolus 10% 250 mL  250 mL IntraVENous PRN    glucagon (rDNA) injection 1 mg  1 mg SubCUTAneous PRN    dextrose 10 % infusion   IntraVENous Continuous PRN    aluminum & magnesium hydroxide-simethicone (MAALOX) 200-200-20 MG/5ML suspension 30 mL  30 mL Oral Q6H PRN    insulin glargine (LANTUS) injection vial 13 Units  0.15 Units/kg SubCUTAneous Nightly    insulin lispro (HUMALOG) injection vial 0-8 Units  0-8 Units SubCUTAneous TID WC    insulin lispro (HUMALOG) injection vial 0-4 Units  0-4 Units SubCUTAneous Nightly    magnesium sulfate 2000 mg in 50 mL IVPB premix  2,000 mg IntraVENous PRN    potassium chloride (KLOR-CON M) extended release tablet 40 mEq  40 mEq Oral PRN    Or    potassium bicarb-citric acid (EFFER-K) effervescent tablet 40 mEq  40 mEq Oral PRN    Or    potassium chloride 10 mEq/100 mL IVPB (Peripheral Line)  10 mEq IntraVENous PRN    lactated ringers IV soln infusion   IntraVENous Continuous    hyoscyamine (LEVSIN/SL) sublingual tablet 125 mcg  125 mcg SubLINGual Q4H PRN    heparin (porcine) injection 4,000 Units  4,000 Units IntraVENous PRN    heparin (porcine) injection 2,000 Units  2,000 Units IntraVENous PRN    heparin 25,000 units in dextrose 5% 250 mL (premix) infusion  5-30 Units/kg/hr IntraVENous Continuous    pantoprazole (PROTONIX) 40 mg in sodium chloride (PF) 0.9 % 10 mL injection  40 mg IntraVENous Q12H    prochlorperazine (COMPAZINE) injection 5 mg  5 mg IntraVENous Q4H PRN    cefTRIAXone (ROCEPHIN) 1,000 mg in sodium chloride 0.9 % 50 mL IVPB (mini-bag)  1,000 mg IntraVENous Q24H    azithromycin (ZITHROMAX) 500 mg in sodium chloride 0.9 % 250 mL IVPB ([id number])  500 mg IntraVENous Q24H       Data Review: data included in this note has been independently reviewed by the author     TELEMETRY: nsr    Assessment/Plan:     Patient Active Problem List   Diagnosis    Coronary artery disease involving native coronary artery of native heart    Complete heart block (HCC)    Diabetes ([address]    Hypertension    Chronic systolic heart failure (HCC)    Syncope    Hypertensive cardiomyopathy, with heart failure ([address]    Pacemaker    Fatty liver    Acquired hypothyroidism    Type 2 diabetes mellitus with hyperglycemia, without long-term current use of insulin ([facility])    Paralysis of conjugate gaze    NSTEMI (non-ST elevated myocardial infarction) ([address]    Blurry vision    Stroke ([address]    Non-rheumatic mitral regurgitation    Hyperlipidemia    Ischemic cardiomyopathy    Leukocytosis    Osteoarthritis of spine with radiculopathy, lumbar region    Right leg pain    Atherosclerosis of native arteries of extremities with rest pain, other extremity ([facility])    Diastolic dysfunction    Diabetic retinopathy without macular edema associated with type 2 diabetes mellitus (HCC)    Primary localized osteoarthritis of right hip    Sepsis with encephalopathy without septic shock ([address]    History of CVA (cerebrovascular accident)    Elevated troponin    Acute respiratory insufficiency    DNR (do not resuscitate)     [address] [place] [address] Problems    *Sepsis with encephalopathy without septic shock ([address]  Continue antibiotics. Awaiting cultures       History of CVA (cerebrovascular accident)  Noted       Elevated troponin  Clinical scenario concerning for stress-induced cardiomyopathy. Echo consistent with this.   However, difficult to exclude acute coronary syndrome given that he is ventricularly paced, has known CAD and is unable to give events leading up to his arrival.  He denies any active chest pain but is still confused. Discussed cardiac catheterization with family who expressed that they thought he would want a conservative approach but would like him to make the decision. Would continue intravenous heparin until his mental status improves at which time we can discuss cardiac catheterization with patient. Start coreg  Patient could also tolerate addition of ACE inhibitor with his Coreg. Possibly if hemodynamics tolerate he can be escalated to Coreg ACE inhibitor and MRA therapy. The last piece could be SGLT2 inhibitor therapy       Acute respiratory insufficiency  Wean oxygen as needed.   PRN diuretic       DNR (do not resuscitate)  Noted       Coronary artery disease involving native coronary artery of native heart  See above       Pacemaker  Normal function       Ischemic cardiomyopathy  See above       Type 2 diabetes mellitus with hyperglycemia, without long-term current use of insulin ([facility]  Defer management to primary team.          Hilda Vergara MD

## 2023-02-15 NOTE — PROGRESS NOTES
LTG: Patient will tolerate least restrictive oral diet without overt s/sx of airway compromise. MET 2/15  STG: Patient will participate in modified barium swallow study to objectively assess swallow function as medically indicated. MET 2/15        SPEECH LANGUAGE PATHOLOGY: MODIFIED BARIUM SWALLOW STUDY Initial Assessment and Discharge    NAME: Manju Bryant  : 1945  MRN: [id number]    ADMISSION DATE: 2023  PRIMARY DIAGNOSIS: Sepsis with encephalopathy without septic shock (HCC)  Shortness of breath [R06.02]  Hypoxia [R09.02]  Elevated troponin [R77.8]  Severe sepsis ([facility] [A41.9, R65.20]  Altered mental status, unspecified altered mental status type [R41.82]  Sepsis with encephalopathy without septic shock, due to unspecified organism ([facility] [A41.9, R65.20, G93.40]  Date of Eval: 2/15/2023    ICD-10: Treatment Diagnosis: R13.12 Dysphagia, Oropharyngeal Phase    RECOMMENDATIONS   Diet:  Solid consistency: Regular  Liquid consistency: Thin                   Medications: As tolerated     Compensatory Swallowing Strategies : Upright as possible for all oral intake, Alternate solids and liquids  Supervision: Independent   Interventions/Recommendations/Referrals  Therapeutic Interventions: Patient/Family education  Referral To: GI   Patient continues to require skilled intervention: No  No follow up therapy recommended post discharge     ASSESSMENT    Patient presents with oropharyngeal swallow function that is grossly WNL. Functional hyolaryngeal elevation/excursion and epiglottic inversion resulted in adequate airway protection throughout study. Mild pyriform residue post swallow with solids d/t slightly reduced UES opening- suspected cervical osteophyte visualized at ~C6, which appears to impede opening. Pharyngeal residue completely clears with liquid rinse. Recommend regular consistency diet and thin liquids. Meds as tolerated.  Consider GI consult to determine if appropriate for possible dilation at UES if further concerns for dysphagia. No further speech therapy needs. GENERAL    Subjective: Alert upright in stretcher. Following commands. Behavior/Cognition: Alert; Cooperative;Pleasant mood       Pain:   Patient does not c/o pain, Patient does not appear in pain                                           History of Present Injury/Illness: Mr. Obi Perez  has a past medical history of Acquired hypothyroidism, AV junctional bradycardia, CAD (coronary artery disease), Congestive heart failure ([address] Diabetes ([address] Diabetic retinopathy without macular edema associated with type 2 diabetes mellitus ([address] Endocrine disease, Fatty liver, Fatty liver, Fatty liver, Headache, Heart failure ([address] Hypertension, Osteoarthritis of spine with radiculopathy, lumbar region, and Stroke ([address] . He also  has a past surgical history that includes Cholecystectomy and pr unlisted procedure cardiac surgery. OBJECTIVE    Modified barium swallow study was performed in the radiology suite using c-arm with Mr. Obi Perez in the lateral plane. Radiologist: Dr. Aguilar  Fluoroscopy completed by: Jordi REESE    Oral Motor Assessment  Labial: No impairment  Lingual: No impairment  Dentition: Intact  Oral Hygiene: Adequate    PO trials  Patient was presented with trials of thin liquids (by spoon, cup sip, cup gulp, straw sip, and serial swallows), pudding, mixed consistency, and cracker. Oral phase:  no significant oral issues observed    Pharyngeal phase: Thin by teaspoon- swallow triggered at vallecula. No aspiration/penetration. Trace vallecular/pyriform residue post swallow. Thin by serial cup sips- fluro turned on after swallow had been initiated. Trace nonclearing penetration visualized. No aspiration/penetration on further serial sips. Trace vallecular/pyriforms residue post swallow. Thin by straw- swallow triggered at pyriforms. No aspiration/penetration. Trace vallecular/pyriform residue post swallow.   Thin by straw- swallow triggered at vallecula. No aspiration/penetration. No pharyngeal residue post swallow. Pudding- timely swallow. No aspiration/penetration. Minimal pyriform residue post swallow  Mixed fruit- swallow triggered at vallecula. Mild vallecular/pyriform residue post swallow. Cracker- swallow triggered at vallecula. No aspiration/penetration. Minimal vallecular and mild pyriform residue post swallow,  Thin by straw- swallow triggered at vallecula. No aspiration/penetration. Trace vallecular residue post swallow. Thin by serial straw sips- swallow triggered at pyriforms. No aspiration/penetration. No pharyngeal residue post swallow. Pharyngeal characteristics:  functional pharyngeal swallow  Aspiration/Penetration Scale:   3 (Penetration/visible residue. Contrast remains above the folds/cords, but is not cleared.)    Upper Esophageal Screen:   decreased opening of upper segment of esophagus- appears to be d/t ?cervical osteophyte at C-6  *Distal esophagus not assessed due to limitations of modified barium swallow study. PLAN    Duration/Frequency: No treatment indicated at this time    Dysphagia Outcome and Severity Scale (MICHELLE)  Dysphagia Outcome Severity Scale: Level 6: Within functional limits/Modified independence  Interpretation of Tool: The Dysphagia Outcome and Severity Scale (MICHELLE) is a simple, easy-to-use, 7-point scale developed to systematically rate the functional severity of dysphagia based on objective assessment and make recommendations for diet level, independence level, and type of nutrition.    Normal(7), Functional(6), Mild(5), Mild-Moderate(4), Moderate(3), Moderate-Severe(2), Severe(1)    Speech Therapy Prognosis  Prognosis: Excellent  Prognosis Considerations: Previous Level of Function, Participation Level  Education:  Patient Education: MBSS results, safe swallowing precautions  Patient Education Response: Verbalizes understanding  Individuals consulted  Consulted and agree with results and recommendations: Patient    Current Medications:   No current facility-administered medications on file prior to encounter. Current Outpatient Medications on File Prior to Encounter   Medication Sig Dispense Refill    glimepiride (AMARYL) 4 MG tablet 1 po bid with a meal for diabetes instead of the glipizide) 180 tablet 1    Semaglutide 7 MG TABS Take 7 mg by mouth every morning (before breakfast) 30 tablet 5    carvedilol (COREG) 12.5 MG tablet Take 1 tablet by mouth 2 times daily (with meals) 180 tablet 0    atorvastatin (LIPITOR) 20 MG tablet Take 1 tablet by mouth daily 90 tablet 1    spironolactone (ALDACTONE) 25 MG tablet 1/2 tablet po qam 90 tablet 1    levothyroxine (SYNTHROID) 137 MCG tablet TAKE 1 TABLET BY MOUTH ONCE DAILY BEFORE BREAKFAST 90 tablet 3    metFORMIN (GLUCOPHAGE XR) 500 MG extended release tablet 1 po bid with meals instead of the 1000 mg dose.  180 tablet 3    Multiple Vitamin (MULTIVITAMIN ADULT PO) Take by mouth      losartan (COZAAR) 50 MG tablet Take 1 tablet by mouth daily 90 tablet 3    aspirin 81 MG chewable tablet Take 81 mg by mouth      vitamin D 25 MCG (1000 UT) CAPS Take by mouth daily      nitroGLYCERIN (NITROSTAT) 0.4 MG SL tablet Place 0.4 mg under the tongue      triamcinolone (KENALOG) 0.1 % cream Apply topically 2 times daily         PRECAUTIONS/ALLERGIES: Ticagrelor and Empagliflozin     SAFETY: Safety Devices in place: Yes  Type of devices: Left in bed  Restraints Initially in Place: No      Therapy Time  SLP Individual Minutes  Time In: 0920  Time Out: [address]  Minutes: [address] [place], [place]  2/15/2023 10:47 AM

## 2023-02-15 NOTE — PROGRESS NOTES
SPEECH PATHOLOGY NOTE:    Modified barium swallow study completed. Oropharyngeal swallow function grossly WNL. Recommend regular consistency diet and thin liquids. Meds as tolerated. Full report to follow.       Anjana Ibrahim MS, CCC-SLP

## 2023-02-15 NOTE — PROGRESS NOTES
END OF SHIFT NOTE:    INTAKE/OUTPUT  02/13 0701 - 02/14 0700  In: 1997.9 [I.V.:1997.9]  Out: 625 [Urine:625]  Voiding: Yes  Catheter: No  Drain:   External Urinary Catheter (Active)               Flatus: Patient does have flatus present. Stool:  occurrences. Characteristics:  Stool Appearance: Loose  Stool Color: Brown  Stool Amount: Medium  Stool Assessment  Incontinence: No  Stool Appearance: Loose  Stool Color: Brown  Stool Amount: Medium  Stool Source: Rectum  Last BM (including prior to admit):  (patient unable to verbalize)    Emesis:  occurrences. Characteristics:   Emesis Appearance: Osvaldo Lopez, Coffee ground (MD notified.)    VITAL SIGNS  Patient Vitals for the past 12 hrs:   Temp Pulse Resp BP SpO2   02/14/23 1537 97.7 °F (36.5 °C) 95 -- 112/68 90 %   02/14/23 1123 98.1 °F (36.7 °C) 98 -- 107/64 93 %   02/14/23 0725 97.7 °F (36.5 °C) 100 20 114/68 91 %       Pain Assessment             Ambulating  Yes    Shift report given to oncoming nurse at the bedside.     Isauro Lamb RN

## 2023-02-15 NOTE — PROGRESS NOTES
Hospitalist Progress Note   Admit Date:  2023 12:11 PM   Name:  Lori Siu   Age:  76 y.o. Sex:  male  :  1945   MRN:  [id number]   Room:  222/    Presenting Complaint: Altered Mental Status     Reason(s) for Admission: Shortness of breath [R06.02]  Hypoxia [R09.02]  Elevated troponin [R77.8]  Severe sepsis ([facility] [address] [A41.9, R65.20]  Altered mental status, unspecified altered mental status type [R41.82]  Sepsis with encephalopathy without septic shock, due to unspecified organism ([address] [A41.9, R65.20, G93.40]     Hospital Course: Lori Siu is a 76 y.o. male with medical history of CAD s/p CABG, ICM, CHB s/p dual chamber PPM, HLD, DM, CVA, hypothyroidism who presented from home via EMS with c/o AMS. Family called for a welfare check and patient found AMS in chair covered in vomit and urine. Family called for a welfare check and patient found AMS in chair covered in vomit and urine. Found to be hypoxic on EMS arrival spo2 86% and placed on 4lpm NC. Patient admitted with sepsis, unclear source, acute respiratory failure secondary to pulm edema and metabolic encephalopathy. Subjective & 24hr Events (02/15/23): Patient is seen at the bedside. Very pleasant and more alert today. Denies chest pain, palpitation, nausea, vomiting or abdominal pain.       Assessment & Plan:     Sepsis, unclear etiology:  Patient met sepsis criteria on admission  Chest x-ray with vascular congestion, UA clean, procalcitonin negative  Leukocytosis improving  Follow-up on blood cultures  Continue empiric antibiotics, if blood culture negative for 48 hours, will discontinue abx    NSTEMI versus demand ischemia:  Unable to rule out NSTEMI  Echocardiogram concerning for stress-induced cardiomyopathy with ejection fraction 25 to 30%  Cardiology discussed cardiac catheterization with family, per family patient wants more conservative approach  Continue heparin GTT  Continue Coreg    Acute metabolic encephalopathy:  Secondary to above  CT head negative for acute pathology  Delirium precautions  Treat underlying conditions  Patient improved    CAD s/p CABG:  Pacemaker:  Ischemic cardiomyopathy:  HFrEF:  Continue aspirin and heparin gtt. Continue Lasix and carvedilol  Resume lisinopril and spironolactone  Strict I&O's  Cardiology following    LETTY:  Resolved with gentle hydration  Avoid nephrotoxic agent    Type 2 diabetes mellitus:  Continue Lantus  Start patient on sliding scale  Adjust insulin accordingly    Hypothyroidism:  Continue Synthyroid      PT/OT evals and PPD needed/ordered? Yes    Diet:  ADULT DIET; Dysphagia - Minced and Moist  VTE prophylaxis:Lovenox  Code status: DNR    Hospital Problems:  Principal Problem:    Sepsis with encephalopathy without septic shock ([facility] [address]  Active Problems:    Coronary artery disease involving native coronary artery of native heart    History of CVA (cerebrovascular accident)    Elevated troponin    Acute respiratory insufficiency    DNR (do not resuscitate)    Pacemaker    Acquired hypothyroidism    Type 2 diabetes mellitus with hyperglycemia, without long-term current use of insulin ([facility] [address]    Ischemic cardiomyopathy  Resolved Problems:    * No resolved hospital problems. *      Objective:   Patient Vitals for the past 24 hrs:   Temp Pulse Resp BP SpO2   02/15/23 1202 98.1 °F (36.7 °C) 83 18 (!) 109/58 96 %   02/15/23 0855 -- 89 -- 122/69 --   02/15/23 0704 98.2 °F (36.8 °C) 89 20 122/69 90 %   02/15/23 0321 97.2 °F (36.2 °C) 89 18 117/65 91 %   02/14/23 2312 98.6 °F (37 °C) 94 17 120/63 91 %   02/14/23 2014 97.5 °F (36.4 °C) 94 17 114/63 95 %   02/14/23 1537 97.7 °F (36.5 °C) 95 -- 112/68 90 %         Oxygen Therapy  SpO2: 96 %  Pulse via Oximetry: 114 beats per minute  O2 Device: Nasal cannula  O2 Flow Rate (L/min): 4 L/min    Estimated body mass index is 25.89 kg/m² as calculated from the following:    Height as of this encounter: 6' 1\" (1.854 m).     Weight as of this encounter: 196 lb 3.2 oz (89 kg). Intake/Output Summary (Last 24 hours) at 2/15/2023 1425  Last data filed at 2/15/2023 1418  Gross per 24 hour   Intake 494.1 ml   Output 2300 ml   Net -1805.9 ml           Physical Exam:     Blood pressure (!) 109/58, pulse 83, temperature 98.1 °F (36.7 °C), temperature source Oral, resp. rate 18, height 6' 1\" (1.854 m), weight 196 lb 3.2 oz (89 kg), SpO2 96 %. General:    Well nourished. More alert today, in mitten  Head:  Normocephalic, atraumatic  Eyes:  Sclerae appear normal.  Pupils equally round. ENT:  Nares appear normal.  Moist oral mucosa  Neck:  No restricted ROM. Trachea midline   CV:   RRR. No m/r/g. No jugular venous distension. Lungs:   CTAB. No wheezing, rhonchi, or rales. Symmetric expansion. Abdomen:   Soft, nontender, nondistended. Extremities: No cyanosis or clubbing. No edema  Skin:     No rashes and normal coloration. Warm and dry. Neuro:  CN II-XII grossly intact.     Psych:  More alert     I have personally reviewed labs and tests:  Recent Labs:  Recent Results (from the past 48 hour(s))   Troponin    Collection Time: 02/13/23  2:41 PM   Result Value Ref Range    Troponin, High Sensitivity 11,977.3 (HH) 0 - 14 pg/mL   Blood Culture 1    Collection Time: 02/13/23  2:41 PM    Specimen: Blood   Result Value Ref Range    Special Requests LEFT  FOREARM        Culture NO GROWTH 2 DAYS     Lipase    Collection Time: 02/13/23  2:41 PM   Result Value Ref Range    Lipase 119 73 - 393 U/L   CK    Collection Time: 02/13/23  2:41 PM   Result Value Ref Range    Total  (H) 21 - 215 U/L   POCT Glucose    Collection Time: 02/13/23  3:03 PM   Result Value Ref Range    POC Glucose 339 (H) 65 - 100 mg/dL    Performed by: Zena Johnson    Lactic Acid    Collection Time: 02/13/23  4:27 PM   Result Value Ref Range    Lactic Acid, Plasma 4.9 (HH) 0.4 - 2.0 MMOL/L   COVID-19, Rapid    Collection Time: 02/13/23  4:27 PM    Specimen: Nasopharyngeal   Result Value Ref Range    Source NASAL      SARS-CoV-2, Rapid Not detected NOTD     Influenza A/B, Molecular    Collection Time: 02/13/23  4:27 PM    Specimen: Not Specified   Result Value Ref Range    Influenza A, BRIANDA Not detected NOTD      Influenza B, BRIANDA Not detected NOTD     Transthoracic echocardiogram (TTE) complete with contrast, bubble, strain, and 3D PRN    Collection Time: 02/13/23  4:33 PM   Result Value Ref Range    Body Surface Area 2.15 m2    Fractional Shortening 2D 14 28 - 44 %    LV ESV Index A4C 50 mL/m2    LV EDV Index A4C 78 mL/m2    LV ESV Index A2C 35 mL/m2    LV EDV Index A2C 49 mL/m2    LVIDd Index 2.29 cm/m2    LVIDs Index 1.96 cm/m2    LV RWT Ratio 0.37     LV Mass 2D 153.0 88 - 224 g    LV Mass 2D Index 71.5 49 - 115 g/m2    LA Volume Index BP 29 16 - 34 ml/m2    LVOT Stroke Volume Index 21.6 mL/m2    LA Volume Index 2C 22 16 - 34 mL/m2    LA Volume Index 4C 34 16 - 34 mL/m2    Ao Root Index 1.36 cm/m2    AV Velocity Ratio 0.57     LVOT:AV VTI Index 0.67     BUZZ/BSA VTI 1.0 cm2/m2    BUZZ/BSA Peak Velocity 0.9 cm2/m2    Est. RA Pressure 5 mmHg    RVSP 51 mmHg    LV EDV A2C 105 mL    LV EDV A4C 167 mL    LV ESV A2C 74 mL    LV ESV A4C 108 mL    IVSd 0.9 0.6 - 1.0 cm    LVIDd 4.9 4.2 - 5.9 cm    LVIDs 4.2 cm    LVOT Diameter 2.0 cm    LVOT Mean Gradient 1 mmHg    LVOT VTI 14.7 cm    LVOT Peak Velocity 0.8 m/s    LVOT Peak Gradient 3 mmHg    LVPWd 0.9 0.6 - 1.0 cm    LV Ejection Fraction A2C 29 %    LV Ejection Fraction A4C 35 %    EF BP 30 (A) 55 - 100 %    LVOT Area 3.1 cm2    LVOT SV 46.2 ml    LA Minor Axis 5.2 cm    LA Major Gabbs 6.0 cm    LA Area 2C 17.1 cm2    LA Area 4C 23.1 cm2    LA Volume 2C 47 18 - 58 mL    LA Volume 4C 72 (A) 18 - 58 mL    LA Volume BP 62 (A) 18 - 58 mL    AV Mean Velocity 1.1 m/s    AV Mean Gradient 5 mmHg    AV VTI 22.0 cm    AV Peak Velocity 1.4 m/s    AV Peak Gradient 7 mmHg    AV Area by VTI 2.1 cm2    AV Area by Peak Velocity 1.9 cm2    Aortic Root 2.9 cm    IVC Proxmal 2.1 cm    PV Max Velocity 0.9 m/s    PV Peak Gradient 3 mmHg    RV Basal Dimension 3.3 cm    TAPSE 1.7 1.7 cm    TR Max Velocity 3.38 m/s    TR Peak Gradient 46 mmHg    Left Ventricular Ejection Fraction 28     LVEF MODALITY ECHO    POCT Glucose    Collection Time: 02/13/23  9:00 PM   Result Value Ref Range    POC Glucose 320 (H) 65 - 100 mg/dL    Performed by: Lauri    Blood Culture 2    Collection Time: 02/13/23 10:11 PM    Specimen: Blood   Result Value Ref Range    Special Requests RIGHT  FOREARM        Culture NO GROWTH 1 DAY     CBC    Collection Time: 02/13/23 10:11 PM   Result Value Ref Range    WBC 17.4 (H) 4.3 - 11.1 K/uL    RBC 4.76 4.23 - 5.6 M/uL    Hemoglobin 14.7 13.6 - 17.2 g/dL    Hematocrit 43.0 41.1 - 50.3 %    MCV 90.3 82 - 102 FL    MCH 30.9 26.1 - 32.9 PG    MCHC 34.2 31.4 - 35.0 g/dL    RDW 13.1 11.9 - 14.6 %    Platelets 618 654 - 808 K/uL    MPV 10.8 9.4 - 12.3 FL    nRBC 0.00 0.0 - 0.2 K/uL   APTT    Collection Time: 02/13/23 10:11 PM   Result Value Ref Range    PTT 60.1 (H) 24.5 - 34.2 SEC   Protime-INR    Collection Time: 02/13/23 10:11 PM   Result Value Ref Range    Protime 15.8 (H) 12.6 - 14.3 sec    INR 1.2     Anti-Xa, Unfractionated Heparin    Collection Time: 02/13/23 10:11 PM   Result Value Ref Range    Anti-XA Unfrac Heparin 0.24 (L) 0.3 - 0.7 IU/mL   Ferritin    Collection Time: 02/13/23 10:11 PM   Result Value Ref Range    Ferritin 141 8 - 388 NG/ML   Transferrin Saturation    Collection Time: 02/13/23 10:11 PM   Result Value Ref Range    Iron 34 (L) 35 - 150 ug/dL    TIBC 247 (L) 250 - 450 ug/dL    TRANSFERRIN SATURATION 14 (L) >20 %   Lactate, Sepsis    Collection Time: 02/13/23 10:11 PM   Result Value Ref Range    Lactic Acid, Sepsis 3.9 (HH) 0.4 - 2.0 MMOL/L   PLEASE READ & DOCUMENT PPD TEST IN 48 HRS    Collection Time: 02/14/23 12:00 AM   Result Value Ref Range    PPD, (POC) Negative Negative    mm Induration 0 0 - 5 mm   Lactic Acid    Collection Time: 02/14/23  2:59 AM   Result Value Ref Range    Lactic Acid, Plasma 2.4 (H) 0.4 - 2.0 MMOL/L   Hemoglobin A1c    Collection Time: 02/14/23  5:55 AM   Result Value Ref Range    Hemoglobin A1C 8.0 (H) 4.8 - 5.6 %    eAG 183 mg/dL   Anti-Xa, Unfractionated Heparin    Collection Time: 02/14/23  5:55 AM   Result Value Ref Range    Anti-XA Unfrac Heparin 0.39 0.3 - 0.7 IU/mL   CBC with Auto Differential    Collection Time: 02/14/23  5:55 AM   Result Value Ref Range    WBC 14.9 (H) 4.3 - 11.1 K/uL    RBC 4.57 4.23 - 5.6 M/uL    Hemoglobin 14.3 13.6 - 17.2 g/dL    Hematocrit 41.5 41.1 - 50.3 %    MCV 90.8 82 - 102 FL    MCH 31.3 26.1 - 32.9 PG    MCHC 34.5 31.4 - 35.0 g/dL    RDW 13.0 11.9 - 14.6 %    Platelets 380 (L) 668 - 450 K/uL    MPV 10.6 9.4 - 12.3 FL    nRBC 0.00 0.0 - 0.2 K/uL    Differential Type AUTOMATED      Seg Neutrophils 81 (H) 43 - 78 %    Lymphocytes 10 (L) 13 - 44 %    Monocytes 9 4.0 - 12.0 %    Eosinophils % 0 (L) 0.5 - 7.8 %    Basophils 0 0.0 - 2.0 %    Immature Granulocytes 0 0.0 - 5.0 %    Segs Absolute 12.0 (H) 1.7 - 8.2 K/UL    Absolute Lymph # 1.5 0.5 - 4.6 K/UL    Absolute Mono # 1.3 0.1 - 1.3 K/UL    Absolute Eos # 0.0 0.0 - 0.8 K/UL    Basophils Absolute 0.0 0.0 - 0.2 K/UL    Absolute Immature Granulocyte 0.1 0.0 - 0.5 K/UL   Magnesium    Collection Time: 02/14/23  5:55 AM   Result Value Ref Range    Magnesium 1.9 1.8 - 2.4 mg/dL   Comprehensive Metabolic Panel    Collection Time: 02/14/23  5:55 AM   Result Value Ref Range    Sodium 140 133 - 143 mmol/L    Potassium 3.8 3.5 - 5.1 mmol/L    Chloride 106 101 - 110 mmol/L    CO2 26 21 - 32 mmol/L    Anion Gap 8 2 - 11 mmol/L    Glucose 267 (H) 65 - 100 mg/dL    BUN 37 (H) 8 - 23 MG/DL    Creatinine 1.40 0.8 - 1.5 MG/DL    Est, Glom Filt Rate 51 (L) >60 ml/min/1.73m2    Calcium 8.8 8.3 - 10.4 MG/DL    Total Bilirubin 1.2 (H) 0.2 - 1.1 MG/DL    ALT 28 12 - 65 U/L    AST 50 (H) 15 - 37 U/L    Alk Phosphatase 61 50 - 136 U/L    Total Protein 6.1 (L) 6.3 - 8.2 g/dL    Albumin 2.9 (L) 3.2 - 4.6 g/dL    Globulin 3.2 2.8 - 4.5 g/dL    Albumin/Globulin Ratio 0.9 0.4 - 1.6     Lactic Acid    Collection Time: 02/14/23  5:55 AM   Result Value Ref Range    Lactic Acid, Plasma 1.8 0.4 - 2.0 MMOL/L   POCT Glucose    Collection Time: 02/14/23  7:25 AM   Result Value Ref Range    POC Glucose 243 (H) 65 - 100 mg/dL    Performed by: Chino    Lactate, Sepsis    Collection Time: 02/14/23 11:17 AM   Result Value Ref Range    Lactic Acid, Sepsis 2.9 (H) 0.4 - 2.0 MMOL/L   Anti-Xa, Unfractionated Heparin    Collection Time: 02/14/23 11:17 AM   Result Value Ref Range    Anti-XA Unfrac Heparin 0.36 0.3 - 0.7 IU/mL   POCT Glucose    Collection Time: 02/14/23 11:25 AM   Result Value Ref Range    POC Glucose 273 (H) 65 - 100 mg/dL    Performed by: Chino    Anti-Xa, Unfractionated Heparin    Collection Time: 02/14/23  1:22 PM   Result Value Ref Range    Anti-XA Unfrac Heparin 0.44 0.3 - 0.7 IU/mL   POCT Glucose    Collection Time: 02/14/23  5:44 PM   Result Value Ref Range    POC Glucose 294 (H) 65 - 100 mg/dL    Performed by: Chino    Anti-Xa, Unfractionated Heparin    Collection Time: 02/14/23  8:18 PM   Result Value Ref Range    Anti-XA Unfrac Heparin 0.48 0.3 - 0.7 IU/mL   POCT Glucose    Collection Time: 02/14/23  9:01 PM   Result Value Ref Range    POC Glucose 256 (H) 65 - 100 mg/dL    Performed by: Lauri    CBC with Auto Differential    Collection Time: 02/15/23  4:30 AM   Result Value Ref Range    WBC 11.0 4.3 - 11.1 K/uL    RBC 4.48 4.23 - 5.6 M/uL    Hemoglobin 13.8 13.6 - 17.2 g/dL    Hematocrit 41.0 (L) 41.1 - 50.3 %    MCV 91.5 82 - 102 FL    MCH 30.8 26.1 - 32.9 PG    MCHC 33.7 31.4 - 35.0 g/dL    RDW 13.0 11.9 - 14.6 %    Platelets 079 (L) 287 - 450 K/uL    MPV 11.0 9.4 - 12.3 FL    nRBC 0.00 0.0 - 0.2 K/uL    Differential Type AUTOMATED      Seg Neutrophils 75 43 - 78 %    Lymphocytes 14 13 - 44 %    Monocytes 10 4.0 - 12.0 % Eosinophils % 2 0.5 - 7.8 %    Basophils 0 0.0 - 2.0 %    Immature Granulocytes 0 0.0 - 5.0 %    Segs Absolute 8.2 1.7 - 8.2 K/UL    Absolute Lymph # 1.5 0.5 - 4.6 K/UL    Absolute Mono # 1.1 0.1 - 1.3 K/UL    Absolute Eos # 0.2 0.0 - 0.8 K/UL    Basophils Absolute 0.0 0.0 - 0.2 K/UL    Absolute Immature Granulocyte 0.0 0.0 - 0.5 K/UL   Magnesium    Collection Time: 02/15/23  4:30 AM   Result Value Ref Range    Magnesium 2.3 1.8 - 2.4 mg/dL   Comprehensive Metabolic Panel    Collection Time: 02/15/23  4:30 AM   Result Value Ref Range    Sodium 138 133 - 143 mmol/L    Potassium 3.5 3.5 - 5.1 mmol/L    Chloride 105 101 - 110 mmol/L    CO2 29 21 - 32 mmol/L    Anion Gap 4 2 - 11 mmol/L    Glucose 229 (H) 65 - 100 mg/dL    BUN 31 (H) 8 - 23 MG/DL    Creatinine 1.20 0.8 - 1.5 MG/DL    Est, Glom Filt Rate >60 >60 ml/min/1.73m2    Calcium 9.1 8.3 - 10.4 MG/DL    Total Bilirubin 1.3 (H) 0.2 - 1.1 MG/DL    ALT 26 12 - 65 U/L    AST 42 (H) 15 - 37 U/L    Alk Phosphatase 59 50 - 136 U/L    Total Protein 6.2 (L) 6.3 - 8.2 g/dL    Albumin 2.8 (L) 3.2 - 4.6 g/dL    Globulin 3.4 2.8 - 4.5 g/dL    Albumin/Globulin Ratio 0.8 0.4 - 1.6     Anti-Xa, Unfractionated Heparin    Collection Time: 02/15/23  4:30 AM   Result Value Ref Range    Anti-XA Unfrac Heparin 0.53 0.3 - 0.7 IU/mL   POCT Glucose    Collection Time: 02/15/23  7:13 AM   Result Value Ref Range    POC Glucose 199 (H) 65 - 100 mg/dL    Performed by: Yojana    POCT Glucose    Collection Time: 02/15/23 12:03 PM   Result Value Ref Range    POC Glucose 283 (H) 65 - 100 mg/dL    Performed by: Yojana        I have personally reviewed imaging studies:  Other Studies:  FL MODIFIED BARIUM SWALLOW W VIDEO   Final Result      No laryngeal penetration or aspiration with any consistency of barium. Please   see detailed report from speech pathology for further description. CT HEAD WO CONTRAST   Final Result      1.   Age-related senescent changes and chronic microvascular disease without   acute intracranial abnormality. CPT code 87272      XR CHEST PORTABLE   Final Result      1. Cardiomegaly with mild central vascular congestion.       CPT code(s) 42556                      Current Meds:  Current Facility-Administered Medications   Medication Dose Route Frequency    carvedilol (COREG) tablet 6.25 mg  6.25 mg Oral BID WC    lisinopril (PRINIVIL;ZESTRIL) tablet 5 mg  5 mg Oral Daily    aspirin chewable tablet 81 mg  81 mg Oral Daily    furosemide (LASIX) injection 20 mg  20 mg IntraVENous Daily    levothyroxine (SYNTHROID) tablet 137 mcg  137 mcg Oral QAM AC    sodium chloride flush 0.9 % injection 5-40 mL  5-40 mL IntraVENous 2 times per day    sodium chloride flush 0.9 % injection 5-40 mL  5-40 mL IntraVENous PRN    0.9 % sodium chloride infusion   IntraVENous PRN    acetaminophen (TYLENOL) tablet 650 mg  650 mg Oral Q6H PRN    Or    acetaminophen (TYLENOL) suppository 650 mg  650 mg Rectal Q6H PRN    glucose chewable tablet 16 g  4 tablet Oral PRN    dextrose bolus 10% 125 mL  125 mL IntraVENous PRN    Or    dextrose bolus 10% 250 mL  250 mL IntraVENous PRN    glucagon (rDNA) injection 1 mg  1 mg SubCUTAneous PRN    dextrose 10 % infusion   IntraVENous Continuous PRN    aluminum & magnesium hydroxide-simethicone (MAALOX) 200-200-20 MG/5ML suspension 30 mL  30 mL Oral Q6H PRN    insulin glargine (LANTUS) injection vial 13 Units  0.15 Units/kg SubCUTAneous Nightly    insulin lispro (HUMALOG) injection vial 0-8 Units  0-8 Units SubCUTAneous TID WC    insulin lispro (HUMALOG) injection vial 0-4 Units  0-4 Units SubCUTAneous Nightly    magnesium sulfate 2000 mg in 50 mL IVPB premix  2,000 mg IntraVENous PRN    potassium chloride (KLOR-CON M) extended release tablet 40 mEq  40 mEq Oral PRN    Or    potassium bicarb-citric acid (EFFER-K) effervescent tablet 40 mEq  40 mEq Oral PRN    Or    potassium chloride 10 mEq/100 mL IVPB (Peripheral Line)  10 mEq IntraVENous PRN    lactated ringers IV soln infusion   IntraVENous Continuous    hyoscyamine (LEVSIN/SL) sublingual tablet 125 mcg  125 mcg SubLINGual Q4H PRN    heparin (porcine) injection 4,000 Units  4,000 Units IntraVENous PRN    heparin (porcine) injection 2,000 Units  2,000 Units IntraVENous PRN    heparin 25,000 units in dextrose 5% 250 mL (premix) infusion  5-30 Units/kg/hr IntraVENous Continuous    pantoprazole (PROTONIX) 40 mg in sodium chloride (PF) 0.9 % 10 mL injection  40 mg IntraVENous Q12H    prochlorperazine (COMPAZINE) injection 5 mg  5 mg IntraVENous Q4H PRN    cefTRIAXone (ROCEPHIN) 1,000 mg in sodium chloride 0.9 % 50 mL IVPB (mini-bag)  1,000 mg IntraVENous Q24H    azithromycin (ZITHROMAX) 500 mg in sodium chloride 0.9 % 250 mL IVPB ([id number])  500 mg IntraVENous Q24H       Signed:  Katerina Rodriguez MD    Part of this note may have been written by using a voice dictation software. The note has been proof read but may still contain some grammatical/other typographical errors.

## 2023-02-15 NOTE — CARE COORDINATION
CM continues to follow for discharge planning and/or CM needs. Pt and family reviewed SNF list and provided SNF choices - [address], [facility], & [facility]. Referrals made this day. Will await bed offers. CM to continue to monitor and update as needed.

## 2023-02-15 NOTE — PROGRESS NOTES
END OF SHIFT NOTE:    INTAKE/OUTPUT  02/14 0701 - 02/15 0700  In: 134.1 [I.V.:134.1]  Out: 1400 [Urine:1400]  Voiding: No  Catheter: Yes  Drain:   Urinary Catheter 02/15/23 Simmons (Active)   $ Urethral catheter insertion $ Not inserted for procedure 02/15/23 0150   Catheter Indications Urinary retention (acute or chronic), continuous bladder irrigation or bladder outlet obstruction 02/15/23 0150   Site Assessment No urethral drainage 02/15/23 0150   Urine Color Yellow 02/15/23 0150   Urine Appearance Clear 02/15/23 0150   Urine Odor Malodorous 02/15/23 0150   Collection Container Standard 02/15/23 0150   Securement Method Securing device (Describe) 02/15/23 0150   Catheter Care  Perineal wipes 02/15/23 0150   Catheter Best Practices  Drainage tube clipped to bed;Catheter secured to thigh; Bag below bladder; Tamper seal intact; Bag not on floor; Lack of dependent loop in tubing;Drainage bag less than half full 02/15/23 0150   Status Draining;Patent 02/15/23 0150   Output (mL) 1200 mL 02/15/23 0150               Flatus: Patient does have flatus present. Stool:  occurrences. Characteristics:  Stool Appearance: Loose  Stool Color: Brown  Stool Amount: Medium  Stool Assessment  Incontinence: No  Stool Appearance: Loose  Stool Color: Brown  Stool Amount: Medium  Stool Source: Rectum  Last BM (including prior to admit):  (patient unable to verbalize)    Emesis:  occurrences. Characteristics:   Emesis Appearance: Chad Olmedo ground (MD notified.)    VITAL SIGNS  Patient Vitals for the past 12 hrs:   Temp Pulse Resp BP SpO2   02/15/23 0704 98.2 °F (36.8 °C) 89 20 122/69 90 %   02/15/23 0321 97.2 °F (36.2 °C) 89 18 117/65 91 %   02/14/23 2312 98.6 °F (37 °C) 94 17 120/63 91 %   02/14/23 2014 97.5 °F (36.4 °C) 94 17 114/63 95 %       Pain Assessment             Ambulating  No    Shift report given to oncoming nurse at the bedside.     Edi Larson RN

## 2023-02-15 NOTE — PROGRESS NOTES
Patient has not voided this shift. Bladder scan: 1120cc. Hospitalist notified. New orders to place boykin.

## 2023-02-15 NOTE — PROGRESS NOTES
END OF SHIFT NOTE:    INTAKE/OUTPUT  02/14 0701 - 02/15 0700  In: 134.1 [I.V.:134.1]  Out: 1400 [Urine:1400]  Voiding: No  Catheter: Yes  Drain:   Urinary Catheter 02/15/23 Simmons (Active)   $ Urethral catheter insertion $ Not inserted for procedure 02/15/23 0150   Catheter Indications Urinary retention (acute or chronic), continuous bladder irrigation or bladder outlet obstruction 02/15/23 0150   Site Assessment No urethral drainage 02/15/23 0150   Urine Color Yellow 02/15/23 0150   Urine Appearance Clear 02/15/23 0150   Urine Odor Malodorous 02/15/23 0150   Collection Container Standard 02/15/23 0150   Securement Method Securing device (Describe) 02/15/23 0150   Catheter Care  Perineal wipes 02/15/23 0150   Catheter Best Practices  Drainage tube clipped to bed;Catheter secured to thigh; Bag below bladder; Tamper seal intact; Bag not on floor; Lack of dependent loop in tubing;Drainage bag less than half full 02/15/23 0150   Status Draining;Patent 02/15/23 0150   Output (mL) 200 mL 02/15/23 1826               Flatus: Patient does have flatus present. Stool: 0 occurrences. Characteristics:  Stool Appearance: Loose  Stool Color: Brown  Stool Amount: Medium  Stool Assessment  Incontinence: No  Stool Appearance: Loose  Stool Color: Brown  Stool Amount: Medium  Stool Source: Rectum  Last BM (including prior to admit):  (patient unable to verbalize)    Emesis: 0 occurrences. Characteristics:   Emesis Appearance: Willdevan Perea, [place] ground (MD notified.)    VITAL SIGNS  Patient Vitals for the past 12 hrs:   Temp Pulse Resp BP SpO2   02/15/23 1528 98.2 °F (36.8 °C) 87 18 (!) 122/57 96 %   02/15/23 1202 98.1 °F (36.7 °C) 83 18 (!) 109/58 96 %   02/15/23 0855 -- 89 -- 122/69 --   02/15/23 0704 98.2 °F (36.8 °C) 89 20 122/69 90 %       Pain Assessment             Ambulating  Yes    Shift report given to oncoming nurse at the bedside.     Mckenzie Shah RN

## 2023-02-15 NOTE — PROGRESS NOTES
ACUTE PHYSICAL THERAPY GOALS:   (Developed with and agreed upon by patient and/or caregiver.)  LTG:  (1.)Mr. Fischer will move from supine to sit and sit to supine , scoot up and down and roll side to side in flat bed without siderails with  INDEPENDENT within 7 day(s).    (2.)Mr. Fischer will perform all functional transfers with  INDEPENDENT using the least restrictive/no device within 7 day(s).    (3.)Mr. Fischer will ambulate with  STAND BY ASSIST for 250+ feet with normal vital sign response with the least restrictive/no device within 7 day(s).     PHYSICAL THERAPY: Daily Note AM   (Link to Caseload Tracking: PT Visit Days : 2  Time In/Out PT Charge Capture  Rehab Caseload Tracker  Orders    Man Fischer is a 77 y.o. male   PRIMARY DIAGNOSIS: Sepsis with encephalopathy without septic shock (HCC)  Shortness of breath [R06.02]  Hypoxia [R09.02]  Elevated troponin [R77.8]  Severe sepsis (HCC) [A41.9, R65.20]  Altered mental status, unspecified altered mental status type [R41.82]  Sepsis with encephalopathy without septic shock, due to unspecified organism (HCC) [A41.9, R65.20, G93.40]       Inpatient: Payor: [facility] MEDICARE / Plan: [employer] DUAL COMPLETE / Product Type: *No Product type* /     ASSESSMENT:     REHAB RECOMMENDATIONS:   Recommendation to date pending progress:  Setting:  Short-term Rehab    Equipment:    To Be Determined     ASSESSMENT:  Mr. Fischer was supine in bed.  He appears more alert today but still not oriented totally.  CGA bed mobility.  Patient stood with min A.  He was very unsteady in standing and required mod A during ambulation for balance and safety with RW.  Patient demonstrated progress with ambulation and transfers today..     SUBJECTIVE:   Mr. Fischer states, \"1994\"     Social/Functional Lives With: Alone  OBJECTIVE:     PAIN: VITALS / O2: PRECAUTION / LINES / DRAINS:   Pre Treatment:   Pain Assessment: None - Denies Pain      Post Treatment: 0 Vitals        Oxygen     IV    RESTRICTIONS/PRECAUTIONS:        MOBILITY: I Mod I S SBA CGA Min Mod Max Total  NT x2 Comments:   Bed Mobility    Rolling [] [] [] [] [x] [] [] [] [] [] []    Supine to Sit [] [] [] [] [x] [] [] [] [] [] []    Scooting [] [] [] [] [x] [] [] [] [] [] []    Sit to Supine [] [] [] [] [] [] [] [] [] [] []    Transfers    Sit to Stand [] [] [] [] [] [x] [] [] [] [] []    Bed to Chair [] [] [] [] [] [x] [x] [] [] [] []    Stand to Sit [] [] [] [] [] [x] [] [] [] [] []     [] [] [] [] [] [] [] [] [] [] []    I=Independent, Mod I=Modified Independent, S=Supervision, SBA=Standby Assistance, CGA=Contact Guard Assistance,   Min=Minimal Assistance, Mod=Moderate Assistance, Max=Maximal Assistance, Total=Total Assistance, NT=Not Tested    BALANCE: Good Fair+ Fair Fair- Poor NT Comments   Sitting Static [x] [] [] [] [] []    Sitting Dynamic [] [] [] [] [] []              Standing Static [] [] [] [] [x] []    Standing Dynamic [] [] [] [] [x] []      GAIT: I Mod I S SBA CGA Min Mod Max Total  NT x2 Comments:   Level of Assistance [] [] [] [] [] [x] [x] [] [] [] []    Distance 15, 25 feet    DME Gait Belt and Rolling Walker    Gait Quality Decreased cindy , Decreased step clearance, Decreased step length, and Path deviations     Weightbearing Status      Stairs      I=Independent, Mod I=Modified Independent, S=Supervision, SBA=Standby Assistance, CGA=Contact Guard Assistance,   Min=Minimal Assistance, Mod=Moderate Assistance, Max=Maximal Assistance, Total=Total Assistance, NT=Not Tested    PLAN:   FREQUENCY AND DURATION: 3 times/week for duration of hospital stay or until stated goals are met, whichever comes first.    TREATMENT:   TREATMENT:   Therapeutic Activity (24 Minutes): Therapeutic activity included Supine to Sit, Scooting, Transfer Training, Ambulation on level ground, and Standing balance to improve functional Activity tolerance, Balance, Mobility, and Strength.     TREATMENT GRID:  N/A    AFTER TREATMENT PRECAUTIONS: Alarm Activated, Bed/Chair Locked, Call light within reach, Chair, Needs within reach, and RN notified    INTERDISCIPLINARY COLLABORATION:  RN/ PCT and PT/ PTA    EDUCATION:      TIME IN/OUT:  Time In: 1200  Time Out: [facility]  Minutes: 24    A [facility], PT Jasmina Armando MD

## 2023-02-16 LAB
ALBUMIN SERPL-MCNC: 2.8 G/DL (ref 3.2–4.6)
ALBUMIN/GLOB SERPL: 0.8 (ref 0.4–1.6)
ALP SERPL-CCNC: 72 U/L (ref 50–136)
ALT SERPL-CCNC: 34 U/L (ref 12–65)
ANION GAP SERPL CALC-SCNC: 6 MMOL/L (ref 2–11)
AST SERPL-CCNC: 42 U/L (ref 15–37)
BASOPHILS # BLD: 0 K/UL (ref 0–0.2)
BASOPHILS NFR BLD: 0 % (ref 0–2)
BILIRUB SERPL-MCNC: 1.2 MG/DL (ref 0.2–1.1)
BUN SERPL-MCNC: 24 MG/DL (ref 8–23)
CALCIUM SERPL-MCNC: 9 MG/DL (ref 8.3–10.4)
CHLORIDE SERPL-SCNC: 102 MMOL/L (ref 101–110)
CO2 SERPL-SCNC: 29 MMOL/L (ref 21–32)
CREAT SERPL-MCNC: 1.1 MG/DL (ref 0.8–1.5)
DIFFERENTIAL METHOD BLD: ABNORMAL
EOSINOPHIL # BLD: 0.2 K/UL (ref 0–0.8)
EOSINOPHIL NFR BLD: 2 % (ref 0.5–7.8)
ERYTHROCYTE [DISTWIDTH] IN BLOOD BY AUTOMATED COUNT: 12.7 % (ref 11.9–14.6)
GLOBULIN SER CALC-MCNC: 3.6 G/DL (ref 2.8–4.5)
GLUCOSE BLD STRIP.AUTO-MCNC: 213 MG/DL (ref 65–100)
GLUCOSE BLD STRIP.AUTO-MCNC: 259 MG/DL (ref 65–100)
GLUCOSE BLD STRIP.AUTO-MCNC: 316 MG/DL (ref 65–100)
GLUCOSE BLD STRIP.AUTO-MCNC: 389 MG/DL (ref 65–100)
GLUCOSE SERPL-MCNC: 228 MG/DL (ref 65–100)
HCT VFR BLD AUTO: 41.9 % (ref 41.1–50.3)
HGB BLD-MCNC: 14.2 G/DL (ref 13.6–17.2)
IMM GRANULOCYTES # BLD AUTO: 0 K/UL (ref 0–0.5)
IMM GRANULOCYTES NFR BLD AUTO: 0 % (ref 0–5)
LYMPHOCYTES # BLD: 1.3 K/UL (ref 0.5–4.6)
LYMPHOCYTES NFR BLD: 14 % (ref 13–44)
MAGNESIUM SERPL-MCNC: 2.1 MG/DL (ref 1.8–2.4)
MCH RBC QN AUTO: 30.7 PG (ref 26.1–32.9)
MCHC RBC AUTO-ENTMCNC: 33.9 G/DL (ref 31.4–35)
MCV RBC AUTO: 90.7 FL (ref 82–102)
MM INDURATION, POC: 0 MM (ref 0–5)
MONOCYTES # BLD: 0.8 K/UL (ref 0.1–1.3)
MONOCYTES NFR BLD: 9 % (ref 4–12)
NEUTS SEG # BLD: 6.8 K/UL (ref 1.7–8.2)
NEUTS SEG NFR BLD: 75 % (ref 43–78)
NRBC # BLD: 0 K/UL (ref 0–0.2)
PLATELET # BLD AUTO: 136 K/UL (ref 150–450)
PMV BLD AUTO: 11.2 FL (ref 9.4–12.3)
POTASSIUM SERPL-SCNC: 3.3 MMOL/L (ref 3.5–5.1)
PPD, POC: NEGATIVE
PROT SERPL-MCNC: 6.4 G/DL (ref 6.3–8.2)
RBC # BLD AUTO: 4.62 M/UL (ref 4.23–5.6)
SERVICE CMNT-IMP: ABNORMAL
SODIUM SERPL-SCNC: 137 MMOL/L (ref 133–143)
UFH PPP CHRO-ACNC: 0.41 IU/ML (ref 0.3–0.7)
UFH PPP CHRO-ACNC: 0.48 IU/ML (ref 0.3–0.7)
WBC # BLD AUTO: 9.1 K/UL (ref 4.3–11.1)

## 2023-02-16 PROCEDURE — 6370000000 HC RX 637 (ALT 250 FOR IP): Performed by: FAMILY MEDICINE

## 2023-02-16 PROCEDURE — 93294 REM INTERROG EVL PM/LDLS PM: CPT | Performed by: INTERNAL MEDICINE

## 2023-02-16 PROCEDURE — 97112 NEUROMUSCULAR REEDUCATION: CPT

## 2023-02-16 PROCEDURE — 6370000000 HC RX 637 (ALT 250 FOR IP): Performed by: INTERNAL MEDICINE

## 2023-02-16 PROCEDURE — C9113 INJ PANTOPRAZOLE SODIUM, VIA: HCPCS | Performed by: FAMILY MEDICINE

## 2023-02-16 PROCEDURE — 36415 COLL VENOUS BLD VENIPUNCTURE: CPT

## 2023-02-16 PROCEDURE — 97530 THERAPEUTIC ACTIVITIES: CPT

## 2023-02-16 PROCEDURE — A4216 STERILE WATER/SALINE, 10 ML: HCPCS | Performed by: FAMILY MEDICINE

## 2023-02-16 PROCEDURE — 6360000002 HC RX W HCPCS: Performed by: FAMILY MEDICINE

## 2023-02-16 PROCEDURE — 97535 SELF CARE MNGMENT TRAINING: CPT

## 2023-02-16 PROCEDURE — 2580000003 HC RX 258: Performed by: FAMILY MEDICINE

## 2023-02-16 PROCEDURE — 83735 ASSAY OF MAGNESIUM: CPT

## 2023-02-16 PROCEDURE — 85025 COMPLETE CBC W/AUTO DIFF WBC: CPT

## 2023-02-16 PROCEDURE — 93296 REM INTERROG EVL PM/IDS: CPT | Performed by: INTERNAL MEDICINE

## 2023-02-16 PROCEDURE — 82962 GLUCOSE BLOOD TEST: CPT

## 2023-02-16 PROCEDURE — 80053 COMPREHEN METABOLIC PANEL: CPT

## 2023-02-16 PROCEDURE — 1100000003 HC PRIVATE W/ TELEMETRY

## 2023-02-16 PROCEDURE — 85520 HEPARIN ASSAY: CPT

## 2023-02-16 RX ORDER — INSULIN LISPRO 100 [IU]/ML
8 INJECTION, SOLUTION INTRAVENOUS; SUBCUTANEOUS ONCE
Status: COMPLETED | OUTPATIENT
Start: 2023-02-16 | End: 2023-02-16

## 2023-02-16 RX ORDER — CLOPIDOGREL BISULFATE 75 MG/1
75 TABLET ORAL DAILY
Status: DISCONTINUED | OUTPATIENT
Start: 2023-02-16 | End: 2023-02-18 | Stop reason: HOSPADM

## 2023-02-16 RX ORDER — ATORVASTATIN CALCIUM 80 MG/1
80 TABLET, FILM COATED ORAL NIGHTLY
Status: DISCONTINUED | OUTPATIENT
Start: 2023-02-16 | End: 2023-02-18 | Stop reason: HOSPADM

## 2023-02-16 RX ADMIN — SODIUM CHLORIDE 40 MG: 9 INJECTION, SOLUTION INTRAMUSCULAR; INTRAVENOUS; SUBCUTANEOUS at 05:36

## 2023-02-16 RX ADMIN — INSULIN LISPRO 2 UNITS: 100 INJECTION, SOLUTION INTRAVENOUS; SUBCUTANEOUS at 08:48

## 2023-02-16 RX ADMIN — INSULIN LISPRO 8 UNITS: 100 INJECTION, SOLUTION INTRAVENOUS; SUBCUTANEOUS at 17:25

## 2023-02-16 RX ADMIN — FUROSEMIDE 20 MG: 10 INJECTION, SOLUTION INTRAMUSCULAR; INTRAVENOUS at 08:48

## 2023-02-16 RX ADMIN — SPIRONOLACTONE 25 MG: 25 TABLET ORAL at 08:46

## 2023-02-16 RX ADMIN — INSULIN LISPRO 6 UNITS: 100 INJECTION, SOLUTION INTRAVENOUS; SUBCUTANEOUS at 11:52

## 2023-02-16 RX ADMIN — ACETAMINOPHEN 650 MG: 325 TABLET ORAL at 11:50

## 2023-02-16 RX ADMIN — CLOPIDOGREL BISULFATE 75 MG: 75 TABLET ORAL at 11:50

## 2023-02-16 RX ADMIN — SODIUM CHLORIDE, PRESERVATIVE FREE 10 ML: 5 INJECTION INTRAVENOUS at 08:48

## 2023-02-16 RX ADMIN — INSULIN LISPRO 8 UNITS: 100 INJECTION, SOLUTION INTRAVENOUS; SUBCUTANEOUS at 17:22

## 2023-02-16 RX ADMIN — LEVOTHYROXINE SODIUM 137 MCG: 0.11 TABLET ORAL at 05:36

## 2023-02-16 RX ADMIN — CARVEDILOL 6.25 MG: 6.25 TABLET, FILM COATED ORAL at 08:47

## 2023-02-16 RX ADMIN — CARVEDILOL 6.25 MG: 6.25 TABLET, FILM COATED ORAL at 17:22

## 2023-02-16 RX ADMIN — INSULIN GLARGINE 13 UNITS: 100 INJECTION, SOLUTION SUBCUTANEOUS at 21:03

## 2023-02-16 RX ADMIN — SODIUM CHLORIDE, PRESERVATIVE FREE 10 ML: 5 INJECTION INTRAVENOUS at 21:04

## 2023-02-16 RX ADMIN — ASPIRIN 81 MG 81 MG: 81 TABLET ORAL at 08:47

## 2023-02-16 RX ADMIN — ATORVASTATIN CALCIUM 80 MG: 80 TABLET, FILM COATED ORAL at 21:02

## 2023-02-16 RX ADMIN — SODIUM CHLORIDE 40 MG: 9 INJECTION, SOLUTION INTRAMUSCULAR; INTRAVENOUS; SUBCUTANEOUS at 17:22

## 2023-02-16 RX ADMIN — POTASSIUM CHLORIDE 40 MEQ: 1500 TABLET, EXTENDED RELEASE ORAL at 08:46

## 2023-02-16 ASSESSMENT — PAIN DESCRIPTION - LOCATION
LOCATION: NECK
LOCATION: NECK

## 2023-02-16 ASSESSMENT — PAIN DESCRIPTION - DESCRIPTORS: DESCRIPTORS: ACHING;DISCOMFORT

## 2023-02-16 ASSESSMENT — PAIN - FUNCTIONAL ASSESSMENT: PAIN_FUNCTIONAL_ASSESSMENT: ACTIVITIES ARE NOT PREVENTED

## 2023-02-16 ASSESSMENT — PAIN DESCRIPTION - PAIN TYPE: TYPE: ACUTE PAIN

## 2023-02-16 ASSESSMENT — PAIN SCALES - GENERAL
PAINLEVEL_OUTOF10: 7
PAINLEVEL_OUTOF10: 3

## 2023-02-16 ASSESSMENT — PAIN SCALES - WONG BAKER
WONGBAKER_NUMERICALRESPONSE: 0
WONGBAKER_NUMERICALRESPONSE: 2

## 2023-02-16 ASSESSMENT — PAIN DESCRIPTION - ORIENTATION: ORIENTATION: POSTERIOR

## 2023-02-16 ASSESSMENT — PAIN DESCRIPTION - FREQUENCY: FREQUENCY: INTERMITTENT

## 2023-02-16 ASSESSMENT — PAIN DESCRIPTION - ONSET: ONSET: GRADUAL

## 2023-02-16 NOTE — PROGRESS NOTES
END OF SHIFT NOTE:    INTAKE/OUTPUT  02/15 0701 - 02/16 0700  In: 1168.9 [P.O.:600; I.V.:568.9]  Out: 1900 [Urine:1900]  Voiding: No  Catheter: Yes  Drain:   Urinary Catheter 02/15/23 Simmons (Active)   $ Urethral catheter insertion $ Not inserted for procedure 02/15/23 0150   Catheter Indications Urinary retention (acute or chronic), continuous bladder irrigation or bladder outlet obstruction 02/16/23 1300   Site Assessment No urethral drainage 02/16/23 1300   Urine Color Yellow 02/16/23 1300   Urine Appearance Clear 02/16/23 1300   Urine Odor Malodorous 02/16/23 1300   Collection Container Standard 02/16/23 1300   Securement Method Securing device (Describe) 02/16/23 1300   Catheter Care  Perineal wipes 02/16/23 1300   Catheter Best Practices  Drainage tube clipped to bed;Catheter secured to thigh; Bag below bladder; Tamper seal intact; Bag not on floor; Lack of dependent loop in tubing;Drainage bag less than half full 02/16/23 1300   Status Draining;Patent 02/16/23 1300   Output (mL) 300 mL 02/16/23 1742               Flatus: Patient does have flatus present. Stool: 0 occurrences. Characteristics:  Stool Appearance: Loose  Stool Color: Brown  Stool Amount: Medium  Stool Assessment  Incontinence: No  Stool Appearance: Loose  Stool Color: Brown  Stool Amount: Medium  Stool Source: Rectum  Last BM (including prior to admit):  (patient unable to verbalize)    Emesis: 0 occurrences.     Characteristics:   Emesis Appearance: Niki Thompson, Coffee ground (MD notified.)    VITAL SIGNS  Patient Vitals for the past 12 hrs:   Temp Pulse Resp BP SpO2   02/16/23 1722 -- 83 -- 123/67 --   02/16/23 1604 97.7 °F (36.5 °C) 83 18 123/67 96 %   02/16/23 1057 97.9 °F (36.6 °C) 91 18 118/67 96 %   02/16/23 0847 -- 85 -- 131/63 --   02/16/23 0755 98.6 °F (37 °C) 85 18 131/63 92 %       Pain Assessment  Pain Level: 7 (02/16/23 1359)  Pain Location: Neck  Patient's Stated Pain Goal: 0 - No pain    Ambulating  Yes    Shift report given to oncoming nurse at the bedside.     Sim Elkins RN

## 2023-02-16 NOTE — PLAN OF CARE
Problem: Discharge Planning  Goal: Discharge to home or other facility with appropriate resources  Outcome: Progressing  Flowsheets (Taken 2/16/2023 0750)  Discharge to home or other facility with appropriate resources:   Identify barriers to discharge with patient and caregiver   Refer to discharge planning if patient needs post-hospital services based on physician order or complex needs related to functional status, cognitive ability or social support system     Problem: Pain  Goal: Verbalizes/displays adequate comfort level or baseline comfort level  Outcome: Progressing     Problem: Safety - Adult  Goal: Free from fall injury  Outcome: Progressing  Flowsheets  Taken 2/16/2023 1300  Free From Fall Injury: Instruct family/caregiver on patient safety  Taken 2/16/2023 0750  Free From Fall Injury: Instruct family/caregiver on patient safety

## 2023-02-16 NOTE — PROGRESS NOTES
[facility] CARDIOLOGY PROGRESS NOTE           2/16/2023 11:18 AM    Admit Date: 2/13/2023         Subjective: Family elected not to pursue invasive evaluation. Patient denies chest pain. He is alert and oriented x2 this morning. ROS:  Cardiovascular:  As noted above    Objective:      Vitals:    02/16/23 0405 02/16/23 0755 02/16/23 0847 02/16/23 1057   BP:  131/63 131/63 118/67   Pulse:  85 85 91   Resp:  18  18   Temp:  98.6 °F (37 °C)  97.9 °F (36.6 °C)   TempSrc:  Oral  Oral   SpO2:  92%  96%   Weight: 192 lb 9.6 oz (87.4 kg)      Height:           On telemetry:sr      Physical Exam:  General: Well Developed, Well Nourished, No Acute Distress, Alert & Oriented x 2, Appropriate mood  Neck: supple, no JVD  Heart: S1S2 with RRR without murmurs or gallops  Lungs: Clear throughout auscultation bilaterally without adventitious sounds  Abd: soft, nontender, nondistended, with good bowel sounds  Ext: no edema bilaterally  Skin: warm and dry      Data Review:   Recent Labs     02/13/23  2211 02/14/23  0555 02/15/23  0430 02/16/23  0632   NA  --    < > 138 137   K  --    < > 3.5 3.3*   MG  --    < > 2.3 2.1   BUN  --    < > 31* 24*   WBC 17.4*   < > 11.0 9.1   HGB 14.7   < > 13.8 14.2   HCT 43.0   < > 41.0* 41.9      < > 133* 136*   INR 1.2  --   --   --     < > = values in this interval not displayed. No results for input(s): TNIPOC in the last 72 hours.     Invalid input(s): TROIQ        Assessment/Plan:     Principal Problem:    Sepsis with encephalopathy without septic shock ([address]  Active Problems:    Coronary artery disease involving native coronary artery of native heart    History of CVA (cerebrovascular accident)    Elevated troponin    Acute respiratory insufficiency    DNR (do not resuscitate)    Pacemaker    Acquired hypothyroidism    Type 2 diabetes mellitus with hyperglycemia, without long-term current use of insulin ([address]    Ischemic cardiomyopathy  Resolved Problems: * No resolved hospital problems. *    A/P  1) NSTEMI/stress enduced cardiomyopathy -based on the clinical data and evidence we have it is impossible to determine whether this man is suffering from an acute occlusion of the major epicardial coronary artery or whether he is suffering from stress-induced cardiomyopathy. We will optimize his therapy for heart failure as well as treating him medically for an acute coronary syndrome. We have not confirmed either diagnosis but he will be treated for both. Both conditions should be considered severe and life-threatening. A) NSTEMI - heparin was started at 2/13 at 1640 would recommend 48 hours of therapy (stop at 2/15 at 1640) start DAPT and high dose statin   B) Stress induced cardiomyopathy - On Coreg 6.25 mg BID, lisinopril 5 mg daily, spironolactone 25 mg daily no SGLT2i at this point can be initiated as outpatient.  On lasix 20 mg IV daily checking daily BMPs to monitor renal function  2) Leukocytosis - unclear etiology is responding to abx therapy per primary team  3) AMS - improving with treatment of other underlying clinic issues    Jefferson Rocha MD  2/16/2023 11:18 AM

## 2023-02-16 NOTE — PROGRESS NOTES
ACUTE OCCUPATIONAL THERAPY GOALS:   (Developed with and agreed upon by patient and/or caregiver.)  1. Pt will complete LB ADL Mod A with AE as needed. 2. Pt will complete toileting Mod A with AE as needed. 3. Pt will complete UB ADL Min A.  4. Pt will tolerate 25 minutes of OT treatment requiring 1-2 breaks as needed. 5. Pt will complete grooming/feeding tasks while seated EOB with SBA. 6. Pt will complete functional mobility via RW Mod A.   7. Pt will tolerate BUE exercises to increase strength for safe, functional transfers, ADL participation. Timeframe: 7 visits     OCCUPATIONAL THERAPY: Daily Note AM   OT Visit Days: 2   Time In/Out  OT Charge Capture  Rehab Caseload Tracker  OT Orders    Fara Hernandez is a 76 y.o. male   PRIMARY DIAGNOSIS: Sepsis with encephalopathy without septic shock ([facility])  Shortness of breath [R06.02]  Hypoxia [R09.02]  Elevated troponin [R77.8]  Severe sepsis ([facility] [A41.9, R65.20]  Altered mental status, unspecified altered mental status type [R41.82]  Sepsis with encephalopathy without septic shock, due to unspecified organism ([facility] [A41.9, R65.20, G93.40]       Inpatient: Payor: [address] [place] [address] Stefania / Plan: [employer] / Product Type: *No Product type* /     ASSESSMENT:     REHAB RECOMMENDATIONS: CURRENT LEVEL OF FUNCTION:  (Most Recently Demonstrated)   Recommendation to date pending progress:  Setting:  Short-term Rehab    Equipment:    To Be Determined Bathing:  Not Tested  Dressing:  Not Tested  Feeding/Grooming:  Contact Guard Assist- Min A  Toileting:  Not Tested  Functional Mobility:  Contact Guard Assist- Min A RW     ASSESSMENT:  Mr. Bree Osborn was received sitting up in chair with alarm. Pt required assistance for functional mobility and ADLs today due to generalized weakness, impaired balance, impulsivity. Pt with swaying forward/backward during dynamic standing balance at edge of sink.  Seated UB therapeutic exercise attempted but limited due to severely limited AROM/PROM of neck. Could not perform full shoulder flexion AROM. Pt c/o neck pain. RN notified. Heat applied. Pt is progressing with functional mobility and ADLs. Continue POC. SUBJECTIVE:     Mr. Geo Richetr states, \"I'm just so weak. \"     Social/Functional Lives With: Alone    OBJECTIVE:     Amy Sanchez / [employer] / AIRWAY: Simmons Catheter and IV    RESTRICTIONS/PRECAUTIONS:  Restrictions/Precautions  Restrictions/Precautions: Fall Risk        PAIN: VITALS / O2:   Pre Treatment:   Pain Assessment: 0-10  Pain Level: 7  Pain Location: Neck  Non-Pharmaceutical Pain Intervention(s): Nurse notified (comment); Repositioned; Heat applied        Post Treatment: 7/10 Vitals          Oxygen        MOBILITY: I Mod I S SBA CGA Min Mod Max Total  NT x2 Comments:   Bed Mobility    Rolling [] [] [] [] [] [] [] [] [] [] []    Supine to Sit [] [] [] [] [] [] [] [] [] [] []    Scooting [] [] [] [] [] [] [] [] [] [] []    Sit to Supine [] [] [] [] [] [] [] [] [] [] []    Transfers    Sit to Stand [] [] [] [] [x] [x] [] [] [] [] []    Bed to Chair [] [] [] [] [] [] [] [] [] [] []    Stand to Sit [] [] [] [] [x] [x] [] [] [] [] []    Tub/Shower [] [] [] [] [] [] [] [] [] [] []     Toilet [] [] [] [] [] [] [] [] [] [] []      [] [] [] [] [] [] [] [] [] [] []    I=Independent, Mod I=Modified Independent, S=Supervision/Setup, SBA=Standby Assistance, CGA=Contact Guard Assistance, Min=Minimal Assistance, Mod=Moderate Assistance, Max=Maximal Assistance, Total=Total Assistance, NT=Not Tested    ACTIVITIES OF DAILY LIVING: I Mod I S SBA CGA Min Mod Max Total NT Comments   BASIC ADLs:              Upper Body Bathing [] [] [] [] [] [] [] [] [] []    Lower Body Bathing [] [] [] [] [] [] [] [] [] []    Toileting [] [] [] [] [] [] [] [] [] []    Upper Body Dressing [] [] [] [] [] [] [] [] [] []    Lower Body Dressing [] [] [] [] [] [] [] [] [] []    Feeding [] [] [] [] [] [] [] [] [] []    Grooming [] [] [] [] [x] [x] [] [] [] [] Standing EOS brushing dentures   Personal Device Care [] [] [] [] [] [] [] [] [] []    Functional Mobility [] [] [] [] [x] [x] [] [] [] [] Chair > EOS > chair RW   I=Independent, Mod I=Modified Independent, S=Supervision/Setup, SBA=Standby Assistance, CGA=Contact Guard Assistance, Min=Minimal Assistance, Mod=Moderate Assistance, Max=Maximal Assistance, Total=Total Assistance, NT=Not Tested    BALANCE: Good Fair+ Fair Fair- Poor NT Comments   Sitting Static [x] [] [] [] [] []    Sitting Dynamic [] [] [] [] [] []              Standing Static [] [] [x] [] [] []    Standing Dynamic [] [] [x] [] [] []        PLAN:     FREQUENCY/DURATION   OT Plan of Care: 3 times/week for duration of hospital stay or until stated goals are met, whichever comes first.    TREATMENT:     TREATMENT:   Self Care (23 minutes): Patient participated in grooming ADLs in standing with minimal verbal, manual, and tactile cueing to increase independence, decrease assistance required, increase activity tolerance, and increase safety awareness. Patient also participated in functional mobility and adaptive equipment training to increase independence, decrease assistance required, increase activity tolerance, and increase safety awareness. TREATMENT GRID:   Date:  2/16/23 Date:   Date:     Activity/Exercise Parameters Parameters Parameters   Seated sh. ABD AROM 2x10     Attempted shoulder shrugs, neck AROM/PROM but unable due to pain                                        AFTER TREATMENT PRECAUTIONS: Alarm Activated, Bed/Chair Locked, Call light within reach, Chair, Needs within reach, and RN notified    INTERDISCIPLINARY COLLABORATION:  RN/ PCT, OT/ GILMORE, and Rehab Attendant    EDUCATION:  Education Given To: Patient  Education Provided: Role of Therapy;Plan of Care; Fall Prevention Strategies  Education Outcome: Continued education needed    TOTAL TREATMENT DURATION AND TIME:  Time In: 7881  Time Out: 6225  Minutes: [address], OT

## 2023-02-16 NOTE — PROGRESS NOTES
END OF SHIFT NOTE:    INTAKE/OUTPUT  02/15 0701 - 02/16 0700  In: 1168.9 [P.O.:600; I.V.:568.9]  Out: 1900 [Urine:1900]  Voiding: Yes  Catheter: No  Drain:   Urinary Catheter 02/15/23 Simmons (Active)   $ Urethral catheter insertion $ Not inserted for procedure 02/15/23 0150   Catheter Indications Urinary retention (acute or chronic), continuous bladder irrigation or bladder outlet obstruction 02/15/23 1920   Site Assessment No urethral drainage 02/15/23 1920   Urine Color Yellow 02/15/23 1920   Urine Appearance Clear 02/15/23 1920   Urine Odor Malodorous 02/15/23 1310   Collection Container Standard 02/15/23 1920   Securement Method Securing device (Describe) 02/15/23 1920   Catheter Care  Perineal wipes 02/15/23 1310   Catheter Best Practices  Drainage tube clipped to bed;Catheter secured to thigh; Bag below bladder; Tamper seal intact; Bag not on floor; Lack of dependent loop in tubing;Drainage bag less than half full 02/15/23 1920   Status Draining;Patent 02/15/23 1920   Output (mL) 400 mL 02/16/23 0405               Flatus: Patient does have flatus present. Stool:  occurrences. Characteristics:  Stool Appearance: Loose  Stool Color: Brown  Stool Amount: Medium  Stool Assessment  Incontinence: No  Stool Appearance: Loose  Stool Color: Brown  Stool Amount: Medium  Stool Source: Rectum  Last BM (including prior to admit):  (patient unable to verbalize)    Emesis:  occurrences. Characteristics:   Emesis Appearance: Apollo Machelle, Coffee ground (MD notified.)    VITAL SIGNS  Patient Vitals for the past 12 hrs:   Temp Pulse Resp BP SpO2   02/16/23 0347 98.6 °F (37 °C) 85 17 124/64 92 %   02/15/23 2321 98.4 °F (36.9 °C) 84 19 (!) 115/53 94 %   02/15/23 1959 98.1 °F (36.7 °C) 87 19 (!) 107/57 97 %   02/15/23 1920 -- 86 -- -- --       Pain Assessment             Ambulating  No    Shift report given to oncoming nurse at the bedside.     Jorden Shannon RN

## 2023-02-16 NOTE — PROGRESS NOTES
ACUTE PHYSICAL THERAPY GOALS:   (Developed with and agreed upon by patient and/or caregiver. )  LTG:  (1.)Mr. Fischer will move from supine to sit and sit to supine , scoot up and down and roll side to side in flat bed without siderails with  INDEPENDENT within 7 day(s). (2.)Mr. Fischer will perform all functional transfers with  INDEPENDENT using the least restrictive/no device within 7 day(s). (3.)Mr. Fischer will ambulate with  STAND BY ASSIST for 250+ feet with normal vital sign response with the least restrictive/no device within 7 day(s). PHYSICAL THERAPY: Daily Note PM   (Link to Caseload Tracking: PT Visit Days : 3  Time In/Out PT Charge Capture  Rehab Caseload Tracker  Orders    Melissa Lacy is a 76 y.o. male   PRIMARY DIAGNOSIS: Sepsis with encephalopathy without septic shock ([address]  Shortness of breath [R06.02]  Hypoxia [R09.02]  Elevated troponin [R77.8]  Severe sepsis ([address] [A41.9, R65.20]  Altered mental status, unspecified altered mental status type [R41.82]  Sepsis with encephalopathy without septic shock, due to unspecified organism ([address] [A41.9, R65.20, G93.40]       Inpatient: Payor: Peng Covert / Plan: Abdirizak Garcia / Product Type: *No Product type* /     ASSESSMENT:     REHAB RECOMMENDATIONS:   Recommendation to date pending progress:  Setting:  Short-term Rehab    Equipment:    To Be Determined     ASSESSMENT:  Mr. Yamilex Arellano was supine in bed. He appears more alert today but still not speaking much. Min A bed mobility. Patient stood with CGA. He was able to ambulate 100' with RW and min A..  Patient demonstrated progress with ambulation and transfers today. .     SUBJECTIVE:   Mr. Yamilex Arellano states, \"The doctor asked if I'd been walking much. \"     Social/Functional Lives With: Alone  OBJECTIVE:     PAIN: VITALS / O2: PRECAUTION / Ileene Taj / DRAINS:   Pre Treatment:   Pain Assessment: None - Denies Pain      Post Treatment: 0 Vitals        Oxygen IV    RESTRICTIONS/PRECAUTIONS:        MOBILITY: I Mod I S SBA CGA Min Mod Max Total  NT x2 Comments:   Bed Mobility    Rolling [] [] [] [] [x] [] [] [] [] [] []    Supine to Sit [] [] [] [] [x] [x] [] [] [] [] []    Scooting [] [] [] [] [x] [] [] [] [] [] []    Sit to Supine [] [] [] [] [] [] [] [] [] [] []    Transfers    Sit to Stand [] [] [] [] [x] [] [] [] [] [] []    Bed to Chair [] [] [] [] [] [x] [x] [] [] [] []    Stand to Sit [] [] [] [] [x] [x] [] [] [] [] []     [] [] [] [] [] [] [] [] [] [] []    I=Independent, Mod I=Modified Independent, S=Supervision, SBA=Standby Assistance, CGA=Contact Guard Assistance,   Min=Minimal Assistance, Mod=Moderate Assistance, Max=Maximal Assistance, Total=Total Assistance, NT=Not Tested    BALANCE: Good Fair+ Fair Fair- Poor NT Comments   Sitting Static [x] [] [] [] [] []    Sitting Dynamic [] [] [] [] [] []              Standing Static [] [] [] [] [x] []    Standing Dynamic [] [] [] [] [x] []      GAIT: I Mod I S SBA CGA Min Mod Max Total  NT x2 Comments:   Level of Assistance [] [] [] [] [] [x] [] [] [] [] [x] Chair follow for safety   Distance 100, 50 feet    DME Gait Belt and Rolling Walker    Gait Quality Decreased cindy , Decreased step clearance, Decreased step length, and Path deviations     Weightbearing Status      Stairs      I=Independent, Mod I=Modified Independent, S=Supervision, SBA=Standby Assistance, CGA=Contact Guard Assistance,   Min=Minimal Assistance, Mod=Moderate Assistance, Max=Maximal Assistance, Total=Total Assistance, NT=Not Tested    PLAN:   FREQUENCY AND DURATION: 3 times/week for duration of hospital stay or until stated goals are met, whichever comes first.    TREATMENT:   TREATMENT:   Therapeutic Activity (24 Minutes): Therapeutic activity included Supine to Sit, Scooting, Transfer Training, Ambulation on level ground, and Standing balance to improve functional Activity tolerance, Balance, Mobility, and Strength.     TREATMENT GRID:  N/A    AFTER TREATMENT PRECAUTIONS: Alarm Activated, Bed/Chair Locked, Call light within reach, Chair, Needs within reach, and RN notified    INTERDISCIPLINARY COLLABORATION:  RN/ PCT and PT/ PTA    EDUCATION:      TIME IN/OUT:  Time In: 2800  Time Out: [address]  Minutes: 25    A Ninoska Alberto, PT

## 2023-02-16 NOTE — PROGRESS NOTES
PatiePatient BGL is 377. Instructed to give 4 units of humalog and notify providernt BGL is 377. Hospitalist notified.  Instructed to give 8 units of Humalog total.

## 2023-02-16 NOTE — PROGRESS NOTES
Hospitalist Progress Note   Admit Date:  2023 12:11 PM   Name:  Apryl Shea   Age:  76 y.o. Sex:  male  :  1945   MRN:  [id number]   Room:      Presenting Complaint: Altered Mental Status     Reason(s) for Admission: Shortness of breath [R06.02]  Hypoxia [R09.02]  Elevated troponin [R77.8]  Severe sepsis ([address] [A41.9, R65.20]  Altered mental status, unspecified altered mental status type [R41.82]  Sepsis with encephalopathy without septic shock, due to unspecified organism ([address] [A41.9, R65.20, G93.40]     Hospital Course: Apryl Shea is a 76 y.o. male with medical history of CAD s/p CABG, ICM, CHB s/p dual chamber PPM, HLD, DM, CVA, hypothyroidism who presented from home via EMS with c/o AMS. Family called for a welfare check and patient found AMS in chair covered in vomit and urine. Family called for a welfare check and patient found AMS in chair covered in vomit and urine. Found to be hypoxic on EMS arrival spo2 86% and placed on 4lpm NC. Patient admitted with sepsis, unclear source, acute respiratory failure secondary to pulm edema and metabolic encephalopathy. Subjective & 24hr Events (23): Patient is seen at the bedside. Pleasantly confused. No active complaint. Denies chest pain, palpitation, shortness of breath, nausea, vomiting or abdominal pain.     Assessment & Plan:     Sepsis, unclear etiology:  Patient met sepsis criteria on admission  Chest x-ray with vascular congestion, UA clean, procalcitonin negative  Blood culture negative for 48 hours  I will discontinue antibiotics and observe for now    NSTEMI:  Stress-induced cardiomyopathy:  Unable to rule out NSTEMI  Echocardiogram concerning for stress-induced cardiomyopathy with ejection fraction 25 to 30%  Cardiology discussed cardiac catheterization with family, per family patient wants more conservative approach  Treated with heparin GTT for 48 hours and then transitioned to DAPT  Continue high-dose statin  Cardiology optimize medical management and patient currently on Coreg, lisinopril and spironolactone. Plan to add Jardiance outpatient    Acute metabolic encephalopathy:  Secondary to above  CT head negative for acute pathology  Delirium precautions  Treat underlying conditions  Patient improved    CAD s/p CABG:  Pacemaker:  HFrEF:  Continue aspirin and heparin gtt. Continue Lasix and carvedilol  Resume lisinopril and spironolactone  Strict I&O's  Cardiology following    LETTY:  Resolved with gentle hydration  Avoid nephrotoxic agent    Type 2 diabetes mellitus:  Continue Lantus  Start patient on sliding scale  Adjust insulin accordingly    Hypothyroidism:  Continue Synthyroid      PT/OT evals and PPD needed/ordered? Yes    Diet:  ADULT DIET; Dysphagia - Minced and Moist; 3 carb choices (45 gm/meal)  VTE prophylaxis:Lovenox  Code status: DNR    Hospital Problems:  Principal Problem:    Sepsis with encephalopathy without septic shock ([facility]  Active Problems:    Coronary artery disease involving native coronary artery of native heart    History of CVA (cerebrovascular accident)    Elevated troponin    Acute respiratory insufficiency    DNR (do not resuscitate)    Pacemaker    Acquired hypothyroidism    Type 2 diabetes mellitus with hyperglycemia, without long-term current use of insulin ([facility] [address]    Ischemic cardiomyopathy  Resolved Problems:    * No resolved hospital problems.  *      Objective:   Patient Vitals for the past 24 hrs:   Temp Pulse Resp BP SpO2   02/16/23 1057 97.9 °F (36.6 °C) 91 18 118/67 96 %   02/16/23 0847 -- 85 -- 131/63 --   02/16/23 0755 98.6 °F (37 °C) 85 18 131/63 92 %   02/16/23 0347 98.6 °F (37 °C) 85 17 124/64 92 %   02/15/23 2321 98.4 °F (36.9 °C) 84 19 (!) 115/53 94 %   02/15/23 1959 98.1 °F (36.7 °C) 87 19 (!) 107/57 97 %   02/15/23 1920 -- 86 -- -- --   02/15/23 1528 98.2 °F (36.8 °C) 87 18 (!) 122/57 96 %         Oxygen Therapy  SpO2: 96 %  Pulse via Oximetry: 114 beats per minute  Pulse Oximeter Device Mode: Intermittent  O2 Device: Nasal cannula  O2 Flow Rate (L/min): 4 L/min    Estimated body mass index is 25.41 kg/m² as calculated from the following:    Height as of this encounter: 6' 1\" (1.854 m). Weight as of this encounter: 192 lb 9.6 oz (87.4 kg). Intake/Output Summary (Last 24 hours) at 2/16/2023 1518  Last data filed at 2/16/2023 1300  Gross per 24 hour   Intake 1406.88 ml   Output 1500 ml   Net -93.12 ml           Physical Exam:     Blood pressure 118/67, pulse 91, temperature 97.9 °F (36.6 °C), temperature source Oral, resp. rate 18, height 6' 1\" (1.854 m), weight 192 lb 9.6 oz (87.4 kg), SpO2 96 %. General:    Well nourished. More alert today, in mitten  Head:  Normocephalic, atraumatic  Eyes:  Sclerae appear normal.  Pupils equally round. ENT:  Nares appear normal.  Moist oral mucosa  Neck:  No restricted ROM. Trachea midline   CV:   RRR. No m/r/g. No jugular venous distension. Lungs:   CTAB. No wheezing, rhonchi, or rales. Symmetric expansion. Abdomen:   Soft, nontender, nondistended. Extremities: No cyanosis or clubbing. No edema  Skin:     No rashes and normal coloration. Warm and dry. Neuro:  CN II-XII grossly intact.     Psych:  More alert     I have personally reviewed labs and tests:  Recent Labs:  Recent Results (from the past 48 hour(s))   POCT Glucose    Collection Time: 02/14/23  5:44 PM   Result Value Ref Range    POC Glucose 294 (H) 65 - 100 mg/dL    Performed by: [place]    Anti-Xa, Unfractionated Heparin    Collection Time: 02/14/23  8:18 PM   Result Value Ref Range    Anti-XA Unfrac Heparin 0.48 0.3 - 0.7 IU/mL   POCT Glucose    Collection Time: 02/14/23  9:01 PM   Result Value Ref Range    POC Glucose 256 (H) 65 - 100 mg/dL    Performed by: [address] PPD TEST IN 24 HRS    Collection Time: 02/14/23  9:30 PM   Result Value Ref Range    PPD, (POC) Negative Negative    mm Induration 0 0 - 5 mm CBC with Auto Differential    Collection Time: 02/15/23  4:30 AM   Result Value Ref Range    WBC 11.0 4.3 - 11.1 K/uL    RBC 4.48 4.23 - 5.6 M/uL    Hemoglobin 13.8 13.6 - 17.2 g/dL    Hematocrit 41.0 (L) 41.1 - 50.3 %    MCV 91.5 82 - 102 FL    MCH 30.8 26.1 - 32.9 PG    MCHC 33.7 31.4 - 35.0 g/dL    RDW 13.0 11.9 - 14.6 %    Platelets 973 (L) 291 - 450 K/uL    MPV 11.0 9.4 - 12.3 FL    nRBC 0.00 0.0 - 0.2 K/uL    Differential Type AUTOMATED      Seg Neutrophils 75 43 - 78 %    Lymphocytes 14 13 - 44 %    Monocytes 10 4.0 - 12.0 %    Eosinophils % 2 0.5 - 7.8 %    Basophils 0 0.0 - 2.0 %    Immature Granulocytes 0 0.0 - 5.0 %    Segs Absolute 8.2 1.7 - 8.2 K/UL    Absolute Lymph # 1.5 0.5 - 4.6 K/UL    Absolute Mono # 1.1 0.1 - 1.3 K/UL    Absolute Eos # 0.2 0.0 - 0.8 K/UL    Basophils Absolute 0.0 0.0 - 0.2 K/UL    Absolute Immature Granulocyte 0.0 0.0 - 0.5 K/UL   Magnesium    Collection Time: 02/15/23  4:30 AM   Result Value Ref Range    Magnesium 2.3 1.8 - 2.4 mg/dL   Comprehensive Metabolic Panel    Collection Time: 02/15/23  4:30 AM   Result Value Ref Range    Sodium 138 133 - 143 mmol/L    Potassium 3.5 3.5 - 5.1 mmol/L    Chloride 105 101 - 110 mmol/L    CO2 29 21 - 32 mmol/L    Anion Gap 4 2 - 11 mmol/L    Glucose 229 (H) 65 - 100 mg/dL    BUN 31 (H) 8 - 23 MG/DL    Creatinine 1.20 0.8 - 1.5 MG/DL    Est, Glom Filt Rate >60 >60 ml/min/1.73m2    Calcium 9.1 8.3 - 10.4 MG/DL    Total Bilirubin 1.3 (H) 0.2 - 1.1 MG/DL    ALT 26 12 - 65 U/L    AST 42 (H) 15 - 37 U/L    Alk Phosphatase 59 50 - 136 U/L    Total Protein 6.2 (L) 6.3 - 8.2 g/dL    Albumin 2.8 (L) 3.2 - 4.6 g/dL    Globulin 3.4 2.8 - 4.5 g/dL    Albumin/Globulin Ratio 0.8 0.4 - 1.6     Anti-Xa, Unfractionated Heparin    Collection Time: 02/15/23  4:30 AM   Result Value Ref Range    Anti-XA Unfrac Heparin 0.53 0.3 - 0.7 IU/mL   POCT Glucose    Collection Time: 02/15/23  7:13 AM   Result Value Ref Range    POC Glucose 199 (H) 65 - 100 mg/dL Performed by: [employer]    POCT Glucose    Collection Time: 02/15/23 12:03 PM   Result Value Ref Range    POC Glucose 283 (H) 65 - 100 mg/dL    Performed by: [employer]    Anti-Xa, Unfractionated Heparin    Collection Time: 02/15/23  4:47 PM   Result Value Ref Range    Anti-XA Unfrac Heparin 0.34 0.3 - 0.7 IU/mL   POCT Glucose    Collection Time: 02/15/23  5:17 PM   Result Value Ref Range    POC Glucose 284 (H) 65 - 100 mg/dL    Performed by: [employer]    POCT Glucose    Collection Time: 02/15/23  9:17 PM   Result Value Ref Range    POC Glucose 377 (H) 65 - 100 mg/dL    Performed by: [address] PPD TEST IN 48 HRS    Collection Time: 02/15/23  9:29 PM   Result Value Ref Range    PPD, (POC) Negative Negative    mm Induration 0 0 - 5 mm   Anti-Xa, Unfractionated Heparin    Collection Time: 02/15/23 10:46 PM   Result Value Ref Range    Anti-XA Unfrac Heparin 0.48 0.3 - 0.7 IU/mL   Anti-Xa, Unfractionated Heparin    Collection Time: 02/16/23  6:32 AM   Result Value Ref Range    Anti-XA Unfrac Heparin 0.41 0.3 - 0.7 IU/mL   CBC with Auto Differential    Collection Time: 02/16/23  6:32 AM   Result Value Ref Range    WBC 9.1 4.3 - 11.1 K/uL    RBC 4.62 4.23 - 5.6 M/uL    Hemoglobin 14.2 13.6 - 17.2 g/dL    Hematocrit 41.9 41.1 - 50.3 %    MCV 90.7 82 - 102 FL    MCH 30.7 26.1 - 32.9 PG    MCHC 33.9 31.4 - 35.0 g/dL    RDW 12.7 11.9 - 14.6 %    Platelets 179 (L) 836 - 450 K/uL    MPV 11.2 9.4 - 12.3 FL    nRBC 0.00 0.0 - 0.2 K/uL    Differential Type AUTOMATED      Seg Neutrophils 75 43 - 78 %    Lymphocytes 14 13 - 44 %    Monocytes 9 4.0 - 12.0 %    Eosinophils % 2 0.5 - 7.8 %    Basophils 0 0.0 - 2.0 %    Immature Granulocytes 0 0.0 - 5.0 %    Segs Absolute 6.8 1.7 - 8.2 K/UL    Absolute Lymph # 1.3 0.5 - 4.6 K/UL    Absolute Mono # 0.8 0.1 - 1.3 K/UL    Absolute Eos # 0.2 0.0 - 0.8 K/UL    Basophils Absolute 0.0 0.0 - 0.2 K/UL    Absolute Immature Granulocyte 0.0 0.0 - 0.5 K/UL   Magnesium    Collection Time: 02/16/23  6:32 AM   Result Value Ref Range    Magnesium 2.1 1.8 - 2.4 mg/dL   Comprehensive Metabolic Panel    Collection Time: 02/16/23  6:32 AM   Result Value Ref Range    Sodium 137 133 - 143 mmol/L    Potassium 3.3 (L) 3.5 - 5.1 mmol/L    Chloride 102 101 - 110 mmol/L    CO2 29 21 - 32 mmol/L    Anion Gap 6 2 - 11 mmol/L    Glucose 228 (H) 65 - 100 mg/dL    BUN 24 (H) 8 - 23 MG/DL    Creatinine 1.10 0.8 - 1.5 MG/DL    Est, Glom Filt Rate >60 >60 ml/min/1.73m2    Calcium 9.0 8.3 - 10.4 MG/DL    Total Bilirubin 1.2 (H) 0.2 - 1.1 MG/DL    ALT 34 12 - 65 U/L    AST 42 (H) 15 - 37 U/L    Alk Phosphatase 72 50 - 136 U/L    Total Protein 6.4 6.3 - 8.2 g/dL    Albumin 2.8 (L) 3.2 - 4.6 g/dL    Globulin 3.6 2.8 - 4.5 g/dL    Albumin/Globulin Ratio 0.8 0.4 - 1.6     POCT Glucose    Collection Time: 02/16/23  7:51 AM   Result Value Ref Range    POC Glucose 213 (H) 65 - 100 mg/dL    Performed by: [employer]    POCT Glucose    Collection Time: 02/16/23 11:00 AM   Result Value Ref Range    POC Glucose 316 (H) 65 - 100 mg/dL    Performed by: [employer]        I have personally reviewed imaging studies:  Other Studies:  FL MODIFIED BARIUM SWALLOW W VIDEO   Final Result      No laryngeal penetration or aspiration with any consistency of barium. Please   see detailed report from speech pathology for further description. CT HEAD WO CONTRAST   Final Result      1. Age-related senescent changes and chronic microvascular disease without   acute intracranial abnormality. CPT code 21722      XR CHEST PORTABLE   Final Result      1. Cardiomegaly with mild central vascular congestion.       CPT code(s) 38892                      Current Meds:  Current Facility-Administered Medications   Medication Dose Route Frequency    atorvastatin (LIPITOR) tablet 80 mg  80 mg Oral Nightly    clopidogrel (PLAVIX) tablet 75 mg  75 mg Oral Daily    carvedilol (COREG) tablet 6.25 mg  6.25 mg Oral BID WC    lisinopril (PRINIVIL;ZESTRIL) tablet 5 mg  5 mg Oral Daily    spironolactone (ALDACTONE) tablet 25 mg  25 mg Oral Daily    aspirin chewable tablet 81 mg  81 mg Oral Daily    furosemide (LASIX) injection 20 mg  20 mg IntraVENous Daily    levothyroxine (SYNTHROID) tablet 137 mcg  137 mcg Oral QAM AC    sodium chloride flush 0.9 % injection 5-40 mL  5-40 mL IntraVENous 2 times per day    sodium chloride flush 0.9 % injection 5-40 mL  5-40 mL IntraVENous PRN    0.9 % sodium chloride infusion   IntraVENous PRN    acetaminophen (TYLENOL) tablet 650 mg  650 mg Oral Q6H PRN    Or    acetaminophen (TYLENOL) suppository 650 mg  650 mg Rectal Q6H PRN    glucose chewable tablet 16 g  4 tablet Oral PRN    dextrose bolus 10% 125 mL  125 mL IntraVENous PRN    Or    dextrose bolus 10% 250 mL  250 mL IntraVENous PRN    glucagon (rDNA) injection 1 mg  1 mg SubCUTAneous PRN    dextrose 10 % infusion   IntraVENous Continuous PRN    aluminum & magnesium hydroxide-simethicone (MAALOX) 200-200-20 MG/5ML suspension 30 mL  30 mL Oral Q6H PRN    insulin glargine (LANTUS) injection vial 13 Units  0.15 Units/kg SubCUTAneous Nightly    insulin lispro (HUMALOG) injection vial 0-8 Units  0-8 Units SubCUTAneous TID WC    insulin lispro (HUMALOG) injection vial 0-4 Units  0-4 Units SubCUTAneous Nightly    magnesium sulfate 2000 mg in 50 mL IVPB premix  2,000 mg IntraVENous PRN    potassium chloride (KLOR-CON M) extended release tablet 40 mEq  40 mEq Oral PRN    Or    potassium bicarb-citric acid (EFFER-K) effervescent tablet 40 mEq  40 mEq Oral PRN    Or    potassium chloride 10 mEq/100 mL IVPB (Peripheral Line)  10 mEq IntraVENous PRN    hyoscyamine (LEVSIN/SL) sublingual tablet 125 mcg  125 mcg SubLINGual Q4H PRN    heparin (porcine) injection 4,000 Units  4,000 Units IntraVENous PRN    heparin (porcine) injection 2,000 Units  2,000 Units IntraVENous PRN    pantoprazole (PROTONIX) 40 mg in sodium chloride (PF) 0.9 % 10 mL injection 40 mg IntraVENous Q12H    prochlorperazine (COMPAZINE) injection 5 mg  5 mg IntraVENous Q4H PRN       Signed:  Juan Castro MD    Part of this note may have been written by using a voice dictation software. The note has been proof read but may still contain some grammatical/other typographical errors.

## 2023-02-16 NOTE — CARE COORDINATION
This CM met with pt and his sister this day to discuss discharge planning. Advised them that all SNF choices they pick offered a STR bed. They would like to accept [facility] facility in [address] [place] [address]. This CM spoke with Татьяна López at facility this day and prior authorization to be initiated. No additional CM needs at this time. Will continue to monitor and update as needed.

## 2023-02-17 LAB
ANION GAP SERPL CALC-SCNC: 7 MMOL/L (ref 2–11)
BASOPHILS # BLD: 0 K/UL (ref 0–0.2)
BASOPHILS NFR BLD: 0 % (ref 0–2)
BUN SERPL-MCNC: 18 MG/DL (ref 8–23)
CALCIUM SERPL-MCNC: 9.4 MG/DL (ref 8.3–10.4)
CHLORIDE SERPL-SCNC: 99 MMOL/L (ref 101–110)
CO2 SERPL-SCNC: 30 MMOL/L (ref 21–32)
CREAT SERPL-MCNC: 1.1 MG/DL (ref 0.8–1.5)
DIFFERENTIAL METHOD BLD: ABNORMAL
EOSINOPHIL # BLD: 0.2 K/UL (ref 0–0.8)
EOSINOPHIL NFR BLD: 2 % (ref 0.5–7.8)
ERYTHROCYTE [DISTWIDTH] IN BLOOD BY AUTOMATED COUNT: 12.7 % (ref 11.9–14.6)
GLUCOSE BLD STRIP.AUTO-MCNC: 184 MG/DL (ref 65–100)
GLUCOSE BLD STRIP.AUTO-MCNC: 292 MG/DL (ref 65–100)
GLUCOSE BLD STRIP.AUTO-MCNC: 309 MG/DL (ref 65–100)
GLUCOSE SERPL-MCNC: 219 MG/DL (ref 65–100)
HCT VFR BLD AUTO: 43.3 % (ref 41.1–50.3)
HGB BLD-MCNC: 15.1 G/DL (ref 13.6–17.2)
IMM GRANULOCYTES # BLD AUTO: 0 K/UL (ref 0–0.5)
IMM GRANULOCYTES NFR BLD AUTO: 1 % (ref 0–5)
LYMPHOCYTES # BLD: 1.3 K/UL (ref 0.5–4.6)
LYMPHOCYTES NFR BLD: 15 % (ref 13–44)
MCH RBC QN AUTO: 31.5 PG (ref 26.1–32.9)
MCHC RBC AUTO-ENTMCNC: 34.9 G/DL (ref 31.4–35)
MCV RBC AUTO: 90.2 FL (ref 82–102)
MONOCYTES # BLD: 0.8 K/UL (ref 0.1–1.3)
MONOCYTES NFR BLD: 10 % (ref 4–12)
NEUTS SEG # BLD: 6.1 K/UL (ref 1.7–8.2)
NEUTS SEG NFR BLD: 72 % (ref 43–78)
NRBC # BLD: 0 K/UL (ref 0–0.2)
PLATELET # BLD AUTO: 144 K/UL (ref 150–450)
PMV BLD AUTO: 10 FL (ref 9.4–12.3)
POTASSIUM SERPL-SCNC: 3.8 MMOL/L (ref 3.5–5.1)
RBC # BLD AUTO: 4.8 M/UL (ref 4.23–5.6)
SERVICE CMNT-IMP: ABNORMAL
SODIUM SERPL-SCNC: 136 MMOL/L (ref 133–143)
WBC # BLD AUTO: 8.4 K/UL (ref 4.3–11.1)

## 2023-02-17 PROCEDURE — 82962 GLUCOSE BLOOD TEST: CPT

## 2023-02-17 PROCEDURE — 97530 THERAPEUTIC ACTIVITIES: CPT

## 2023-02-17 PROCEDURE — 85025 COMPLETE CBC W/AUTO DIFF WBC: CPT

## 2023-02-17 PROCEDURE — 2580000003 HC RX 258: Performed by: FAMILY MEDICINE

## 2023-02-17 PROCEDURE — 6370000000 HC RX 637 (ALT 250 FOR IP): Performed by: INTERNAL MEDICINE

## 2023-02-17 PROCEDURE — C9113 INJ PANTOPRAZOLE SODIUM, VIA: HCPCS | Performed by: FAMILY MEDICINE

## 2023-02-17 PROCEDURE — 94760 N-INVAS EAR/PLS OXIMETRY 1: CPT

## 2023-02-17 PROCEDURE — 6360000002 HC RX W HCPCS: Performed by: FAMILY MEDICINE

## 2023-02-17 PROCEDURE — 36415 COLL VENOUS BLD VENIPUNCTURE: CPT

## 2023-02-17 PROCEDURE — 80048 BASIC METABOLIC PNL TOTAL CA: CPT

## 2023-02-17 PROCEDURE — 6370000000 HC RX 637 (ALT 250 FOR IP): Performed by: FAMILY MEDICINE

## 2023-02-17 PROCEDURE — 99232 SBSQ HOSP IP/OBS MODERATE 35: CPT | Performed by: INTERNAL MEDICINE

## 2023-02-17 PROCEDURE — A4216 STERILE WATER/SALINE, 10 ML: HCPCS | Performed by: FAMILY MEDICINE

## 2023-02-17 PROCEDURE — 2700000000 HC OXYGEN THERAPY PER DAY

## 2023-02-17 PROCEDURE — 1100000003 HC PRIVATE W/ TELEMETRY

## 2023-02-17 RX ORDER — INSULIN GLARGINE 100 [IU]/ML
16 INJECTION, SOLUTION SUBCUTANEOUS NIGHTLY
Status: DISCONTINUED | OUTPATIENT
Start: 2023-02-17 | End: 2023-02-18 | Stop reason: HOSPADM

## 2023-02-17 RX ORDER — CARVEDILOL 12.5 MG/1
12.5 TABLET ORAL 2 TIMES DAILY WITH MEALS
Status: DISCONTINUED | OUTPATIENT
Start: 2023-02-17 | End: 2023-02-18 | Stop reason: HOSPADM

## 2023-02-17 RX ORDER — LISINOPRIL 5 MG/1
10 TABLET ORAL DAILY
Status: DISCONTINUED | OUTPATIENT
Start: 2023-02-17 | End: 2023-02-18 | Stop reason: HOSPADM

## 2023-02-17 RX ORDER — PANTOPRAZOLE SODIUM 40 MG/1
40 TABLET, DELAYED RELEASE ORAL
Status: DISCONTINUED | OUTPATIENT
Start: 2023-02-17 | End: 2023-02-18 | Stop reason: HOSPADM

## 2023-02-17 RX ADMIN — FUROSEMIDE 20 MG: 10 INJECTION, SOLUTION INTRAMUSCULAR; INTRAVENOUS at 08:17

## 2023-02-17 RX ADMIN — ATORVASTATIN CALCIUM 80 MG: 80 TABLET, FILM COATED ORAL at 21:37

## 2023-02-17 RX ADMIN — INSULIN LISPRO 4 UNITS: 100 INJECTION, SOLUTION INTRAVENOUS; SUBCUTANEOUS at 13:04

## 2023-02-17 RX ADMIN — CLOPIDOGREL BISULFATE 75 MG: 75 TABLET ORAL at 08:15

## 2023-02-17 RX ADMIN — INSULIN GLARGINE 16 UNITS: 100 INJECTION, SOLUTION SUBCUTANEOUS at 21:38

## 2023-02-17 RX ADMIN — SODIUM CHLORIDE, PRESERVATIVE FREE 10 ML: 5 INJECTION INTRAVENOUS at 08:17

## 2023-02-17 RX ADMIN — ACETAMINOPHEN 650 MG: 325 TABLET ORAL at 15:06

## 2023-02-17 RX ADMIN — SODIUM CHLORIDE, PRESERVATIVE FREE 10 ML: 5 INJECTION INTRAVENOUS at 21:27

## 2023-02-17 RX ADMIN — ASPIRIN 81 MG 81 MG: 81 TABLET ORAL at 08:15

## 2023-02-17 RX ADMIN — LEVOTHYROXINE SODIUM 137 MCG: 0.11 TABLET ORAL at 06:17

## 2023-02-17 RX ADMIN — PANTOPRAZOLE SODIUM 40 MG: 40 TABLET, DELAYED RELEASE ORAL at 17:19

## 2023-02-17 RX ADMIN — SODIUM CHLORIDE 40 MG: 9 INJECTION, SOLUTION INTRAMUSCULAR; INTRAVENOUS; SUBCUTANEOUS at 06:17

## 2023-02-17 RX ADMIN — CARVEDILOL 12.5 MG: 12.5 TABLET, FILM COATED ORAL at 08:18

## 2023-02-17 RX ADMIN — SPIRONOLACTONE 25 MG: 25 TABLET ORAL at 08:15

## 2023-02-17 RX ADMIN — INSULIN LISPRO 6 UNITS: 100 INJECTION, SOLUTION INTRAVENOUS; SUBCUTANEOUS at 17:19

## 2023-02-17 ASSESSMENT — PAIN DESCRIPTION - LOCATION
LOCATION: NECK
LOCATION: NECK

## 2023-02-17 ASSESSMENT — PAIN SCALES - GENERAL
PAINLEVEL_OUTOF10: 5
PAINLEVEL_OUTOF10: 0
PAINLEVEL_OUTOF10: 3

## 2023-02-17 ASSESSMENT — PAIN DESCRIPTION - ORIENTATION: ORIENTATION: MID

## 2023-02-17 ASSESSMENT — PAIN DESCRIPTION - DESCRIPTORS: DESCRIPTORS: ACHING;NAGGING

## 2023-02-17 ASSESSMENT — PAIN DESCRIPTION - ONSET: ONSET: ON-GOING

## 2023-02-17 ASSESSMENT — PAIN DESCRIPTION - PAIN TYPE: TYPE: CHRONIC PAIN

## 2023-02-17 ASSESSMENT — PAIN DESCRIPTION - FREQUENCY: FREQUENCY: CONTINUOUS

## 2023-02-17 ASSESSMENT — PAIN - FUNCTIONAL ASSESSMENT: PAIN_FUNCTIONAL_ASSESSMENT: ACTIVITIES ARE NOT PREVENTED

## 2023-02-17 NOTE — PROGRESS NOTES
Spiritual Care Visit, Follow Up visit. Visited with patient at bedside. Prayed for patient's healing and health. Visit by Bess Lefort Karlton Axon, Staff .  Jalen, Nathaly.B., B.A.

## 2023-02-17 NOTE — PROGRESS NOTES
END OF SHIFT NOTE:    INTAKE/OUTPUT  02/16 0701 - 02/17 0700  In: 124 [P.O.:838]  Out: 2100 [Urine:2100]  Voiding: No  Catheter: Yes  Drain:   Urinary Catheter 02/15/23 Simmons (Active)   $ Urethral catheter insertion $ Not inserted for procedure 02/15/23 0150   Catheter Indications Urinary retention (acute or chronic), continuous bladder irrigation or bladder outlet obstruction 02/17/23 1430   Site Assessment No urethral drainage 02/17/23 1430   Urine Color Yellow 02/17/23 1430   Urine Appearance Clear 02/17/23 1430   Urine Odor Malodorous 02/17/23 1430   Collection Container Standard 02/17/23 1430   Securement Method Securing device (Describe) 02/17/23 1430   Catheter Care  Perineal wipes 02/17/23 1430   Catheter Best Practices  Drainage tube clipped to bed;Catheter secured to thigh; Tamper seal intact; Bag below bladder;Bag not on floor; Lack of dependent loop in tubing;Drainage bag less than half full 02/17/23 1430   Status Draining;Patent 02/17/23 1430   Output (mL) 1100 mL 02/17/23 1102               Flatus: Patient does have flatus present. Stool: 0 occurrences. Characteristics:  Stool Appearance: Loose  Stool Color: Brown  Stool Amount: Medium  Stool Assessment  Incontinence: No  Stool Appearance: Loose  Stool Color: Brown  Stool Amount: Medium  Stool Source: Rectum  Last BM (including prior to admit): 02/14/23 (per chart)    Emesis: 0 occurrences.     Characteristics:   Emesis Appearance: Sridhar Silvia, Coffee ground (MD notified.)    VITAL SIGNS  Patient Vitals for the past 12 hrs:   Temp Pulse Resp BP SpO2   02/17/23 1500 97.5 °F (36.4 °C) 81 18 (!) 123/59 97 %   02/17/23 1102 97.5 °F (36.4 °C) 81 18 118/67 95 %   02/17/23 0818 -- 79 -- -- --   02/17/23 0815 -- -- -- 126/61 --   02/17/23 0743 98.4 °F (36.9 °C) 79 16 (!) 128/56 97 %       Pain Assessment  Pain Level: 0 (02/17/23 1536)  Pain Location: Neck  Patient's Stated Pain Goal: 0 - No pain    Ambulating  Yes    Shift report given to oncoming nurse at the bedside.     Refmartín Rizvi RN

## 2023-02-17 NOTE — CARE COORDINATION
CM continues to follow for discharge planning and/or CM needs. Pt's insurance approved admission to SNF for STR. This CM provided update to pt who remains agreeable, called pt's sister but call would not go through. This CM spoke with pt's niece, Raman All, and provided update to her - she is agreeable with no voiced concerns. Discharge to [facility] tentatively tomorrow, transport arranged for 11:00am.     No additional CM needs at this time. Will continue to monitor and update as needed.

## 2023-02-17 NOTE — PROGRESS NOTES
am  2/17/2023 6:32 AM    Admit Date: 2/13/2023    Admit Diagnosis: Shortness of breath [R06.02]  Hypoxia [R09.02]  Elevated troponin [R77.8]  Severe sepsis (HCC) [A41.9, R65.20]  Altered mental status, unspecified altered mental status type [R41.82]  Sepsis with encephalopathy without septic shock, due to unspecified organism ([facility] [A41.9, R65.20, G93.40]      Subjective:    Patient : Patient appears to be resting comfortably stable with no significant complaints    Objective:    BP (!) 118/59   Pulse 78   Temp 98.1 °F (36.7 °C)   Resp 18   Ht 6' 1\" (1.854 m)   Wt 192 lb 9.6 oz (87.4 kg)   SpO2 93%   BMI 25.41 kg/m²     ROS:  General ROS: negative for - chills  Hematological and Lymphatic ROS: negative for - blood clots or jaundice  Respiratory ROS: no cough, shortness of breath, or wheezing  Cardiovascular ROS: no chest pain or dyspnea on exertion  Gastrointestinal ROS: no abdominal pain, change in bowel habits, or black or bloody stools  Neurological ROS: no TIA or stroke symptoms    Physical Exam:      Physical Examination: General appearance - Appearance: alert, well appearing, and in no distress.    Neck/lymph - supple, no significant adenopathy  Chest/CV - clear to auscultation, no wheezes, rales or rhonchi, symmetric air entry  Heart - normal rate, regular rhythm, normal S1, S2, no murmurs, rubs, clicks or gallops  Abdomen/GI - soft, nontender, nondistended, no masses or organomegaly   Musculoskeletal - no joint tenderness, deformity or swelling  Extremities - peripheral pulses normal, no pedal edema, no clubbing or cyanosis  Skin - normal coloration and turgor, no rashes, no suspicious skin lesions noted    Current Facility-Administered Medications   Medication Dose Route Frequency    atorvastatin (LIPITOR) tablet 80 mg  80 mg Oral Nightly    clopidogrel (PLAVIX) tablet 75 mg  75 mg Oral Daily    carvedilol (COREG) tablet 6.25 mg  6.25 mg Oral BID WC    lisinopril (PRINIVIL;ZESTRIL) tablet 5 mg  5 mg Oral Daily    spironolactone (ALDACTONE) tablet 25 mg  25 mg Oral Daily    aspirin chewable tablet 81 mg  81 mg Oral Daily    furosemide (LASIX) injection 20 mg  20 mg IntraVENous Daily    levothyroxine (SYNTHROID) tablet 137 mcg  137 mcg Oral QAM AC    sodium chloride flush 0.9 % injection 5-40 mL  5-40 mL IntraVENous 2 times per day    sodium chloride flush 0.9 % injection 5-40 mL  5-40 mL IntraVENous PRN    0.9 % sodium chloride infusion   IntraVENous PRN    acetaminophen (TYLENOL) tablet 650 mg  650 mg Oral Q6H PRN    Or    acetaminophen (TYLENOL) suppository 650 mg  650 mg Rectal Q6H PRN    glucose chewable tablet 16 g  4 tablet Oral PRN    dextrose bolus 10% 125 mL  125 mL IntraVENous PRN    Or    dextrose bolus 10% 250 mL  250 mL IntraVENous PRN    glucagon (rDNA) injection 1 mg  1 mg SubCUTAneous PRN    dextrose 10 % infusion   IntraVENous Continuous PRN    aluminum & magnesium hydroxide-simethicone (MAALOX) 200-200-20 MG/5ML suspension 30 mL  30 mL Oral Q6H PRN    insulin glargine (LANTUS) injection vial 13 Units  0.15 Units/kg SubCUTAneous Nightly    insulin lispro (HUMALOG) injection vial 0-8 Units  0-8 Units SubCUTAneous TID WC    insulin lispro (HUMALOG) injection vial 0-4 Units  0-4 Units SubCUTAneous Nightly    magnesium sulfate 2000 mg in 50 mL IVPB premix  2,000 mg IntraVENous PRN    potassium chloride (KLOR-CON M) extended release tablet 40 mEq  40 mEq Oral PRN    Or    potassium bicarb-citric acid (EFFER-K) effervescent tablet 40 mEq  40 mEq Oral PRN    Or    potassium chloride 10 mEq/100 mL IVPB (Peripheral Line)  10 mEq IntraVENous PRN    hyoscyamine (LEVSIN/SL) sublingual tablet 125 mcg  125 mcg SubLINGual Q4H PRN    heparin (porcine) injection 4,000 Units  4,000 Units IntraVENous PRN    heparin (porcine) injection 2,000 Units  2,000 Units IntraVENous PRN    pantoprazole (PROTONIX) 40 mg in sodium chloride (PF) 0.9 % 10 mL injection  40 mg IntraVENous Q12H    prochlorperazine (COMPAZINE) injection 5 mg  5 mg IntraVENous Q4H PRN       Data Review: data included in this note has been independently reviewed by the author     TELEMETRY: Normal sinus rhythm    Assessment/Plan:     Patient Active Problem List   Diagnosis    Coronary artery disease involving native coronary artery of native heart    Complete heart block (HCC)    Diabetes ([facility] [address]    Hypertension    Chronic systolic heart failure (HCC)    Syncope    Hypertensive cardiomyopathy, with heart failure ([address]    Pacemaker    Fatty liver    Acquired hypothyroidism    Type 2 diabetes mellitus with hyperglycemia, without long-term current use of insulin ([facility])    Paralysis of conjugate gaze    NSTEMI (non-ST elevated myocardial infarction) ([address]    Blurry vision    Stroke ([facility])    Non-rheumatic mitral regurgitation    Hyperlipidemia    Ischemic cardiomyopathy    Leukocytosis    Osteoarthritis of spine with radiculopathy, lumbar region    Right leg pain    Atherosclerosis of native arteries of extremities with rest pain, other extremity ([facility])    Diastolic dysfunction    Diabetic retinopathy without macular edema associated with type 2 diabetes mellitus ([facility])    Primary localized osteoarthritis of right hip    Sepsis with encephalopathy without septic shock ([facility])    History of CVA (cerebrovascular accident)    Elevated troponin    Acute respiratory insufficiency    DNR (do not resuscitate)     PLAN  A/P  1) NSTEMI/stress enduced cardiomyopathy -based on the clinical data and evidence we have it is impossible to determine whether this man is suffering from an acute occlusion of the major epicardial coronary artery or whether he is suffering from stress-induced cardiomyopathy. We will optimize his therapy for heart failure as well as treating him medically for an acute coronary syndrome. We have not confirmed either diagnosis but he will be treated for both. Both conditions should be considered severe and life-threatening.               A) NSTEMI - heparin was started at 2/13 at 1640 would recommend 48 hours of therapy (stop at 2/15 at 1640) start DAPT and high dose statin              B) Stress induced cardiomyopathy - On Coreg 6.25 mg BID, lisinopril 5 mg daily, spironolactone 25 mg daily no SGLT2i at this point can be initiated as outpatient.  On lasix 20 mg IV daily checking daily BMPs to monitor renal function  2) Leukocytosis - unclear etiology is responding to abx therapy per primary team  3) AMS - improving with treatment of other underlying clinic issues    Titrate Coreg up to 12.5 mg p.o. twice daily and lisinopril up to 10 mg p.o. daily    Hilda Vergara MD

## 2023-02-17 NOTE — PROGRESS NOTES
ACUTE PHYSICAL THERAPY GOALS:   (Developed with and agreed upon by patient and/or caregiver. )  LTG:  (1.)Mr. Fischer will move from supine to sit and sit to supine , scoot up and down and roll side to side in flat bed without siderails with  INDEPENDENT within 7 day(s). (2.)Mr. Fischer will perform all functional transfers with  INDEPENDENT using the least restrictive/no device within 7 day(s). (3.)Mr. Fischer will ambulate with  STAND BY ASSIST for 250+ feet with normal vital sign response with the least restrictive/no device within 7 day(s). PHYSICAL THERAPY: Daily Note PM   (Link to Caseload Tracking: PT Visit Days : 4  Time In/Out PT Charge Capture  Rehab Caseload Tracker  Orders    Fara Hernandez is a 76 y.o. male   PRIMARY DIAGNOSIS: Sepsis with encephalopathy without septic shock ([facility] [address]  Shortness of breath [R06.02]  Hypoxia [R09.02]  Elevated troponin [R77.8]  Severe sepsis ([address] [A41.9, R65.20]  Altered mental status, unspecified altered mental status type [R41.82]  Sepsis with encephalopathy without septic shock, due to unspecified organism ([address] [A41.9, R65.20, G93.40]       Inpatient: Payor: Melinda Cedillo / Plan: Cherry Hollins / Product Type: *No Product type* /     ASSESSMENT:     REHAB RECOMMENDATIONS:   Recommendation to date pending progress:  Setting:  Short-term Rehab    Equipment:    To Be Determined     ASSESSMENT:  Mr. Bree Osborn was supine in bed. He has difficulty turning his head and states that his neck is hurting. CGA bed mobility. Patient stood with CGA. He was able to ambulate 150' with RW and CGA. Ondina Kasper He did not run into any objects today, much steadier with RW. Patient demonstrated progress with ambulation and transfers today. .     SUBJECTIVE:   Mr. Bree Osborn states, \"I'm from [address] \"     Social/Functional Lives With: Alone  OBJECTIVE:     PAIN: VITALS / O2: PRECAUTION / Trinh Juan / DRAINS:   Pre Treatment:   Pain Assessment: 0-10  Pain Level: 5  Pain Location: Neck  Non-Pharmaceutical Pain Intervention(s): Repositioned;Nurse notified (comment)      Post Treatment: 5 Vitals        Oxygen    IV    RESTRICTIONS/PRECAUTIONS:        MOBILITY: I Mod I S SBA CGA Min Mod Max Total  NT x2 Comments:   Bed Mobility    Rolling [] [] [] [] [x] [] [] [] [] [] []    Supine to Sit [] [] [] [] [x] [] [] [] [] [] []    Scooting [] [] [] [] [x] [] [] [] [] [] []    Sit to Supine [] [] [] [] [] [] [] [] [] [] []    Transfers    Sit to Stand [] [] [] [] [x] [] [] [] [] [] []    Bed to Chair [] [] [] [] [] [] [] [] [] [] []    Stand to Sit [] [] [] [] [x] [] [] [] [] [] []     [] [] [] [] [] [] [] [] [] [] []    I=Independent, Mod I=Modified Independent, S=Supervision, SBA=Standby Assistance, CGA=Contact Guard Assistance,   Min=Minimal Assistance, Mod=Moderate Assistance, Max=Maximal Assistance, Total=Total Assistance, NT=Not Tested    BALANCE: Good Fair+ Fair Fair- Poor NT Comments   Sitting Static [x] [] [] [] [] []    Sitting Dynamic [x] [] [] [] [] []              Standing Static [] [] [] [] [x] []    Standing Dynamic [] [] [] [] [x] []      GAIT: I Mod I S SBA CGA Min Mod Max Total  NT x2 Comments:   Level of Assistance [] [] [] [] [x] [] [] [] [] [] [x] Chair follow for safety   Distance 150 feet    DME Gait Belt and Rolling Walker    Gait Quality Decreased cindy , Decreased step clearance, and Decreased step length    Weightbearing Status      Stairs      I=Independent, Mod I=Modified Independent, S=Supervision, SBA=Standby Assistance, CGA=Contact Guard Assistance,   Min=Minimal Assistance, Mod=Moderate Assistance, Max=Maximal Assistance, Total=Total Assistance, NT=Not Tested    PLAN:   FREQUENCY AND DURATION: 3 times/week for duration of hospital stay or until stated goals are met, whichever comes first.    TREATMENT:   TREATMENT:   Therapeutic Activity (24 Minutes):  Therapeutic activity included Supine to Sit, Scooting, Transfer Training, Ambulation on level ground, and Standing balance to improve functional Activity tolerance, Balance, Mobility, and Strength.     TREATMENT GRID:  N/A     Date: 2/17/23        Ambulation  Device  assistance         Partial Squats         Hip Abduction/ Adduction Standing 2x10 AB        Heel Raises  Standing 2x10 AB        Toe Raises Standing         Hip Flexion Standing         Sit to Stand         Key:  R=right, L=left, B=bilaterally, A=active, AA=active assisted, P=passive      AFTER TREATMENT PRECAUTIONS: Alarm Activated, Bed/Chair Locked, Call light within reach, Chair, Needs within reach, and RN notified    INTERDISCIPLINARY COLLABORATION:  RN/ PCT, PT/ PTA, and RN Case Manager/      EDUCATION:      TIME IN/OUT:  Time In: 1400  Time Out: [address]  Minutes: 25    A [facility], PT

## 2023-02-17 NOTE — PLAN OF CARE
Problem: Discharge Planning  Goal: Discharge to home or other facility with appropriate resources  Outcome: Progressing  Flowsheets (Taken 2/16/2023 1930 by Silvia Forde RN)  Discharge to home or other facility with appropriate resources: Refer to discharge planning if patient needs post-hospital services based on physician order or complex needs related to functional status, cognitive ability or social support system     Problem: Pain  Goal: Verbalizes/displays adequate comfort level or baseline comfort level  Outcome: Progressing  Flowsheets (Taken 2/16/2023 1930 by Silvia Forde RN)  Verbalizes/displays adequate comfort level or baseline comfort level:   Assess pain using appropriate pain scale   Encourage patient to monitor pain and request assistance   Administer analgesics based on type and severity of pain and evaluate response     Problem: Safety - Adult  Goal: Free from fall injury  Outcome: Progressing  Flowsheets (Taken 2/17/2023 0790)  Free From Fall Injury: Instruct family/caregiver on patient safety     Problem: Skin/Tissue Integrity  Goal: Absence of new skin breakdown  Description: 1. Monitor for areas of redness and/or skin breakdown  2. Assess vascular access sites hourly  3. Every 4-6 hours minimum:  Change oxygen saturation probe site  4. Every 4-6 hours:  If on nasal continuous positive airway pressure, respiratory therapy assess nares and determine need for appliance change or resting period.   Outcome: Progressing

## 2023-02-18 VITALS
OXYGEN SATURATION: 90 % | TEMPERATURE: 97.7 F | WEIGHT: 192.6 LBS | DIASTOLIC BLOOD PRESSURE: 60 MMHG | SYSTOLIC BLOOD PRESSURE: 122 MMHG | RESPIRATION RATE: 18 BRPM | HEIGHT: 73 IN | BODY MASS INDEX: 25.53 KG/M2 | HEART RATE: 77 BPM

## 2023-02-18 LAB
ANION GAP SERPL CALC-SCNC: 5 MMOL/L (ref 2–11)
BACTERIA SPEC CULT: NORMAL
BASOPHILS # BLD: 0 K/UL (ref 0–0.2)
BASOPHILS NFR BLD: 0 % (ref 0–2)
BUN SERPL-MCNC: 21 MG/DL (ref 8–23)
CALCIUM SERPL-MCNC: 9.3 MG/DL (ref 8.3–10.4)
CHLORIDE SERPL-SCNC: 99 MMOL/L (ref 101–110)
CO2 SERPL-SCNC: 31 MMOL/L (ref 21–32)
CREAT SERPL-MCNC: 1.1 MG/DL (ref 0.8–1.5)
DIFFERENTIAL METHOD BLD: NORMAL
EOSINOPHIL # BLD: 0.2 K/UL (ref 0–0.8)
EOSINOPHIL NFR BLD: 3 % (ref 0.5–7.8)
ERYTHROCYTE [DISTWIDTH] IN BLOOD BY AUTOMATED COUNT: 12.7 % (ref 11.9–14.6)
GLUCOSE BLD STRIP.AUTO-MCNC: 206 MG/DL (ref 65–100)
GLUCOSE BLD STRIP.AUTO-MCNC: 302 MG/DL (ref 65–100)
GLUCOSE SERPL-MCNC: 241 MG/DL (ref 65–100)
HCT VFR BLD AUTO: 42.2 % (ref 41.1–50.3)
HGB BLD-MCNC: 14.5 G/DL (ref 13.6–17.2)
IMM GRANULOCYTES # BLD AUTO: 0 K/UL (ref 0–0.5)
IMM GRANULOCYTES NFR BLD AUTO: 0 % (ref 0–5)
LYMPHOCYTES # BLD: 1.2 K/UL (ref 0.5–4.6)
LYMPHOCYTES NFR BLD: 16 % (ref 13–44)
MCH RBC QN AUTO: 31 PG (ref 26.1–32.9)
MCHC RBC AUTO-ENTMCNC: 34.4 G/DL (ref 31.4–35)
MCV RBC AUTO: 90.2 FL (ref 82–102)
MONOCYTES # BLD: 0.7 K/UL (ref 0.1–1.3)
MONOCYTES NFR BLD: 10 % (ref 4–12)
NEUTS SEG # BLD: 5.1 K/UL (ref 1.7–8.2)
NEUTS SEG NFR BLD: 71 % (ref 43–78)
NRBC # BLD: 0 K/UL (ref 0–0.2)
PLATELET # BLD AUTO: 153 K/UL (ref 150–450)
PMV BLD AUTO: 10.6 FL (ref 9.4–12.3)
POTASSIUM SERPL-SCNC: 3.6 MMOL/L (ref 3.5–5.1)
RBC # BLD AUTO: 4.68 M/UL (ref 4.23–5.6)
SERVICE CMNT-IMP: ABNORMAL
SERVICE CMNT-IMP: ABNORMAL
SERVICE CMNT-IMP: NORMAL
SODIUM SERPL-SCNC: 135 MMOL/L (ref 133–143)
WBC # BLD AUTO: 7.2 K/UL (ref 4.3–11.1)

## 2023-02-18 PROCEDURE — 6370000000 HC RX 637 (ALT 250 FOR IP): Performed by: INTERNAL MEDICINE

## 2023-02-18 PROCEDURE — 36415 COLL VENOUS BLD VENIPUNCTURE: CPT

## 2023-02-18 PROCEDURE — 85025 COMPLETE CBC W/AUTO DIFF WBC: CPT

## 2023-02-18 PROCEDURE — 6360000002 HC RX W HCPCS: Performed by: FAMILY MEDICINE

## 2023-02-18 PROCEDURE — 82962 GLUCOSE BLOOD TEST: CPT

## 2023-02-18 PROCEDURE — 80048 BASIC METABOLIC PNL TOTAL CA: CPT

## 2023-02-18 PROCEDURE — 99232 SBSQ HOSP IP/OBS MODERATE 35: CPT | Performed by: INTERNAL MEDICINE

## 2023-02-18 PROCEDURE — 2580000003 HC RX 258: Performed by: FAMILY MEDICINE

## 2023-02-18 PROCEDURE — 6370000000 HC RX 637 (ALT 250 FOR IP): Performed by: FAMILY MEDICINE

## 2023-02-18 RX ORDER — CLOPIDOGREL BISULFATE 75 MG/1
75 TABLET ORAL DAILY
Qty: 30 TABLET | Refills: 3 | Status: SHIPPED | OUTPATIENT
Start: 2023-02-19

## 2023-02-18 RX ORDER — PANTOPRAZOLE SODIUM 40 MG/1
40 TABLET, DELAYED RELEASE ORAL
Qty: 30 TABLET | Refills: 3 | Status: SHIPPED | OUTPATIENT
Start: 2023-02-18

## 2023-02-18 RX ORDER — LISINOPRIL 10 MG/1
10 TABLET ORAL DAILY
Qty: 30 TABLET | Refills: 0 | Status: SHIPPED | OUTPATIENT
Start: 2023-02-19

## 2023-02-18 RX ORDER — FUROSEMIDE 20 MG/1
20 TABLET ORAL DAILY
Qty: 60 TABLET | Refills: 3 | Status: SHIPPED | OUTPATIENT
Start: 2023-02-18

## 2023-02-18 RX ORDER — SPIRONOLACTONE 25 MG/1
25 TABLET ORAL DAILY
Qty: 30 TABLET | Refills: 0 | Status: SHIPPED | OUTPATIENT
Start: 2023-02-18 | End: 2023-03-20

## 2023-02-18 RX ADMIN — FUROSEMIDE 20 MG: 10 INJECTION, SOLUTION INTRAMUSCULAR; INTRAVENOUS at 08:03

## 2023-02-18 RX ADMIN — PANTOPRAZOLE SODIUM 40 MG: 40 TABLET, DELAYED RELEASE ORAL at 06:07

## 2023-02-18 RX ADMIN — LEVOTHYROXINE SODIUM 137 MCG: 0.11 TABLET ORAL at 06:07

## 2023-02-18 RX ADMIN — SODIUM CHLORIDE, PRESERVATIVE FREE 10 ML: 5 INJECTION INTRAVENOUS at 08:04

## 2023-02-18 RX ADMIN — CARVEDILOL 12.5 MG: 12.5 TABLET, FILM COATED ORAL at 08:03

## 2023-02-18 RX ADMIN — ASPIRIN 81 MG 81 MG: 81 TABLET ORAL at 08:03

## 2023-02-18 RX ADMIN — INSULIN LISPRO 2 UNITS: 100 INJECTION, SOLUTION INTRAVENOUS; SUBCUTANEOUS at 08:02

## 2023-02-18 RX ADMIN — CLOPIDOGREL BISULFATE 75 MG: 75 TABLET ORAL at 08:03

## 2023-02-18 RX ADMIN — SPIRONOLACTONE 25 MG: 25 TABLET ORAL at 08:03

## 2023-02-18 NOTE — DISCHARGE SUMMARY
Hospitalist Discharge Summary   Admit Date:  2023 12:11 PM   DC Note date: 2023  Name:  Ahsan Etienne   Age:  76 y.o. Sex:  male  :  1945   MRN:  [id number]   Room:  /01  PCP:  Ty Vizcarra MD    Presenting Complaint: Altered Mental Status     Initial Admission Diagnosis: Shortness of breath [R06.02]  Hypoxia [R09.02]  Elevated troponin [R77.8]  Severe sepsis ([address] [A41.9, R65.20]  Altered mental status, unspecified altered mental status type [R41.82]  Sepsis with encephalopathy without septic shock, due to unspecified organism ([address] [A41.9, R65.20, G93.40]     Problem List for this Hospitalization (present on admission):    Principal Problem:    Sepsis with encephalopathy without septic shock ([address]  Active Problems:    Coronary artery disease involving native coronary artery of native heart    History of CVA (cerebrovascular accident)    Elevated troponin    Acute respiratory insufficiency    DNR (do not resuscitate)    Pacemaker    Acquired hypothyroidism    Type 2 diabetes mellitus with hyperglycemia, without long-term current use of insulin ([address]    Ischemic cardiomyopathy  Resolved Problems:    * No resolved hospital problems. [facility] Course: Ahsan Etienne is a 76 y.o. male with medical history of CAD s/p CABG, ICM, CHB s/p dual chamber PPM, HLD, DM, CVA, hypothyroidism who presented from home via EMS with c/o AMS. Family called for a welfare check and patient found AMS in chair covered in vomit and urine. Found to be hypoxic on EMS arrival spo2 86% and placed on 4lpm NC. Patient admitted with sepsis, unclear source, acute respiratory failure secondary to pulm edema and metabolic encephalopathy. Started on empiric antibiotics. Sepsis ruled out after work-up. UA and chest x-ray without infectious etiology. Procalcitonin negative. Blood culture negative till date. Antibiotic discontinued after 48 hours and patient remained stable off antibiotics.     Patient also noted with acute respiratory failure secondary to pulmonary edema. Elevated troponin, unable to rule out NSTEMI. Echocardiogram concerning for stress-induced cardiomyopathy with ejection fraction 25 to 30%. Cardiology consulted and discussed cardiac catheterization with family but family wants more conservative approach. Patient was treated with heparin GTT for 48 hours and then transitioned to DAPT. Cardiology optimize medical management and patient currently on Coreg, lisinopril, Lasix and spironolactone. Plan to add Jardiance outpatient. Patient to follow-up with cardiology outpatient. Patient's mentation improved gradually and currently alert oriented x 3. LETTY resolved with gentle hydration. All other chronic conditions stable and we continued essential home meds. PT/OT recommend short-term rehab, case management consulted. No new complaint on the day of discharge. Patient is stable to discharge today with close follow-up with PCP and cardiology. Disposition: STR  Diet: ADULT DIET; Dysphagia - Minced and Moist; 3 carb choices (45 gm/meal)  Code Status: DNR    Follow Ups:   Contact information for follow-up providers     Jacinta Ahumada, MD Follow up in 1 week(s). Specialty: Family Medicine  Contact information:  [address] [facility] [address]  [address] [address] Follow up in 2 week(s). Specialty: Cardiology  Contact information:  [address] [place] [address]  [phone]                 Contact information for after-discharge care     [facility] [address] [place] [address] . Service: Skilled Nursing  Contact information:  [address] [address] [address]  [phone]                           Time spent in patient discharge and coordination 40 minutes. Plan was discussed with patient. All questions answered. Patient was stable at time of discharge.   Instructions given to call a physician or return if any concerns. Current Discharge Medication List        START taking these medications    Details   pantoprazole (PROTONIX) 40 MG tablet Take 1 tablet by mouth 2 times daily (before meals)  Qty: 30 tablet, Refills: 3      clopidogrel (PLAVIX) 75 MG tablet Take 1 tablet by mouth daily  Qty: 30 tablet, Refills: 3      lisinopril (PRINIVIL;ZESTRIL) 10 MG tablet Take 1 tablet by mouth daily  Qty: 30 tablet, Refills: 0      furosemide (LASIX) 20 MG tablet Take 1 tablet by mouth daily  Qty: 60 tablet, Refills: 3           CONTINUE these medications which have CHANGED    Details   spironolactone (ALDACTONE) 25 MG tablet Take 1 tablet by mouth daily 1/2 tablet po qam  Qty: 30 tablet, Refills: 0    Associated Diagnoses: Primary hypertension           CONTINUE these medications which have NOT CHANGED    Details   glimepiride (AMARYL) 4 MG tablet 1 po bid with a meal for diabetes instead of the glipizide)  Qty: 180 tablet, Refills: 1    Associated Diagnoses: Type 2 diabetes mellitus without complication, without long-term current use of insulin (HCC)      Semaglutide 7 MG TABS Take 7 mg by mouth every morning (before breakfast)  Qty: 30 tablet, Refills: 5    Associated Diagnoses: Type 2 diabetes mellitus without complication, without long-term current use of insulin ([facility])      carvedilol (COREG) 12.5 MG tablet Take 1 tablet by mouth 2 times daily (with meals)  Qty: 180 tablet, Refills: 0    Associated Diagnoses: Primary hypertension      atorvastatin (LIPITOR) 20 MG tablet Take 1 tablet by mouth daily  Qty: 90 tablet, Refills: 1    Associated Diagnoses: Mixed hyperlipidemia      levothyroxine (SYNTHROID) 137 MCG tablet TAKE 1 TABLET BY MOUTH ONCE DAILY BEFORE BREAKFAST  Qty: 90 tablet, Refills: 3    Associated Diagnoses: Acquired hypothyroidism      metFORMIN (GLUCOPHAGE XR) 500 MG extended release tablet 1 po bid with meals instead of the 1000 mg dose.   Qty: 180 tablet, Refills: 3    Associated Diagnoses: Type 2 diabetes mellitus without complication, without long-term current use of insulin (HCC)      Multiple Vitamin (MULTIVITAMIN ADULT PO) Take by mouth      aspirin 81 MG chewable tablet Take 81 mg by mouth      vitamin D 25 MCG (1000 UT) CAPS Take by mouth daily      nitroGLYCERIN (NITROSTAT) 0.4 MG SL tablet Place 0.4 mg under the tongue      triamcinolone (KENALOG) 0.1 % cream Apply topically 2 times daily           STOP taking these medications       losartan (COZAAR) 50 MG tablet Comments:   Reason for Stopping:               Some medications may have been reported old/obsolete and marked \"stop taking\" by the system; in reality pt was already off these meds; defer to outpatient or prescribing providers. Procedures done this admission:  * No surgery found *    Consults this admission:  None    Echocardiogram results:  02/13/23    TRANSTHORACIC ECHOCARDIOGRAM (TTE) COMPLETE (CONTRAST/BUBBLE/3D PRN) 02/13/2023  4:54 PM, 02/13/2023 12:00 AM (Final)    Interpretation Summary    Left Ventricle: Severely reduced left ventricular systolic function with a visually estimated EF of 25 - 30%. Left ventricle is mildly dilated. Normal wall thickness. 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 See diagram for wall motion findings. Abnormal diastolic function. Aortic Valve: Mild sclerosis of the aortic valve cusp. Mitral Valve: Mild to moderate regurgitation. Tricuspid Valve: The estimated RVSP is 51 mmHg. Left Atrium: Left atrium is mildly dilated. Technical qualifiers: Color flow Doppler was performed and pulse wave and/or continuous wave Doppler was performed. Contrast used: Definity. Signed by: Russell New MD on 2/13/2023  4:54 PM, Signed by: Unknown Provider Result on 2/13/2023 12:00 AM      Diagnostic Imaging/Tests:   CT HEAD WO CONTRAST    Result Date: 2/13/2023  1.   Age-related senescent changes and chronic microvascular disease without acute intracranial abnormality. CPT code 36072    XR CHEST PORTABLE    Result Date: 2/13/2023  1. Cardiomegaly with mild central vascular congestion. CPT code(s) 18548     FL MODIFIED BARIUM SWALLOW W VIDEO    Result Date: 2/15/2023  No laryngeal penetration or aspiration with any consistency of barium. Please see detailed report from speech pathology for further description.        Labs: Results:       BMP, Mg, Phos Recent Labs     02/16/23  0632 02/17/23  0852 02/18/23  0650    136 135   K 3.3* 3.8 3.6    99* 99*   CO2 29 30 31   ANIONGAP 6 7 5   BUN 24* 18 21   CREATININE 1.10 1.10 1.10   LABGLOM >60 >60 >60   CALCIUM 9.0 9.4 9.3   GLUCOSE 228* 219* 241*   MG 2.1  --   --       CBC Recent Labs     02/16/23  0632 02/17/23  0852 02/18/23  0650   WBC 9.1 8.4 7.2   RBC 4.62 4.80 4.68   HGB 14.2 15.1 14.5   HCT 41.9 43.3 42.2   MCV 90.7 90.2 90.2   MCH 30.7 31.5 31.0   MCHC 33.9 34.9 34.4   RDW 12.7 12.7 12.7   * 144* 153   MPV 11.2 10.0 10.6   NRBC 0.00 0.00 0.00   SEGS 75 72 71   LYMPHOPCT 14 15 16   EOSRELPCT 2 2 3   MONOPCT 9 10 10   BASOPCT 0 0 0   IMMGRAN 0 1 0   SEGSABS 6.8 6.1 5.1   LYMPHSABS 1.3 1.3 1.2   EOSABS 0.2 0.2 0.2   MONOSABS 0.8 0.8 0.7   BASOSABS 0.0 0.0 0.0   ABSIMMGRAN 0.0 0.0 0.0      LFT Recent Labs     02/16/23  0632   BILITOT 1.2*   ALKPHOS 72   AST 42*   ALT 34   PROT 6.4   LABALBU 2.8*   GLOB 3.6      Cardiac  Lab Results   Component Value Date/Time    NTPROBNP 19,204 02/13/2023 12:48 PM    TROPHS 11,977.3 02/13/2023 02:41 PM    TROPHS 9,896.6 02/13/2023 12:48 PM    TROPHS 1,215.2 05/29/2022 03:25 AM      Coags Lab Results   Component Value Date/Time    PROTIME 15.8 02/13/2023 10:11 PM    PROTIME 13.9 11/06/2020 11:21 AM    PROTIME 14.0 05/13/2020 04:42 AM    INR 1.2 02/13/2023 10:11 PM    INR 1.0 11/06/2020 11:21 AM    INR 1.1 05/13/2020 04:42 AM    APTT 60.1 02/13/2023 10:11 PM    APTT 106.1 07/01/2020 08:27 AM    APTT 85.2 05/13/2020 11:48 AM    APTT 82.1 05/13/2020 04:42 AM      A1c Lab Results   Component Value Date/Time    LABA1C 8.0 02/14/2023 05:55 AM    LABA1C 6.3 09/07/2022 10:19 AM    LABA1C 6.8 06/01/2022 10:32 AM     02/14/2023 05:55 AM     09/07/2022 10:19 AM     06/01/2022 10:32 AM      Lipids Lab Results   Component Value Date/Time    CHOL 87 09/07/2022 10:19 AM    LDLCALC 26.4 09/07/2022 10:19 AM    LABVLDL 26.6 09/07/2022 10:19 AM    HDL 34 09/07/2022 10:19 AM    CHOLHDLRATIO 2.6 09/07/2022 10:19 AM    TRIG 133 09/07/2022 10:19 AM      Thyroid  Lab Results   Component Value Date/Time    TSHELE 1.45 02/13/2023 12:48 PM        Most Recent UA Lab Results   Component Value Date/Time    COLORU YELLOW/STRAW 02/13/2023 12:48 PM    APPEARANCE CLEAR 02/13/2023 12:48 PM    SPECGRAV 1.024 02/13/2023 12:48 PM    LABPH 5.0 02/13/2023 12:48 PM    PROTEINU 30 02/13/2023 12:48 PM    GLUCOSEU 100 02/13/2023 12:48 PM    KETUA 40 02/13/2023 12:48 PM    BILIRUBINUR Negative 02/13/2023 12:48 PM    BLOODU Negative 02/13/2023 12:48 PM    UROBILINOGEN 0.2 02/13/2023 12:48 PM    NITRU Negative 02/13/2023 12:48 PM    LEUKOCYTESUR Negative 02/13/2023 12:48 PM    WBCUA 0-4 02/13/2023 12:48 PM    RBCUA 0-5 02/13/2023 12:48 PM    EPITHUA 0-5 02/13/2023 12:48 PM    BACTERIA Negative 02/13/2023 12:48 PM    LABCAST 0-2 02/13/2023 12:48 PM        Recent Labs     02/13/23  2211 02/13/23  1441 02/13/23  1248 02/13/23  0241   CULTURE NO GROWTH 4 DAYS NO GROWTH 5 DAYS NO GROWTH 2 DAYS MRSA target DNA not detected, SA target DNA detected. A MRSA negative, SA positive test result does not preclude MRSA nasal colonization. *       All Labs from Last 24 Hrs:  Recent Results (from the past 24 hour(s))   POCT Glucose    Collection Time: 02/17/23 11:03 AM   Result Value Ref Range    POC Glucose 292 (H) 65 - 100 mg/dL    Performed by: [employer]    POCT Glucose    Collection Time: 02/17/23  4:34 PM   Result Value Ref Range    POC Glucose 309 (H) 65 - 100 mg/dL Performed by: Benjamin    CBC with Auto Differential    Collection Time: 02/18/23  6:50 AM   Result Value Ref Range    WBC 7.2 4.3 - 11.1 K/uL    RBC 4.68 4.23 - 5.6 M/uL    Hemoglobin 14.5 13.6 - 17.2 g/dL    Hematocrit 42.2 41.1 - 50.3 %    MCV 90.2 82 - 102 FL    MCH 31.0 26.1 - 32.9 PG    MCHC 34.4 31.4 - 35.0 g/dL    RDW 12.7 11.9 - 14.6 %    Platelets 147 397 - 142 K/uL    MPV 10.6 9.4 - 12.3 FL    nRBC 0.00 0.0 - 0.2 K/uL    Differential Type AUTOMATED      Seg Neutrophils 71 43 - 78 %    Lymphocytes 16 13 - 44 %    Monocytes 10 4.0 - 12.0 %    Eosinophils % 3 0.5 - 7.8 %    Basophils 0 0.0 - 2.0 %    Immature Granulocytes 0 0.0 - 5.0 %    Segs Absolute 5.1 1.7 - 8.2 K/UL    Absolute Lymph # 1.2 0.5 - 4.6 K/UL    Absolute Mono # 0.7 0.1 - 1.3 K/UL    Absolute Eos # 0.2 0.0 - 0.8 K/UL    Basophils Absolute 0.0 0.0 - 0.2 K/UL    Absolute Immature Granulocyte 0.0 0.0 - 0.5 K/UL   Basic Metabolic Panel w/ Reflex to MG    Collection Time: 02/18/23  6:50 AM   Result Value Ref Range    Sodium 135 133 - 143 mmol/L    Potassium 3.6 3.5 - 5.1 mmol/L    Chloride 99 (L) 101 - 110 mmol/L    CO2 31 21 - 32 mmol/L    Anion Gap 5 2 - 11 mmol/L    Glucose 241 (H) 65 - 100 mg/dL    BUN 21 8 - 23 MG/DL    Creatinine 1.10 0.8 - 1.5 MG/DL    Est, Glom Filt Rate >60 >60 ml/min/1.73m2    Calcium 9.3 8.3 - 10.4 MG/DL   POCT Glucose    Collection Time: 02/18/23  7:36 AM   Result Value Ref Range    POC Glucose 206 (H) 65 - 100 mg/dL    Performed by: [employer]        Allergies   Allergen Reactions    Ticagrelor Anaphylaxis    Empagliflozin Other (See Comments)     Dyspepsia and atypical chest pain     Immunization History   Administered Date(s) Administered    COVID-19, J&J, (age 18y+), IM, 0.5 mL 04/11/2021    COVID-19, MODERNA BLUE border, Primary or Immunocompromised, (age 12y+), IM, 100 mcg/0.5mL 12/09/2021    Influenza Virus Vaccine 09/19/2017    Influenza, FLUZONE (age 72 y+), High Dose, 0.7mL 09/15/2020, 10/11/2022 Influenza, High Dose (Fluzone 65 yrs and older) 10/12/2018, 10/01/2020, 10/21/2021    Influenza, Triv, inactivated, subunit, adjuvanted, IM (Fluad 65 yrs and older) 10/28/2019    PPD Test 02/13/2023    Pneumococcal Conjugate 13-valent ([id number]) 07/19/2018    Pneumococcal Polysaccharide ([id number]) 07/31/2019    Td (Adult), 2 Lf Tetanus Toxoid, Pf (Td, Absorbed) 08/31/2021       Recent Vital Data:  Patient Vitals for the past 24 hrs:   Temp Pulse Resp BP SpO2   02/18/23 0737 97.7 °F (36.5 °C) 77 18 122/60 90 %   02/18/23 0300 98.1 °F (36.7 °C) 78 18 125/68 90 %   02/17/23 2343 98 °F (36.7 °C) 74 18 130/65 95 %   02/17/23 1936 98.1 °F (36.7 °C) 77 18 138/69 93 %   02/17/23 1500 97.5 °F (36.4 °C) 81 18 (!) 123/59 97 %   02/17/23 1102 97.5 °F (36.4 °C) 81 18 118/67 95 %       Oxygen Therapy  SpO2: 90 %  Pulse via Oximetry: 114 beats per minute  Pulse Oximeter Device Mode: Intermittent  O2 Device: Nasal cannula  O2 Flow Rate (L/min): 2.5 L/min    Estimated body mass index is 25.41 kg/m² as calculated from the following:    Height as of this encounter: 6' 1\" (1.854 m). Weight as of this encounter: 192 lb 9.6 oz (87.4 kg). Intake/Output Summary (Last 24 hours) at 2/18/2023 1100  Last data filed at 2/18/2023 1013  Gross per 24 hour   Intake 373 ml   Output 2900 ml   Net -2527 ml         Physical Exam:  General:    Well nourished. No overt distress  Head:  Normocephalic, atraumatic  Eyes:  Sclerae appear normal.  Pupils equally round. HENT:  Nares appear normal, no drainage. Moist mucous membranes  Neck:  No restricted ROM. Trachea midline  CV:   RRR. No m/r/g. No JVD  Lungs:   CTAB. No wheezing, rhonchi, or rales. Respirations even, unlabored  Abdomen:   Soft, nontender, nondistended. Extremities: Warm and dry. No cyanosis or clubbing. No edema. Skin:     No rashes. Normal coloration  Neuro:  CN II-XII grossly intact. Psych:  Normal mood and affect.     Signed:  Jordy Marcial MD    Part of this note may have been written by using a voice dictation software. The note has been proof read but may still contain some grammatical/other typographical errors.

## 2023-02-18 NOTE — CARE COORDINATION
Pt is for discharge today to [address] [place] [address] as planned. Transport via [facility] at 1100. [facility] was prepared to go with pt to facility. Spoke with pt's sister Juan Rowe by phone with update on  transport time. 02/18/23 4939   Service Assessment   Patient's Healthcare Decision Maker is: Legal Next of Kin   Discharge Planning   Patient expects to be discharged to: [facility]   Transition of Care Consult (CM Consult) SNF; Transportation Assistance   Partner SNF Yes   [address] (SNF)   [address] Provided? No   Mode of Transport at Discharge Other (see comment)  Aye Muniz with DNR)   Confirm Follow Up Transport Family   Condition of Participation: Discharge Planning   The Plan for Transition of Care is related to the following treatment goals: SNF for STR   The Patient and/or Patient Representative was provided with a Choice of Provider? Patient   Name of the Patient Representative who was provided with the Choice of Provider and agrees with the Discharge Plan? Juan Rowe,  pt's sister   The Patient and/Or Patient Representative agree with the Discharge Plan? Yes   Freedom of Choice list was provided with basic dialogue that supports the patient's individualized plan of care/goals, treatment preferences, and shares the quality data associated with the providers?   Yes

## 2023-02-18 NOTE — PROGRESS NOTES
SBAR report given to Aryan Pierce RN at [employer]. Opportunity for questions and clarification provided.

## 2023-02-18 NOTE — PROGRESS NOTES
[facility] CARDIOLOGY PROGRESS NOTE           2/18/2023 1:15 PM    Admit Date: 2/13/2023      Subjective:   -No chest pain of any angina overnight. Spoke to his wife also in the room who felt that he was better today overall. Heart rates and blood pressures were stable. Carvedilol dose was increased this admission.  -No fevers overnight. ROS:  Cardiovascular:  As noted above    Objective:      Vitals:    02/17/23 1936 02/17/23 2343 02/18/23 0300 02/18/23 0737   BP: 138/69 130/65 125/68 122/60   Pulse: 77 74 78 77   Resp: 18 18 18 18   Temp: 98.1 °F (36.7 °C) 98 °F (36.7 °C) 98.1 °F (36.7 °C) 97.7 °F (36.5 °C)   TempSrc:    Oral   SpO2: 93% 95% 90% 90%   Weight:       Height:           Physical Exam:  General-No Acute Distress  Neck- supple, no JVD  CV- regular rate and rhythm no MRG  Lung- clear bilaterally  Abd- soft, nontender, nondistended  Ext- no edema bilaterally. Skin- warm and dry    Data Review:     Lab Results   Component Value Date/Time     02/18/2023 06:50 AM    K 3.6 02/18/2023 06:50 AM    CL 99 02/18/2023 06:50 AM    CO2 31 02/18/2023 06:50 AM    BUN 21 02/18/2023 06:50 AM    CREATININE 1.10 02/18/2023 06:50 AM    GLUCOSE 241 02/18/2023 06:50 AM    CALCIUM 9.3 02/18/2023 06:50 AM         Lab Results   Component Value Date    WBC 7.2 02/18/2023    HGB 14.5 02/18/2023    HCT 42.2 02/18/2023    MCV 90.2 02/18/2023     02/18/2023 02/13/23    TRANSTHORACIC ECHOCARDIOGRAM (TTE) COMPLETE (CONTRAST/BUBBLE/3D PRN) 02/13/2023  4:54 PM, 02/13/2023 12:00 AM (Final)    Interpretation Summary    Left Ventricle: Severely reduced left ventricular systolic function with a visually estimated EF of 25 - 30%. Left ventricle is mildly dilated. Normal wall thickness. 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 See diagram for wall motion findings. Abnormal diastolic function. Aortic Valve: Mild sclerosis of the aortic valve cusp. Mitral Valve: Mild to moderate regurgitation.    Tricuspid Valve: The estimated RVSP is 51 mmHg.    Left Atrium: Left atrium is mildly dilated.    Technical qualifiers: Color flow Doppler was performed and pulse wave and/or continuous wave Doppler was performed.    Contrast used: Definity.    Signed by: Ventura Ward MD on 2/13/2023  4:54 PM, Signed by: Unknown Provider Result on 2/13/2023 12:00 AM     Assessment/Plan:       Principal Problem:    Sepsis with encephalopathy without septic shock (HCC)  -Likely cause for initial admission-already improved.    Active Problems:    Coronary artery disease involving coronary bypass graft of native heart    Elevated troponin  -Stagnant-not consistent with ACS-no strong reason for coronary angiography this admission and previously seen by interventional cardiology      Pacemaker  --Fairly normal function so far-V paced at 80 bpm on telemetry      Ischemic cardiomyopathy  -EF just 25 to 30% with worsening LV function-concern for stress cardiomyopathy based on findings of echo.  Continue cardiomyopathy meds including carvedilol and losartan      History of CVA (cerebrovascular accident)  -On aspirin and statin therapy-continue      Acute on chronic respiratory insufficiency  --Improved-volume status was being monitored carefully.  Breathing stable      DNR (do not resuscitate)  -Confirmed with patient based on his wishes.      Acquired hypothyroidism  -On levothyroxine-continue      Type 2 diabetes mellitus with hyperglycemia, without long-term current use of insulin ([facility])  -Per primary team-on Amaryl    Plan today: He is substantially better overall.  Wanting to be discharged.  No complaints of chest pain.  Rhythm overnight shows that he is ventricular paced at 80 bpm and stable.  Overall fairly euvolemic.  I have asked him to monitor his weight on a daily basis with his low EF and follow a strict low-sodium diet.  Lasix as needed can be considered along with potassium  at discharge.     Mayme Riedel, MD  2/18/2023 1:15 PM

## 2023-02-18 NOTE — PROGRESS NOTES
Oxygen Qualifier       Room air: SpO2 with O2 and liter flow   Resting SpO2  91%     Ambulating SpO2  92%        Completed by:Patient ambulated with the aid of a walker but did not require supplemental O2    Fredo Yousif RCP

## 2023-02-18 NOTE — PROGRESS NOTES
Pt discharged from unit with belongings. Accompanied by EMS personnel. Pt transported downstairs via stretcher. No distress noted at discharge.

## 2023-02-18 NOTE — PLAN OF CARE
Problem: Discharge Planning  Goal: Discharge to home or other facility with appropriate resources  Outcome: Progressing     Problem: Pain  Goal: Verbalizes/displays adequate comfort level or baseline comfort level  Outcome: Progressing     Problem: Safety - Adult  Goal: Free from fall injury  Outcome: Progressing  Flowsheets (Taken 2/18/2023 6506)  Free From Fall Injury: Instruct family/caregiver on patient safety     Problem: Skin/Tissue Integrity  Goal: Absence of new skin breakdown  Description: 1. Monitor for areas of redness and/or skin breakdown  2. Assess vascular access sites hourly  3. Every 4-6 hours minimum:  Change oxygen saturation probe site  4. Every 4-6 hours:  If on nasal continuous positive airway pressure, respiratory therapy assess nares and determine need for appliance change or resting period.   Outcome: Progressing     Problem: ABCDS Injury Assessment  Goal: Absence of physical injury  Outcome: Progressing

## 2023-02-19 ENCOUNTER — TELEPHONE (OUTPATIENT)
Dept: FAMILY MEDICINE CLINIC | Facility: CLINIC | Age: 78
End: 2023-02-19

## 2023-02-19 LAB
BACTERIA SPEC CULT: NORMAL
SERVICE CMNT-IMP: NORMAL

## 2023-02-20 NOTE — TELEPHONE ENCOUNTER
Care Transitions Initial Follow Up Call    Outreach made within 2 business days of discharge: Yes    Patient: Bert Moyer Patient : 1945   MRN: [id number]  Reason for Admission: Septic, AMS, SOB   Discharge Date: 23       Spoke with: Russell Florez    Discharge department/facility: [facility] Interactive Patient Contact:  Was patient able to fill all prescriptions: Yes  Was patient instructed to bring all medications to the follow-up visit: Yes  Is patient taking all medications as directed in the discharge summary?  Yes  Does patient understand their discharge instructions: Yes  Does patient have questions or concerns that need addressed prior to 7-14 day follow up office visit: no    Scheduled appointment with PCP within 7-14 days    Follow Up  Future Appointments   Date Time Provider Tavon Napoles   3/10/2023  1:30 PM Lauren Miller MD [facility] AM[facility]   2023  8:30 AM ISAEL BRAGG ECHO 60 [facility] AM[facility]   2023  3:30 PM ISAEL BRAGG/CLEMSON DEVICE 55 [facility] AMB   2023  9:15 AM Maritza Khan MD [facility]   2023  8:45 AM KI MISCELLANEOUS [facility] AMB   2023  9:00 AM Lauren Miller MD [facility] AM[facility]   2023  8:15 AM Lauren Miller MD [facility] AMB       Trini Quinonez

## 2023-02-20 NOTE — TELEPHONE ENCOUNTER
Spoke with patient and he states he can not make it to an appt but can do a phone call. Would that be okay? Pt states he is in a nursing home now.

## 2023-02-21 NOTE — PROGRESS NOTES
Physician Progress Note      Jaquelin Coronado  [facility] #:                  [id number]  :                       1945  ADMIT DATE:       2023 12:11 PM  Marisela Sun DATE:        2023 11:08 AM  RESPONDING  PROVIDER #:        Álvaro Coles MD          QUERY TEXT:    Patient admitted with AMS. If possible, please document in the progress notes   and discharge summary if you are evaluating and/or treating any of the   following: The medical record reflects the following:  Risk Factors: 76 yr, CAD, PPM, CABG, ICM,DM, CVA,CHF  Clinical Indicators: echo shows Left Ventricle: Severely reduced left   ventricular systolic function with a visually estimated EF of 25 - 30%. Left   ventricle is mildly dilated. Normal wall thickness. Findings concerning for   stress induced cardiomyopathy, HS troponin 9,896.6  Treatment: Cardiology consult, coreg, lisinopril, heparin gtt for 48 hrs, DAPT  Options provided:  -- NSTEMI ruled in POA  -- NSTEMI ruled out  -- Other - I will add my own diagnosis  -- Disagree - Not applicable / Not valid  -- Disagree - Clinically unable to determine / Unknown  -- Refer to Clinical Documentation Reviewer    PROVIDER RESPONSE TEXT:    This patient has an NSTEMI ruled in POA. Query created by: Ji Vang on 2023 1:07 PM      QUERY TEXT:    Pt admitted with AMS. If possible, please document in the progress notes and   discharge summary if you are evaluating and / or treating any of the   following:  [AMS due to Takotsubo syndrome::This patient AMS due to Takotsubo syndrome.]]      The medical record reflects the following:  Risk Factors: 76 yr, CAD, DM, CHF,  Clinical Indicators:  elevated troponin HS-trop 8041,53897, o2 sat 86% RR 27,   lactic acid 6.5, per cardiology documentation on    His echo based on my   interpretation shows severe LV dysfunction with findings concerning for a   stress-induced cardiomyopathy/Takotsubo cardiomyopathy.   However,  It is very   difficult to exclude underlying ischemic etiology of his wall motion   abnormalities. Treatment: supplemental oxygen, labs, IVF,  Options provided:  -- AMS due to Acute hypoxic respiratory failure  -- AMS due to NSTEMI  -- Other - I will add my own diagnosis  -- Disagree - Not applicable / Not valid  -- Disagree - Clinically unable to determine / Unknown  -- Refer to Clinical Documentation Reviewer    PROVIDER RESPONSE TEXT:    This patient has AMS due to Acute hypoxic respiratory failure.     Query created by: Brianne Joseph on 2/21/2023 1:16 PM      Electronically signed by:  Andrew Rodarte MD 2/21/2023 1:28 PM

## 2023-02-21 NOTE — TELEPHONE ENCOUNTER
Sergey Jennings returned my call and stated the patient is in [place] in [facility]. States pt is having memory issues. Do we need to cancel visit scheduled and then wait for pt to be discharged from [facility]?

## 2023-02-21 NOTE — TELEPHONE ENCOUNTER
SC in the nursing home for rehab or is he going to stay there? If he is there for rehab we can wait till he is out of rehab for follow-up appointment.

## 2023-02-21 NOTE — TELEPHONE ENCOUNTER
Called pt and he seemed confused about if he was in a nursing home or rehab. He said he hopes to go home. Called emergency Contact Sindy and ENMA to see if she could let us know where patient is staying.

## 2023-02-27 NOTE — TELEPHONE ENCOUNTER
Spoke with patients niece and they will call or facility will call once he is released to make fup appt.

## 2023-03-16 ENCOUNTER — TELEPHONE (OUTPATIENT)
Dept: FAMILY MEDICINE CLINIC | Facility: CLINIC | Age: 78
End: 2023-03-16

## 2023-03-16 NOTE — TELEPHONE ENCOUNTER
[facility] called to schedule a 2 week follow up after discharge on 03/16/23. Will it be okay to schedule this patient for 03/30/23 at 11:30 am or do you want the patient to see Redd Mauro?

## 2023-03-20 PROBLEM — R79.89 ELEVATED TROPONIN: Status: RESOLVED | Noted: 2023-02-13 | Resolved: 2023-03-20

## 2023-03-20 PROBLEM — R77.8 ELEVATED TROPONIN: Status: RESOLVED | Noted: 2023-02-13 | Resolved: 2023-03-20

## 2023-03-22 DIAGNOSIS — I44.2 COMPLETE HEART BLOCK (HCC): ICD-10-CM

## 2023-03-22 DIAGNOSIS — Z95.0 PACEMAKER: ICD-10-CM

## 2024-06-24 ENCOUNTER — TELEPHONE (OUTPATIENT)
Dept: FAMILY MEDICINE CLINIC | Facility: CLINIC | Age: 79
End: 2024-06-24

## 2024-08-29 ENCOUNTER — TELEPHONE (OUTPATIENT)
Dept: OTHER | Facility: CLINIC | Age: 79
End: 2024-08-29

## 2024-11-13 ENCOUNTER — TELEPHONE (OUTPATIENT)
Dept: OTHER | Facility: CLINIC | Age: 79
End: 2024-11-13

## 2024-12-11 PROBLEM — I50.9 HEART FAILURE (HCC): Status: ACTIVE | Noted: 2024-01-01
